# Patient Record
Sex: FEMALE | Race: WHITE | NOT HISPANIC OR LATINO | Employment: FULL TIME | URBAN - METROPOLITAN AREA
[De-identification: names, ages, dates, MRNs, and addresses within clinical notes are randomized per-mention and may not be internally consistent; named-entity substitution may affect disease eponyms.]

---

## 2017-03-17 ENCOUNTER — HOSPITAL ENCOUNTER (OUTPATIENT)
Dept: LAB | Facility: MEDICAL CENTER | Age: 32
End: 2017-03-17
Attending: FAMILY MEDICINE
Payer: COMMERCIAL

## 2017-03-17 LAB
ALBUMIN SERPL BCP-MCNC: 4.1 G/DL (ref 3.2–4.9)
ALBUMIN/GLOB SERPL: 1.6 G/DL
ALP SERPL-CCNC: 32 U/L (ref 30–99)
ALT SERPL-CCNC: 11 U/L (ref 2–50)
ANION GAP SERPL CALC-SCNC: 8 MMOL/L (ref 0–11.9)
AST SERPL-CCNC: 16 U/L (ref 12–45)
BASOPHILS # BLD AUTO: 0.03 K/UL (ref 0–0.12)
BASOPHILS NFR BLD AUTO: 0.4 % (ref 0–1.8)
BILIRUB SERPL-MCNC: 0.4 MG/DL (ref 0.1–1.5)
BUN SERPL-MCNC: 15 MG/DL (ref 8–22)
CALCIUM SERPL-MCNC: 9.3 MG/DL (ref 8.5–10.5)
CHLORIDE SERPL-SCNC: 105 MMOL/L (ref 96–112)
CO2 SERPL-SCNC: 26 MMOL/L (ref 20–33)
CREAT SERPL-MCNC: 0.93 MG/DL (ref 0.5–1.4)
CRP SERPL HS-MCNC: 0.07 MG/DL (ref 0–0.75)
EOSINOPHIL # BLD: 0.11 K/UL (ref 0–0.51)
EOSINOPHIL NFR BLD AUTO: 1.3 % (ref 0–6.9)
ERYTHROCYTE [DISTWIDTH] IN BLOOD BY AUTOMATED COUNT: 43.9 FL (ref 35.9–50)
ERYTHROCYTE [SEDIMENTATION RATE] IN BLOOD BY WESTERGREN METHOD: 6 MM/HOUR (ref 0–20)
GLOBULIN SER CALC-MCNC: 2.6 G/DL (ref 1.9–3.5)
GLUCOSE SERPL-MCNC: 82 MG/DL (ref 65–99)
HCT VFR BLD AUTO: 43.2 % (ref 37–47)
HGB BLD-MCNC: 13.9 G/DL (ref 12–16)
IMM GRANULOCYTES # BLD AUTO: 0.02 K/UL (ref 0–0.11)
IMM GRANULOCYTES NFR BLD AUTO: 0.2 % (ref 0–0.9)
LYMPHOCYTES # BLD: 2.41 K/UL (ref 1–4.8)
LYMPHOCYTES NFR BLD AUTO: 29.2 % (ref 22–41)
MCH RBC QN AUTO: 27.7 PG (ref 27–33)
MCHC RBC AUTO-ENTMCNC: 32.2 G/DL (ref 33.6–35)
MCV RBC AUTO: 86.2 FL (ref 81.4–97.8)
MONOCYTES # BLD: 0.4 K/UL (ref 0–0.85)
MONOCYTES NFR BLD AUTO: 4.9 % (ref 0–13.4)
NEUTROPHILS # BLD: 5.27 K/UL (ref 2–7.15)
NEUTROPHILS NFR BLD AUTO: 64 % (ref 44–72)
NRBC # BLD AUTO: 0 K/UL
NRBC BLD-RTO: 0 /100 WBC
PLATELET # BLD AUTO: 168 K/UL (ref 164–446)
PMV BLD AUTO: 12.3 FL (ref 9–12.9)
POTASSIUM SERPL-SCNC: 4.2 MMOL/L (ref 3.6–5.5)
PROT SERPL-MCNC: 6.7 G/DL (ref 6–8.2)
RBC # BLD AUTO: 5.01 M/UL (ref 4.2–5.4)
SODIUM SERPL-SCNC: 139 MMOL/L (ref 135–145)
TSH SERPL DL<=0.005 MIU/L-ACNC: 2.62 UIU/ML (ref 0.3–3.7)
WBC # BLD AUTO: 8.2 K/UL (ref 4.8–10.8)

## 2017-03-17 PROCEDURE — 86140 C-REACTIVE PROTEIN: CPT

## 2017-03-17 PROCEDURE — 82784 ASSAY IGA/IGD/IGG/IGM EACH: CPT | Mod: 91

## 2017-03-17 PROCEDURE — 80053 COMPREHEN METABOLIC PANEL: CPT

## 2017-03-17 PROCEDURE — 84443 ASSAY THYROID STIM HORMONE: CPT

## 2017-03-17 PROCEDURE — 85652 RBC SED RATE AUTOMATED: CPT

## 2017-03-17 PROCEDURE — 82784 ASSAY IGA/IGD/IGG/IGM EACH: CPT

## 2017-03-17 PROCEDURE — 85025 COMPLETE CBC W/AUTO DIFF WBC: CPT

## 2017-03-17 PROCEDURE — 36415 COLL VENOUS BLD VENIPUNCTURE: CPT

## 2017-03-17 PROCEDURE — 86038 ANTINUCLEAR ANTIBODIES: CPT

## 2017-03-18 LAB
IGA SERPL-MCNC: 28 MG/DL (ref 68–408)
IGG SERPL-MCNC: 956 MG/DL (ref 768–1632)
IGM SERPL-MCNC: 115 MG/DL (ref 35–263)

## 2017-03-20 LAB
NUCLEAR IGG SER QL IA: DETECTED
NUCLEAR IGG TITR SER IF: ABNORMAL {TITER}

## 2017-05-10 ENCOUNTER — HOSPITAL ENCOUNTER (OUTPATIENT)
Dept: LAB | Facility: MEDICAL CENTER | Age: 32
End: 2017-05-10
Attending: INTERNAL MEDICINE
Payer: COMMERCIAL

## 2017-05-10 LAB
25(OH)D3 SERPL-MCNC: 17 NG/ML (ref 30–100)
APPEARANCE UR: ABNORMAL
BACTERIA #/AREA URNS HPF: ABNORMAL /HPF
BILIRUB UR QL STRIP.AUTO: NEGATIVE
C3 SERPL-MCNC: 106 MG/DL (ref 87–200)
C4 SERPL-MCNC: 22 MG/DL (ref 19–52)
CK SERPL-CCNC: 78 U/L (ref 0–154)
COLOR UR: ABNORMAL
CULTURE IF INDICATED INDCX: NO UA CULTURE
EPI CELLS #/AREA URNS HPF: ABNORMAL /HPF
FOLATE SERPL-MCNC: >23.3 NG/ML
GLUCOSE UR STRIP.AUTO-MCNC: NEGATIVE MG/DL
KETONES UR STRIP.AUTO-MCNC: NEGATIVE MG/DL
LEUKOCYTE ESTERASE UR QL STRIP.AUTO: NEGATIVE
MICRO URNS: ABNORMAL
NITRITE UR QL STRIP.AUTO: NEGATIVE
PH UR STRIP.AUTO: 7 [PH]
PROT UR QL STRIP: NEGATIVE MG/DL
RBC # URNS HPF: ABNORMAL /HPF
RBC UR QL AUTO: NEGATIVE
RHEUMATOID FACT SER IA-ACNC: <10 IU/ML (ref 0–14)
SP GR UR STRIP.AUTO: 1.02
THYROPEROXIDASE AB SERPL-ACNC: 340.6 IU/ML (ref 0–9)
VIT B12 SERPL-MCNC: 618 PG/ML (ref 211–911)
WBC #/AREA URNS HPF: ABNORMAL /HPF

## 2017-05-10 PROCEDURE — 86225 DNA ANTIBODY NATIVE: CPT

## 2017-05-10 PROCEDURE — 86431 RHEUMATOID FACTOR QUANT: CPT

## 2017-05-10 PROCEDURE — 82306 VITAMIN D 25 HYDROXY: CPT

## 2017-05-10 PROCEDURE — 86376 MICROSOMAL ANTIBODY EACH: CPT

## 2017-05-10 PROCEDURE — 36415 COLL VENOUS BLD VENIPUNCTURE: CPT

## 2017-05-10 PROCEDURE — 82607 VITAMIN B-12: CPT

## 2017-05-10 PROCEDURE — 86160 COMPLEMENT ANTIGEN: CPT

## 2017-05-10 PROCEDURE — 82550 ASSAY OF CK (CPK): CPT

## 2017-05-10 PROCEDURE — 86235 NUCLEAR ANTIGEN ANTIBODY: CPT | Mod: 91

## 2017-05-10 PROCEDURE — 82085 ASSAY OF ALDOLASE: CPT

## 2017-05-10 PROCEDURE — 83516 IMMUNOASSAY NONANTIBODY: CPT

## 2017-05-10 PROCEDURE — 81001 URINALYSIS AUTO W/SCOPE: CPT

## 2017-05-10 PROCEDURE — 82746 ASSAY OF FOLIC ACID SERUM: CPT

## 2017-05-12 LAB — DSDNA AB TITR SER CLIF: NORMAL {TITER}

## 2017-05-13 LAB
CENTROMERE IGG TITR SER IF: 1 AU/ML (ref 0–40)
ENA SM IGG SER-ACNC: 0 AU/ML (ref 0–40)
ENA SS-B IGG SER IA-ACNC: 0 AU/ML (ref 0–40)
SSA52 R0ENA AB IGG Q0420: 3 AU/ML (ref 0–40)
SSA60 R0ENA AB IGG Q0419: 50 AU/ML (ref 0–40)
U1 SNRNP IGG SER QL: 0 AU/ML (ref 0–40)

## 2017-05-15 LAB — ALDOLASE SERPL-CCNC: 4.1 U/L (ref 1.5–8.1)

## 2017-07-23 ENCOUNTER — HOSPITAL ENCOUNTER (OUTPATIENT)
Dept: RADIOLOGY | Facility: MEDICAL CENTER | Age: 32
End: 2017-07-23
Attending: NURSE PRACTITIONER
Payer: COMMERCIAL

## 2017-07-23 DIAGNOSIS — K42.9 UMBILICAL HERNIA WITHOUT OBSTRUCTION AND WITHOUT GANGRENE: ICD-10-CM

## 2017-07-23 PROCEDURE — 76705 ECHO EXAM OF ABDOMEN: CPT

## 2017-08-02 DIAGNOSIS — Z01.812 PRE-OPERATIVE LABORATORY EXAMINATION: ICD-10-CM

## 2017-08-02 LAB
ANION GAP SERPL CALC-SCNC: 8 MMOL/L (ref 0–11.9)
BUN SERPL-MCNC: 12 MG/DL (ref 8–22)
CALCIUM SERPL-MCNC: 9.4 MG/DL (ref 8.5–10.5)
CHLORIDE SERPL-SCNC: 105 MMOL/L (ref 96–112)
CO2 SERPL-SCNC: 25 MMOL/L (ref 20–33)
CREAT SERPL-MCNC: 0.83 MG/DL (ref 0.5–1.4)
GFR SERPL CREATININE-BSD FRML MDRD: >60 ML/MIN/1.73 M 2
GLUCOSE SERPL-MCNC: 82 MG/DL (ref 65–99)
POTASSIUM SERPL-SCNC: 4 MMOL/L (ref 3.6–5.5)
SODIUM SERPL-SCNC: 138 MMOL/L (ref 135–145)

## 2017-08-02 PROCEDURE — 80048 BASIC METABOLIC PNL TOTAL CA: CPT

## 2017-08-02 PROCEDURE — 36415 COLL VENOUS BLD VENIPUNCTURE: CPT

## 2017-08-02 RX ORDER — SPIRONOLACTONE 50 MG/1
50 TABLET, FILM COATED ORAL EVERY MORNING
Status: ON HOLD | COMMUNITY
End: 2017-08-09

## 2017-08-02 RX ORDER — LEVOTHYROXINE SODIUM 137 UG/1
137 TABLET ORAL
Status: ON HOLD | COMMUNITY
End: 2017-08-09

## 2017-08-02 RX ORDER — LIOTHYRONINE SODIUM 25 UG/1
25 TABLET ORAL EVERY MORNING
Status: ON HOLD | COMMUNITY
End: 2017-08-09

## 2017-08-09 ENCOUNTER — HOSPITAL ENCOUNTER (OUTPATIENT)
Facility: MEDICAL CENTER | Age: 32
End: 2017-08-09
Attending: SURGERY | Admitting: SURGERY
Payer: COMMERCIAL

## 2017-08-09 VITALS
RESPIRATION RATE: 15 BRPM | OXYGEN SATURATION: 96 % | HEIGHT: 67 IN | TEMPERATURE: 98.4 F | WEIGHT: 182.54 LBS | SYSTOLIC BLOOD PRESSURE: 99 MMHG | BODY MASS INDEX: 28.65 KG/M2 | DIASTOLIC BLOOD PRESSURE: 60 MMHG | HEART RATE: 75 BPM

## 2017-08-09 PROBLEM — K42.9 UMBILICAL HERNIA WITHOUT MENTION OF OBSTRUCTION OR GANGRENE: Status: ACTIVE | Noted: 2017-08-09

## 2017-08-09 LAB
HCG UR QL: NEGATIVE
SP GR UR REFRACTOMETRY: 1.02

## 2017-08-09 PROCEDURE — 160025 RECOVERY II MINUTES (STATS): Performed by: SURGERY

## 2017-08-09 PROCEDURE — 501838 HCHG SUTURE GENERAL: Performed by: SURGERY

## 2017-08-09 PROCEDURE — C1781 MESH (IMPLANTABLE): HCPCS | Performed by: SURGERY

## 2017-08-09 PROCEDURE — 700102 HCHG RX REV CODE 250 W/ 637 OVERRIDE(OP)

## 2017-08-09 PROCEDURE — A6402 STERILE GAUZE <= 16 SQ IN: HCPCS | Performed by: SURGERY

## 2017-08-09 PROCEDURE — 160009 HCHG ANES TIME/MIN: Performed by: SURGERY

## 2017-08-09 PROCEDURE — 700111 HCHG RX REV CODE 636 W/ 250 OVERRIDE (IP)

## 2017-08-09 PROCEDURE — 160035 HCHG PACU - 1ST 60 MINS PHASE I: Performed by: SURGERY

## 2017-08-09 PROCEDURE — 160046 HCHG PACU - 1ST 60 MINS PHASE II: Performed by: SURGERY

## 2017-08-09 PROCEDURE — 81025 URINE PREGNANCY TEST: CPT

## 2017-08-09 PROCEDURE — 160002 HCHG RECOVERY MINUTES (STAT): Performed by: SURGERY

## 2017-08-09 PROCEDURE — 700101 HCHG RX REV CODE 250

## 2017-08-09 PROCEDURE — 160048 HCHG OR STATISTICAL LEVEL 1-5: Performed by: SURGERY

## 2017-08-09 PROCEDURE — A9270 NON-COVERED ITEM OR SERVICE: HCPCS

## 2017-08-09 PROCEDURE — 160027 HCHG SURGERY MINUTES - 1ST 30 MINS LEVEL 2: Performed by: SURGERY

## 2017-08-09 PROCEDURE — 160047 HCHG PACU  - EA ADDL 30 MINS PHASE II: Performed by: SURGERY

## 2017-08-09 PROCEDURE — 160038 HCHG SURGERY MINUTES - EA ADDL 1 MIN LEVEL 2: Performed by: SURGERY

## 2017-08-09 DEVICE — MESH VENTRALEX ST W/STRAP - 4.3CM SMALL (1EA/CA): Type: IMPLANTABLE DEVICE | Status: FUNCTIONAL

## 2017-08-09 RX ORDER — ONDANSETRON 4 MG/1
4 TABLET, ORALLY DISINTEGRATING ORAL EVERY 4 HOURS PRN
Qty: 10 TAB | Refills: 1 | Status: SHIPPED | OUTPATIENT
Start: 2017-08-09 | End: 2017-10-27

## 2017-08-09 RX ORDER — CELECOXIB 200 MG/1
CAPSULE ORAL
Status: COMPLETED
Start: 2017-08-09 | End: 2017-08-09

## 2017-08-09 RX ORDER — HYDROCODONE BITARTRATE AND ACETAMINOPHEN 5; 325 MG/1; MG/1
1-2 TABLET ORAL EVERY 6 HOURS PRN
Qty: 30 TAB | Refills: 0 | Status: SHIPPED | OUTPATIENT
Start: 2017-08-09 | End: 2017-10-27

## 2017-08-09 RX ORDER — MAGNESIUM HYDROXIDE 1200 MG/15ML
LIQUID ORAL
Status: DISCONTINUED | OUTPATIENT
Start: 2017-08-09 | End: 2017-08-09 | Stop reason: HOSPADM

## 2017-08-09 RX ORDER — LIDOCAINE AND PRILOCAINE 25; 25 MG/G; MG/G
1 CREAM TOPICAL
Status: DISCONTINUED | OUTPATIENT
Start: 2017-08-09 | End: 2017-08-09 | Stop reason: HOSPADM

## 2017-08-09 RX ORDER — OXYCODONE HYDROCHLORIDE 5 MG/1
5 TABLET ORAL EVERY 4 HOURS PRN
Qty: 30 TAB | Refills: 0 | Status: SHIPPED | OUTPATIENT
Start: 2017-08-09 | End: 2017-10-27

## 2017-08-09 RX ORDER — OXYCODONE HYDROCHLORIDE 5 MG/1
5 TABLET ORAL EVERY 4 HOURS PRN
Status: DISCONTINUED | OUTPATIENT
Start: 2017-08-09 | End: 2017-08-09 | Stop reason: HOSPADM

## 2017-08-09 RX ORDER — BUPIVACAINE HYDROCHLORIDE AND EPINEPHRINE 5; 5 MG/ML; UG/ML
INJECTION, SOLUTION EPIDURAL; INTRACAUDAL; PERINEURAL
Status: DISCONTINUED | OUTPATIENT
Start: 2017-08-09 | End: 2017-08-09 | Stop reason: HOSPADM

## 2017-08-09 RX ORDER — ONDANSETRON 2 MG/ML
4 INJECTION INTRAMUSCULAR; INTRAVENOUS EVERY 4 HOURS PRN
Status: DISCONTINUED | OUTPATIENT
Start: 2017-08-09 | End: 2017-08-09 | Stop reason: HOSPADM

## 2017-08-09 RX ORDER — DIPHENHYDRAMINE HYDROCHLORIDE 50 MG/ML
25 INJECTION INTRAMUSCULAR; INTRAVENOUS EVERY 6 HOURS PRN
Status: DISCONTINUED | OUTPATIENT
Start: 2017-08-09 | End: 2017-08-09 | Stop reason: HOSPADM

## 2017-08-09 RX ORDER — KETOROLAC TROMETHAMINE 30 MG/ML
INJECTION, SOLUTION INTRAMUSCULAR; INTRAVENOUS
Status: DISCONTINUED
Start: 2017-08-09 | End: 2017-08-09 | Stop reason: HOSPADM

## 2017-08-09 RX ORDER — LIDOCAINE HYDROCHLORIDE 10 MG/ML
0.5 INJECTION, SOLUTION INFILTRATION; PERINEURAL
Status: DISCONTINUED | OUTPATIENT
Start: 2017-08-09 | End: 2017-08-09 | Stop reason: HOSPADM

## 2017-08-09 RX ORDER — DEXAMETHASONE SODIUM PHOSPHATE 4 MG/ML
4 INJECTION, SOLUTION INTRA-ARTICULAR; INTRALESIONAL; INTRAMUSCULAR; INTRAVENOUS; SOFT TISSUE
Status: DISCONTINUED | OUTPATIENT
Start: 2017-08-09 | End: 2017-08-09 | Stop reason: HOSPADM

## 2017-08-09 RX ORDER — SCOLOPAMINE TRANSDERMAL SYSTEM 1 MG/1
PATCH, EXTENDED RELEASE TRANSDERMAL
Status: COMPLETED
Start: 2017-08-09 | End: 2017-08-09

## 2017-08-09 RX ORDER — SCOLOPAMINE TRANSDERMAL SYSTEM 1 MG/1
PATCH, EXTENDED RELEASE TRANSDERMAL
Status: DISCONTINUED
Start: 2017-08-09 | End: 2017-08-09 | Stop reason: HOSPADM

## 2017-08-09 RX ORDER — HALOPERIDOL 5 MG/ML
1 INJECTION INTRAMUSCULAR EVERY 6 HOURS PRN
Status: DISCONTINUED | OUTPATIENT
Start: 2017-08-09 | End: 2017-08-09 | Stop reason: HOSPADM

## 2017-08-09 RX ORDER — HYDROCODONE BITARTRATE AND ACETAMINOPHEN 5; 325 MG/1; MG/1
1-2 TABLET ORAL EVERY 6 HOURS PRN
Status: DISCONTINUED | OUTPATIENT
Start: 2017-08-09 | End: 2017-08-09

## 2017-08-09 RX ORDER — SCOLOPAMINE TRANSDERMAL SYSTEM 1 MG/1
1 PATCH, EXTENDED RELEASE TRANSDERMAL
Status: DISCONTINUED | OUTPATIENT
Start: 2017-08-09 | End: 2017-08-09 | Stop reason: HOSPADM

## 2017-08-09 RX ORDER — OXYCODONE HCL 10 MG/1
TABLET, FILM COATED, EXTENDED RELEASE ORAL
Status: COMPLETED
Start: 2017-08-09 | End: 2017-08-09

## 2017-08-09 RX ORDER — ONDANSETRON 4 MG/1
4 TABLET, ORALLY DISINTEGRATING ORAL EVERY 4 HOURS PRN
Status: DISCONTINUED | OUTPATIENT
Start: 2017-08-09 | End: 2017-08-09 | Stop reason: HOSPADM

## 2017-08-09 RX ORDER — OXYCODONE HCL 5 MG/5 ML
SOLUTION, ORAL ORAL
Status: COMPLETED
Start: 2017-08-09 | End: 2017-08-09

## 2017-08-09 RX ORDER — KETOROLAC TROMETHAMINE 30 MG/ML
30 INJECTION, SOLUTION INTRAMUSCULAR; INTRAVENOUS EVERY 6 HOURS
Status: DISCONTINUED | OUTPATIENT
Start: 2017-08-09 | End: 2017-08-09 | Stop reason: HOSPADM

## 2017-08-09 RX ORDER — ACETAMINOPHEN 500 MG
TABLET ORAL
Status: COMPLETED
Start: 2017-08-09 | End: 2017-08-09

## 2017-08-09 RX ORDER — SODIUM CHLORIDE, SODIUM LACTATE, POTASSIUM CHLORIDE, CALCIUM CHLORIDE 600; 310; 30; 20 MG/100ML; MG/100ML; MG/100ML; MG/100ML
INJECTION, SOLUTION INTRAVENOUS CONTINUOUS
Status: DISCONTINUED | OUTPATIENT
Start: 2017-08-09 | End: 2017-08-09 | Stop reason: HOSPADM

## 2017-08-09 RX ADMIN — CELECOXIB 400 MG: 200 CAPSULE ORAL at 08:37

## 2017-08-09 RX ADMIN — OXYCODONE HYDROCHLORIDE 10 MG: 10 TABLET, FILM COATED, EXTENDED RELEASE ORAL at 08:37

## 2017-08-09 RX ADMIN — OXYCODONE HYDROCHLORIDE 5 MG: 5 SOLUTION ORAL at 12:25

## 2017-08-09 RX ADMIN — SCOPALAMINE 1 PATCH: 1 PATCH, EXTENDED RELEASE TRANSDERMAL at 08:35

## 2017-08-09 RX ADMIN — SODIUM CHLORIDE, SODIUM LACTATE, POTASSIUM CHLORIDE, CALCIUM CHLORIDE: 600; 310; 30; 20 INJECTION, SOLUTION INTRAVENOUS at 09:00

## 2017-08-09 RX ADMIN — ACETAMINOPHEN 1000 MG: 500 TABLET, FILM COATED ORAL at 08:37

## 2017-08-09 ASSESSMENT — PAIN SCALES - GENERAL
PAINLEVEL_OUTOF10: 2
PAINLEVEL_OUTOF10: 0
PAINLEVEL_OUTOF10: 2
PAINLEVEL_OUTOF10: 0
PAINLEVEL_OUTOF10: 2

## 2017-08-09 NOTE — OR NURSING
1120: Patient has voided and feel like pain is tolerable at this time, vital signs are stable. Patient meet discharge criteria but is requesting a different pain medication to go home with. Dr. Tavares notified and stated he will come back to unit at some point to give her a new prescription.   1145: Patient was provided discharge education and is comfortable waiting for Dr. Tavares.

## 2017-08-09 NOTE — OP REPORT
DATE OF OPERATION: 8/9/2017    PREOPERATIVE DIAGNOSIS: Umbilical hernia.    POSTOPERATIVE DIAGNOSIS: Umbilical hernia.    PROCEDURE PERFORMED: Umbilical hernia repair with mesh.    SURGEON: Florencio Tavares MD    ASSISTANT: None.    ANESTHESIOLOGIST:Camilo Martin M.D./General     ANESTHESIA: General with LMA, local with 30 mL of 0.5% Marcaine with   epinephrine.    SPECIMENS: None.    BLOOD LOSS: Less than 5 mL    FINDINGS: A 1.5 cm defect. Hernia sac was completely reduced. A Ventralex   ST small mesh was placed in the preperitoneal space, secured with 0 Ethibond   sutures.    COMPLICATIONS: None.    DESCRIPTION OF PROCEDURE: The patient in supine position, general anesthesia   was administered, abdomen shaved, prepped with ChloraPrep and widely draped.   Local anesthesia was infiltrated along the superior edge of the umbilicus,   transverse incision made.  Blunt dissection was used to the fascia. Fascia was   cleaned. The umbilicus was detached from the underlying fascial attachments.   Hernia sac was identified, it was  from the umbilical tissue and was  able to be completely reduced. Superior edge of the fascia was cleaned.   At this point, the fascial defect was approximately 1.8 cm. I was able put a finger in   and out through this without any resistance. The Ventralex ST small mesh was placed   in the preperitoneal space after this had been slightly increased in size just with blunt   dissection with the finger. Once in the preperitoneal space, secured superiorly and inferiorly,   then left and right lateral with horizontal mattress sutures of 0 Ethibond. Once here   in place, these were tied with good lay of the mesh. The tails of the mesh were cut.   Small defect was then repaired using a 0 Vicryl running suture. Subcutaneous   tissues were irrigated. Umbilicus was reattached to the fascia with 3-0   Vicryl sutures x2. Subcutaneous tissues were reapproximated with 3-0 Vicryl   sutures  and the skin was reapproximated with 4-0 Vicryl running subcuticular   suture. Areas were cleaned and dried. Benzoin and Steri-Strips were applied   followed by a gauze and Tegaderm. The patient tolerated the procedure well   and was extubated in the OR, brought to recovery room. Plan to be discharged   to home.    ____________________________________  MD BRIE BLAIR / NTS

## 2017-08-09 NOTE — DISCHARGE INSTRUCTIONS
ACTIVITY: Rest and take it easy for the first 24 hours.  A responsible adult is recommended to remain with you during that time.  It is normal to feel sleepy.  We encourage you to not do anything that requires balance, judgment or coordination.    MILD FLU-LIKE SYMPTOMS ARE NORMAL. YOU MAY EXPERIENCE GENERALIZED MUSCLE ACHES, THROAT IRRITATION, HEADACHE AND/OR SOME NAUSEA.    FOR 24 HOURS DO NOT:  Drive, operate machinery or run household appliances.  Drink beer or alcoholic beverages.   Make important decisions or sign legal documents.    SPECIAL INSTRUCTIONS:   *No lifting >20lbs for 4 weeks.  *Follow up in office in 10 - 14 days  *Ice pack to incisions intermittently to reduce swelling and pain    SURGICAL DRESSING/BATHING:   *Remove dressing in 48 - 72 hrs.  *OK to shower when dressing removed / no bath for 2 weeks.    FOLLOW-UP APPOINTMENT:  A follow-up appointment should be arranged with your doctor in 10-14 days; call to schedule.    You should CALL YOUR PHYSICIAN if you develop:  Fever greater than 101 degrees F.  Pain not relieved by medication, or persistent nausea or vomiting.  Excessive bleeding (blood soaking through dressing) or unexpected drainage from the wound.  Extreme redness or swelling around the incision site, drainage of pus or foul smelling drainage.  Inability to urinate or empty your bladder within 8 hours.  Problems with breathing or chest pain.    You should call 911 if you develop problems with breathing or chest pain.  If you are unable to contact your doctor or surgical center, you should go to the nearest emergency room or urgent care center.  Physician's telephone #: [phone]    If any questions arise, call your doctor.  If your doctor is not available, please feel free to call the Surgical Center at [phone].  The Center is open Monday through Friday from 7AM to 7PM.  You can also call the HEALTH HOTLINE open 24 hours/day, 7 days/week and speak to a nurse at [phone]  [phone], or toll free at [phone].    A registered nurse may call you a few days after your surgery to see how you are doing after your procedure.    MEDICATIONS: Resume taking daily medication.  Take prescribed pain medication with food.  If no medication is prescribed, you may take non-aspirin pain medication if needed.  PAIN MEDICATION CAN BE VERY CONSTIPATING.  Take a stool softener or laxative such as senokot, pericolace, or milk of magnesia if needed.    Last pain medication given at 8:37 am.    If your physician has prescribed pain medication that includes Acetaminophen (Tylenol), do not take additional Acetaminophen (Tylenol) while taking the prescribed medication.    Depression / Suicide Risk    As you are discharged from this [facility], it is important to learn how to keep safe from harming yourself.    Recognize the warning signs:  · Abrupt changes in personality, positive or negative- including increase in energy   · Giving away possessions  · Change in eating patterns- significant weight changes-  positive or negative  · Change in sleeping patterns- unable to sleep or sleeping all the time   · Unwillingness or inability to communicate  · Depression  · Unusual sadness, discouragement and loneliness  · Talk of wanting to die  · Neglect of personal appearance   · Rebelliousness- reckless behavior  · Withdrawal from people/activities they love  · Confusion- inability to concentrate     If you or a loved one observes any of these behaviors or has concerns about self-harm, here's what you can do:  · Talk about it- your feelings and reasons for harming yourself  · Remove any means that you might use to hurt yourself (examples: pills, rope, extension cords, firearm)  · Get professional help from the community (Mental Health, Substance Abuse, psychological counseling)  · Do not be alone:Call your Safe Contact- someone whom you trust who will be there for you.  · Call your local CRISIS HOTLINE  480-3311 or 046-533-4246  · Call your local Children's Mobile Crisis Response Team [place] (001) 965-0607 or www.[employer].Arkadin  · Call the toll free National Suicide Prevention Hotlines   · National Suicide Prevention Lifeline 700-060-HQJF (8306)  · [employer] Line Network 800-SUICIDE (047-2724)

## 2017-08-09 NOTE — IP AVS SNAPSHOT
" Home Care Instructions                                                                                                                Name:Carolina Metcalf  Medical Record Number:[id number]  CSN: [id number]    YOB: 1985   Age: 32 y.o.  Sex: female  HT:1.702 m (5' 7\") WT: 82.8 kg (182 lb 8.7 oz)          Admit Date: 8/9/2017     Discharge Date:   Today's Date: 8/9/2017  Attending Doctor:  Florencio Tavares M.D.                  Allergies:  Shellfish allergy; Cephalosporins; Clindamycin hcl; Iodine; Penicillins; and Vancomycin                Discharge Instructions           ACTIVITY: Rest and take it easy for the first 24 hours.  A responsible adult is recommended to remain with you during that time.  It is normal to feel sleepy.  We encourage you to not do anything that requires balance, judgment or coordination.    MILD FLU-LIKE SYMPTOMS ARE NORMAL. YOU MAY EXPERIENCE GENERALIZED MUSCLE ACHES, THROAT IRRITATION, HEADACHE AND/OR SOME NAUSEA.    FOR 24 HOURS DO NOT:  Drive, operate machinery or run household appliances.  Drink beer or alcoholic beverages.   Make important decisions or sign legal documents.    SPECIAL INSTRUCTIONS:   *No lifting >20lbs for 4 weeks.  *Follow up in office in 10 - 14 days  *Ice pack to incisions intermittently to reduce swelling and pain    SURGICAL DRESSING/BATHING:   *Remove dressing in 48 - 72 hrs.  *OK to shower when dressing removed / no bath for 2 weeks.    FOLLOW-UP APPOINTMENT:  A follow-up appointment should be arranged with your doctor in 10-14 days; call to schedule.    You should CALL YOUR PHYSICIAN if you develop:  Fever greater than 101 degrees F.  Pain not relieved by medication, or persistent nausea or vomiting.  Excessive bleeding (blood soaking through dressing) or unexpected drainage from the wound.  Extreme redness or swelling around the incision site, drainage of pus or foul smelling drainage.  Inability to urinate or empty your bladder within 8 " hours.  Problems with breathing or chest pain.    You should call 911 if you develop problems with breathing or chest pain.  If you are unable to contact your doctor or surgical center, you should go to the nearest emergency room or urgent care center.  Physician's telephone #: [phone]    If any questions arise, call your doctor.  If your doctor is not available, please feel free to call the Surgical Center at [phone].  The Center is open Monday through Friday from 7AM to 7PM.  You can also call the HEALTH HOTLINE open 24 hours/day, 7 days/week and speak to a nurse at [phone], or toll free at [phone].    A registered nurse may call you a few days after your surgery to see how you are doing after your procedure.    MEDICATIONS: Resume taking daily medication.  Take prescribed pain medication with food.  If no medication is prescribed, you may take non-aspirin pain medication if needed.  PAIN MEDICATION CAN BE VERY CONSTIPATING.  Take a stool softener or laxative such as senokot, pericolace, or milk of magnesia if needed.    Last pain medication given at 8:37 am.    If your physician has prescribed pain medication that includes Acetaminophen (Tylenol), do not take additional Acetaminophen (Tylenol) while taking the prescribed medication.    Depression / Suicide Risk    As you are discharged from this [facility] facility, it is important to learn how to keep safe from harming yourself.    Recognize the warning signs:  · Abrupt changes in personality, positive or negative- including increase in energy   · Giving away possessions  · Change in eating patterns- significant weight changes-  positive or negative  · Change in sleeping patterns- unable to sleep or sleeping all the time   · Unwillingness or inability to communicate  · Depression  · Unusual sadness, discouragement and loneliness  · Talk of wanting to die  · Neglect of personal appearance   · Rebelliousness- reckless  behavior  · Withdrawal from people/activities they love  · Confusion- inability to concentrate     If you or a loved one observes any of these behaviors or has concerns about self-harm, here's what you can do:  · Talk about it- your feelings and reasons for harming yourself  · Remove any means that you might use to hurt yourself (examples: pills, rope, extension cords, firearm)  · Get professional help from the community (Mental Health, Substance Abuse, psychological counseling)  · Do not be alone:Call your Safe Contact- someone whom you trust who will be there for you.  · Call your local CRISIS HOTLINE 056-0009 or 359-474-5046  · Call your local Children's Mobile Crisis Response Team [place] (245) 326-5640 or www.[employer]  · Call the toll free National Suicide Prevention Hotlines   · National Suicide Prevention Lifeline 008-969-GYHZ (4208)  · [place] [employer] Line Network 800-SUICIDE (221-0215)       Medication List      START taking these medications        Instructions    Morning Afternoon Evening Bedtime    hydrocodone-acetaminophen 5-325 MG Tabs per tablet   Commonly known as:  NORCO        Take 1-2 Tabs by mouth every 6 hours as needed (as neededfor moderate pain).   Dose:  1-2 Tab                          STOP taking these medications     cyanocobalamin 1000 MCG/ML Soln   Commonly known as:  VITAMIN B-12               CYTOMEL 25 MCG Tabs   Generic drug:  liothyronine               levothyroxine 137 MCG Tabs   Commonly known as:  SYNTHROID               spironolactone 50 MG Tabs   Commonly known as:  ALDACTONE                    Where to Get Your Medications      You can get these medications from any pharmacy     Bring a paper prescription for each of these medications    - hydrocodone-acetaminophen 5-325 MG Tabs per tablet            Medication Information     Above and/or attached are the medications your physician expects you to take upon discharge. Review all of your home medications and newly  ordered medications with your doctor and/or pharmacist. Follow medication instructions as directed by your doctor and/or pharmacist. Please keep your medication list with you and share with your physician. Update the information when medications are discontinued, doses are changed, or new medications (including over-the-counter products) are added; and carry medication information at all times in the event of emergency situations.        Resources     Quit Smoking / Tobacco Use:    I understand the use of any tobacco products increases my chance of suffering from future heart disease or stroke and could cause other illnesses which may shorten my life. Quitting the use of tobacco products is the single most important thing I can do to improve my health. For further information on smoking / tobacco cessation call a Toll Free Quit Line at 1-365.651.9237 (*National [employer] [place]) or 1-219.326.5136 (American Lung Association) or you can access the web based program at www.lungusa.org.    [place] Tobacco Users Help Line:  (296) 969-9247       Toll Free: 1-773.585.1504  Quit Tobacco Program [facility] (769)727-0164    Crisis Hotline:    [place] Crisis Hotline:  8-551-QZTPUJC or 1-752.833.8731    [place] Crisis Hotline:    1-458.178.4318 or 021-219-0566    Discharge Survey:   Thank you for choosing [facility]. We hope we did everything we could to make your hospital stay a pleasant one. You may be receiving a survey and we would appreciate your time and participation in answering the questions. Your input is very valuable to us in our efforts to improve our service to our patients and their families.            Signatures     My signature on this form indicates that:    1. I acknowledge receipt and understanding of these Home Care Instruction.  2. My questions regarding this information have been answered to my satisfaction.  3. I have formulated a plan with my discharge nurse to obtain my  prescribed medications for home.    __________________________________      __________________________________                   Patient Signature                                 Guardian/Responsible Adult Signature      __________________________________                 __________       ________                       Nurse Signature                                               Date                 Time

## 2017-08-09 NOTE — IP AVS SNAPSHOT
8/9/2017    Carolina Metcalf  [address] [facility] [address]    Dear Carolina:    [facility] wants to ensure your discharge home is safe and you or your loved ones have had all of your questions answered regarding your care after you leave the hospital.    Below is a list of resources and contact information should you have any questions regarding your hospital stay, follow-up instructions, or active medical symptoms.    Questions or Concerns Regarding… Contact   Medical Questions Related to Your Discharge  (7 days a week, 8am-5pm) Contact a Nurse Care Coordinator   [phone]   Medical Questions Not Related to Your Discharge  (24 hours a day / 7 days a week)  Contact the Nurse Health Line   [phone]    Medications or Discharge Instructions Refer to your discharge packet   or contact your [facility] Primary Care Provider   [phone]   Follow-up Appointment(s) Schedule your appointment via Qumu   or contact Scheduling [phone]   Billing Review your statement via Qumu  or contact Billing [phone]   Medical Records Review your records via [employer]umu   or contact Medical Records [phone]     You may receive a telephone call within two days of discharge. This call is to make certain you understand your discharge instructions and have the opportunity to have any questions answered. You can also easily access your medical information, test results and upcoming appointments via the Qumu free online health management tool. You can learn more and sign up at [employer]/Qumu. For assistance setting up your Qumu account, please call [phone].    Once again, we want to ensure your discharge home is safe and that you have a clear understanding of any next steps in your care. If you have any questions or concerns, please do not hesitate to contact us, we are here for you. Thank you for choosing [facility] for your healthcare needs.    Sincerely,    Your [facility] Healthcare Team

## 2017-08-09 NOTE — OR SURGEON
Operative Report    PreOp Diagnosis: Umbilical hernia    PostOp Diagnosis: same    Procedure(s):  UMBILICAL HERNIA REPAIR WITH MESH - Wound Class: Clean    Surgeon(s):  Florencio Tavares M.D.    Anesthesiologist/Type of Anesthesia:  Anesthesiologist: Camilo Martin M.D./General    Surgical Staff:  Circulator: Berenice Christianson R.N.  Scrub Person: Silvino Pascual    Specimens:  * No specimens in log *    Estimated Blood Loss: <5 cc    Findings: Defect ~1.8 cm- allowed finger to pass / Ventralux ST small    Complications: none        8/9/2017 10:20 AM Florencio Tavares

## 2017-08-18 ENCOUNTER — OFFICE VISIT (OUTPATIENT)
Dept: URGENT CARE | Facility: PHYSICIAN GROUP | Age: 32
End: 2017-08-18
Payer: COMMERCIAL

## 2017-08-18 ENCOUNTER — HOSPITAL ENCOUNTER (EMERGENCY)
Facility: MEDICAL CENTER | Age: 32
End: 2017-08-19
Attending: EMERGENCY MEDICINE
Payer: COMMERCIAL

## 2017-08-18 VITALS
BODY MASS INDEX: 28.56 KG/M2 | WEIGHT: 182 LBS | DIASTOLIC BLOOD PRESSURE: 62 MMHG | SYSTOLIC BLOOD PRESSURE: 114 MMHG | HEART RATE: 78 BPM | RESPIRATION RATE: 16 BRPM | OXYGEN SATURATION: 99 % | TEMPERATURE: 98.4 F | HEIGHT: 67 IN

## 2017-08-18 DIAGNOSIS — Z87.19 STATUS POST REPAIR OF VENTRAL HERNIA: ICD-10-CM

## 2017-08-18 DIAGNOSIS — Z98.890 STATUS POST REPAIR OF VENTRAL HERNIA: ICD-10-CM

## 2017-08-18 DIAGNOSIS — R10.33 PERIUMBILICAL ABDOMINAL PAIN: ICD-10-CM

## 2017-08-18 DIAGNOSIS — G89.18 POST-OP PAIN: ICD-10-CM

## 2017-08-18 DIAGNOSIS — R11.2 NAUSEA AND VOMITING, INTRACTABILITY OF VOMITING NOT SPECIFIED, UNSPECIFIED VOMITING TYPE: ICD-10-CM

## 2017-08-18 LAB
ALBUMIN SERPL BCP-MCNC: 4.1 G/DL (ref 3.2–4.9)
ALBUMIN/GLOB SERPL: 1.6 G/DL
ALP SERPL-CCNC: 41 U/L (ref 30–99)
ALT SERPL-CCNC: 70 U/L (ref 2–50)
ANION GAP SERPL CALC-SCNC: 8 MMOL/L (ref 0–11.9)
APPEARANCE UR: CLEAR
AST SERPL-CCNC: 42 U/L (ref 12–45)
BASOPHILS # BLD AUTO: 0.5 % (ref 0–1.8)
BASOPHILS # BLD: 0.03 K/UL (ref 0–0.12)
BILIRUB SERPL-MCNC: 0.8 MG/DL (ref 0.1–1.5)
BILIRUB UR QL STRIP.AUTO: NEGATIVE
BUN SERPL-MCNC: 10 MG/DL (ref 8–22)
CALCIUM SERPL-MCNC: 9.1 MG/DL (ref 8.5–10.5)
CHLORIDE SERPL-SCNC: 104 MMOL/L (ref 96–112)
CO2 SERPL-SCNC: 25 MMOL/L (ref 20–33)
COLOR UR: YELLOW
CREAT SERPL-MCNC: 0.85 MG/DL (ref 0.5–1.4)
CULTURE IF INDICATED INDCX: NO UA CULTURE
EOSINOPHIL # BLD AUTO: 0.11 K/UL (ref 0–0.51)
EOSINOPHIL NFR BLD: 1.9 % (ref 0–6.9)
ERYTHROCYTE [DISTWIDTH] IN BLOOD BY AUTOMATED COUNT: 41.2 FL (ref 35.9–50)
GFR SERPL CREATININE-BSD FRML MDRD: >60 ML/MIN/1.73 M 2
GLOBULIN SER CALC-MCNC: 2.6 G/DL (ref 1.9–3.5)
GLUCOSE SERPL-MCNC: 84 MG/DL (ref 65–99)
GLUCOSE UR STRIP.AUTO-MCNC: NEGATIVE MG/DL
HCG UR QL: NEGATIVE
HCT VFR BLD AUTO: 41.8 % (ref 37–47)
HGB BLD-MCNC: 13.6 G/DL (ref 12–16)
IMM GRANULOCYTES # BLD AUTO: 0.01 K/UL (ref 0–0.11)
IMM GRANULOCYTES NFR BLD AUTO: 0.2 % (ref 0–0.9)
KETONES UR STRIP.AUTO-MCNC: 15 MG/DL
LEUKOCYTE ESTERASE UR QL STRIP.AUTO: NEGATIVE
LYMPHOCYTES # BLD AUTO: 1.77 K/UL (ref 1–4.8)
LYMPHOCYTES NFR BLD: 30.6 % (ref 22–41)
MCH RBC QN AUTO: 27.9 PG (ref 27–33)
MCHC RBC AUTO-ENTMCNC: 32.5 G/DL (ref 33.6–35)
MCV RBC AUTO: 85.7 FL (ref 81.4–97.8)
MICRO URNS: ABNORMAL
MONOCYTES # BLD AUTO: 0.47 K/UL (ref 0–0.85)
MONOCYTES NFR BLD AUTO: 8.1 % (ref 0–13.4)
NEUTROPHILS # BLD AUTO: 3.4 K/UL (ref 2–7.15)
NEUTROPHILS NFR BLD: 58.7 % (ref 44–72)
NITRITE UR QL STRIP.AUTO: NEGATIVE
NRBC # BLD AUTO: 0 K/UL
NRBC BLD AUTO-RTO: 0 /100 WBC
PH UR STRIP.AUTO: 5.5 [PH]
PLATELET # BLD AUTO: 134 K/UL (ref 164–446)
PMV BLD AUTO: 11.6 FL (ref 9–12.9)
POTASSIUM SERPL-SCNC: 3.7 MMOL/L (ref 3.6–5.5)
PROT SERPL-MCNC: 6.7 G/DL (ref 6–8.2)
PROT UR QL STRIP: NEGATIVE MG/DL
RBC # BLD AUTO: 4.88 M/UL (ref 4.2–5.4)
RBC UR QL AUTO: NEGATIVE
SODIUM SERPL-SCNC: 137 MMOL/L (ref 135–145)
SP GR UR REFRACTOMETRY: 1.01
SP GR UR STRIP.AUTO: 1.01
UROBILINOGEN UR STRIP.AUTO-MCNC: 0.2 MG/DL
WBC # BLD AUTO: 5.8 K/UL (ref 4.8–10.8)

## 2017-08-18 PROCEDURE — 96361 HYDRATE IV INFUSION ADD-ON: CPT

## 2017-08-18 PROCEDURE — 99284 EMERGENCY DEPT VISIT MOD MDM: CPT

## 2017-08-18 PROCEDURE — 81025 URINE PREGNANCY TEST: CPT

## 2017-08-18 PROCEDURE — 81003 URINALYSIS AUTO W/O SCOPE: CPT

## 2017-08-18 PROCEDURE — 700111 HCHG RX REV CODE 636 W/ 250 OVERRIDE (IP): Performed by: EMERGENCY MEDICINE

## 2017-08-18 PROCEDURE — 85025 COMPLETE CBC W/AUTO DIFF WBC: CPT

## 2017-08-18 PROCEDURE — 80053 COMPREHEN METABOLIC PANEL: CPT

## 2017-08-18 PROCEDURE — 700105 HCHG RX REV CODE 258: Performed by: EMERGENCY MEDICINE

## 2017-08-18 PROCEDURE — 99203 OFFICE O/P NEW LOW 30 MIN: CPT | Performed by: EMERGENCY MEDICINE

## 2017-08-18 PROCEDURE — 36415 COLL VENOUS BLD VENIPUNCTURE: CPT

## 2017-08-18 PROCEDURE — 96375 TX/PRO/DX INJ NEW DRUG ADDON: CPT

## 2017-08-18 PROCEDURE — 96374 THER/PROPH/DIAG INJ IV PUSH: CPT

## 2017-08-18 RX ORDER — MORPHINE SULFATE 4 MG/ML
4 INJECTION, SOLUTION INTRAMUSCULAR; INTRAVENOUS ONCE
Status: COMPLETED | OUTPATIENT
Start: 2017-08-18 | End: 2017-08-18

## 2017-08-18 RX ORDER — ONDANSETRON 2 MG/ML
4 INJECTION INTRAMUSCULAR; INTRAVENOUS ONCE
Status: COMPLETED | OUTPATIENT
Start: 2017-08-18 | End: 2017-08-18

## 2017-08-18 RX ORDER — DIPHENHYDRAMINE HYDROCHLORIDE 50 MG/ML
50 INJECTION INTRAMUSCULAR; INTRAVENOUS ONCE
Status: COMPLETED | OUTPATIENT
Start: 2017-08-18 | End: 2017-08-19

## 2017-08-18 RX ORDER — DIPHENHYDRAMINE HYDROCHLORIDE 50 MG/ML
50 INJECTION INTRAMUSCULAR; INTRAVENOUS ONCE
Status: COMPLETED | OUTPATIENT
Start: 2017-08-18 | End: 2017-08-18

## 2017-08-18 RX ORDER — SODIUM CHLORIDE 9 MG/ML
1000 INJECTION, SOLUTION INTRAVENOUS ONCE
Status: COMPLETED | OUTPATIENT
Start: 2017-08-18 | End: 2017-08-18

## 2017-08-18 RX ADMIN — ONDANSETRON 4 MG: 2 INJECTION INTRAMUSCULAR; INTRAVENOUS at 20:56

## 2017-08-18 RX ADMIN — MORPHINE SULFATE 4 MG: 4 INJECTION INTRAVENOUS at 20:56

## 2017-08-18 RX ADMIN — SODIUM CHLORIDE 1000 ML: 9 INJECTION, SOLUTION INTRAVENOUS at 20:54

## 2017-08-18 RX ADMIN — DIPHENHYDRAMINE HYDROCHLORIDE 50 MG: 50 INJECTION, SOLUTION INTRAMUSCULAR; INTRAVENOUS at 20:56

## 2017-08-18 ASSESSMENT — ENCOUNTER SYMPTOMS
FLANK PAIN: 0
NAUSEA: 1
CHILLS: 1
ANOREXIA: 1
VOMITING: 1
FLATUS: 0
HEMATOCHEZIA: 0
ABDOMINAL PAIN: 1
SORE THROAT: 0
HEADACHES: 1
FEVER: 1
MYALGIAS: 1
COUGH: 0
SHORTNESS OF BREATH: 0
BELCHING: 0
CONSTIPATION: 0
BLOOD IN STOOL: 0
DIARRHEA: 0

## 2017-08-18 ASSESSMENT — LIFESTYLE VARIABLES: DO YOU DRINK ALCOHOL: YES

## 2017-08-18 NOTE — ED NOTES
Carolina Metcalf  32 y.o.  Chief Complaint   Patient presents with   • Post-Op Complications     Pt states that she had an umbilical hernia repaired on the 9/10, yesterday at 11:30 am she began feeling increasing abdominal pain, weakness, nausea vomiring, fever, and headache. She was told to go to  and was then directed here.

## 2017-08-18 NOTE — ED AVS SNAPSHOT
Home Care Instructions                                                                                                                Carolina Metcalf   MRN: [id number]    Department:  [facility], Emergency Dept   Date of Visit:  8/18/2017            [facility], Emergency Dept    [address] [address]    Phone:  [phone]      You were seen by     1. Carlo Prasad M.D.    2. Michelle Prasad M.D.      Your Diagnosis Was     Post-op pain     G89.18       These are the medications you received during your hospitalization from 08/18/2017 1558 to 08/19/2017 0259     Date/Time Order Dose Route Action    08/18/2017 2056 ondansetron (ZOFRAN) syringe/vial injection 4 mg 4 mg Intravenous Given    08/18/2017 2054 NS infusion 1,000 mL 1,000 mL Intravenous New Bag    08/18/2017 2056 morphine (pf) 4 mg/ml injection 4 mg 4 mg Intravenous Given    08/18/2017 2056 diphenhydrAMINE (BENADRYL) injection 50 mg 50 mg Intravenous Given    08/19/2017 0124 diphenhydrAMINE (BENADRYL) injection 50 mg 50 mg Intravenous Given    08/19/2017 0120 ondansetron (ZOFRAN) syringe/vial injection 4 mg 4 mg Intravenous Given    08/19/2017 0300 iohexol (OMNIPAQUE) 350 mg/mL 100 mL Intravenous Given      Medication Information     Review all of your home medications and newly ordered medications with your primary doctor and/or pharmacist as soon as possible. Follow medication instructions as directed by your doctor and/or pharmacist.     Please keep your complete medication list with you and share with your physician. Update the information when medications are discontinued, doses are changed, or new medications (including over-the-counter products) are added; and carry medication information at all times in the event of emergency situations.               Medication List      ASK your doctor about these medications        Instructions    Morning Afternoon Evening Bedtime    hydrocodone-acetaminophen 5-325 MG Tabs per tablet   Commonly known as:  NORCO        Take 1-2 Tabs by mouth every 6 hours as needed (as neededfor moderate pain).   Dose:  1-2 Tab                        ondansetron 4 MG Tbdp   Commonly known as:  ZOFRAN ODT        Take 1 Tab by mouth every four hours as needed for Nausea/Vomiting.   Dose:  4 mg                        oxycodone immediate-release 5 MG Tabs   Commonly known as:  ROXICODONE        Take 1 Tab by mouth every four hours as needed for Severe Pain.   Dose:  5 mg                                Procedures and tests performed during your visit     CBC WITH DIFFERENTIAL    COMP METABOLIC PANEL    CT-ABDOMEN-PELVIS WITH    ESTIMATED GFR    HCG QUALITATIVE UR    INSERT IV    REFRACTOMETER SG    URINALYSIS,CULTURE IF INDICATED        Discharge Instructions       Pain Relief Preoperatively and Postoperatively  If you have questions, problems, or concerns about the pain that you may feel after surgery, let your health care provider know. Patients have the right to assessment and management of pain. Severe pain after surgery--and the fear or anxiety associated with that pain--may cause extreme discomfort that:  · Prevents sleep.  · Decreases the ability to breathe deeply and to cough. This can result in pneumonia or other upper airway infections.  · Causes the heart to beat more quickly and the blood pressure to be higher.  · Increases the risk for constipation and bloating.  · Decreases the ability of wounds to heal.  · May result in depression, increased anxiety, and feelings of helplessness.  Relieving pain before surgery (preoperatively) is also important because it lessens pain that you have after surgery (postoperatively). Patients who receive pain relief both before and after surgery experience greater pain relief than those who receive pain relief only after surgery. Let your health care provider know if you are having uncontrolled pain. This is very  important. Pain after surgery is more difficult to manage if it is severe, so receiving prompt and adequate treatment of acute pain is necessary. If you become constipated after taking pain medicine, drink more liquids if you can. Your health care provider may have you take a mild laxative.  PAIN CONTROL METHODS  Your health care providers follow policies and procedures about the management of your pain. These guidelines should be explained to you before surgery. Plans for pain control after surgery must be decided upon by you and your health care provider and put into use with your full understanding and agreement. Do not be afraid to ask questions about the care that you are receiving.  Your health care providers will attempt to control your pain in various ways, and these methods may be used together (multimodal analgesia). Using this approach has many benefits for you, including being able to eat, move around, and leave the hospital sooner.  As-Needed Pain Control  · You may be given pain medicine through an IV tube or as a pill or liquid that you can swallow. Let your health care provider know when you are having pain, and he or she will give you the pain medicine that is ordered for you.  IV Patient-Controlled Analgesia (PCA) Pump  · You can receive your pain medicine through an IV tube that goes into one of your veins. You can control the amount of pain medicine that you get. The pain medicine is controlled by a pump. When you push the button that is hooked up to this pump, you receive a specific amount of pain medicine. This button should be pushed only by you or by someone who is specifically assigned by you to do so. It is set up to keep you from accidentally giving yourself too much pain medicine. You will be able to start using your pain pump in the recovery room after your surgery. This method can be helpful for most types of surgery.  · Tell your health care provider:  ¨ If you are having too much  pain.  ¨ If you are feeling too sleepy or nauseous.  Continuous Epidural Pain Control  · A thin, soft tube (catheter) is put into your back, outside the outer layer of your spinal cord. Pain medicine flows through the catheter to lessen pain in areas of your body that are below the level of catheter placement. Continuous epidural pain control may work best for you if you are having surgery on your abdomen, hip area, or legs. The epidural catheter is usually put into your back shortly before surgery. It is left in until you can eat, take medicine by mouth, pass urine, and have a bowel movement.  · Giving pain medicine through the epidural catheter may help you to heal more quickly because you can do these things sooner:  ¨ Regain normal bowel and bladder function.  ¨ Return to eating.  ¨ Get up and walk.  Medicine That Numbs the Area (Local Anesthetic)  You may be given pain medicine:  · As an injection near the area of the pain (local infiltration).  · As an injection near the nerve that controls the sensation to a specific part of your body (peripheral nerve block).  · In your spine to block pain (spinal block).  · Through a local anesthetic reservoir pump. If your surgeon or anesthesiologist selects this option as a part of your pain control, one or more thin, soft tubes will be inserted into your incision site(s) at the end of surgery. These tubes will be connected to a device that is filled with a non-narcotic pain medicine. This medicine gradually empties into your incision site over the next several days. Usually, after all of the medicine is used, your health care provider will remove the tubes and throw away the device.  Opioids  · Moderate to moderately severe acute pain after surgery may respond to opioids. Opioids are narcotic pain medicine. Opioids are often combined with non-narcotic medicines to improve pain relief, lower the risk of side effects, and reduce the chance of addiction.  · If you follow  your health care provider's directions about taking opioids and you do not have a history of substance abuse, your risk of becoming addicted is very small. To prevent addiction, opioids are given for short periods of time in careful doses.  Other Methods of Pain Control  · Steroids.  · Physical therapy.  · Heat and cold therapy.  · Compression, such as wrapping an elastic bandage around the area of the pain.  · Massage.     This information is not intended to replace advice given to you by your health care provider. Make sure you discuss any questions you have with your health care provider.     Document Released: 03/09/2004 Document Revised: 01/08/2016 Document Reviewed: 03/13/2012  [employer] Interactive Patient Education ©2016 [employer]            Patient Information     Patient Information    Following emergency treatment: all patient requiring follow-up care must return either to a private physician or a clinic if your condition worsens before you are able to obtain further medical attention, please return to the emergency room.     Billing Information    At [facility], we work to make the billing process streamlined for our patients.  Our Representatives are here to answer any questions you may have regarding your hospital bill.  If you have insurance coverage and have supplied your insurance information to us, we will submit a claim to your insurer on your behalf.  Should you have any questions regarding your bill, we can be reached online or by phone as follows:  Online: You are able pay your bills online or live chat with our representatives about any billing questions you may have. We are here to help Monday - Friday from 8:00am to 7:30pm and 9:00am - 12:00pm on Saturdays.  Please visit [email or web address]  for more information.   Phone:  [phone] or [phone]    Please note that your emergency physician, surgeon, pathologist, radiologist, anesthesiologist,  and other specialists are not employed by [facility] and will therefore bill separately for their services.  Please contact them directly for any questions concerning their bills at the numbers below:     Emergency Physician Services:  [phone]  [facility]:  [phone]  [facility]:  [phone]  [facility]:  [phone]    1. Your final bill may vary from the amount quoted upon discharge if all procedures are not complete at that time, or if your doctor has additional procedures of which we are not aware. You will receive an additional bill if you return to the Emergency Department at [facility] for suture removal regardless of the facility of which the sutures were placed.     2. Please arrange for settlement of this account at the emergency registration.    3. All self-pay accounts are due in full at the time of treatment.  If you are unable to meet this obligation then payment is expected within 4-5 days.     4. If you have had radiology studies (CT, X-ray, Ultrasound, MRI), you have received a preliminary result during your emergency department visit. Please contact the radiology department [phone] to receive a copy of your final result. Please discuss the Final result with your primary physician or with the follow up physician provided.     Crisis Hotline:  [place] Crisis Hotline:  4-502-TYBONLG or 1-154.306.9100  [place] Crisis Hotline:    1-340.772.3839 or 763-931-6475         ED Discharge Follow Up Questions    1. In order to provide you with very good care, we would like to follow up with a phone call in the next few days.  May we have your permission to contact you?     YES /  NO    2. What is the best phone number to call you? (       )_____-__________    3. What is the best time to call you?      Morning  /  Afternoon  /  Evening                   Patient Signature:  ____________________________________________________________    Date:   ____________________________________________________________

## 2017-08-18 NOTE — PROGRESS NOTES
"Subjective:      Carolina Metcalf is a 32 y.o. female who presents with Nausea            Abdominal Pain  This is a new problem. The current episode started yesterday. The onset quality is sudden. The problem occurs constantly. The problem has been unchanged. The pain is located in the periumbilical region. The pain is moderate. The quality of the pain is aching. The abdominal pain does not radiate. Associated symptoms include anorexia, a fever, headaches, myalgias, nausea and vomiting. Pertinent negatives include no belching, constipation, diarrhea, dysuria, flatus, frequency, hematochezia, hematuria or melena. Nothing aggravates the pain. The pain is relieved by nothing. Treatments tried: Zofran. The treatment provided mild relief. Her past medical history is significant for abdominal surgery.    patient notes apparently uncomplicated umbilical hernia repair one week ago, was doing well until yesterday. No known exposure risk, no trauma.    Review of Systems   Constitutional: Positive for fever and chills.   HENT: Negative for congestion and sore throat.    Respiratory: Negative for cough and shortness of breath.    Cardiovascular: Negative for chest pain.   Gastrointestinal: Positive for nausea, vomiting, abdominal pain and anorexia. Negative for diarrhea, constipation, blood in stool, melena, hematochezia and flatus.        No hematemesis. Some decreased appetite.   Genitourinary: Negative for dysuria, urgency, frequency, hematuria and flank pain.   Musculoskeletal: Positive for myalgias.   Skin: Negative for rash.   Neurological: Positive for headaches.          Objective:     /62 mmHg  Pulse 78  Temp(Src) 36.9 °C (98.4 °F)  Resp 16  Ht 1.702 m (5' 7\")  Wt 82.555 kg (182 lb)  BMI 28.50 kg/m2  SpO2 99%  Breastfeeding? No     Physical Exam   Constitutional: She is oriented to person, place, and time. She appears well-developed and well-nourished. She is cooperative.  Non-toxic appearance. " She does not have a sickly appearance. No distress.   Appears well-hydrated.   HENT:   Head: Normocephalic.   Mouth/Throat: Oropharynx is clear and moist.   Eyes: Conjunctivae are normal. No scleral icterus.   Neck: Phonation normal. Neck supple. No thyromegaly present.   Cardiovascular: Normal rate, regular rhythm and normal heart sounds.    No murmur heard.  Pulmonary/Chest: Effort normal and breath sounds normal.   Abdominal: Soft. Bowel sounds are normal. She exhibits no distension and no pulsatile midline mass. There is no hepatosplenomegaly. There is tenderness in the periumbilical area. There is guarding. There is no rigidity, no rebound, no CVA tenderness and no tenderness at McBurney's point.   Lymphadenopathy:     She has no cervical adenopathy.        Right: No inguinal adenopathy present.        Left: No inguinal adenopathy present.   Neurological: She is alert and oriented to person, place, and time.   Skin: Skin is warm and dry.        Psychiatric: She has a normal mood and affect.      Advised patient of need for evaluations due to symptoms associated with recent surgery; likely to need laboratory and imaging evaluation now. Patient and mother prefer to go to the emergency department. [facility] emergency physician by telephone report.          Assessment/Plan:     1. Periumbilical abdominal pain  NPO    2. Nausea and vomiting, intractability of vomiting not specified, unspecified vomiting type    3. Status post repair of ventral hernia

## 2017-08-18 NOTE — ED AVS SNAPSHOT
8/19/2017    Carolina Metcalf  [address] [place] [address]    Dear Carolina:    [facility] wants to ensure your discharge home is safe and you or your loved ones have had all of your questions answered regarding your care after you leave the hospital.    Below is a list of resources and contact information should you have any questions regarding your hospital stay, follow-up instructions, or active medical symptoms.    Questions or Concerns Regarding… Contact   Medical Questions Related to Your Discharge  (7 days a week, 8am-5pm) Contact a Nurse Care Coordinator   [phone]   Medical Questions Not Related to Your Discharge  (24 hours a day / 7 days a week)  Contact the Nurse Health Line   [phone]    Medications or Discharge Instructions Refer to your discharge packet   or contact your [facility] Primary Care Provider   [phone]   Follow-up Appointment(s) Schedule your appointment via [employer]   or contact Scheduling [phone]   Billing Review your statement via [employer]  or contact Billing [phone]   Medical Records Review your records via [employer]   or contact Medical Records [phone]     You may receive a telephone call within two days of discharge. This call is to make certain you understand your discharge instructions and have the opportunity to have any questions answered. You can also easily access your medical information, test results and upcoming appointments via the [employer] free online health management tool. You can learn more and sign up at [email or web address]. For assistance setting up your [employer] account, please call [phone].    Once again, we want to ensure your discharge home is safe and that you have a clear understanding of any next steps in your care. If you have any questions or concerns, please do not hesitate to contact us, we are here for you. Thank you for choosing [facility] for your healthcare needs.    Sincerely,    Your [facility] Healthcare Team

## 2017-08-18 NOTE — MR AVS SNAPSHOT
"        Carolina Metcalf   2017 2:40 PM   Office Visit   MRN: [id number]    Department:  [facility]   Dept Phone:  [phone]    Description:  Female : 1985   Provider:  Pedro Esets M.D.           Reason for Visit     Nausea x 1 day, fever, body aches      Allergies as of 2017     Allergen Noted Reactions    Shellfish Allergy 10/17/2012   Rash    Throat swelling    Cephalosporins 10/17/2012   Hives, Rash    .    Iodine 10/15/2012   Rash    Topical iodine and injectable    Penicillins 10/15/2012   Hives, Rash    .    Vancomycin 10/15/2012   Rash    .      You were diagnosed with     Periumbilical abdominal pain   [066678]       Nausea and vomiting, intractability of vomiting not specified, unspecified vomiting type   [8865423]       Status post repair of ventral hernia   [[id number]]         Vital Signs     Blood Pressure Pulse Temperature Respirations Height Weight    114/62 mmHg 78 36.9 °C (98.4 °F) 16 1.702 m (5' 7\") 82.555 kg (182 lb)    Body Mass Index Oxygen Saturation Breastfeeding? Smoking Status          28.50 kg/m2 99% No Never Smoker         Basic Information     Date Of Birth Sex Race Ethnicity Preferred Language    1985 Female White Non- English      Problem List              ICD-10-CM Priority Class Noted - Resolved    Sinus tachycardia R00.0   10/17/2012 - Present    Dyskinesia    2013 - Present    Healthcare maintenance Z00.00   2013 - Present    Travel advice encounter Z71.89   2016 - Present    Umbilical hernia without mention of obstruction or gangrene K42.9   2017 - Present      Health Maintenance        Date Due Completion Dates    IMM INFLUENZA (1) 2017 10/22/2015, 10/3/2014, 2013, 2012    PAP SMEAR 2018 (Done)    Override on 2015: Done    IMM DTaP/Tdap/Td Vaccine (2 - Td) 2026            Current Immunizations     HPV 9-VALENT VACCINE (GARDASIL 9) 2007    Hep A/HEP B Combined " Vaccine (TwinRix) 1/4/2004, 8/24/2003, 6/16/2003    Influenza TIV (IM) 10/3/2014, 11/13/2013, 12/12/2012    Influenza Vaccine Quad Inj (Preserved) 10/22/2015    MMR Vaccine 6/26/2008    Tdap Vaccine 6/12/2016    Tuberculin Skin Test 5/14/2014,  Incomplete, 6/6/2012  7:00 AM, 5/21/2012  6:30 AM, 12/8/2010 12:35 PM      Below and/or attached are the medications your provider expects you to take. Review all of your home medications and newly ordered medications with your provider and/or pharmacist. Follow medication instructions as directed by your provider and/or pharmacist. Please keep your medication list with you and share with your provider. Update the information when medications are discontinued, doses are changed, or new medications (including over-the-counter products) are added; and carry medication information at all times in the event of emergency situations     Allergies:  SHELLFISH ALLERGY - Rash     CEPHALOSPORINS - Hives,Rash     IODINE - Rash     PENICILLINS - Hives,Rash     VANCOMYCIN - Rash               Medications  Valid as of: August 18, 2017 -  3:10 PM    Generic Name Brand Name Tablet Size Instructions for use    Hydrocodone-Acetaminophen (Tab) NORCO 5-325 MG Take 1-2 Tabs by mouth every 6 hours as needed (as neededfor moderate pain).        Ondansetron (TABLET DISPERSIBLE) ZOFRAN ODT 4 MG Take 1 Tab by mouth every four hours as needed for Nausea/Vomiting.        OxyCODONE HCl (Tab) ROXICODONE 5 MG Take 1 Tab by mouth every four hours as needed for Severe Pain.        .                 Medicines prescribed today were sent to:     [id number] - [place], [place] - [address]    [address] [place] [address]    Phone: [phone] Fax: [phone]    Open 24 Hours?: No      Medication refill instructions:       If your prescription bottle indicates you have medication refills left, it is not necessary to call your provider’s office. Please contact your pharmacy and they will refill  your medication.    If your prescription bottle indicates you do not have any refills left, you may request refills at any time through one of the following ways: The online [employer] system (except Urgent Care), by calling your provider’s office, or by asking your pharmacy to contact your provider’s office with a refill request. Medication refills are processed only during regular business hours and may not be available until the next business day. Your provider may request additional information or to have a follow-up visit with you prior to refilling your medication.   *Please Note: Medication refills are assigned a new Rx number when refilled electronically. Your pharmacy may indicate that no refills were authorized even though a new prescription for the same medication is available at the pharmacy. Please request the medicine by name with the pharmacy before contacting your provider for a refill.           [employer] Access Code: Activation code not generated  Current [employer] Status: Active

## 2017-08-19 ENCOUNTER — APPOINTMENT (OUTPATIENT)
Dept: RADIOLOGY | Facility: MEDICAL CENTER | Age: 32
End: 2017-08-19
Attending: EMERGENCY MEDICINE
Payer: COMMERCIAL

## 2017-08-19 VITALS
HEART RATE: 67 BPM | WEIGHT: 182.54 LBS | OXYGEN SATURATION: 98 % | TEMPERATURE: 97.7 F | DIASTOLIC BLOOD PRESSURE: 65 MMHG | BODY MASS INDEX: 28.58 KG/M2 | SYSTOLIC BLOOD PRESSURE: 106 MMHG | RESPIRATION RATE: 18 BRPM

## 2017-08-19 PROCEDURE — 700111 HCHG RX REV CODE 636 W/ 250 OVERRIDE (IP): Performed by: EMERGENCY MEDICINE

## 2017-08-19 PROCEDURE — 700117 HCHG RX CONTRAST REV CODE 255: Performed by: EMERGENCY MEDICINE

## 2017-08-19 PROCEDURE — 96376 TX/PRO/DX INJ SAME DRUG ADON: CPT

## 2017-08-19 PROCEDURE — 74177 CT ABD & PELVIS W/CONTRAST: CPT

## 2017-08-19 PROCEDURE — 96375 TX/PRO/DX INJ NEW DRUG ADDON: CPT

## 2017-08-19 RX ORDER — ONDANSETRON 2 MG/ML
4 INJECTION INTRAMUSCULAR; INTRAVENOUS ONCE
Status: COMPLETED | OUTPATIENT
Start: 2017-08-19 | End: 2017-08-19

## 2017-08-19 RX ADMIN — ONDANSETRON 4 MG: 2 INJECTION INTRAMUSCULAR; INTRAVENOUS at 01:20

## 2017-08-19 RX ADMIN — DIPHENHYDRAMINE HYDROCHLORIDE 50 MG: 50 INJECTION, SOLUTION INTRAMUSCULAR; INTRAVENOUS at 01:24

## 2017-08-19 RX ADMIN — IOHEXOL 100 ML: 350 INJECTION, SOLUTION INTRAVENOUS at 03:00

## 2017-08-19 NOTE — ED PROVIDER NOTES
"ED Provider Note        Primary care provider: Mary Brooke M.D.  Means of arrival: Walk-in  History obtained from: Patient  History limited by: None    CHIEF COMPLAINT  Chief Complaint   Patient presents with   • Post-Op Complications     Pt states that she had an umbilical hernia repaired on the 9/10, yesterday at 11:30 am she began feeling increasing abdominal pain, weakness, nausea vomiring, fever, and headache. She was told to go to  and was then directed here.        HPI  Carolina Metcalf is a 32 y.o. female who presents to the Emergency Department sent from Urgent Care due to post-op complications. The patient had an umbilical hernia repaired on 8/9 by Dr. Tavares (Surgery). She reports that her abdomen has been sore at night since the repair but she has had worsening abdominal pain since yesterday. She has had associated symptoms of generalized body aches, hot sweats, dizziness, chills, and nausea since yesterday, as well as a fever, with a temperature \"in the low 100's\". Per patient, she also had 1 episode of emesis today. Since vomiting, she has eaten a couple of crackers, which she has been able to retain. However, she still feels nauseas. The patient decided to go to Urgent Care today since her symptoms were not resolving. She does not report any aggravating or alleviating factors. She has had normal bowel movements since her hernia repair. The patient has previously had a cholecystectomy by laparoscopy.     REVIEW OF SYSTEMS  Pertinent positives include abdominal pain, generalized body aches, hot sweats, dizziness, chills, fever, nausea, and vomiting. Patient denies changes in bowel habits. All other systems are reviewed and negative.   C    PAST MEDICAL HISTORY   has a past medical history of Thyroid disease and Pain (08-).    SURGICAL HISTORY   has past surgical history that includes knee arthroscopy; kenroy by laparoscopy (5/1/2013); tonsillectomy and adenoidectomy; and umbilical " hernia repair (8/9/2017).    SOCIAL HISTORY  Social History   Substance Use Topics   • Smoking status: Never Smoker    • Smokeless tobacco: Never Used   • Alcohol Use: 0.0 oz/week     0 Standard drinks or equivalent per week      Comment: 1-2/week      History   Drug Use No       FAMILY HISTORY  Family History   Problem Relation Age of Onset   • Heart Disease Father    • Hypertension Father    • Cancer Paternal Aunt      breast   • Cancer Paternal Uncle      colon   • Diabetes Maternal Grandfather    • Cancer Paternal Grandmother      breast       CURRENT MEDICATIONS  Reviewed.  See Encounter Summary.     ALLERGIES  Allergies   Allergen Reactions   • Shellfish Allergy Rash     Throat swelling   • Cephalosporins Hives and Rash     .   • Iodine Rash     Topical iodine and injectable   • Penicillins Hives and Rash     .   • Vancomycin Rash     .       PHYSICAL EXAM  VITAL SIGNS: /65 mmHg  Pulse 78  Temp(Src) 36.5 °C (97.7 °F) (Oral)  Resp 18  Wt 82.8 kg (182 lb 8.7 oz)  SpO2 98%   Pulse ox interpretation: I interpret this pulse ox as normal.  Constitutional: Alert in no apparent distress.  HENT: Head normocephalic, atraumatic, Bilateral external ears normal, Nose normal.  Eyes: Pupils are equal, extraocular movements intact, Conjunctiva normal, Non-icteric.   Neck: Normal range of motion, Supple, No stridor.   Lymphatic: No lymphadenopathy noted.   Cardiovascular: Regular rate and rhythm   Thorax & Lungs: No acute respiratory distress, No wheezing, No increased work of breathing.   Abdomen: Well-healing laparoscopic incision points across abdomen, without erythema or tenderness directly above infection. Soft, Non-distended, Minimal tenderness to palpation. No rebound, No guarding, No pulsatile masses, No peritoneal signs.  Skin: Well-healing laparoscopic incision points across abdomen, without erythema or tenderness directly above infection. Warm, Dry, No erythema, No rash.   Back: No bony tenderness, No CVA  tenderness.   Extremities: Intact distal pulses, No edema, No tenderness, No cyanosis.    Musculoskeletal: Good range of motion in all major joints. No tenderness to palpation or major deformities noted.   Neurologic: Alert , Normal motor function, Normal sensory function, No focal deficits noted.   Psychiatric: Affect normal, Judgment normal, Mood normal.     DIAGNOSTIC STUDIES / PROCEDURES     LABS  Results for orders placed or performed during the hospital encounter of 08/18/17   CBC WITH DIFFERENTIAL   Result Value Ref Range    WBC 5.8 4.8 - 10.8 K/uL    RBC 4.88 4.20 - 5.40 M/uL    Hemoglobin 13.6 12.0 - 16.0 g/dL    Hematocrit 41.8 37.0 - 47.0 %    MCV 85.7 81.4 - 97.8 fL    MCH 27.9 27.0 - 33.0 pg    MCHC 32.5 (L) 33.6 - 35.0 g/dL    RDW 41.2 35.9 - 50.0 fL    Platelet Count 134 (L) 164 - 446 K/uL    MPV 11.6 9.0 - 12.9 fL    Neutrophils-Polys 58.70 44.00 - 72.00 %    Lymphocytes 30.60 22.00 - 41.00 %    Monocytes 8.10 0.00 - 13.40 %    Eosinophils 1.90 0.00 - 6.90 %    Basophils 0.50 0.00 - 1.80 %    Immature Granulocytes 0.20 0.00 - 0.90 %    Nucleated RBC 0.00 /100 WBC    Neutrophils (Absolute) 3.40 2.00 - 7.15 K/uL    Lymphs (Absolute) 1.77 1.00 - 4.80 K/uL    Monos (Absolute) 0.47 0.00 - 0.85 K/uL    Eos (Absolute) 0.11 0.00 - 0.51 K/uL    Baso (Absolute) 0.03 0.00 - 0.12 K/uL    Immature Granulocytes (abs) 0.01 0.00 - 0.11 K/uL    NRBC (Absolute) 0.00 K/uL   COMP METABOLIC PANEL   Result Value Ref Range    Sodium 137 135 - 145 mmol/L    Potassium 3.7 3.6 - 5.5 mmol/L    Chloride 104 96 - 112 mmol/L    Co2 25 20 - 33 mmol/L    Anion Gap 8.0 0.0 - 11.9    Glucose 84 65 - 99 mg/dL    Bun 10 8 - 22 mg/dL    Creatinine 0.85 0.50 - 1.40 mg/dL    Calcium 9.1 8.5 - 10.5 mg/dL    AST(SGOT) 42 12 - 45 U/L    ALT(SGPT) 70 (H) 2 - 50 U/L    Alkaline Phosphatase 41 30 - 99 U/L    Total Bilirubin 0.8 0.1 - 1.5 mg/dL    Albumin 4.1 3.2 - 4.9 g/dL    Total Protein 6.7 6.0 - 8.2 g/dL    Globulin 2.6 1.9 - 3.5 g/dL     A-G Ratio 1.6 g/dL   HCG QUALITATIVE UR   Result Value Ref Range    Beta-Hcg Urine Negative Negative   URINALYSIS,CULTURE IF INDICATED   Result Value Ref Range    Color Yellow     Character Clear     Specific Gravity 1.007 <1.035    Ph 5.5 5.0-8.0    Glucose Negative Negative mg/dL    Ketones 15 (A) Negative mg/dL    Protein Negative Negative mg/dL    Bilirubin Negative Negative    Urobilinogen, Urine 0.2 Negative    Nitrite Negative Negative    Leukocyte Esterase Negative Negative    Occult Blood Negative Negative    Micro Urine Req see below     Culture Indicated No UA Culture   REFRACTOMETER SG   Result Value Ref Range    Specific Gravity 1.006    ESTIMATED GFR   Result Value Ref Range    GFR If African American >60 >60 mL/min/1.73 m 2    GFR If Non African American >60 >60 mL/min/1.73 m 2     All labs were reviewed by me.    RADIOLOGY  CT-ABDOMEN-PELVIS WITH   Final Result         Periumbilical stranding is likely postsurgical with a tiny focus of soft tissue air as well as a tiny focus of anterior abdominal free air. Findings are likely related to recent hernia repair surgery.      Trace nonspecific free fluid in the pelvis.      Nonvisualization of the appendix, limiting evaluation for appendicitis.      IUD.      Status post cholecystectomy.      Limited evaluation of the descending and rectosigmoid colon which is relatively under distended. It is difficult to exclude mild colonic wall thickening.      Nonobstructing right renal calculus.           The radiologist's interpretation of all radiological studies have been reviewed by me.    COURSE & MEDICAL DECISION MAKING  Pertinent Labs & Imaging studies reviewed. (See chart for details)    8:10 PM - Patient seen and examined at bedside. Patient will be treated with NS infusion 1,000 mL IV for rehydration and benadryl 50 mg IV, morphine 4 mg IV, and zofran 4 mg IV for her symptoms. Ordered refractometer SG, urinalysis, HCG qualitative UR, CMP, CBC with  differential, and CT abdomen/pelvis to evaluate her symptoms.     9:01 PM- Ordered estimated GFR to further evaluate symptoms.     9:32 PM-Patient will be treated with benadryl 50 mg IV for her symptoms.         Decision Making:  This is a 32 y.o. year old female who presents with concern of some postoperative pain. She had an umbilical hernia repair and been having some increasing pain. She said that she was having some weakness, nausea, vomiting fever as well. She presented here afebrile no acute distress. Her labs showed no leukocytosis. She has some abdominal tenderness and was given fentanyl for pain control. CT scan was done to rule out any sort of postoperative infection such as a seroma, intra-abdominal infection, complication related to the repair. CT scan just showed some postoperative changes. At this time there is no signs of any worsening intra-abdominal infection therefore I recommend discharge home with 24-hour return precautions. She continue taking pain medicines at home as needed and can follow-up in the next 24 is if symptoms worsen.    FINAL IMPRESSION  1. Post-op pain

## 2017-08-19 NOTE — ED NOTES
Discharge instructions given to patient; patient verbalized understanding. Patient's VS WNL upon discharge. Patient ambulatory w/ steady gait upon leaving ED w/ mother.

## 2017-08-19 NOTE — DISCHARGE INSTRUCTIONS
Pain Relief Preoperatively and Postoperatively  If you have questions, problems, or concerns about the pain that you may feel after surgery, let your health care provider know. Patients have the right to assessment and management of pain. Severe pain after surgery--and the fear or anxiety associated with that pain--may cause extreme discomfort that:  · Prevents sleep.  · Decreases the ability to breathe deeply and to cough. This can result in pneumonia or other upper airway infections.  · Causes the heart to beat more quickly and the blood pressure to be higher.  · Increases the risk for constipation and bloating.  · Decreases the ability of wounds to heal.  · May result in depression, increased anxiety, and feelings of helplessness.  Relieving pain before surgery (preoperatively) is also important because it lessens pain that you have after surgery (postoperatively). Patients who receive pain relief both before and after surgery experience greater pain relief than those who receive pain relief only after surgery. Let your health care provider know if you are having uncontrolled pain. This is very important. Pain after surgery is more difficult to manage if it is severe, so receiving prompt and adequate treatment of acute pain is necessary. If you become constipated after taking pain medicine, drink more liquids if you can. Your health care provider may have you take a mild laxative.  PAIN CONTROL METHODS  Your health care providers follow policies and procedures about the management of your pain. These guidelines should be explained to you before surgery. Plans for pain control after surgery must be decided upon by you and your health care provider and put into use with your full understanding and agreement. Do not be afraid to ask questions about the care that you are receiving.  Your health care providers will attempt to control your pain in various ways, and these methods may be used together (multimodal  analgesia). Using this approach has many benefits for you, including being able to eat, move around, and leave the hospital sooner.  As-Needed Pain Control  · You may be given pain medicine through an IV tube or as a pill or liquid that you can swallow. Let your health care provider know when you are having pain, and he or she will give you the pain medicine that is ordered for you.  IV Patient-Controlled Analgesia (PCA) Pump  · You can receive your pain medicine through an IV tube that goes into one of your veins. You can control the amount of pain medicine that you get. The pain medicine is controlled by a pump. When you push the button that is hooked up to this pump, you receive a specific amount of pain medicine. This button should be pushed only by you or by someone who is specifically assigned by you to do so. It is set up to keep you from accidentally giving yourself too much pain medicine. You will be able to start using your pain pump in the recovery room after your surgery. This method can be helpful for most types of surgery.  · Tell your health care provider:  ¨ If you are having too much pain.  ¨ If you are feeling too sleepy or nauseous.  Continuous Epidural Pain Control  · A thin, soft tube (catheter) is put into your back, outside the outer layer of your spinal cord. Pain medicine flows through the catheter to lessen pain in areas of your body that are below the level of catheter placement. Continuous epidural pain control may work best for you if you are having surgery on your abdomen, hip area, or legs. The epidural catheter is usually put into your back shortly before surgery. It is left in until you can eat, take medicine by mouth, pass urine, and have a bowel movement.  · Giving pain medicine through the epidural catheter may help you to heal more quickly because you can do these things sooner:  ¨ Regain normal bowel and bladder function.  ¨ Return to eating.  ¨ Get up and walk.  Medicine That  Numbs the Area (Local Anesthetic)  You may be given pain medicine:  · As an injection near the area of the pain (local infiltration).  · As an injection near the nerve that controls the sensation to a specific part of your body (peripheral nerve block).  · In your spine to block pain (spinal block).  · Through a local anesthetic reservoir pump. If your surgeon or anesthesiologist selects this option as a part of your pain control, one or more thin, soft tubes will be inserted into your incision site(s) at the end of surgery. These tubes will be connected to a device that is filled with a non-narcotic pain medicine. This medicine gradually empties into your incision site over the next several days. Usually, after all of the medicine is used, your health care provider will remove the tubes and throw away the device.  Opioids  · Moderate to moderately severe acute pain after surgery may respond to opioids. Opioids are narcotic pain medicine. Opioids are often combined with non-narcotic medicines to improve pain relief, lower the risk of side effects, and reduce the chance of addiction.  · If you follow your health care provider's directions about taking opioids and you do not have a history of substance abuse, your risk of becoming addicted is very small. To prevent addiction, opioids are given for short periods of time in careful doses.  Other Methods of Pain Control  · Steroids.  · Physical therapy.  · Heat and cold therapy.  · Compression, such as wrapping an elastic bandage around the area of the pain.  · Massage.     This information is not intended to replace advice given to you by your health care provider. Make sure you discuss any questions you have with your health care provider.     Document Released: 03/09/2004 Document Revised: 01/08/2016 Document Reviewed: 03/13/2012  [employer] Patient Education ©2016 [employer]

## 2017-09-01 ENCOUNTER — EH NON-PROVIDER (OUTPATIENT)
Dept: OCCUPATIONAL MEDICINE | Facility: CLINIC | Age: 32
End: 2017-09-01

## 2017-09-01 DIAGNOSIS — Z29.89 NEED FOR ISOLATION: ICD-10-CM

## 2017-09-01 PROCEDURE — 94375 RESPIRATORY FLOW VOLUME LOOP: CPT

## 2017-09-19 ENCOUNTER — HOSPITAL ENCOUNTER (OUTPATIENT)
Dept: LAB | Facility: MEDICAL CENTER | Age: 32
End: 2017-09-19
Payer: COMMERCIAL

## 2017-09-19 LAB
BDY FAT % MEASURED: 31 %
BP DIAS: 63 MMHG
BP SYS: 118 MMHG
CHOLEST SERPL-MCNC: 161 MG/DL (ref 100–199)
DIABETES HTDIA: NO
EVENT NAME HTEVT: NORMAL
FASTING HTFAS: YES
GLUCOSE SERPL-MCNC: 88 MG/DL (ref 65–99)
HDLC SERPL-MCNC: 54 MG/DL
HYPERTENSION HTHYP: NO
LDLC SERPL CALC-MCNC: 96 MG/DL
SCREENING LOC CITY HTCIT: NORMAL
SCREENING LOC STATE HTSTA: NORMAL
SCREENING LOCATION HTLOC: NORMAL
SMOKING HTSMO: NO
SUBSCRIBER ID HTSID: NORMAL
TRIGL SERPL-MCNC: 54 MG/DL (ref 0–149)

## 2017-09-19 PROCEDURE — S5190 WELLNESS ASSESSMENT BY NONPH: HCPCS

## 2017-09-19 PROCEDURE — 80061 LIPID PANEL: CPT

## 2017-09-19 PROCEDURE — 36415 COLL VENOUS BLD VENIPUNCTURE: CPT

## 2017-09-19 PROCEDURE — 82947 ASSAY GLUCOSE BLOOD QUANT: CPT

## 2017-11-02 ENCOUNTER — IMMUNIZATION (OUTPATIENT)
Dept: OCCUPATIONAL MEDICINE | Facility: CLINIC | Age: 32
End: 2017-11-02

## 2017-11-02 DIAGNOSIS — Z23 NEED FOR VACCINATION: ICD-10-CM

## 2017-11-02 PROCEDURE — 90686 IIV4 VACC NO PRSV 0.5 ML IM: CPT | Performed by: PREVENTIVE MEDICINE

## 2018-01-10 ENCOUNTER — GYNECOLOGY VISIT (OUTPATIENT)
Dept: OBGYN | Facility: CLINIC | Age: 33
End: 2018-01-10
Payer: COMMERCIAL

## 2018-01-10 ENCOUNTER — HOSPITAL ENCOUNTER (OUTPATIENT)
Facility: MEDICAL CENTER | Age: 33
End: 2018-01-10
Attending: OBSTETRICS & GYNECOLOGY
Payer: COMMERCIAL

## 2018-01-10 VITALS
BODY MASS INDEX: 27.94 KG/M2 | WEIGHT: 178 LBS | HEIGHT: 67 IN | DIASTOLIC BLOOD PRESSURE: 80 MMHG | SYSTOLIC BLOOD PRESSURE: 132 MMHG

## 2018-01-10 DIAGNOSIS — Z11.51 SPECIAL SCREENING EXAMINATION FOR HUMAN PAPILLOMAVIRUS (HPV): ICD-10-CM

## 2018-01-10 DIAGNOSIS — N63.0 BREAST LUMP IN UPPER OUTER QUADRANT: ICD-10-CM

## 2018-01-10 DIAGNOSIS — Z12.4 SCREENING FOR CERVICAL CANCER: ICD-10-CM

## 2018-01-10 DIAGNOSIS — Z01.419 WELL WOMAN EXAM: ICD-10-CM

## 2018-01-10 PROCEDURE — 88175 CYTOPATH C/V AUTO FLUID REDO: CPT

## 2018-01-10 PROCEDURE — 87624 HPV HI-RISK TYP POOLED RSLT: CPT

## 2018-01-10 PROCEDURE — 99395 PREV VISIT EST AGE 18-39: CPT | Performed by: OBSTETRICS & GYNECOLOGY

## 2018-01-10 NOTE — PROGRESS NOTES
"Chief complaint is right breast lump and annual gynecologic exam       Carolina Metcalf32 y.o.  female presents for Annual Well Woman Exam.   Patient is currently without complaints. Patient is   Having no menses with last menstrual period 3 years ago. Consistent with Mirena use  Patient reports occasional spotting, no cramping or pain doing well with Mirena IUD  Patient reports noticing a right breast lump that she describes as a small clean in the right upper outer quadrant. She states it is been there approximately 3 weeks it is nontender and nonpainful she denies any breast discharge.    ROS: Patient is feeling well. No dyspnea or chest pain on exertion. No Abdominal pain, change in bowel habits, black or bloody stools. No urinary sx. GYN ROS:normal menses, no abnormal bleeding, pelvic pain or discharge, no vaginal bleeding, no discharge or pelvic pain. Positive breast symptoms:  tenderness on right upper outer quadrant,  mass on right upper outer quadrant, discharge none, , skin changes none  No neurological complaints.  Past Medical History:   Diagnosis Date   • Pain 2017    abd., 1/10   • Thyroid disease      None  Allergy:   Shellfish allergy; Cephalosporins; Iodine; Penicillins; and Vancomycin    LMP:       No LMP recorded. Patient has had an implant. .     Menstrual History: menses irregular: mirena iud      Pap history, the patient denies abnormal Pap smears and denies   sexually transmitted diseases    Mammo:    Objective : The patient appears well, alert and oriented x 3, in no acute distress.  Blood pressure 132/80, height 1.702 m (5' 7\"), weight 80.7 kg (178 lb), not currently breastfeeding.  HEENT Exam: EOMI, JENISE, no adenopathy or thyromegaly.  Lungs: Clear to Auscultation   Cor: S1 and S2 normal, no murmurs, or rubs   Abdomen: Soft without tenderness, guarding mass or organomegaly.  Extremities: No edema, pulses equal  Neurological: Normal No focal signs  Breast patient has a " small 0.5 cm firm nodule located in the right upper outer quadrant. It is nontender and mobile  Pelvic: External genitalia,urethral meatus, urethra, bladder and vagina normal. Cervix, uterus and adnexa intact and normal.  Anus and perineum normal. Bimanual and rectovaginal without masses or tenderness.  IUD string was noted at cervical os    Lab:No results found for this or any previous visit (from the past 336 hour(s)).    Assessment:  well woman    Plan:pap smear  counseled on breast self exam and family planning choices  return annually or prn  Patient will need a right breast ultrasound  Contraception:IUD

## 2018-01-12 LAB
CYTOLOGY REG CYTOL: ABNORMAL
HPV HR 12 DNA CVX QL NAA+PROBE: POSITIVE
HPV16 DNA SPEC QL NAA+PROBE: NEGATIVE
HPV18 DNA SPEC QL NAA+PROBE: NEGATIVE
SPECIMEN SOURCE: ABNORMAL

## 2018-01-15 ENCOUNTER — HOSPITAL ENCOUNTER (OUTPATIENT)
Dept: RADIOLOGY | Facility: MEDICAL CENTER | Age: 33
End: 2018-01-15
Attending: OBSTETRICS & GYNECOLOGY
Payer: COMMERCIAL

## 2018-01-15 DIAGNOSIS — N63.0 BREAST LUMP IN UPPER OUTER QUADRANT: ICD-10-CM

## 2018-01-15 PROCEDURE — 77066 DX MAMMO INCL CAD BI: CPT

## 2018-01-15 PROCEDURE — 76642 ULTRASOUND BREAST LIMITED: CPT | Mod: RT

## 2018-01-16 ENCOUNTER — TELEPHONE (OUTPATIENT)
Dept: OBGYN | Facility: CLINIC | Age: 33
End: 2018-01-16

## 2018-01-16 DIAGNOSIS — R87.612 LGSIL ON PAP SMEAR OF CERVIX: ICD-10-CM

## 2018-01-16 NOTE — TELEPHONE ENCOUNTER
I called and gave the patient her results and placed the referral for the colposcopy. Will you please sign off on the referral. Thank you

## 2018-01-24 ENCOUNTER — TELEPHONE (OUTPATIENT)
Dept: RADIOLOGY | Facility: MEDICAL CENTER | Age: 33
End: 2018-01-24

## 2018-01-24 NOTE — TELEPHONE ENCOUNTER
Spoke w/ pt to domenica apt @ [facility] on 1/25 @ 8:45 checki n @ 8:15, reviewed prep and location

## 2018-01-25 ENCOUNTER — HOSPITAL ENCOUNTER (OUTPATIENT)
Dept: RADIOLOGY | Facility: MEDICAL CENTER | Age: 33
End: 2018-01-25
Attending: OBSTETRICS & GYNECOLOGY
Payer: COMMERCIAL

## 2018-01-25 DIAGNOSIS — R92.8 ABNORMAL FINDINGS ON DIAGNOSTIC IMAGING OF BREAST: ICD-10-CM

## 2018-01-25 PROCEDURE — 88112 CYTOPATH CELL ENHANCE TECH: CPT

## 2018-01-25 PROCEDURE — 76942 ECHO GUIDE FOR BIOPSY: CPT | Mod: RT

## 2018-01-25 PROCEDURE — 88305 TISSUE EXAM BY PATHOLOGIST: CPT

## 2018-01-26 ENCOUNTER — TELEPHONE (OUTPATIENT)
Dept: RADIOLOGY | Facility: MEDICAL CENTER | Age: 33
End: 2018-01-26

## 2018-02-23 ENCOUNTER — HOSPITAL ENCOUNTER (OUTPATIENT)
Dept: LAB | Facility: MEDICAL CENTER | Age: 33
End: 2018-02-23
Attending: INTERNAL MEDICINE
Payer: COMMERCIAL

## 2018-02-23 LAB
ALBUMIN SERPL BCP-MCNC: 4.4 G/DL (ref 3.2–4.9)
ALBUMIN/GLOB SERPL: 1.8 G/DL
ALP SERPL-CCNC: 34 U/L (ref 30–99)
ALT SERPL-CCNC: 15 U/L (ref 2–50)
ANION GAP SERPL CALC-SCNC: 7 MMOL/L (ref 0–11.9)
AST SERPL-CCNC: 16 U/L (ref 12–45)
BILIRUB SERPL-MCNC: 0.9 MG/DL (ref 0.1–1.5)
BUN SERPL-MCNC: 14 MG/DL (ref 8–22)
CALCIUM SERPL-MCNC: 9.1 MG/DL (ref 8.5–10.5)
CHLORIDE SERPL-SCNC: 105 MMOL/L (ref 96–112)
CO2 SERPL-SCNC: 25 MMOL/L (ref 20–33)
CREAT SERPL-MCNC: 0.83 MG/DL (ref 0.5–1.4)
CREAT UR-MCNC: 52.6 MG/DL
GLOBULIN SER CALC-MCNC: 2.4 G/DL (ref 1.9–3.5)
GLUCOSE SERPL-MCNC: 89 MG/DL (ref 65–99)
PHOSPHATE SERPL-MCNC: 3.1 MG/DL (ref 2.5–4.5)
POTASSIUM SERPL-SCNC: 4.4 MMOL/L (ref 3.6–5.5)
PROT SERPL-MCNC: 6.8 G/DL (ref 6–8.2)
SODIUM SERPL-SCNC: 137 MMOL/L (ref 135–145)
T3FREE SERPL-MCNC: 3.77 PG/ML (ref 2.4–4.2)
T4 FREE SERPL-MCNC: 1.07 NG/DL (ref 0.53–1.43)
TSH SERPL DL<=0.005 MIU/L-ACNC: 0.3 UIU/ML (ref 0.38–5.33)

## 2018-02-23 PROCEDURE — 84481 FREE ASSAY (FT-3): CPT

## 2018-02-23 PROCEDURE — 82340 ASSAY OF CALCIUM IN URINE: CPT

## 2018-02-23 PROCEDURE — 82570 ASSAY OF URINE CREATININE: CPT

## 2018-02-23 PROCEDURE — 80053 COMPREHEN METABOLIC PANEL: CPT

## 2018-02-23 PROCEDURE — 36415 COLL VENOUS BLD VENIPUNCTURE: CPT

## 2018-02-23 PROCEDURE — 84105 ASSAY OF URINE PHOSPHORUS: CPT

## 2018-02-23 PROCEDURE — 84443 ASSAY THYROID STIM HORMONE: CPT

## 2018-02-23 PROCEDURE — 84439 ASSAY OF FREE THYROXINE: CPT

## 2018-02-23 PROCEDURE — 84100 ASSAY OF PHOSPHORUS: CPT

## 2018-02-25 LAB
CALCIUM 24H UR-MCNC: 4.4 MG/DL
CALCIUM 24H UR-MRATE: NORMAL MG/D
CALCIUM/CREAT 24H UR: 98 MG/G (ref 20–300)
COLLECT DURATION TIME SPEC: NORMAL HRS
COLLECT DURATION TIME SPEC: NORMAL HRS
CREAT 24H UR-MCNC: 45 MG/DL
CREAT 24H UR-MRATE: NORMAL MG/D (ref 700–1600)
PHOSPHATE 24H UR-MCNC: 25 MG/DL
PHOSPHATE 24H UR-MRATE: NORMAL MG/D (ref 400–1300)
PHOSPHATE/CREAT 24H UR: 556 MG/G
SPECIMEN VOL ?TM UR: NORMAL ML
TOTAL VOLUME 1105: NORMAL ML

## 2018-03-11 PROBLEM — E03.8 HYPOTHYROIDISM DUE TO HASHIMOTO'S THYROIDITIS: Status: ACTIVE | Noted: 2018-03-11

## 2018-03-11 PROBLEM — E06.3 HYPOTHYROIDISM DUE TO HASHIMOTO'S THYROIDITIS: Status: ACTIVE | Noted: 2018-03-11

## 2018-03-30 ENCOUNTER — HOSPITAL ENCOUNTER (OUTPATIENT)
Facility: MEDICAL CENTER | Age: 33
End: 2018-03-30
Attending: OBSTETRICS & GYNECOLOGY
Payer: COMMERCIAL

## 2018-03-30 ENCOUNTER — GYNECOLOGY VISIT (OUTPATIENT)
Dept: OBGYN | Facility: CLINIC | Age: 33
End: 2018-03-30
Payer: COMMERCIAL

## 2018-03-30 VITALS
HEIGHT: 67 IN | DIASTOLIC BLOOD PRESSURE: 80 MMHG | WEIGHT: 179 LBS | SYSTOLIC BLOOD PRESSURE: 118 MMHG | BODY MASS INDEX: 28.09 KG/M2

## 2018-03-30 DIAGNOSIS — R87.612 LGSIL ON PAP SMEAR OF CERVIX: ICD-10-CM

## 2018-03-30 LAB
INT CON NEG: NEGATIVE
INT CON POS: POSITIVE
POC URINE PREGNANCY TEST: NEGATIVE

## 2018-03-30 PROCEDURE — 81025 URINE PREGNANCY TEST: CPT | Performed by: OBSTETRICS & GYNECOLOGY

## 2018-03-30 PROCEDURE — 87591 N.GONORRHOEAE DNA AMP PROB: CPT

## 2018-03-30 PROCEDURE — 57454 BX/CURETT OF CERVIX W/SCOPE: CPT | Performed by: OBSTETRICS & GYNECOLOGY

## 2018-03-30 PROCEDURE — 87491 CHLMYD TRACH DNA AMP PROBE: CPT

## 2018-03-30 PROCEDURE — 88305 TISSUE EXAM BY PATHOLOGIST: CPT | Mod: 59

## 2018-03-30 NOTE — LETTER
COLPOSCOPY / LEEP DATA SHEET    Carolina Metcalf     1985      32 y.o.      No LMP recorded. Patient has had an implant.     @EDC@       Medical history:   o MVP    o Blood dyscrasia    o Rh     o Other: .    STD history:    o HPV     o HSV     o HIV     o GC     o Chlamydia     o None     o Other: .    HIV:   o Positive     o Negative                            IV Drug User:   o  Yes    o  No .    Age of first coitus:                                                Number of sex partners in lifetime:  .    Reason for exam:.    Prior abnormal PAPS?    o  Yes  o  No        Treatment: .    Prior history of condyloma?    o  Yes  o  No        Treatment:.     Colposcopic view - description:                                 Satisfactory?    o  Yes  o  No                    Squamo-columnar junction seen?  o  Yes  o  No.    Entire lesion seen?     o  Yes  o  No          PAP done?  o  Yes  o  No           Biopsies done?  o  Yes  o  No.    Biopsy sites:.    ECC done?    o  Yes  o  No      If no, reason:.    Impression:.    Recommendations:.             Colposcopist: ______________________________________________ Date: ___________________

## 2018-03-30 NOTE — PROGRESS NOTES
Chief complaint colposcopy    32-year-old G0 here today forcolposcopyPatient is currently without complaints. Using Mirena IUD for birth control    Indications for colposcopy are reviewed with patient  Pap smear shows Mildly abnormal (LGSIL - encompassing HPV,mild dysplasia,PRINCESS I).  Urine pregnancy test is negative  Risks of colposcopy are discussed and informed consent is signed  The patient is placed in dorsal lithotomy position, speculum is inserted into the vagina and the cervix was visualized  Acetic acid is applied to the cervix  The colposcope was then used to evaluate the cervix  The squamocolumnar junction is seen in its entirety  Findings for colposcopy are: There are small area at 12:00 position consistent with PRINCESS-1 biopsy taken  ECC is performed  Colposcopy is considered adequate  Hemostasis is achieved with Monsel solution  Patient tolerated the procedure well  Please see animation located in media, colposcopy/LEEP data sheet    The patient should followup in 1-2 weeks for results review  Pelvic rest x1 week

## 2018-04-01 LAB
C TRACH DNA SPEC QL NAA+PROBE: NEGATIVE
N GONORRHOEA DNA SPEC QL NAA+PROBE: NEGATIVE
SPECIMEN SOURCE: NORMAL

## 2018-09-10 ENCOUNTER — DOCUMENTATION (OUTPATIENT)
Dept: OCCUPATIONAL MEDICINE | Facility: CLINIC | Age: 33
End: 2018-09-10

## 2018-09-12 ENCOUNTER — HOSPITAL ENCOUNTER (OUTPATIENT)
Dept: LAB | Facility: MEDICAL CENTER | Age: 33
End: 2018-09-12
Attending: INTERNAL MEDICINE
Payer: COMMERCIAL

## 2018-09-12 ENCOUNTER — HOSPITAL ENCOUNTER (OUTPATIENT)
Dept: LAB | Facility: MEDICAL CENTER | Age: 33
End: 2018-09-12
Payer: COMMERCIAL

## 2018-09-12 LAB
ALBUMIN SERPL BCP-MCNC: 4.4 G/DL (ref 3.2–4.9)
ALBUMIN/GLOB SERPL: 1.7 G/DL
ALP SERPL-CCNC: 33 U/L (ref 30–99)
ALT SERPL-CCNC: 25 U/L (ref 2–50)
ANION GAP SERPL CALC-SCNC: 5 MMOL/L (ref 0–11.9)
AST SERPL-CCNC: 21 U/L (ref 12–45)
BDY FAT % MEASURED: 26.5 %
BILIRUB SERPL-MCNC: 0.5 MG/DL (ref 0.1–1.5)
BP DIAS: 70 MMHG
BP SYS: 116 MMHG
BUN SERPL-MCNC: 16 MG/DL (ref 8–22)
CALCIUM SERPL-MCNC: 9.1 MG/DL (ref 8.5–10.5)
CHLORIDE SERPL-SCNC: 106 MMOL/L (ref 96–112)
CHOLEST SERPL-MCNC: 181 MG/DL (ref 100–199)
CO2 SERPL-SCNC: 26 MMOL/L (ref 20–33)
CREAT SERPL-MCNC: 0.84 MG/DL (ref 0.5–1.4)
CREAT UR-MCNC: 86.8 MG/DL
DIABETES HTDIA: NO
EVENT NAME HTEVT: NORMAL
FASTING HTFAS: YES
GLOBULIN SER CALC-MCNC: 2.6 G/DL (ref 1.9–3.5)
GLUCOSE SERPL-MCNC: 84 MG/DL (ref 65–99)
GLUCOSE SERPL-MCNC: 84 MG/DL (ref 65–99)
HDLC SERPL-MCNC: 58 MG/DL
HYPERTENSION HTHYP: NO
LDLC SERPL CALC-MCNC: 112 MG/DL
PHOSPHATE SERPL-MCNC: 3.4 MG/DL (ref 2.5–4.5)
POTASSIUM SERPL-SCNC: 4.4 MMOL/L (ref 3.6–5.5)
PROT SERPL-MCNC: 7 G/DL (ref 6–8.2)
SCREENING LOC CITY HTCIT: NORMAL
SCREENING LOC STATE HTSTA: NORMAL
SCREENING LOCATION HTLOC: NORMAL
SMOKING HTSMO: NO
SODIUM SERPL-SCNC: 137 MMOL/L (ref 135–145)
SUBSCRIBER ID HTSID: NORMAL
T3FREE SERPL-MCNC: 3.28 PG/ML (ref 2.4–4.2)
T4 FREE SERPL-MCNC: 0.62 NG/DL (ref 0.53–1.43)
TRIGL SERPL-MCNC: 57 MG/DL (ref 0–149)
TSH SERPL DL<=0.005 MIU/L-ACNC: 0.17 UIU/ML (ref 0.38–5.33)

## 2018-09-12 PROCEDURE — 36415 COLL VENOUS BLD VENIPUNCTURE: CPT

## 2018-09-12 PROCEDURE — 86039 ANTINUCLEAR ANTIBODIES (ANA): CPT

## 2018-09-12 PROCEDURE — 82340 ASSAY OF CALCIUM IN URINE: CPT

## 2018-09-12 PROCEDURE — 80053 COMPREHEN METABOLIC PANEL: CPT

## 2018-09-12 PROCEDURE — 82570 ASSAY OF URINE CREATININE: CPT

## 2018-09-12 PROCEDURE — S5190 WELLNESS ASSESSMENT BY NONPH: HCPCS

## 2018-09-12 PROCEDURE — 84100 ASSAY OF PHOSPHORUS: CPT

## 2018-09-12 PROCEDURE — 86038 ANTINUCLEAR ANTIBODIES: CPT

## 2018-09-12 PROCEDURE — 84439 ASSAY OF FREE THYROXINE: CPT

## 2018-09-12 PROCEDURE — 84105 ASSAY OF URINE PHOSPHORUS: CPT

## 2018-09-12 PROCEDURE — 80061 LIPID PANEL: CPT

## 2018-09-12 PROCEDURE — 82947 ASSAY GLUCOSE BLOOD QUANT: CPT

## 2018-09-12 PROCEDURE — 81256 HFE GENE: CPT

## 2018-09-12 PROCEDURE — 84443 ASSAY THYROID STIM HORMONE: CPT

## 2018-09-12 PROCEDURE — 84481 FREE ASSAY (FT-3): CPT

## 2018-09-14 LAB
CALCIUM 24H UR-MCNC: 5.5 MG/DL
CALCIUM 24H UR-MRATE: NORMAL MG/D
CALCIUM/CREAT 24H UR: 75 MG/G (ref 20–300)
COLLECT DURATION TIME SPEC: NORMAL HRS
COLLECT DURATION TIME SPEC: NORMAL HRS
CREAT 24H UR-MCNC: 73 MG/DL
CREAT 24H UR-MRATE: NORMAL MG/D (ref 700–1600)
NUCLEAR IGG SER QL IA: DETECTED
PHOSPHATE 24H UR-MCNC: 44 MG/DL
PHOSPHATE 24H UR-MRATE: NORMAL MG/D (ref 400–1300)
PHOSPHATE/CREAT 24H UR: 603 MG/G
SPECIMEN VOL ?TM UR: NORMAL ML
TOTAL VOLUME 1105: NORMAL ML

## 2018-09-15 LAB
HFE GENE MUT ANL BLD/T: NORMAL
HFE P.C282Y BLD/T QL: NEGATIVE
HFE P.H63D BLD/T QL: NEGATIVE
HFE P.S65C BLD/T QL: NEGATIVE

## 2018-09-16 LAB — NUCLEAR IGG TITR SER IF: ABNORMAL {TITER}

## 2018-09-24 ENCOUNTER — IMMUNIZATION (OUTPATIENT)
Dept: OCCUPATIONAL MEDICINE | Facility: CLINIC | Age: 33
End: 2018-09-24

## 2018-09-24 DIAGNOSIS — Z23 NEED FOR VACCINATION: ICD-10-CM

## 2018-09-24 PROCEDURE — 90686 IIV4 VACC NO PRSV 0.5 ML IM: CPT | Performed by: PREVENTIVE MEDICINE

## 2018-09-27 ENCOUNTER — EH NON-PROVIDER (OUTPATIENT)
Dept: OCCUPATIONAL MEDICINE | Facility: CLINIC | Age: 33
End: 2018-09-27

## 2018-09-27 DIAGNOSIS — Z02.89 ENCOUNTER FOR OCCUPATIONAL HEALTH EXAMINATION INVOLVING RESPIRATOR: Primary | ICD-10-CM

## 2018-09-27 PROCEDURE — 94375 RESPIRATORY FLOW VOLUME LOOP: CPT | Performed by: PREVENTIVE MEDICINE

## 2019-01-06 ENCOUNTER — HOSPITAL ENCOUNTER (OUTPATIENT)
Facility: MEDICAL CENTER | Age: 34
End: 2019-01-06
Attending: PHYSICIAN ASSISTANT
Payer: COMMERCIAL

## 2019-01-06 ENCOUNTER — OFFICE VISIT (OUTPATIENT)
Dept: URGENT CARE | Facility: PHYSICIAN GROUP | Age: 34
End: 2019-01-06
Payer: COMMERCIAL

## 2019-01-06 VITALS
HEART RATE: 79 BPM | DIASTOLIC BLOOD PRESSURE: 58 MMHG | TEMPERATURE: 98.2 F | OXYGEN SATURATION: 98 % | BODY MASS INDEX: 28.25 KG/M2 | WEIGHT: 180 LBS | RESPIRATION RATE: 16 BRPM | SYSTOLIC BLOOD PRESSURE: 102 MMHG | HEIGHT: 67 IN

## 2019-01-06 DIAGNOSIS — B37.31 VULVOVAGINAL CANDIDIASIS: ICD-10-CM

## 2019-01-06 LAB
APPEARANCE UR: CLEAR
BILIRUB UR STRIP-MCNC: NORMAL MG/DL
COLOR UR AUTO: YELLOW
GLUCOSE UR STRIP.AUTO-MCNC: NORMAL MG/DL
INT CON NEG: NEGATIVE
INT CON POS: POSITIVE
KETONES UR STRIP.AUTO-MCNC: 15 MG/DL
LEUKOCYTE ESTERASE UR QL STRIP.AUTO: NORMAL
NITRITE UR QL STRIP.AUTO: NORMAL
PH UR STRIP.AUTO: 7 [PH] (ref 5–8)
POC URINE PREGNANCY TEST: NORMAL
PROT UR QL STRIP: NORMAL MG/DL
RBC UR QL AUTO: NORMAL
SP GR UR STRIP.AUTO: 1.02
UROBILINOGEN UR STRIP-MCNC: 0.2 MG/DL

## 2019-01-06 PROCEDURE — 99214 OFFICE O/P EST MOD 30 MIN: CPT | Performed by: PHYSICIAN ASSISTANT

## 2019-01-06 PROCEDURE — 81002 URINALYSIS NONAUTO W/O SCOPE: CPT | Performed by: PHYSICIAN ASSISTANT

## 2019-01-06 PROCEDURE — 87491 CHLMYD TRACH DNA AMP PROBE: CPT

## 2019-01-06 PROCEDURE — 87591 N.GONORRHOEAE DNA AMP PROB: CPT

## 2019-01-06 PROCEDURE — 81025 URINE PREGNANCY TEST: CPT | Performed by: PHYSICIAN ASSISTANT

## 2019-01-06 RX ORDER — FLUCONAZOLE 150 MG
TABLET ORAL
Qty: 2 TAB | Refills: 1 | Status: SHIPPED | OUTPATIENT
Start: 2019-01-06 | End: 2021-01-12

## 2019-01-07 ENCOUNTER — TELEPHONE (OUTPATIENT)
Dept: URGENT CARE | Facility: PHYSICIAN GROUP | Age: 34
End: 2019-01-07

## 2019-01-07 NOTE — TELEPHONE ENCOUNTER
[place] the dna panal was mislabeled and they could not read the label that was written on the tube for this patient.  Do you want us to recollect? If so we need a new requisition. thanks

## 2019-03-27 ENCOUNTER — HOSPITAL ENCOUNTER (OUTPATIENT)
Dept: LAB | Facility: MEDICAL CENTER | Age: 34
End: 2019-03-27
Attending: FAMILY MEDICINE
Payer: COMMERCIAL

## 2019-03-27 LAB
ALBUMIN SERPL BCP-MCNC: 4.2 G/DL (ref 3.2–4.9)
ALBUMIN/GLOB SERPL: 1.6 G/DL
ALP SERPL-CCNC: 33 U/L (ref 30–99)
ALT SERPL-CCNC: 18 U/L (ref 2–50)
ANION GAP SERPL CALC-SCNC: 7 MMOL/L (ref 0–11.9)
AST SERPL-CCNC: 18 U/L (ref 12–45)
BASOPHILS # BLD AUTO: 0.5 % (ref 0–1.8)
BASOPHILS # BLD: 0.03 K/UL (ref 0–0.12)
BILIRUB SERPL-MCNC: 0.7 MG/DL (ref 0.1–1.5)
BUN SERPL-MCNC: 16 MG/DL (ref 8–22)
CALCIUM SERPL-MCNC: 9 MG/DL (ref 8.5–10.5)
CHLORIDE SERPL-SCNC: 107 MMOL/L (ref 96–112)
CHOLEST SERPL-MCNC: 173 MG/DL (ref 100–199)
CO2 SERPL-SCNC: 26 MMOL/L (ref 20–33)
CREAT SERPL-MCNC: 0.95 MG/DL (ref 0.5–1.4)
EOSINOPHIL # BLD AUTO: 0.05 K/UL (ref 0–0.51)
EOSINOPHIL NFR BLD: 0.8 % (ref 0–6.9)
ERYTHROCYTE [DISTWIDTH] IN BLOOD BY AUTOMATED COUNT: 43.6 FL (ref 35.9–50)
GLOBULIN SER CALC-MCNC: 2.7 G/DL (ref 1.9–3.5)
GLUCOSE SERPL-MCNC: 88 MG/DL (ref 65–99)
HCT VFR BLD AUTO: 45 % (ref 37–47)
HDLC SERPL-MCNC: 64 MG/DL
HGB BLD-MCNC: 14.6 G/DL (ref 12–16)
HIV 1+2 AB+HIV1 P24 AG SERPL QL IA: NON REACTIVE
IMM GRANULOCYTES # BLD AUTO: 0.01 K/UL (ref 0–0.11)
IMM GRANULOCYTES NFR BLD AUTO: 0.2 % (ref 0–0.9)
LDLC SERPL CALC-MCNC: 100 MG/DL
LYMPHOCYTES # BLD AUTO: 2.05 K/UL (ref 1–4.8)
LYMPHOCYTES NFR BLD: 30.8 % (ref 22–41)
MCH RBC QN AUTO: 28.2 PG (ref 27–33)
MCHC RBC AUTO-ENTMCNC: 32.4 G/DL (ref 33.6–35)
MCV RBC AUTO: 86.9 FL (ref 81.4–97.8)
MONOCYTES # BLD AUTO: 0.35 K/UL (ref 0–0.85)
MONOCYTES NFR BLD AUTO: 5.3 % (ref 0–13.4)
NEUTROPHILS # BLD AUTO: 4.17 K/UL (ref 2–7.15)
NEUTROPHILS NFR BLD: 62.4 % (ref 44–72)
NRBC # BLD AUTO: 0 K/UL
NRBC BLD-RTO: 0 /100 WBC
PLATELET # BLD AUTO: 171 K/UL (ref 164–446)
PMV BLD AUTO: 12.1 FL (ref 9–12.9)
POTASSIUM SERPL-SCNC: 4.4 MMOL/L (ref 3.6–5.5)
PROT SERPL-MCNC: 6.9 G/DL (ref 6–8.2)
RBC # BLD AUTO: 5.18 M/UL (ref 4.2–5.4)
SODIUM SERPL-SCNC: 140 MMOL/L (ref 135–145)
TREPONEMA PALLIDUM IGG+IGM AB [PRESENCE] IN SERUM OR PLASMA BY IMMUNOASSAY: NON REACTIVE
TRIGL SERPL-MCNC: 46 MG/DL (ref 0–149)
WBC # BLD AUTO: 6.7 K/UL (ref 4.8–10.8)

## 2019-03-27 PROCEDURE — 80061 LIPID PANEL: CPT

## 2019-03-27 PROCEDURE — 87389 HIV-1 AG W/HIV-1&-2 AB AG IA: CPT

## 2019-03-27 PROCEDURE — 80053 COMPREHEN METABOLIC PANEL: CPT

## 2019-03-27 PROCEDURE — 85025 COMPLETE CBC W/AUTO DIFF WBC: CPT

## 2019-03-27 PROCEDURE — 36415 COLL VENOUS BLD VENIPUNCTURE: CPT

## 2019-03-27 PROCEDURE — 86780 TREPONEMA PALLIDUM: CPT

## 2019-04-02 ENCOUNTER — HOSPITAL ENCOUNTER (OUTPATIENT)
Dept: RADIOLOGY | Facility: MEDICAL CENTER | Age: 34
End: 2019-04-02
Attending: INTERNAL MEDICINE
Payer: COMMERCIAL

## 2019-04-02 DIAGNOSIS — Z13.820 SCREENING FOR OSTEOPOROSIS: ICD-10-CM

## 2019-04-02 DIAGNOSIS — M81.0 SENILE OSTEOPOROSIS: ICD-10-CM

## 2019-04-02 PROCEDURE — 77080 DXA BONE DENSITY AXIAL: CPT

## 2019-04-04 ENCOUNTER — HOSPITAL ENCOUNTER (OUTPATIENT)
Dept: RADIOLOGY | Facility: MEDICAL CENTER | Age: 34
End: 2019-04-04
Attending: FAMILY MEDICINE
Payer: COMMERCIAL

## 2019-04-04 DIAGNOSIS — K42.9 UMBILICAL HERNIA WITHOUT OBSTRUCTION OR GANGRENE: ICD-10-CM

## 2019-04-04 PROCEDURE — 76705 ECHO EXAM OF ABDOMEN: CPT

## 2019-04-11 ENCOUNTER — TELEPHONE (OUTPATIENT)
Dept: RADIOLOGY | Facility: MEDICAL CENTER | Age: 34
End: 2019-04-11

## 2019-04-11 NOTE — TELEPHONE ENCOUNTER
PER PT: SHE HAS NOT YET SEEN DR ROBERSON @ NEW LOCATION SINCE SHE LEFT [place]; WILL REQUEST ORDER FROM PCP JINNY GOINS - R/S IF POSSIBLE/TML

## 2019-04-12 ENCOUNTER — APPOINTMENT (OUTPATIENT)
Dept: RADIOLOGY | Facility: MEDICAL CENTER | Age: 34
End: 2019-04-12
Attending: OBSTETRICS & GYNECOLOGY
Payer: COMMERCIAL

## 2019-09-25 ENCOUNTER — HOSPITAL ENCOUNTER (OUTPATIENT)
Dept: LAB | Facility: MEDICAL CENTER | Age: 34
End: 2019-09-25
Payer: COMMERCIAL

## 2019-09-25 ENCOUNTER — IMMUNIZATION (OUTPATIENT)
Dept: OCCUPATIONAL MEDICINE | Facility: CLINIC | Age: 34
End: 2019-09-25

## 2019-09-25 DIAGNOSIS — Z23 NEED FOR VACCINATION: ICD-10-CM

## 2019-09-25 LAB
BDY FAT % MEASURED: 29 %
BP DIAS: 73 MMHG
BP SYS: 126 MMHG
CHOLEST SERPL-MCNC: 155 MG/DL (ref 100–199)
DIABETES HTDIA: NO
EVENT NAME HTEVT: NORMAL
FASTING HTFAS: YES
GLUCOSE SERPL-MCNC: 94 MG/DL (ref 65–99)
HDLC SERPL-MCNC: 51 MG/DL
HYPERTENSION HTHYP: NO
LDLC SERPL CALC-MCNC: 92 MG/DL
SCREENING LOC CITY HTCIT: NORMAL
SCREENING LOC STATE HTSTA: NORMAL
SCREENING LOCATION HTLOC: NORMAL
SMOKING HTSMO: NO
SUBSCRIBER ID HTSID: NORMAL
TRIGL SERPL-MCNC: 62 MG/DL (ref 0–149)

## 2019-09-25 PROCEDURE — 90686 IIV4 VACC NO PRSV 0.5 ML IM: CPT | Performed by: PREVENTIVE MEDICINE

## 2019-09-25 PROCEDURE — 36415 COLL VENOUS BLD VENIPUNCTURE: CPT

## 2019-09-25 PROCEDURE — 80061 LIPID PANEL: CPT

## 2019-09-25 PROCEDURE — 82947 ASSAY GLUCOSE BLOOD QUANT: CPT

## 2019-09-25 PROCEDURE — S5190 WELLNESS ASSESSMENT BY NONPH: HCPCS

## 2019-10-07 RX ORDER — CIPROFLOXACIN 500 MG/1
500 TABLET, FILM COATED ORAL 2 TIMES DAILY
Qty: 10 TAB | Refills: 0 | Status: SHIPPED | OUTPATIENT
Start: 2019-10-07 | End: 2021-01-12

## 2019-10-07 RX ORDER — ATOVAQUONE AND PROGUANIL HYDROCHLORIDE 250; 100 MG/1; MG/1
1 TABLET, FILM COATED ORAL DAILY
Qty: 20 TAB | Refills: 0 | Status: SHIPPED | OUTPATIENT
Start: 2019-10-07 | End: 2021-01-12

## 2019-10-13 ENCOUNTER — DOCUMENTATION (OUTPATIENT)
Dept: OCCUPATIONAL MEDICINE | Facility: CLINIC | Age: 34
End: 2019-10-13

## 2019-10-16 ENCOUNTER — EH NON-PROVIDER (OUTPATIENT)
Dept: OCCUPATIONAL MEDICINE | Facility: CLINIC | Age: 34
End: 2019-10-16

## 2019-10-16 DIAGNOSIS — Z02.89 ENCOUNTER FOR OCCUPATIONAL HEALTH EXAMINATION INVOLVING RESPIRATOR: Primary | ICD-10-CM

## 2019-10-16 PROCEDURE — 94375 RESPIRATORY FLOW VOLUME LOOP: CPT | Performed by: NURSE PRACTITIONER

## 2020-04-03 ENCOUNTER — HOSPITAL ENCOUNTER (OUTPATIENT)
Dept: RADIOLOGY | Facility: MEDICAL CENTER | Age: 35
End: 2020-04-03
Attending: ORTHOPAEDIC SURGERY
Payer: COMMERCIAL

## 2020-04-03 DIAGNOSIS — S63.592A OTHER SPECIFIED SPRAIN OF LEFT WRIST, INITIAL ENCOUNTER: ICD-10-CM

## 2020-04-03 PROCEDURE — 73221 MRI JOINT UPR EXTREM W/O DYE: CPT | Mod: LT

## 2020-09-20 ENCOUNTER — TELEMEDICINE (OUTPATIENT)
Dept: TELEHEALTH | Facility: TELEMEDICINE | Age: 35
End: 2020-09-20
Payer: COMMERCIAL

## 2020-09-20 DIAGNOSIS — R05.9 COUGH: ICD-10-CM

## 2020-09-20 PROCEDURE — 99214 OFFICE O/P EST MOD 30 MIN: CPT | Mod: 95,CR | Performed by: PHYSICIAN ASSISTANT

## 2020-09-20 RX ORDER — BENZONATATE 100 MG/1
100 CAPSULE ORAL 3 TIMES DAILY PRN
Qty: 21 CAP | Refills: 0 | Status: SHIPPED | OUTPATIENT
Start: 2020-09-20 | End: 2021-01-12

## 2020-09-20 RX ORDER — ALBUTEROL SULFATE 90 UG/1
2 AEROSOL, METERED RESPIRATORY (INHALATION) EVERY 6 HOURS PRN
Qty: 8.5 G | Refills: 0 | Status: SHIPPED | OUTPATIENT
Start: 2020-09-20 | End: 2021-01-12

## 2020-09-20 NOTE — PROGRESS NOTES
Virtual Visit: Established Patient   This visit was conducted via Zoom using secure and encrypted videoconferencing technology. The patient was in a private location in the state of [place].    The patient's identity was confirmed and verbal consent was obtained for this virtual visit.    Subjective:   CC:   Chief Complaint   Patient presents with   • Cough       Carolina Metcalf is a 35 y.o. female presenting for evaluation and management of: Cough    Patient states she was hiking in the smoke 5 days ago and she reports of breathing hard because she was not drinking any water at the time.  She reports of an irritated throat to the lower throat.  Since then, she has developed a gradual intermittent nonproductive cough.  She denies any significant sore throat.  She has tried over-the-counter cold medicine without relief.  She has tried cough drops and the moisture helps with the cough.  Ibuprofen with some relief.  She has not tried any allergy pills.  She otherwise denies any other symptoms.  Denies any fevers, chills, sore throat, hemoptysis, chest pain, SOB, abdominal pain, n/v/d, body aches, fatigue, nasal congestion or runny nose.      No known exposure to COVID-19.   History of mild asthma when younger.     ROS   Positive for cough.  Denies any recent fevers or chills. No nausea or vomiting. No chest pains or shortness of breath.  No sore throat, hemoptysis, abdominal pain, body aches, fatigue, nasal congestion or runny nose.    Allergies   Allergen Reactions   • Shellfish Allergy Rash     Throat swelling   • Cephalosporins Hives and Rash     .   • Iodine Rash     Topical iodine and injectable   • Penicillins Hives and Rash     .   • Vancomycin Rash     .       Current medicines (including changes today)  Current Outpatient Medications   Medication Sig Dispense Refill   • atovaquone-proguanil (MALARONE) 250-100 MG per tablet Take 1 Tab by mouth every day. Start 1-2 days prior to travel and take until  gone. 20 Tab 0   • ciprofloxacin (CIPRO) 500 MG Tab Take 1 Tab by mouth 2 times a day. As needed for Travel Diarrhea 10 Tab 0   • fluconazole (DIFLUCAN) 150 MG tablet Take 1 tablet once. Repeat 72 hours prn 2 Tab 1   • DIFLUCAN 150 MG tablet 1 tablet by mouth once. Repeat in 72 hours. 2 Tab 1   • Levothyroxine Sodium (SYNTHROID PO) Take  by mouth.     • Liothyronine Sodium (CYTOMEL PO) Take  by mouth.     • Cyanocobalamin (B-12 INJ) by Injection route.       No current facility-administered medications for this visit.        Patient Active Problem List    Diagnosis Date Noted   • Hypothyroidism due to Hashimoto's thyroiditis 03/11/2018   • Umbilical hernia without mention of obstruction or gangrene 08/09/2017   • Travel advice encounter 09/01/2016   • Healthcare maintenance 09/25/2013   • Dyskinesia 05/01/2013   • Sinus tachycardia 10/17/2012       Family History   Problem Relation Age of Onset   • Heart Disease Father    • Hypertension Father    • Cancer Paternal Aunt         breast   • Cancer Paternal Uncle         colon   • Diabetes Maternal Grandfather    • Cancer Paternal Grandmother         breast       She  has a past medical history of Pain (08-) and Thyroid disease.  She  has a past surgical history that includes knee arthroscopy; kenroy by laparoscopy (5/1/2013); tonsillectomy and adenoidectomy; and umbilical hernia repair (8/9/2017).       Objective:   There were no vitals taken for this visit.    Physical Exam:  Constitutional: Alert, no distress, well-groomed.  Skin: No rashes in visible areas.  Eye: Round. Conjunctiva clear, lids normal. No icterus.   ENMT: Lips pink without lesions, good dentition, moist mucous membranes. Phonation normal.  Neck: No masses, no thyromegaly. Moves freely without pain.  Respiratory: Unlabored respiratory effort. Small cough noted. No audible wheezing.   Psych: Alert and oriented x3, normal affect and mood.       Assessment and Plan:   The following treatment plan  was discussed:     1. Cough  - albuterol 108 (90 Base) MCG/ACT Aero Soln inhalation aerosol; Inhale 2 Puffs by mouth every 6 hours as needed for Shortness of Breath.  Dispense: 8.5 g; Refill: 0  - benzonatate (TESSALON) 100 MG Cap; Take 1 Cap by mouth 3 times a day as needed for Cough.  Dispense: 21 Cap; Refill: 0    Discussed with patient her cough most likely related to smoke from chemical exposure versus allergy related versus viral URI.  She states she is overall feeling okay without any other symptoms that would indicate pneumonia.  No other signs of viral etiology.  She states her throat irritation does not feel like a strep throat.     Due to her history as a child of mild asthma we will do trial of albuterol inhaler.   Tessalon Perles for cough.   Recommend nonsedating oral antihistamine such as Zyrtec or Claritin in the morning.   She may take Benadryl at night in combination with Robitussin-DM or NyQuil at night.   Avoid smoke exposure.    Discussed that it is difficult to assess lung examination virtually.  Recommend if symptoms are not improving in the next few days or any worsening or new symptoms, to present to the urgent care for in-person evaluation.     The patient verbalized a good understanding and agreed to the plan of care.  She had no other questions or concerns.      Follow-up: Return if symptoms worsen or fail to improve.

## 2020-09-29 ENCOUNTER — IMMUNIZATION (OUTPATIENT)
Dept: OCCUPATIONAL MEDICINE | Facility: CLINIC | Age: 35
End: 2020-09-29

## 2020-09-29 DIAGNOSIS — Z23 NEED FOR VACCINATION: ICD-10-CM

## 2020-09-29 PROCEDURE — 90686 IIV4 VACC NO PRSV 0.5 ML IM: CPT | Performed by: NURSE PRACTITIONER

## 2020-12-16 DIAGNOSIS — Z23 NEED FOR VACCINATION: ICD-10-CM

## 2021-01-12 ENCOUNTER — TELEMEDICINE (OUTPATIENT)
Dept: MEDICAL GROUP | Facility: PHYSICIAN GROUP | Age: 36
End: 2021-01-12
Payer: COMMERCIAL

## 2021-01-12 VITALS — HEIGHT: 67 IN | WEIGHT: 175 LBS | BODY MASS INDEX: 27.47 KG/M2 | TEMPERATURE: 97.6 F

## 2021-01-12 DIAGNOSIS — E06.3 HYPOTHYROIDISM DUE TO HASHIMOTO'S THYROIDITIS: ICD-10-CM

## 2021-01-12 DIAGNOSIS — E03.8 HYPOTHYROIDISM DUE TO HASHIMOTO'S THYROIDITIS: ICD-10-CM

## 2021-01-12 DIAGNOSIS — D80.2 IGA DEFICIENCY (HCC): ICD-10-CM

## 2021-01-12 DIAGNOSIS — E66.3 OVERWEIGHT (BMI 25.0-29.9): ICD-10-CM

## 2021-01-12 DIAGNOSIS — H35.54 DYSTROPHIES PRIMARILY INVOLVING THE RETINAL PIGMENT EPITHELIUM: ICD-10-CM

## 2021-01-12 PROBLEM — K42.9 UMBILICAL HERNIA: Status: RESOLVED | Noted: 2017-08-09 | Resolved: 2021-01-12

## 2021-01-12 PROCEDURE — 99215 OFFICE O/P EST HI 40 MIN: CPT | Mod: 95,CR | Performed by: FAMILY MEDICINE

## 2021-01-12 RX ORDER — LEVOTHYROXINE SODIUM 137 UG/1
137 TABLET ORAL
COMMUNITY
Start: 2020-11-30 | End: 2021-04-27 | Stop reason: SDUPTHER

## 2021-01-12 RX ORDER — LIOTHYRONINE SODIUM 25 UG/1
50 TABLET ORAL
COMMUNITY
Start: 2020-11-30 | End: 2021-04-28 | Stop reason: SDUPTHER

## 2021-01-12 RX ORDER — CYANOCOBALAMIN 1000 UG/ML
INJECTION, SOLUTION INTRAMUSCULAR; SUBCUTANEOUS
COMMUNITY
Start: 2020-10-20 | End: 2021-01-12

## 2021-01-12 SDOH — HEALTH STABILITY: MENTAL HEALTH: HOW MANY STANDARD DRINKS CONTAINING ALCOHOL DO YOU HAVE ON A TYPICAL DAY?: 1 OR 2

## 2021-01-12 SDOH — HEALTH STABILITY: MENTAL HEALTH: HOW OFTEN DO YOU HAVE A DRINK CONTAINING ALCOHOL?: 2-4 TIMES A MONTH

## 2021-01-12 SDOH — HEALTH STABILITY: MENTAL HEALTH: HOW OFTEN DO YOU HAVE 6 OR MORE DRINKS ON ONE OCCASION?: LESS THAN MONTHLY

## 2021-01-12 ASSESSMENT — FIBROSIS 4 INDEX: FIB4 SCORE: 0.84

## 2021-01-12 ASSESSMENT — PATIENT HEALTH QUESTIONNAIRE - PHQ9: CLINICAL INTERPRETATION OF PHQ2 SCORE: 0

## 2021-01-12 NOTE — LETTER
"[facility]  Kip Miller M.D.  [address] [place] [address]  Fax: [phone]   Authorization for Release/Disclosure of   Protected Health Information   Name: CAROLINA SHERMAN : 1985 SSN: [id number]   Address: [address] [place] [address] Phone:    [phone] (home)    I authorize the entity listed below to release/disclose the PHI below to:   [facility]/Kip Miller M.D. and Kip Miller M.D.   Provider or Entity Name:  [facility] \"Dr. Maldonado\"   Address   City, State, Zip   Phone:      Fax:     Reason for request: continuity of care   Information to be released:    [  ] LAST COLONOSCOPY,  including any PATH REPORT and follow-up  [  ] LAST FIT/COLOGUARD RESULT [  ] LAST DEXA  [  ] LAST MAMMOGRAM  [  ] LAST PAP  [  ] LAST LABS [  ] RETINA EXAM REPORT  [  ] IMMUNIZATION RECORDS  [ xx ] Release all info      [  ] Check here and initial the line next to each item to release ALL health information INCLUDING  _____ Care and treatment for drug and / or alcohol abuse  _____ HIV testing, infection status, or AIDS  _____ Genetic Testing    DATES OF SERVICE OR TIME PERIOD TO BE DISCLOSED: _____________  I understand and acknowledge that:  * This Authorization may be revoked at any time by you in writing, except if your health information has already been used or disclosed.  * Your health information that will be used or disclosed as a result of you signing this authorization could be re-disclosed by the recipient. If this occurs, your re-disclosed health information may no longer be protected by State or Federal laws.  * You may refuse to sign this Authorization. Your refusal will not affect your ability to obtain treatment.  * This Authorization becomes effective upon signing and will  on (date) __________.      If no date is indicated, this Authorization will  one (1) year from the signature date.    Name: Carolina Sherman    Signature: continuity " of care   Date:     1/12/2021       PLEASE FAX REQUESTED RECORDS BACK TO: [phone]

## 2021-01-12 NOTE — LETTER
[facility]  Kip Miller M.D.  [address] [facility] [address]  Fax: [phone]   Authorization for Release/Disclosure of   Protected Health Information   Name: ANTHONY SHERMAN : 1985 SSN: [id number]   Address: [address] [place] [address] Phone:    [phone] (home)    I authorize the entity listed below to release/disclose the PHI below to:   [facility]/Kip Miller M.D. and Kip Miller M.D.   Provider or Entity Name:  Dr. Maldonado    [facility], [facility]  [address] [facility], [facility] [address] Phone:   [phone]    Fax:  [phone]   Reason for request: continuity of care   Information to be released:    [  ] LAST COLONOSCOPY,  including any PATH REPORT and follow-up  [  ] LAST FIT/COLOGUARD RESULT [  ] LAST DEXA  [  ] LAST MAMMOGRAM  [  ] LAST PAP  [  ] LAST LABS [  ] RETINA EXAM REPORT  [  ] IMMUNIZATION RECORDS  [ xx  ] Release all info      [  ] Check here and initial the line next to each item to release ALL health information INCLUDING  _____ Care and treatment for drug and / or alcohol abuse  _____ HIV testing, infection status, or AIDS  _____ Genetic Testing    DATES OF SERVICE OR TIME PERIOD TO BE DISCLOSED: _____________  I understand and acknowledge that:  * This Authorization may be revoked at any time by you in writing, except if your health information has already been used or disclosed.  * Your health information that will be used or disclosed as a result of you signing this authorization could be re-disclosed by the recipient. If this occurs, your re-disclosed health information may no longer be protected by State or Federal laws.  * You may refuse to sign this Authorization. Your refusal will not affect your ability to obtain treatment.  * This Authorization becomes effective upon signing and will  on (date) __________.      If no date is indicated, this Authorization will  one (1) year from the signature date.    Name:  Carolina Metcalf    Signature:   Date:     1/12/2021       PLEASE FAX REQUESTED RECORDS BACK TO: [phone]

## 2021-01-12 NOTE — LETTER
[facility]  Kip Miller M.D.  [address] [place] [address]  Fax: [phone]   Authorization for Release/Disclosure of   Protected Health Information   Name: CAROLINA SHERMAN : 1985 SSN: [id number]   Address: [address] [place] [address] Phone:    [phone] (home)    I authorize the entity listed below to release/disclose the PHI below to:   [facility]/Kip Miller M.D. and Kip Miller M.D.   Provider or Entity Name:  [facility]   Address   City, State, Zip   Phone:      Fax:     Reason for request: continuity of care   Information to be released:    [  ] LAST COLONOSCOPY,  including any PATH REPORT and follow-up  [  ] LAST FIT/COLOGUARD RESULT [  ] LAST DEXA  [  ] LAST MAMMOGRAM  [  ] LAST PAP  [  ] LAST LABS [  ] RETINA EXAM REPORT  [  ] IMMUNIZATION RECORDS  [ xx ] Release all info      [  ] Check here and initial the line next to each item to release ALL health information INCLUDING  _____ Care and treatment for drug and / or alcohol abuse  _____ HIV testing, infection status, or AIDS  _____ Genetic Testing    DATES OF SERVICE OR TIME PERIOD TO BE DISCLOSED: _____________  I understand and acknowledge that:  * This Authorization may be revoked at any time by you in writing, except if your health information has already been used or disclosed.  * Your health information that will be used or disclosed as a result of you signing this authorization could be re-disclosed by the recipient. If this occurs, your re-disclosed health information may no longer be protected by State or Federal laws.  * You may refuse to sign this Authorization. Your refusal will not affect your ability to obtain treatment.  * This Authorization becomes effective upon signing and will  on (date) __________.      If no date is indicated, this Authorization will  one (1) year from the signature date.    Name: Carolina Sherman    Signature:   Date:     2021       PLEASE  FAX REQUESTED RECORDS BACK TO: [phone]

## 2021-01-15 NOTE — PROGRESS NOTES
Virtual Visit: New Patient   This visit was conducted via Zoom using secure and encrypted videoconferencing technology. The patient was in a private location in the state of [place].    The patient's identity was confirmed and verbal consent was obtained for this virtual visit.    Subjective:     CC:   Chief Complaint   Patient presents with   • Establish Care     New to you.       Carolina Metcalf is a 35 y.o. female presenting to establish care and to discuss the evaluation and management of:    This is a new patient to establish with me with her medical complaints.  Patient does have a history of hypothyroidism secondary to Hashimoto's.  She is currently well controlled on her levothyroxine 137 mcg daily and Cytomel 50 mcg daily.  Denies any heat or cold intolerance, weight gain or weight loss.  Denies any changes in hair/skin/nails.    Patient also follows up for retinal abnormalities that were noticed on previous work-ups and evaluations.  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  Patient follows up once annually with ophthalmology.  Denies any vision changes or abnormalities.    Incidental history of IgA deficiency but no history of chronic or serious infections.    Mildly elevated BMI at 27.    ROS  See HPI  Constitutional: Negative for fever, chills and malaise/fatigue.   HENT: Negative for congestion.    Eyes: Negative for pain.   Respiratory: Negative for cough and shortness of breath.    Cardiovascular: Negative for leg swelling.   Gastrointestinal: Negative for nausea, vomiting, abdominal pain and diarrhea.   Genitourinary: Negative for dysuria and hematuria.   Skin: Negative for rash.   Neurological: Negative for dizziness, focal weakness and headaches.   Endo/Heme/Allergies: Does not bruise/bleed easily.   Psychiatric/Behavioral: Negative for depression.  The patient is not nervous/anxious.      Allergies   Allergen Reactions   • Shellfish Allergy Rash     Throat swelling    • Vancomycin Rash and Anaphylaxis     .   • Clindamycin Hcl Rash   • Cephalosporins Hives and Rash     .   • Iodine Rash     Topical iodine and injectable   • Penicillins Hives and Rash     .       Current medicines (including changes today)  Current Outpatient Medications   Medication Sig Dispense Refill   • levothyroxine (SYNTHROID) 137 MCG Tab Take 137 mcg by mouth every day.     • liothyronine (CYTOMEL) 25 MCG Tab Take 50 mcg by mouth.     • Cyanocobalamin (B-12 INJ) by Injection route.       No current facility-administered medications for this visit.        She  has a past medical history of Pain (08-) and Thyroid disease.  She  has a past surgical history that includes knee arthroscopy; kenroy by laparoscopy (5/1/2013); tonsillectomy and adenoidectomy; umbilical hernia repair (8/9/2017); and umbilical hernia repair.      Family History   Problem Relation Age of Onset   • Heart Disease Father    • Hypertension Father    • Alcohol abuse Father    • Cancer Paternal Aunt         breast   • Cancer Paternal Uncle         colon   • Diabetes Maternal Grandfather    • Cancer Paternal Grandmother         breast   • Autoimmune Disease Paternal Grandmother    • Lupus Mother    • No Known Problems Brother    • Diabetes Maternal Grandmother    • Alzheimer's Disease Paternal Grandfather      Family Status   Relation Name Status   • Fa  (Not Specified)   • PAunt  (Not Specified)   • PUnc  (Not Specified)   • MGFa  (Not Specified)   • PGMo  (Not Specified)   • Mo  Alive   • Bro  Alive   • MGMo  (Not Specified)   • PGFa  (Not Specified)       Patient Active Problem List    Diagnosis Date Noted   • 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  Skin: No rashes in visible areas.  Eye: Round. Conjunctiva clear, lids normal. No icterus.   ENMT: Lips pink without lesions, good dentition, moist mucous membranes. Phonation normal.  Neck: No masses, no thyromegaly. Moves freely without pain.  Respiratory: Unlabored respiratory effort, no cough or audible wheeze  Psych: Alert and oriented x3, normal affect and mood.       Assessment and Plan:   The following treatment plan was discussed:     1. Hypothyroidism due to Hashimoto's thyroiditis  - VITAMIN B12; Future  - TSH; Future  - FREE THYROXINE; Future  - TRIIDOTHYRONINE; Future    2. Overweight (BMI 25.0-29.9)  - CBC WITH DIFFERENTIAL; Future  - Comp Metabolic Panel; Future  - Lipid Profile; Future  - VITAMIN B12; Future    3. IgA deficiency (HCC)    4. 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  - liothyronine (CYTOMEL) 25 MCG Tab; Take 50 mcg by mouth.    Labs as above.  No change to thyroid medications at this time.  We will follow-up pending lab work.  [facility] for [facility]ologic records.  3/27/2019 labs reviewed.  1419 bone density reviewed.  4/4/2019 ultrasound of her abdominal hernia reviewed    Follow-up: Return for Pending Labs.       Please note that this dictation was created using voice recognition software. I have worked with consultants from the vendor as well as technical experts from [employer] to optimize the interface. I have made every reasonable attempt to correct obvious errors, but I expect that there are errors of grammar and possibly content that I did not discover before finalizing the note.    "

## 2021-01-20 ENCOUNTER — PHARMACY VISIT (OUTPATIENT)
Dept: PHARMACY | Facility: MEDICAL CENTER | Age: 36
End: 2021-01-20

## 2021-01-20 PROCEDURE — RXOTC WILLOW AMBULATORY OTC CHARGE

## 2021-03-11 ENCOUNTER — EH NON-PROVIDER (OUTPATIENT)
Dept: OCCUPATIONAL MEDICINE | Facility: CLINIC | Age: 36
End: 2021-03-11

## 2021-03-11 DIAGNOSIS — Z02.89 ENCOUNTER FOR OCCUPATIONAL HEALTH EXAMINATION INVOLVING RESPIRATOR: ICD-10-CM

## 2021-03-11 PROCEDURE — 94375 RESPIRATORY FLOW VOLUME LOOP: CPT | Performed by: NURSE PRACTITIONER

## 2021-04-26 ENCOUNTER — HOSPITAL ENCOUNTER (OUTPATIENT)
Dept: LAB | Facility: MEDICAL CENTER | Age: 36
End: 2021-04-26
Attending: FAMILY MEDICINE
Payer: COMMERCIAL

## 2021-04-26 DIAGNOSIS — E66.3 OVERWEIGHT (BMI 25.0-29.9): ICD-10-CM

## 2021-04-26 DIAGNOSIS — E06.3 HYPOTHYROIDISM DUE TO HASHIMOTO'S THYROIDITIS: ICD-10-CM

## 2021-04-26 DIAGNOSIS — E03.8 HYPOTHYROIDISM DUE TO HASHIMOTO'S THYROIDITIS: ICD-10-CM

## 2021-04-26 LAB
BASOPHILS # BLD AUTO: 0.5 % (ref 0–1.8)
BASOPHILS # BLD: 0.04 K/UL (ref 0–0.12)
EOSINOPHIL # BLD AUTO: 0.04 K/UL (ref 0–0.51)
EOSINOPHIL NFR BLD: 0.5 % (ref 0–6.9)
ERYTHROCYTE [DISTWIDTH] IN BLOOD BY AUTOMATED COUNT: 43.6 FL (ref 35.9–50)
HCT VFR BLD AUTO: 45.1 % (ref 37–47)
HGB BLD-MCNC: 14.7 G/DL (ref 12–16)
IMM GRANULOCYTES # BLD AUTO: 0 K/UL (ref 0–0.11)
IMM GRANULOCYTES NFR BLD AUTO: 0 % (ref 0–0.9)
LYMPHOCYTES # BLD AUTO: 1.9 K/UL (ref 1–4.8)
LYMPHOCYTES NFR BLD: 26 % (ref 22–41)
MCH RBC QN AUTO: 27.4 PG (ref 27–33)
MCHC RBC AUTO-ENTMCNC: 32.6 G/DL (ref 33.6–35)
MCV RBC AUTO: 84.1 FL (ref 81.4–97.8)
MONOCYTES # BLD AUTO: 0.35 K/UL (ref 0–0.85)
MONOCYTES NFR BLD AUTO: 4.8 % (ref 0–13.4)
NEUTROPHILS # BLD AUTO: 4.98 K/UL (ref 2–7.15)
NEUTROPHILS NFR BLD: 68.2 % (ref 44–72)
NRBC # BLD AUTO: 0 K/UL
NRBC BLD-RTO: 0 /100 WBC
PLATELET # BLD AUTO: 189 K/UL (ref 164–446)
PMV BLD AUTO: 12 FL (ref 9–12.9)
RBC # BLD AUTO: 5.36 M/UL (ref 4.2–5.4)
WBC # BLD AUTO: 7.3 K/UL (ref 4.8–10.8)

## 2021-04-26 PROCEDURE — 82607 VITAMIN B-12: CPT

## 2021-04-26 PROCEDURE — 85025 COMPLETE CBC W/AUTO DIFF WBC: CPT

## 2021-04-26 PROCEDURE — 80061 LIPID PANEL: CPT

## 2021-04-26 PROCEDURE — 80053 COMPREHEN METABOLIC PANEL: CPT

## 2021-04-26 PROCEDURE — 84439 ASSAY OF FREE THYROXINE: CPT

## 2021-04-26 PROCEDURE — 84443 ASSAY THYROID STIM HORMONE: CPT

## 2021-04-26 PROCEDURE — 84480 ASSAY TRIIODOTHYRONINE (T3): CPT

## 2021-04-26 PROCEDURE — 36415 COLL VENOUS BLD VENIPUNCTURE: CPT

## 2021-04-27 DIAGNOSIS — E06.3 HYPOTHYROIDISM DUE TO HASHIMOTO'S THYROIDITIS: ICD-10-CM

## 2021-04-27 DIAGNOSIS — E03.8 HYPOTHYROIDISM DUE TO HASHIMOTO'S THYROIDITIS: ICD-10-CM

## 2021-04-27 LAB
ALBUMIN SERPL BCP-MCNC: 4.6 G/DL (ref 3.2–4.9)
ALBUMIN/GLOB SERPL: 1.8 G/DL
ALP SERPL-CCNC: 43 U/L (ref 30–99)
ALT SERPL-CCNC: 27 U/L (ref 2–50)
ANION GAP SERPL CALC-SCNC: 9 MMOL/L (ref 7–16)
AST SERPL-CCNC: 27 U/L (ref 12–45)
BILIRUB SERPL-MCNC: 0.5 MG/DL (ref 0.1–1.5)
BUN SERPL-MCNC: 13 MG/DL (ref 8–22)
CALCIUM SERPL-MCNC: 9.3 MG/DL (ref 8.5–10.5)
CHLORIDE SERPL-SCNC: 101 MMOL/L (ref 96–112)
CHOLEST SERPL-MCNC: 203 MG/DL (ref 100–199)
CO2 SERPL-SCNC: 29 MMOL/L (ref 20–33)
CREAT SERPL-MCNC: 0.74 MG/DL (ref 0.5–1.4)
FASTING STATUS PATIENT QL REPORTED: NORMAL
GLOBULIN SER CALC-MCNC: 2.5 G/DL (ref 1.9–3.5)
GLUCOSE SERPL-MCNC: 59 MG/DL (ref 65–99)
HDLC SERPL-MCNC: 67 MG/DL
LDLC SERPL CALC-MCNC: 124 MG/DL
POTASSIUM SERPL-SCNC: 4.3 MMOL/L (ref 3.6–5.5)
PROT SERPL-MCNC: 7.1 G/DL (ref 6–8.2)
SODIUM SERPL-SCNC: 139 MMOL/L (ref 135–145)
T3 SERPL-MCNC: 307 NG/DL (ref 60–181)
T4 FREE SERPL-MCNC: 0.68 NG/DL (ref 0.93–1.7)
TRIGL SERPL-MCNC: 62 MG/DL (ref 0–149)
TSH SERPL DL<=0.005 MIU/L-ACNC: 1.26 UIU/ML (ref 0.38–5.33)
VIT B12 SERPL-MCNC: 1027 PG/ML (ref 211–911)

## 2021-04-27 RX ORDER — LEVOTHYROXINE SODIUM 137 UG/1
137 TABLET ORAL
Qty: 90 TABLET | Refills: 4 | Status: SHIPPED | OUTPATIENT
Start: 2021-04-27 | End: 2021-04-28 | Stop reason: SDUPTHER

## 2021-04-28 ENCOUNTER — PHARMACY VISIT (OUTPATIENT)
Dept: PHARMACY | Facility: MEDICAL CENTER | Age: 36
End: 2021-04-28
Payer: COMMERCIAL

## 2021-04-28 DIAGNOSIS — E06.3 HYPOTHYROIDISM DUE TO HASHIMOTO'S THYROIDITIS: ICD-10-CM

## 2021-04-28 DIAGNOSIS — E03.8 HYPOTHYROIDISM DUE TO HASHIMOTO'S THYROIDITIS: ICD-10-CM

## 2021-04-28 PROCEDURE — RXMED WILLOW AMBULATORY MEDICATION CHARGE: Performed by: FAMILY MEDICINE

## 2021-04-28 RX ORDER — LIOTHYRONINE SODIUM 50 UG/1
50 TABLET ORAL DAILY
Qty: 90 TABLET | Refills: 4 | Status: SHIPPED | OUTPATIENT
Start: 2021-04-28 | End: 2022-05-20 | Stop reason: SDUPTHER

## 2021-04-28 RX ORDER — LEVOTHYROXINE SODIUM 137 UG/1
137 TABLET ORAL
Qty: 90 TABLET | Refills: 4 | Status: SHIPPED | OUTPATIENT
Start: 2021-04-28 | End: 2022-05-20 | Stop reason: SDUPTHER

## 2021-05-07 ENCOUNTER — PHARMACY VISIT (OUTPATIENT)
Dept: PHARMACY | Facility: MEDICAL CENTER | Age: 36
End: 2021-05-07
Payer: COMMERCIAL

## 2021-09-13 ENCOUNTER — PHARMACY VISIT (OUTPATIENT)
Dept: PHARMACY | Facility: MEDICAL CENTER | Age: 36
End: 2021-09-13
Payer: COMMERCIAL

## 2021-09-13 PROCEDURE — RXMED WILLOW AMBULATORY MEDICATION CHARGE: Performed by: FAMILY MEDICINE

## 2021-09-24 ENCOUNTER — IMMUNIZATION (OUTPATIENT)
Dept: OCCUPATIONAL MEDICINE | Facility: CLINIC | Age: 36
End: 2021-09-24
Payer: COMMERCIAL

## 2021-09-24 DIAGNOSIS — Z23 NEED FOR VACCINATION: Primary | ICD-10-CM

## 2021-09-24 PROCEDURE — 90686 IIV4 VACC NO PRSV 0.5 ML IM: CPT | Performed by: PREVENTIVE MEDICINE

## 2021-10-12 PROCEDURE — RXMED WILLOW AMBULATORY MEDICATION CHARGE: Performed by: FAMILY MEDICINE

## 2021-10-18 ENCOUNTER — PHARMACY VISIT (OUTPATIENT)
Dept: PHARMACY | Facility: MEDICAL CENTER | Age: 36
End: 2021-10-18
Payer: COMMERCIAL

## 2021-11-26 ENCOUNTER — PHARMACY VISIT (OUTPATIENT)
Dept: PHARMACY | Facility: MEDICAL CENTER | Age: 36
End: 2021-11-26
Payer: COMMERCIAL

## 2021-11-26 PROCEDURE — RXMED WILLOW AMBULATORY MEDICATION CHARGE: Performed by: INTERNAL MEDICINE

## 2021-11-26 RX ORDER — RNA INGREDIENT BNT-162B2 0.23 G/1.8ML
0.3 INJECTION, SUSPENSION INTRAMUSCULAR
Qty: 0.3 ML | Refills: 0 | Status: SHIPPED | OUTPATIENT
Start: 2021-11-26 | End: 2022-11-09

## 2021-12-13 ENCOUNTER — HOSPITAL ENCOUNTER (OUTPATIENT)
Dept: RADIOLOGY | Facility: MEDICAL CENTER | Age: 36
End: 2021-12-13
Attending: OBSTETRICS & GYNECOLOGY
Payer: COMMERCIAL

## 2021-12-13 DIAGNOSIS — N63.0 BREAST LUMP IN FEMALE: ICD-10-CM

## 2021-12-13 PROCEDURE — 76642 ULTRASOUND BREAST LIMITED: CPT | Mod: RT

## 2021-12-13 PROCEDURE — G0279 TOMOSYNTHESIS, MAMMO: HCPCS

## 2021-12-16 ENCOUNTER — HOSPITAL ENCOUNTER (OUTPATIENT)
Dept: RADIOLOGY | Facility: MEDICAL CENTER | Age: 36
End: 2021-12-16
Attending: OBSTETRICS & GYNECOLOGY
Payer: COMMERCIAL

## 2021-12-16 DIAGNOSIS — R92.8 ABNORMAL FINDING ON BREAST IMAGING: ICD-10-CM

## 2021-12-16 LAB — PATHOLOGY CONSULT NOTE: NORMAL

## 2021-12-16 PROCEDURE — 19083 BX BREAST 1ST LESION US IMAG: CPT

## 2021-12-16 PROCEDURE — 88305 TISSUE EXAM BY PATHOLOGIST: CPT

## 2021-12-16 PROCEDURE — 76942 ECHO GUIDE FOR BIOPSY: CPT

## 2021-12-17 ENCOUNTER — TELEPHONE (OUTPATIENT)
Dept: RADIOLOGY | Facility: MEDICAL CENTER | Age: 36
End: 2021-12-17

## 2022-01-11 PROCEDURE — RXMED WILLOW AMBULATORY MEDICATION CHARGE: Performed by: FAMILY MEDICINE

## 2022-01-13 ENCOUNTER — PHARMACY VISIT (OUTPATIENT)
Dept: PHARMACY | Facility: MEDICAL CENTER | Age: 37
End: 2022-01-13
Payer: COMMERCIAL

## 2022-03-07 ENCOUNTER — PHARMACY VISIT (OUTPATIENT)
Dept: PHARMACY | Facility: MEDICAL CENTER | Age: 37
End: 2022-03-07
Payer: COMMERCIAL

## 2022-03-07 PROCEDURE — RXMED WILLOW AMBULATORY MEDICATION CHARGE

## 2022-03-07 RX ORDER — HYDROXYZINE HYDROCHLORIDE 25 MG/1
TABLET, FILM COATED ORAL
Qty: 30 TABLET | Refills: 2 | Status: SHIPPED | OUTPATIENT
Start: 2022-03-07 | End: 2022-08-10 | Stop reason: SDUPTHER

## 2022-05-18 DIAGNOSIS — E06.3 HYPOTHYROIDISM DUE TO HASHIMOTO'S THYROIDITIS: ICD-10-CM

## 2022-05-18 DIAGNOSIS — E03.8 HYPOTHYROIDISM DUE TO HASHIMOTO'S THYROIDITIS: ICD-10-CM

## 2022-05-18 PROCEDURE — RXMED WILLOW AMBULATORY MEDICATION CHARGE

## 2022-05-18 RX ORDER — LEVOTHYROXINE SODIUM 137 UG/1
137 TABLET ORAL
Qty: 90 TABLET | Refills: 4 | OUTPATIENT
Start: 2022-05-18

## 2022-05-18 RX ORDER — LIOTHYRONINE SODIUM 50 UG/1
50 TABLET ORAL DAILY
Qty: 90 TABLET | Refills: 4 | OUTPATIENT
Start: 2022-05-18

## 2022-05-18 NOTE — TELEPHONE ENCOUNTER
Lab Results   Component Value Date/Time    CHOLSTRLTOT 203 (H) 04/26/2021 12:09 PM     (H) 04/26/2021 12:09 PM    HDL 67 04/26/2021 12:09 PM    TRIGLYCERIDE 62 04/26/2021 12:09 PM       Lab Results   Component Value Date/Time    SODIUM 139 04/26/2021 12:09 PM    POTASSIUM 4.3 04/26/2021 12:09 PM    CHLORIDE 101 04/26/2021 12:09 PM    CO2 29 04/26/2021 12:09 PM    GLUCOSE 59 (L) 04/26/2021 12:09 PM    BUN 13 04/26/2021 12:09 PM    CREATININE 0.74 04/26/2021 12:09 PM     Lab Results   Component Value Date/Time    ALKPHOSPHAT 43 04/26/2021 12:09 PM    ASTSGOT 27 04/26/2021 12:09 PM    ALTSGPT 27 04/26/2021 12:09 PM    TBILIRUBIN 0.5 04/26/2021 12:09 PM

## 2022-05-19 ENCOUNTER — PHARMACY VISIT (OUTPATIENT)
Dept: PHARMACY | Facility: MEDICAL CENTER | Age: 37
End: 2022-05-19
Payer: COMMERCIAL

## 2022-05-20 DIAGNOSIS — E06.3 HYPOTHYROIDISM DUE TO HASHIMOTO'S THYROIDITIS: Primary | ICD-10-CM

## 2022-05-20 DIAGNOSIS — E03.8 HYPOTHYROIDISM DUE TO HASHIMOTO'S THYROIDITIS: Primary | ICD-10-CM

## 2022-05-20 RX ORDER — LIOTHYRONINE SODIUM 50 UG/1
50 TABLET ORAL DAILY
Qty: 90 TABLET | Refills: 4 | OUTPATIENT
Start: 2022-05-20

## 2022-05-20 RX ORDER — LEVOTHYROXINE SODIUM 137 UG/1
137 TABLET ORAL
Qty: 90 TABLET | Refills: 4 | OUTPATIENT
Start: 2022-05-20

## 2022-05-23 ENCOUNTER — PHARMACY VISIT (OUTPATIENT)
Dept: PHARMACY | Facility: MEDICAL CENTER | Age: 37
End: 2022-05-23
Payer: COMMERCIAL

## 2022-05-23 PROCEDURE — RXMED WILLOW AMBULATORY MEDICATION CHARGE: Performed by: INTERNAL MEDICINE

## 2022-05-23 RX ORDER — LIOTHYRONINE SODIUM 50 UG/1
50 TABLET ORAL DAILY
Qty: 90 TABLET | Refills: 0 | Status: SHIPPED | OUTPATIENT
Start: 2022-05-23 | End: 2022-09-05 | Stop reason: SDUPTHER

## 2022-05-23 RX ORDER — LEVOTHYROXINE SODIUM 137 UG/1
137 TABLET ORAL
Qty: 90 TABLET | Refills: 0 | Status: SHIPPED | OUTPATIENT
Start: 2022-05-23 | End: 2022-09-05 | Stop reason: SDUPTHER

## 2022-07-06 PROCEDURE — RXMED WILLOW AMBULATORY MEDICATION CHARGE

## 2022-07-06 PROCEDURE — RXMED WILLOW AMBULATORY MEDICATION CHARGE: Performed by: INTERNAL MEDICINE

## 2022-07-07 ENCOUNTER — PHARMACY VISIT (OUTPATIENT)
Dept: PHARMACY | Facility: MEDICAL CENTER | Age: 37
End: 2022-07-07
Payer: COMMERCIAL

## 2022-07-07 PROCEDURE — RXOTC WILLOW AMBULATORY OTC CHARGE: Performed by: PHARMACIST

## 2022-08-03 NOTE — ED AVS SNAPSHOT
[employer] Access Code: Activation code not generated  Current [employer] Status: Active    [employer]  A secure, online tool to manage your health information     [employer]’s [employer]® is a secure, online tool that connects you to your personalized health information from the privacy of your home -- day or night - making it very easy for you to manage your healthcare. Once the activation process is completed, you can even access your medical information using the [employer] claudio, which is available for free in the Apple Claudio store or Google Play store.     [employer] provides the following levels of access (as shown below):   My Chart Features   [facility] Primary Care Doctor [facility]  Specialists [facility]  Urgent  Care Non-[facility]  Primary Care  Doctor   Email your healthcare team securely and privately 24/7 X X X X   Manage appointments: schedule your next appointment; view details of past/upcoming appointments X      Request prescription refills. X      View recent personal medical records, including lab and immunizations X X X X   View health record, including health history, allergies, medications X X X X   Read reports about your outpatient visits, procedures, consult and ER notes X X X X   See your discharge summary, which is a recap of your hospital and/or ER visit that includes your diagnosis, lab results, and care plan. X X       How to register for [employer]  1. Go to  [email or web address].  2. Click on the Sign Up Now box, which takes you to the New Member Sign Up page. You will need to provide the following information:  a. Enter your [employer] Access Code exactly as it appears at the top of this page. (You will not need to use this code after you’ve completed the sign-up process. If you do not sign up before the expiration date, you must request a new code.)   b. Enter your date of birth.   c. Enter your home email address.   d. Click Submit, and follow the next screen’s instructions.  3. Create a [employer] ID. This will  be your [employer] login ID and cannot be changed, so think of one that is secure and easy to remember.  4. Create a [employer] password. You can change your password at any time.  5. Enter your Password Reset Question and Answer. This can be used at a later time if you forget your password.   6. Enter your e-mail address. This allows you to receive e-mail notifications when new information is available in [employer].  7. Click Sign Up. You can now view your health information.    For assistance activating your [employer] account, call [phone]         Vaccine status unknown

## 2022-08-10 PROCEDURE — RXMED WILLOW AMBULATORY MEDICATION CHARGE

## 2022-08-14 ENCOUNTER — PHARMACY VISIT (OUTPATIENT)
Dept: PHARMACY | Facility: MEDICAL CENTER | Age: 37
End: 2022-08-14
Payer: COMMERCIAL

## 2022-08-14 PROCEDURE — RXOTC WILLOW AMBULATORY OTC CHARGE: Performed by: PHARMACIST

## 2022-09-05 DIAGNOSIS — E06.3 HYPOTHYROIDISM DUE TO HASHIMOTO'S THYROIDITIS: ICD-10-CM

## 2022-09-05 DIAGNOSIS — E03.8 HYPOTHYROIDISM DUE TO HASHIMOTO'S THYROIDITIS: ICD-10-CM

## 2022-09-05 PROCEDURE — RXMED WILLOW AMBULATORY MEDICATION CHARGE: Performed by: STUDENT IN AN ORGANIZED HEALTH CARE EDUCATION/TRAINING PROGRAM

## 2022-09-05 RX ORDER — LEVOTHYROXINE SODIUM 137 UG/1
137 TABLET ORAL
Qty: 90 TABLET | Refills: 0 | Status: SHIPPED | OUTPATIENT
Start: 2022-09-05 | End: 2022-12-18 | Stop reason: SDUPTHER

## 2022-09-05 RX ORDER — LIOTHYRONINE SODIUM 50 UG/1
50 TABLET ORAL DAILY
Qty: 90 TABLET | Refills: 0 | Status: ON HOLD | OUTPATIENT
Start: 2022-09-05 | End: 2023-05-21

## 2022-09-05 NOTE — PROGRESS NOTES
Long-term levothyroxine and liothyronine use for Hashimoto's.  Symptomatically euthyroid.  Refill 90-day supply until upcoming appointment.

## 2022-09-07 PROCEDURE — RXMED WILLOW AMBULATORY MEDICATION CHARGE

## 2022-09-08 ENCOUNTER — PHARMACY VISIT (OUTPATIENT)
Dept: PHARMACY | Facility: MEDICAL CENTER | Age: 37
End: 2022-09-08
Payer: COMMERCIAL

## 2022-09-12 ENCOUNTER — APPOINTMENT (OUTPATIENT)
Dept: RADIOLOGY | Facility: IMAGING CENTER | Age: 37
End: 2022-09-12
Attending: NURSE PRACTITIONER
Payer: COMMERCIAL

## 2022-09-12 ENCOUNTER — OFFICE VISIT (OUTPATIENT)
Dept: URGENT CARE | Facility: CLINIC | Age: 37
End: 2022-09-12
Payer: COMMERCIAL

## 2022-09-12 VITALS
HEIGHT: 67 IN | DIASTOLIC BLOOD PRESSURE: 82 MMHG | SYSTOLIC BLOOD PRESSURE: 118 MMHG | TEMPERATURE: 98 F | BODY MASS INDEX: 29.82 KG/M2 | WEIGHT: 190 LBS | OXYGEN SATURATION: 100 % | HEART RATE: 101 BPM | RESPIRATION RATE: 18 BRPM

## 2022-09-12 DIAGNOSIS — S97.81XA CRUSHING INJURY OF RIGHT FOOT, INITIAL ENCOUNTER: ICD-10-CM

## 2022-09-12 PROCEDURE — 73630 X-RAY EXAM OF FOOT: CPT | Mod: TC,FY,RT | Performed by: RADIOLOGY

## 2022-09-12 PROCEDURE — 99213 OFFICE O/P EST LOW 20 MIN: CPT | Performed by: NURSE PRACTITIONER

## 2022-09-12 ASSESSMENT — FIBROSIS 4 INDEX: FIB4 SCORE: .989743318610787025

## 2022-09-12 NOTE — PROGRESS NOTES
Patient has consented to treatment and for use of patient information for treatment and billing purposes.    Date: 09/12/22     Arrival Mode: Private Vehicle    Chief Complaint:    Chief Complaint   Patient presents with    Foot Injury     X1 week, right foot pain, swollen, and last night toes started to feel numb         History of Present Illness: 37 y.o.  female presents to clinic with presents to clinic with right foot pain swelling and tingling into the toes last night.  Patient states approximately 1 week ago which she had a horse stepped on her foot and she was wearing sandals at the time.  Patient does admit to some pain with ambulation describes pain mostly midfoot.      ROS:    As stated in HPI     Pertinent Medical History:  Past Medical History:   Diagnosis Date    Pain 08-    abd., 1/10    Thyroid disease         Pertinent Surgical History:  Past Surgical History:   Procedure Laterality Date    UMBILICAL HERNIA REPAIR  8/9/2017    Procedure: UMBILICAL HERNIA REPAIR WITH MESH;  Surgeon: Florencio Tavares M.D.;  Location: SURGERY [facility];  Service:     LIZ BY LAPAROSCOPY  5/1/2013    Performed by Florencio Tavares M.D. at SURGERY [facility]    KNEE ARTHROSCOPY      right knee ( times 4)    TONSILLECTOMY AND ADENOIDECTOMY      as a child    UMBILICAL HERNIA REPAIR          Pertinent Medications:    Current Outpatient Medications on File Prior to Visit   Medication Sig Dispense Refill    levothyroxine (SYNTHROID) 137 MCG Tab Take 1 tablet by mouth every morning on an empty stomach. 90 Tablet 0    liothyronine (CYTOMEL) 50 MCG tablet Take 1 tablet by mouth every day. 90 Tablet 0    hydrOXYzine HCl (ATARAX) 25 MG Tab Take 1 tablet orally 3 times a day, as needed for anxiety and insomnia 30 Tablet 2    Cyanocobalamin (B-12 INJ) by Injection route.      COVID-19 mRNA Vaccine, Pfizer, (PFIZER-BIONTECH COVID-19 VACC) 30 MCG/0.3ML Suspension injection Inject 0.3 mL into the shoulder,  thigh, or buttocks. 2nd shot admin 11/26/2021 (Patient not taking: Reported on 9/12/2022) 0.3 mL 0     No current facility-administered medications on file prior to visit.        Allergies:    Shellfish allergy, Vancomycin, Clindamycin hcl, Cephalosporins, Iodine, and Penicillins     Social History:  Social History     Tobacco Use    Smoking status: Never    Smokeless tobacco: Never   Vaping Use    Vaping Use: Never used   Substance Use Topics    Alcohol use: Yes     Alcohol/week: 0.0 oz     Comment: 1-2/week    Drug use: No        No LMP recorded. Patient has had an implant.           Physical Exam:    Vitals:    09/12/22 0817   BP: 118/82   Pulse: (!) 101   Resp: 18   Temp: 36.7 °C (98 °F)   SpO2: 100%             Physical Exam  Constitutional:       Appearance: Normal appearance. She is not ill-appearing, toxic-appearing or diaphoretic.   Cardiovascular:      Pulses:           Dorsalis pedis pulses are 2+ on the right side and 2+ on the left side.   Musculoskeletal:      Right ankle: Normal.      Left ankle: Normal.      Right foot: Normal range of motion and normal capillary refill. Swelling and bony tenderness present. No crepitus. Normal pulse.        Feet:    Feet:      Right foot:      Skin integrity: Skin integrity normal.      Left foot:      Skin integrity: Skin integrity normal.   Skin:     General: Skin is warm.      Capillary Refill: Capillary refill takes less than 2 seconds.      Coloration: Skin is not cyanotic or pale.   Neurological:      Mental Status: She is alert and oriented to person, place, and time.      Sensory: Sensation is intact. No sensory deficit.      Gait: Gait is intact.   Psychiatric:         Behavior: Behavior normal. Behavior is cooperative.          Diagnostics:    DX-FOOT-COMPLETE 3+ RIGHT    Result Date: 9/12/2022 9/12/2022 8:43 AM HISTORY/REASON FOR EXAM:  Pain/Deformity Following Trauma; Crush injury 1 week pauly right foot. Horse stepped on foot. TTp lisfran joint, 3-5  metatarsals TECHNIQUE/EXAM DESCRIPTION AND NUMBER OF VIEWS: 3 of the right foot COMPARISON: None. FINDINGS: There is normal bony mineralization.  There is no evidence of fracture, dislocation, or osseous lesion.  There is no evidence of soft tissue injury.     1.  No radiographic evidence of acute injury.      Medical Decision making and clinic course :  I personally reviewed prior external notes and test results pertinent to today's visit.   Shared decision-making was utilized with patient for treatment plan.  Did obtain a foot x-ray which showed no acute fractures did review x-rays myself and agree with radiology read.  Did discuss that she does have a significant sprain at this time and the tingling may be due to nerve irritation.  Fortunately patient had sensation on exam.  Encouraged rest ice elevation.  If symptoms or not improving in the next week return to clinic for us.     The patient remained stable during the urgent care visit.    Plan:    Medication discussed included indication for use and the potential benefits and side effects.       1. Crushing injury of right foot, initial encounter    - DX-FOOT-COMPLETE 3+ RIGHT          Printed education was provided regarding the aforementioned assessments.  All of the patient's questions were answered to their satisfaction at the time of discharge.    Follow up:      Patient was encouraged to monitor symptoms closely. Those signs and symptoms which would warrant concern and mandate seeking a higher level of service through the emergency department discussed at length and included in discharge papers.  Patient stated agreement and understanding of this plan of care.    Disposition:  Home in stable condition       Voice Recognition Disclaimer:  Portions of this document were created using voice recognition software. The software does have a chance of producing errors of grammar and possibly content. I have made every reasonable attempt to correct obvious errors,  but there may be errors of grammar and possibly content that I did not discover before finalizing the documentation.    PHOEBE [email or web address].[email or web address].

## 2022-10-07 ENCOUNTER — PHARMACY VISIT (OUTPATIENT)
Dept: PHARMACY | Facility: MEDICAL CENTER | Age: 37
End: 2022-10-07
Payer: COMMERCIAL

## 2022-10-07 ENCOUNTER — TELEPHONE (OUTPATIENT)
Dept: OBGYN | Facility: MEDICAL CENTER | Age: 37
End: 2022-10-07
Payer: COMMERCIAL

## 2022-10-07 DIAGNOSIS — J02.9 PHARYNGITIS, UNSPECIFIED ETIOLOGY: ICD-10-CM

## 2022-10-07 PROCEDURE — RXMED WILLOW AMBULATORY MEDICATION CHARGE: Performed by: OBSTETRICS & GYNECOLOGY

## 2022-10-07 RX ORDER — AZITHROMYCIN 250 MG/1
TABLET, FILM COATED ORAL
Qty: 6 TABLET | Refills: 0 | Status: SHIPPED | OUTPATIENT
Start: 2022-10-07 | End: 2022-11-09

## 2022-10-07 RX ORDER — BENZONATATE 100 MG/1
100 CAPSULE ORAL 3 TIMES DAILY PRN
Qty: 60 CAPSULE | Refills: 0 | Status: SHIPPED | OUTPATIENT
Start: 2022-10-07 | End: 2022-11-09

## 2022-10-07 NOTE — TELEPHONE ENCOUNTER
Patient is a patient of Dr. Weathers who is in our group. Patient states she has had a cough for 2 weeks and has failed steroid therapy. Will rx Z-pack.

## 2022-10-11 ENCOUNTER — IMMUNIZATION (OUTPATIENT)
Dept: OCCUPATIONAL MEDICINE | Facility: CLINIC | Age: 37
End: 2022-10-11

## 2022-10-11 DIAGNOSIS — Z23 NEED FOR VACCINATION: Primary | ICD-10-CM

## 2022-10-11 PROCEDURE — 90686 IIV4 VACC NO PRSV 0.5 ML IM: CPT | Performed by: NURSE PRACTITIONER

## 2022-11-03 ENCOUNTER — PHARMACY VISIT (OUTPATIENT)
Dept: PHARMACY | Facility: MEDICAL CENTER | Age: 37
End: 2022-11-03
Payer: COMMERCIAL

## 2022-11-03 PROCEDURE — RXMED WILLOW AMBULATORY MEDICATION CHARGE: Performed by: STUDENT IN AN ORGANIZED HEALTH CARE EDUCATION/TRAINING PROGRAM

## 2022-11-09 ENCOUNTER — OFFICE VISIT (OUTPATIENT)
Dept: MEDICAL GROUP | Facility: CLINIC | Age: 37
End: 2022-11-09
Payer: COMMERCIAL

## 2022-11-09 VITALS — HEIGHT: 67 IN | WEIGHT: 204 LBS | BODY MASS INDEX: 32.02 KG/M2

## 2022-11-09 DIAGNOSIS — Z3A.01 LESS THAN 8 WEEKS GESTATION OF PREGNANCY: ICD-10-CM

## 2022-11-09 DIAGNOSIS — D80.2 IGA DEFICIENCY (HCC): ICD-10-CM

## 2022-11-09 DIAGNOSIS — E06.3 HYPOTHYROIDISM DUE TO HASHIMOTO'S THYROIDITIS: ICD-10-CM

## 2022-11-09 DIAGNOSIS — Z76.89 ENCOUNTER TO ESTABLISH CARE: ICD-10-CM

## 2022-11-09 DIAGNOSIS — E03.8 HYPOTHYROIDISM DUE TO HASHIMOTO'S THYROIDITIS: ICD-10-CM

## 2022-11-09 PROCEDURE — 99213 OFFICE O/P EST LOW 20 MIN: CPT | Performed by: STUDENT IN AN ORGANIZED HEALTH CARE EDUCATION/TRAINING PROGRAM

## 2022-11-09 ASSESSMENT — FIBROSIS 4 INDEX: FIB4 SCORE: .989743318610787025

## 2022-11-10 ENCOUNTER — PATIENT MESSAGE (OUTPATIENT)
Dept: MEDICAL GROUP | Facility: CLINIC | Age: 37
End: 2022-11-10
Payer: COMMERCIAL

## 2022-11-10 PROBLEM — Z34.90 PREGNANCY: Status: ACTIVE | Noted: 2022-11-10

## 2022-11-10 ASSESSMENT — ENCOUNTER SYMPTOMS
SHORTNESS OF BREATH: 0
CHILLS: 0
FEVER: 0
SORE THROAT: 0
SENSORY CHANGE: 0
PALPITATIONS: 0
COUGH: 0
DEPRESSION: 0
EYE REDNESS: 0
NAUSEA: 1
VOMITING: 1
BLURRED VISION: 0
ABDOMINAL PAIN: 0
FOCAL WEAKNESS: 0
SINUS PAIN: 0

## 2022-11-10 ASSESSMENT — PATIENT HEALTH QUESTIONNAIRE - PHQ9: CLINICAL INTERPRETATION OF PHQ2 SCORE: 0

## 2022-11-11 NOTE — ASSESSMENT & PLAN NOTE
- Reviewed safe/recommended dosing of pyridoxine/doxylamine  - OB appt as scheduled  - May wait to have thyroid studies drawn with first trimester OB labs

## 2022-11-11 NOTE — PROGRESS NOTES
Please notify patient, call or letter    Your results were all normal or expected.  Please continue your current plan of care.     Electronically signed by Lissette Grant CNP.   Subjective:     CC:  Establish care    Diagnoses of Encounter to establish care, Hypothyroidism due to Hashimoto's thyroiditis, Less than 8 weeks gestation of pregnancy, and IgA deficiency (HCC) were pertinent to this visit.    HISTORY OF THE PRESENT ILLNESS: Patient is a 37 y.o. female. This pleasant patient is here today to establish care and discuss hypothyroidism. Her prior PCP was Dr. Kip Miller.    Problem   Pregnancy    7 weeks pregnant  Has appt to establish with OB soon  +N/V, taking pyridoxine and doxylamine for sx       Hypothyroidism Due to Hashimoto's Thyroiditis    Diagnosed at age 17  Previously managed by Endocrinology, but has been managed by prior PCP due to long duration of stable medication doses.  Takes levothyroxine 137 mcg and liothyronine 50 mcg daily.  Symptomatically euthyroid  >1 year since last lab evaluation     Iga Deficiency (Hcc)    Historical dx  Patient denies frequent or more severe illness than peers       PMH:  Hypothyroidism due to Hashimoto's  IgA deficiency    PSH:  Laparoscopic cholecystectomy, 2013  Umbilical hernia repair, 2017  Knee arthroscopy  T&A    FH:  Father: HTN  Mother: autoimmune disease  Siblings: two brothers, one  years ago d/t MVA. Living brother without known health problems.      OB/GYN History:  Menses Q28-30 days, regular prior to current pregnancy which is patient's first pregnancy  H/o LSIL with HPV in past; most recent pap in 2019 NILM without high risk HPV.    Social History:  Tobacco: Denies  ETOH: Denies  Drug use: Denies  Lives with  and father in law, reports great support system. Recently had to euthanize one of her horses which has been difficult.   Works as L&D RN     ROS:   Review of Systems   Constitutional:  Negative for chills and fever.   HENT:  Negative for congestion, sinus pain and sore throat.    Eyes:  Negative for blurred vision and redness.   Respiratory:  Negative for cough and shortness of breath.   "  Cardiovascular:  Negative for chest pain, palpitations and leg swelling.   Gastrointestinal:  Positive for nausea and vomiting. Negative for abdominal pain.   Genitourinary:  Negative for dysuria.   Skin:  Negative for rash.   Neurological:  Negative for sensory change and focal weakness.   Psychiatric/Behavioral:  Negative for depression.        Objective:     Exam: Ht 1.702 m (5' 7\")   Wt 92.5 kg (204 lb)  Body mass index is 31.95 kg/m².    Physical Exam  Constitutional:       General: She is not in acute distress.     Appearance: Normal appearance. She is not ill-appearing.   HENT:      Head: Normocephalic and atraumatic.      Right Ear: External ear normal.      Left Ear: External ear normal.   Eyes:      Conjunctiva/sclera: Conjunctivae normal.   Cardiovascular:      Rate and Rhythm: Normal rate and regular rhythm.      Pulses: Normal pulses.      Heart sounds: Normal heart sounds. No murmur heard.  Pulmonary:      Effort: Pulmonary effort is normal. No respiratory distress.      Breath sounds: Normal breath sounds. No wheezing or rales.   Musculoskeletal:         General: No swelling or deformity.   Skin:     Findings: No lesion or rash.   Neurological:      General: No focal deficit present.      Mental Status: She is alert.      Gait: Gait normal.   Psychiatric:         Mood and Affect: Mood normal.         Behavior: Behavior normal.         Thought Content: Thought content normal.         Judgment: Judgment normal.           Assessment & Plan:   37 y.o. female with the following -    Problem List Items Addressed This Visit       Hypothyroidism due to Hashimoto's thyroiditis     - TSH, T4, and T3 ordered  - Continue levothyroxine and liothyronine at this time, though doses may need adjustment due to new dx of pregnancy         Relevant Orders    TSH    TRIIDOTHYRONINE    FREE THYROXINE    IgA deficiency (HCC)    Pregnancy     - Reviewed safe/recommended dosing of pyridoxine/doxylamine  - OB appt as " scheduled  - May wait to have thyroid studies drawn with first trimester OB labs            Other Visit Diagnoses       Encounter to establish care                    No follow-ups on file.

## 2022-11-11 NOTE — ASSESSMENT & PLAN NOTE
- TSH, T4, and T3 ordered  - Continue levothyroxine and liothyronine at this time, though doses may need adjustment due to new dx of pregnancy

## 2022-11-17 ENCOUNTER — HOSPITAL ENCOUNTER (OUTPATIENT)
Dept: LAB | Facility: MEDICAL CENTER | Age: 37
End: 2022-11-17
Attending: OBSTETRICS & GYNECOLOGY

## 2022-11-17 ENCOUNTER — HOSPITAL ENCOUNTER (OUTPATIENT)
Facility: MEDICAL CENTER | Age: 37
End: 2022-11-17
Attending: OBSTETRICS & GYNECOLOGY
Payer: COMMERCIAL

## 2022-11-17 ENCOUNTER — PHARMACY VISIT (OUTPATIENT)
Dept: PHARMACY | Facility: MEDICAL CENTER | Age: 37
End: 2022-11-17
Payer: COMMERCIAL

## 2022-11-17 PROCEDURE — 84443 ASSAY THYROID STIM HORMONE: CPT

## 2022-11-17 PROCEDURE — 87077 CULTURE AEROBIC IDENTIFY: CPT

## 2022-11-17 PROCEDURE — 86803 HEPATITIS C AB TEST: CPT

## 2022-11-17 PROCEDURE — 85025 COMPLETE CBC W/AUTO DIFF WBC: CPT

## 2022-11-17 PROCEDURE — 86762 RUBELLA ANTIBODY: CPT

## 2022-11-17 PROCEDURE — 86901 BLOOD TYPING SEROLOGIC RH(D): CPT

## 2022-11-17 PROCEDURE — 81329 SMN1 GENE DOS/DELETION ALYS: CPT

## 2022-11-17 PROCEDURE — 87389 HIV-1 AG W/HIV-1&-2 AB AG IA: CPT

## 2022-11-17 PROCEDURE — 81220 CFTR GENE COM VARIANTS: CPT

## 2022-11-17 PROCEDURE — 86780 TREPONEMA PALLIDUM: CPT

## 2022-11-17 PROCEDURE — 87086 URINE CULTURE/COLONY COUNT: CPT

## 2022-11-17 PROCEDURE — 86850 RBC ANTIBODY SCREEN: CPT

## 2022-11-17 PROCEDURE — 86787 VARICELLA-ZOSTER ANTIBODY: CPT | Mod: 91

## 2022-11-17 PROCEDURE — 87340 HEPATITIS B SURFACE AG IA: CPT

## 2022-11-17 PROCEDURE — 86900 BLOOD TYPING SEROLOGIC ABO: CPT

## 2022-11-17 PROCEDURE — 81243 FMR1 GEN ALY DETC ABNL ALLEL: CPT

## 2022-11-17 PROCEDURE — RXMED WILLOW AMBULATORY MEDICATION CHARGE: Performed by: OBSTETRICS & GYNECOLOGY

## 2022-11-17 RX ORDER — ONDANSETRON HYDROCHLORIDE 8 MG/1
TABLET, FILM COATED ORAL
Qty: 30 TABLET | Refills: 0 | Status: SHIPPED | OUTPATIENT
Start: 2022-11-17 | End: 2023-02-23 | Stop reason: SDUPTHER

## 2022-11-18 DIAGNOSIS — E03.8 HYPOTHYROIDISM DUE TO HASHIMOTO'S THYROIDITIS: ICD-10-CM

## 2022-11-18 DIAGNOSIS — E06.3 HYPOTHYROIDISM DUE TO HASHIMOTO'S THYROIDITIS: ICD-10-CM

## 2022-11-18 LAB
ABO GROUP BLD: NORMAL
BASOPHILS # BLD AUTO: 0.4 % (ref 0–1.8)
BASOPHILS # BLD: 0.04 K/UL (ref 0–0.12)
BLD GP AB SCN SERPL QL: NORMAL
EOSINOPHIL # BLD AUTO: 0.04 K/UL (ref 0–0.51)
EOSINOPHIL NFR BLD: 0.4 % (ref 0–6.9)
ERYTHROCYTE [DISTWIDTH] IN BLOOD BY AUTOMATED COUNT: 44.5 FL (ref 35.9–50)
HBV SURFACE AG SER QL: ABNORMAL
HCT VFR BLD AUTO: 45.2 % (ref 37–47)
HCV AB SER QL: NORMAL
HGB BLD-MCNC: 14.7 G/DL (ref 12–16)
HIV 1+2 AB+HIV1 P24 AG SERPL QL IA: NORMAL
IMM GRANULOCYTES # BLD AUTO: 0.03 K/UL (ref 0–0.11)
IMM GRANULOCYTES NFR BLD AUTO: 0.3 % (ref 0–0.9)
LYMPHOCYTES # BLD AUTO: 1.95 K/UL (ref 1–4.8)
LYMPHOCYTES NFR BLD: 18.7 % (ref 22–41)
MCH RBC QN AUTO: 27.4 PG (ref 27–33)
MCHC RBC AUTO-ENTMCNC: 32.5 G/DL (ref 33.6–35)
MCV RBC AUTO: 84.2 FL (ref 81.4–97.8)
MONOCYTES # BLD AUTO: 0.45 K/UL (ref 0–0.85)
MONOCYTES NFR BLD AUTO: 4.3 % (ref 0–13.4)
NEUTROPHILS # BLD AUTO: 7.94 K/UL (ref 2–7.15)
NEUTROPHILS NFR BLD: 75.9 % (ref 44–72)
NRBC # BLD AUTO: 0 K/UL
NRBC BLD-RTO: 0 /100 WBC
PLATELET # BLD AUTO: 181 K/UL (ref 164–446)
PMV BLD AUTO: 12.4 FL (ref 9–12.9)
RBC # BLD AUTO: 5.37 M/UL (ref 4.2–5.4)
RH BLD: NORMAL
RUBV AB SER QL: 170 IU/ML
T PALLIDUM AB SER QL IA: ABNORMAL
TSH SERPL DL<=0.005 MIU/L-ACNC: 0.05 UIU/ML (ref 0.38–5.33)
WBC # BLD AUTO: 10.5 K/UL (ref 4.8–10.8)

## 2022-11-18 NOTE — PROGRESS NOTES
H/o hypothyroidism due to Hashimoto's.  Previously controlled on levothryoxine 137 mcg daily and liothyronine 50 mcg daily.  Recently pregnant, discontinued liothyronine about 1 week ago.  TSH low at 0.05.   Patient denies sx of hyperthyroidism.  Give half life of liothyronine, will repeat TSH in about 1 week. If still low for first trimester of pregnancy, may need to decrease levothyroxine dose.  Discussed plan with patient who verbalizes understanding and agrees with plan of care. Repeat TSH study ordered.

## 2022-11-20 LAB
BACTERIA UR CULT: ABNORMAL
BACTERIA UR CULT: ABNORMAL
SIGNIFICANT IND 70042: ABNORMAL
SITE SITE: ABNORMAL
SOURCE SOURCE: ABNORMAL
VZV IGG SER IA-ACNC: 916.8 IV
VZV IGM SER IA-ACNC: 0.14 ISR

## 2022-12-05 LAB
CF EXPANDED VARIANT PANEL INTERP Q4864: ABNORMAL
CFTR ALLELE 1 BLD/T QL: NEGATIVE
CFTR ALLELE 1 BLD/T QL: NEGATIVE
CFTR MUT ANL BLD/T: ABNORMAL
FMR1 ALLELE 2 CGG RPT ENTNUM BLD/T: 30 CGG REPEATS
FMR1 ALLELE1 CGG RPT ENTNUM BLD/T: 31 CGG REPEATS
FMR1 GENE MUT ANL BLD/T: ABNORMAL
FMR1 GENE MUT ANL BLD/T: ABNORMAL
GEN STRUCT VAR COPY NUM: ABNORMAL
SMN1 GENE MUT ANL BLD/T: ABNORMAL
SMN1+SMN2 GENE MUT ANL BLD/T: ABNORMAL
SMN2 GENE MUT ANL BLD/T: ABNORMAL
SPECIMEN SOURCE: ABNORMAL
SYMPTOM: ABNORMAL

## 2022-12-18 DIAGNOSIS — E06.3 HYPOTHYROIDISM DUE TO HASHIMOTO'S THYROIDITIS: ICD-10-CM

## 2022-12-18 DIAGNOSIS — E03.8 HYPOTHYROIDISM DUE TO HASHIMOTO'S THYROIDITIS: ICD-10-CM

## 2022-12-19 ENCOUNTER — HOSPITAL ENCOUNTER (OUTPATIENT)
Dept: LAB | Facility: MEDICAL CENTER | Age: 37
End: 2022-12-19
Attending: STUDENT IN AN ORGANIZED HEALTH CARE EDUCATION/TRAINING PROGRAM
Payer: COMMERCIAL

## 2022-12-19 DIAGNOSIS — E06.3 HYPOTHYROIDISM DUE TO HASHIMOTO'S THYROIDITIS: ICD-10-CM

## 2022-12-19 DIAGNOSIS — E03.8 HYPOTHYROIDISM DUE TO HASHIMOTO'S THYROIDITIS: ICD-10-CM

## 2022-12-19 LAB
T3 SERPL-MCNC: 113 NG/DL (ref 60–181)
T4 FREE SERPL-MCNC: 0.94 NG/DL (ref 0.93–1.7)
TSH SERPL DL<=0.005 MIU/L-ACNC: 3.75 UIU/ML (ref 0.38–5.33)

## 2022-12-19 PROCEDURE — 36415 COLL VENOUS BLD VENIPUNCTURE: CPT

## 2022-12-19 PROCEDURE — 84480 ASSAY TRIIODOTHYRONINE (T3): CPT

## 2022-12-19 PROCEDURE — 84443 ASSAY THYROID STIM HORMONE: CPT

## 2022-12-19 PROCEDURE — 84439 ASSAY OF FREE THYROXINE: CPT

## 2022-12-19 RX ORDER — LEVOTHYROXINE SODIUM 137 UG/1
137 TABLET ORAL
Qty: 90 TABLET | Refills: 0 | Status: SHIPPED | OUTPATIENT
Start: 2022-12-19 | End: 2022-12-20

## 2022-12-20 ENCOUNTER — PHARMACY VISIT (OUTPATIENT)
Dept: PHARMACY | Facility: MEDICAL CENTER | Age: 37
End: 2022-12-20
Payer: COMMERCIAL

## 2022-12-20 DIAGNOSIS — E06.3 HYPOTHYROIDISM DUE TO HASHIMOTO'S THYROIDITIS: ICD-10-CM

## 2022-12-20 DIAGNOSIS — E03.8 HYPOTHYROIDISM DUE TO HASHIMOTO'S THYROIDITIS: ICD-10-CM

## 2022-12-20 PROCEDURE — RXMED WILLOW AMBULATORY MEDICATION CHARGE: Performed by: STUDENT IN AN ORGANIZED HEALTH CARE EDUCATION/TRAINING PROGRAM

## 2022-12-20 RX ORDER — LEVOTHYROXINE SODIUM 0.15 MG/1
150 TABLET ORAL
Qty: 30 TABLET | Refills: 1 | Status: SHIPPED | OUTPATIENT
Start: 2022-12-20 | End: 2023-02-20 | Stop reason: SDUPTHER

## 2023-01-17 ENCOUNTER — PHARMACY VISIT (OUTPATIENT)
Dept: PHARMACY | Facility: MEDICAL CENTER | Age: 38
End: 2023-01-17
Payer: COMMERCIAL

## 2023-01-17 PROCEDURE — RXMED WILLOW AMBULATORY MEDICATION CHARGE: Performed by: STUDENT IN AN ORGANIZED HEALTH CARE EDUCATION/TRAINING PROGRAM

## 2023-01-17 PROCEDURE — RXOTC WILLOW AMBULATORY OTC CHARGE

## 2023-01-27 ENCOUNTER — HOSPITAL ENCOUNTER (OUTPATIENT)
Dept: LAB | Facility: MEDICAL CENTER | Age: 38
End: 2023-01-27
Attending: STUDENT IN AN ORGANIZED HEALTH CARE EDUCATION/TRAINING PROGRAM
Payer: COMMERCIAL

## 2023-01-27 DIAGNOSIS — E03.8 HYPOTHYROIDISM DUE TO HASHIMOTO'S THYROIDITIS: ICD-10-CM

## 2023-01-27 DIAGNOSIS — E06.3 HYPOTHYROIDISM DUE TO HASHIMOTO'S THYROIDITIS: ICD-10-CM

## 2023-01-27 LAB — TSH SERPL DL<=0.005 MIU/L-ACNC: 1.22 UIU/ML (ref 0.38–5.33)

## 2023-01-27 PROCEDURE — 84443 ASSAY THYROID STIM HORMONE: CPT

## 2023-01-27 PROCEDURE — 36415 COLL VENOUS BLD VENIPUNCTURE: CPT

## 2023-02-09 DIAGNOSIS — E06.3 HYPOTHYROIDISM DUE TO HASHIMOTO'S THYROIDITIS: ICD-10-CM

## 2023-02-09 DIAGNOSIS — E03.8 HYPOTHYROIDISM DUE TO HASHIMOTO'S THYROIDITIS: ICD-10-CM

## 2023-02-20 DIAGNOSIS — E03.8 HYPOTHYROIDISM DUE TO HASHIMOTO'S THYROIDITIS: ICD-10-CM

## 2023-02-20 DIAGNOSIS — E06.3 HYPOTHYROIDISM DUE TO HASHIMOTO'S THYROIDITIS: ICD-10-CM

## 2023-02-22 PROCEDURE — RXMED WILLOW AMBULATORY MEDICATION CHARGE: Performed by: STUDENT IN AN ORGANIZED HEALTH CARE EDUCATION/TRAINING PROGRAM

## 2023-02-22 RX ORDER — LEVOTHYROXINE SODIUM 0.15 MG/1
150 TABLET ORAL
Qty: 30 TABLET | Refills: 1 | Status: SHIPPED | OUTPATIENT
Start: 2023-02-22 | End: 2023-08-03 | Stop reason: SDUPTHER

## 2023-02-23 ENCOUNTER — PHARMACY VISIT (OUTPATIENT)
Dept: PHARMACY | Facility: MEDICAL CENTER | Age: 38
End: 2023-02-23
Payer: COMMERCIAL

## 2023-02-23 PROCEDURE — RXMED WILLOW AMBULATORY MEDICATION CHARGE: Performed by: OBSTETRICS & GYNECOLOGY

## 2023-02-23 RX ORDER — ONDANSETRON HYDROCHLORIDE 8 MG/1
TABLET, FILM COATED ORAL
Qty: 30 TABLET | Refills: 0 | Status: ON HOLD | OUTPATIENT
Start: 2023-02-23 | End: 2023-06-09

## 2023-02-23 RX ORDER — ONDANSETRON HYDROCHLORIDE 8 MG/1
TABLET, FILM COATED ORAL
Qty: 30 TABLET | Refills: 0 | OUTPATIENT
Start: 2023-02-23

## 2023-03-05 ENCOUNTER — HOSPITAL ENCOUNTER (EMERGENCY)
Facility: MEDICAL CENTER | Age: 38
End: 2023-03-05
Attending: OBSTETRICS & GYNECOLOGY | Admitting: OBSTETRICS & GYNECOLOGY
Payer: COMMERCIAL

## 2023-03-05 ENCOUNTER — APPOINTMENT (OUTPATIENT)
Dept: RADIOLOGY | Facility: MEDICAL CENTER | Age: 38
End: 2023-03-05
Attending: OBSTETRICS & GYNECOLOGY
Payer: COMMERCIAL

## 2023-03-05 VITALS
BODY MASS INDEX: 32.96 KG/M2 | SYSTOLIC BLOOD PRESSURE: 127 MMHG | HEIGHT: 67 IN | WEIGHT: 210 LBS | TEMPERATURE: 97.3 F | DIASTOLIC BLOOD PRESSURE: 64 MMHG | RESPIRATION RATE: 18 BRPM | HEART RATE: 78 BPM

## 2023-03-05 LAB
ACTION RH IMMUNE GLOB 8505RHG: NORMAL
NUMBER OF RH DOSES IND 8505RD: 1
RH BLD: NORMAL

## 2023-03-05 PROCEDURE — 86901 BLOOD TYPING SEROLOGIC RH(D): CPT

## 2023-03-05 PROCEDURE — 96374 THER/PROPH/DIAG INJ IV PUSH: CPT

## 2023-03-05 PROCEDURE — 36415 COLL VENOUS BLD VENIPUNCTURE: CPT

## 2023-03-05 PROCEDURE — 99282 EMERGENCY DEPT VISIT SF MDM: CPT

## 2023-03-05 PROCEDURE — 76815 OB US LIMITED FETUS(S): CPT

## 2023-03-05 ASSESSMENT — PAIN SCALES - GENERAL: PAINLEVEL: 0 - NO PAIN

## 2023-03-05 ASSESSMENT — FIBROSIS 4 INDEX: FIB4 SCORE: 1.03

## 2023-03-06 NOTE — PROGRESS NOTES
190) Report received from Molly ÁLVAREZ RN.  POC discussed with Pt  ) Order received for Ultrasound, pt updated   ) Rhogam given   ) Ultrasound at bedside  ) Dr Gonzalez at bedside, order received for discharge.  Pt given  labor discharge instructions.  Stable, discharged to home with FOB

## 2023-03-06 NOTE — PROGRESS NOTES
MIRZA     2023     EGA     23w6d    1729: TOCO/US applied, pt educated. Pt comes in with c/o falling down from a retaining wall in her back yard. Pt states she fell around 1400 today landing on her knees and catching herself with her hands. Pt declines LOF, VB, UC's and states +FM.     : Dr. Paez updated with patient's status, states will come to bedside to assess patient.     : Report given to Loren ÁLVAREZ RN.

## 2023-03-06 NOTE — ED PROVIDER NOTES
OB ED Assessment:    CC: fall    HPI:  Ms. Carolina Medrano is a 37 y.o.  @ 23w6d by lmp c/w 9wk US with fall around 2PM to her hands her knees.  No abdominal trauma.  Denies ctx, LOF, VB.  Reports +FM.        PNC with Dr Kang, denies complications.  She has a hx of hypothyroidism, well controlled.  <ost recent TSH 1.22 .      PNL:  Rh neg, antibody screen neg, RI, HIV neg, RPR NR, HCV neg, HBsAg NR  GBS + by urine      ROS:  Const: denies fevers, general concerns  CV/resp: reports no concerns  GI: denies abd pain, GI concerns  : see HPI  Neuro: reports no HA/vision changes    OB History    Para Term  AB Living   1 0 0 0 0 0   SAB IAB Ectopic Molar Multiple Live Births   0 0 0   0        # Outcome Date GA Lbr Abhay/2nd Weight Sex Delivery Anes PTL Lv   1 Current                PMHx: hypothyroidism        Past Surgical History:   Procedure Laterality Date    UMBILICAL HERNIA REPAIR  2017    Procedure: UMBILICAL HERNIA REPAIR WITH MESH;  Surgeon: Florencio Tavares M.D.;  Location: SURGERY [facility];  Service:     LIZ BY LAPAROSCOPY  2013    Performed by Florencio Tavares M.D. at SURGERY [facility]    KNEE ARTHROSCOPY      right knee ( times 4)    TONSILLECTOMY AND ADENOIDECTOMY      as a child    UMBILICAL HERNIA REPAIR         No current facility-administered medications on file prior to encounter.     Current Outpatient Medications on File Prior to Encounter   Medication Sig Dispense Refill    ondansetron (ZOFRAN) 8 MG Tab Take 1 tablet by mouth once a day as needed 30 Tablet 0    levothyroxine (SYNTHROID) 150 MCG Tab Take 1 Tablet by mouth every morning on an empty stomach. 30 Tablet 1    liothyronine (CYTOMEL) 50 MCG tablet Take 1 tablet by mouth every day. 90 Tablet 0       Family History   Problem Relation Age of Onset    Heart Disease Father     Hypertension Father     Alcohol abuse Father     Cancer Paternal Aunt         breast    Cancer Paternal  "Uncle         colon    Diabetes Maternal Grandfather     Cancer Paternal Grandmother         breast    Autoimmune Disease Paternal Grandmother     Lupus Mother     No Known Problems Brother     Diabetes Maternal Grandmother     Alzheimer's Disease Paternal Grandfather        Social History     Socioeconomic History    Marital status: Single   Tobacco Use    Smoking status: Never    Smokeless tobacco: Never   Vaping Use    Vaping Use: Never used   Substance and Sexual Activity    Alcohol use: Yes     Alcohol/week: 0.0 oz     Comment: 1-2/week    Drug use: No    Sexual activity: Yes     Partners: Female     Birth control/protection: Other-See Comments       PE:  Vitals:    23 1731   BP: 127/64   Pulse: 78   Resp: 18   Temp: 36.3 °C (97.3 °F)   TempSrc: Temporal   Weight: 95.3 kg (210 lb)   Height: 1.702 m (5' 7\")     gen: AAO, NAD  abd: soft, gravid, NT      FHT: 140/moderate variability/+ accels/ mild variable decels  steph: no ctx    A/P: 37 y.o.  @ 23w6d by lmp c/w 9wk US with fall, no abdominal trauma  - FHTs reassuring, EGA appropriate    - Rh neg: rhogam  - US wnl, no signs of abruption.        Nora Paez MD  [facility]        "

## 2023-03-28 ENCOUNTER — HOSPITAL ENCOUNTER (OUTPATIENT)
Dept: LAB | Facility: MEDICAL CENTER | Age: 38
End: 2023-03-28
Attending: STUDENT IN AN ORGANIZED HEALTH CARE EDUCATION/TRAINING PROGRAM
Payer: COMMERCIAL

## 2023-03-28 ENCOUNTER — HOSPITAL ENCOUNTER (OUTPATIENT)
Dept: LAB | Facility: MEDICAL CENTER | Age: 38
End: 2023-03-28
Attending: OBSTETRICS & GYNECOLOGY
Payer: COMMERCIAL

## 2023-03-28 DIAGNOSIS — E03.8 HYPOTHYROIDISM DUE TO HASHIMOTO'S THYROIDITIS: ICD-10-CM

## 2023-03-28 DIAGNOSIS — E06.3 HYPOTHYROIDISM DUE TO HASHIMOTO'S THYROIDITIS: ICD-10-CM

## 2023-03-28 LAB
BASOPHILS # BLD AUTO: 0.2 % (ref 0–1.8)
BASOPHILS # BLD: 0.03 K/UL (ref 0–0.12)
BLD GP AB INVEST PLASRBC-IMP: ABNORMAL
BLD GP AB SCN SERPL QL: ABNORMAL
EOSINOPHIL # BLD AUTO: 0.05 K/UL (ref 0–0.51)
EOSINOPHIL NFR BLD: 0.4 % (ref 0–6.9)
ERYTHROCYTE [DISTWIDTH] IN BLOOD BY AUTOMATED COUNT: 44.7 FL (ref 35.9–50)
GLUCOSE 1H P 50 G GLC PO SERPL-MCNC: 133 MG/DL (ref 70–139)
HCT VFR BLD AUTO: 37.5 % (ref 37–47)
HGB BLD-MCNC: 12.5 G/DL (ref 12–16)
IMM GRANULOCYTES # BLD AUTO: 0.12 K/UL (ref 0–0.11)
IMM GRANULOCYTES NFR BLD AUTO: 1 % (ref 0–0.9)
LYMPHOCYTES # BLD AUTO: 1.81 K/UL (ref 1–4.8)
LYMPHOCYTES NFR BLD: 14.7 % (ref 22–41)
MCH RBC QN AUTO: 29.1 PG (ref 27–33)
MCHC RBC AUTO-ENTMCNC: 33.3 G/DL (ref 33.6–35)
MCV RBC AUTO: 87.4 FL (ref 81.4–97.8)
MONOCYTES # BLD AUTO: 0.46 K/UL (ref 0–0.85)
MONOCYTES NFR BLD AUTO: 3.7 % (ref 0–13.4)
NEUTROPHILS # BLD AUTO: 9.87 K/UL (ref 2–7.15)
NEUTROPHILS NFR BLD: 80 % (ref 44–72)
NRBC # BLD AUTO: 0 K/UL
NRBC BLD-RTO: 0 /100 WBC
PLATELET # BLD AUTO: 128 K/UL (ref 164–446)
PMV BLD AUTO: 12.1 FL (ref 9–12.9)
RBC # BLD AUTO: 4.29 M/UL (ref 4.2–5.4)
T PALLIDUM AB SER QL IA: NORMAL
TSH SERPL DL<=0.005 MIU/L-ACNC: 1.28 UIU/ML (ref 0.38–5.33)
WBC # BLD AUTO: 12.3 K/UL (ref 4.8–10.8)

## 2023-03-28 PROCEDURE — 84443 ASSAY THYROID STIM HORMONE: CPT

## 2023-03-28 PROCEDURE — 86850 RBC ANTIBODY SCREEN: CPT

## 2023-03-28 PROCEDURE — 86870 RBC ANTIBODY IDENTIFICATION: CPT

## 2023-03-28 PROCEDURE — 85025 COMPLETE CBC W/AUTO DIFF WBC: CPT

## 2023-03-28 PROCEDURE — 86780 TREPONEMA PALLIDUM: CPT

## 2023-03-28 PROCEDURE — 36415 COLL VENOUS BLD VENIPUNCTURE: CPT

## 2023-03-28 PROCEDURE — 82950 GLUCOSE TEST: CPT

## 2023-03-30 PROCEDURE — RXMED WILLOW AMBULATORY MEDICATION CHARGE: Performed by: STUDENT IN AN ORGANIZED HEALTH CARE EDUCATION/TRAINING PROGRAM

## 2023-03-30 PROCEDURE — RXMED WILLOW AMBULATORY MEDICATION CHARGE: Performed by: OBSTETRICS & GYNECOLOGY

## 2023-03-31 ENCOUNTER — PHARMACY VISIT (OUTPATIENT)
Dept: PHARMACY | Facility: MEDICAL CENTER | Age: 38
End: 2023-03-31
Payer: COMMERCIAL

## 2023-04-12 ENCOUNTER — OFFICE VISIT (OUTPATIENT)
Dept: URGENT CARE | Facility: CLINIC | Age: 38
End: 2023-04-12
Payer: COMMERCIAL

## 2023-04-12 VITALS
RESPIRATION RATE: 18 BRPM | WEIGHT: 227 LBS | SYSTOLIC BLOOD PRESSURE: 120 MMHG | DIASTOLIC BLOOD PRESSURE: 70 MMHG | HEIGHT: 67 IN | OXYGEN SATURATION: 98 % | BODY MASS INDEX: 35.63 KG/M2 | HEART RATE: 88 BPM | TEMPERATURE: 98 F

## 2023-04-12 DIAGNOSIS — J22 LRTI (LOWER RESPIRATORY TRACT INFECTION): ICD-10-CM

## 2023-04-12 DIAGNOSIS — Z3A.29 29 WEEKS GESTATION OF PREGNANCY: ICD-10-CM

## 2023-04-12 PROCEDURE — 99213 OFFICE O/P EST LOW 20 MIN: CPT | Performed by: NURSE PRACTITIONER

## 2023-04-12 RX ORDER — AZITHROMYCIN 250 MG/1
TABLET, FILM COATED ORAL
Qty: 6 TABLET | Refills: 0 | Status: SHIPPED | OUTPATIENT
Start: 2023-04-12 | End: 2023-04-17

## 2023-04-12 RX ORDER — AMOXICILLIN AND CLAVULANATE POTASSIUM 875; 125 MG/1; MG/1
1 TABLET, FILM COATED ORAL 2 TIMES DAILY
Qty: 14 TABLET | Refills: 0 | Status: SHIPPED | OUTPATIENT
Start: 2023-04-12 | End: 2023-04-12

## 2023-04-12 ASSESSMENT — ENCOUNTER SYMPTOMS
SHORTNESS OF BREATH: 0
SPUTUM PRODUCTION: 1
CONSTITUTIONAL NEGATIVE: 1
CHILLS: 0
COUGH: 1
FEVER: 0

## 2023-04-12 ASSESSMENT — FIBROSIS 4 INDEX: FIB4 SCORE: 1.46

## 2023-04-12 ASSESSMENT — VISUAL ACUITY: OU: 1

## 2023-04-12 NOTE — PROGRESS NOTES
Subjective:     Carolina Medrano is a 37 y.o. female who presents for Cough (X 10 days, cough with green mucus, 29 weeks pregnant.)       Cough  This is a new problem. The problem has been gradually worsening. The cough is Productive of sputum. Pertinent negatives include no chills, fever or shortness of breath.     10 to 14 days ago, patient started develop cough.  Reports productive with green, thick sputum.  Reports metallic taste to it similar to past bacterial/fungal colitis infection she has had in the past.  She is 29 weeks pregnant.  Denies other symptoms at this time.  Has been using Robitussin.  Home COVID test negative.    Review of Systems   Constitutional: Negative.  Negative for chills, fever and malaise/fatigue.   Respiratory:  Positive for cough and sputum production. Negative for shortness of breath.    All other systems reviewed and are negative.    Refer to HPI for additional details.    During this visit, appropriate PPE was worn, hand hygiene was performed, and the patient and any visitors were masked.    PMH:  has a past medical history of Pain (08-) and Thyroid disease.    MEDS:   Current Outpatient Medications:     azithromycin (ZITHROMAX) 250 MG Tab, Take 2 tabs by mouth once today, then one tab by mouth once daily days 2-5., Disp: 6 Tablet, Rfl: 0    omeprazole (PRILOSEC) 10 MG CAPSULE DELAYED RELEASE, Take 1 capsule by mouth 2 times a day, Disp: 60 Capsule, Rfl: 30    ondansetron (ZOFRAN) 8 MG Tab, Take 1 tablet by mouth once a day as needed, Disp: 30 Tablet, Rfl: 0    levothyroxine (SYNTHROID) 150 MCG Tab, Take 1 Tablet by mouth every morning on an empty stomach., Disp: 30 Tablet, Rfl: 1    liothyronine (CYTOMEL) 50 MCG tablet, Take 1 tablet by mouth every day., Disp: 90 Tablet, Rfl: 0    ALLERGIES:   Allergies   Allergen Reactions    Shellfish Allergy Rash     Throat swelling    Vancomycin Rash and Anaphylaxis     .    Clindamycin Hcl Rash    Cephalosporins Hives and Rash  "    .    Iodine Rash     Topical iodine and injectable    Penicillins Hives and Rash     .     SURGHX:   Past Surgical History:   Procedure Laterality Date    UMBILICAL HERNIA REPAIR  8/9/2017    Procedure: UMBILICAL HERNIA REPAIR WITH MESH;  Surgeon: Florencio Tavares M.D.;  Location: SURGERY [facility];  Service:     LIZ BY LAPAROSCOPY  5/1/2013    Performed by Florencio Tavares M.D. at SURGERY [facility]    KNEE ARTHROSCOPY      right knee ( times 4)    TONSILLECTOMY AND ADENOIDECTOMY      as a child    UMBILICAL HERNIA REPAIR       SOCHX:  reports that she has never smoked. She has never used smokeless tobacco. She reports current alcohol use. She reports that she does not use drugs.    FH: Per HPI as applicable/pertinent.      Objective:     /70   Pulse 88   Temp 36.7 °C (98 °F) (Temporal)   Resp 18   Ht 1.702 m (5' 7\")   Wt 103 kg (227 lb)   LMP 09/19/2022   SpO2 98%   BMI 35.55 kg/m²     Physical Exam  Nursing note reviewed.   Constitutional:       General: She is not in acute distress.     Appearance: She is well-developed. She is not ill-appearing or toxic-appearing.   HENT:      Head: Normocephalic.   Eyes:      General: Vision grossly intact.      Extraocular Movements: Extraocular movements intact.   Cardiovascular:      Rate and Rhythm: Normal rate and regular rhythm.      Heart sounds: Normal heart sounds.   Pulmonary:      Effort: Pulmonary effort is normal. No respiratory distress.      Breath sounds: Rhonchi (Small, LLL) present. No decreased breath sounds.   Musculoskeletal:         General: No deformity. Normal range of motion.      Cervical back: Normal range of motion.   Skin:     General: Skin is warm and dry.      Coloration: Skin is not pale.   Neurological:      Mental Status: She is alert and oriented to person, place, and time.      Motor: No weakness.   Psychiatric:         Behavior: Behavior normal. Behavior is cooperative.       Assessment/Plan:     1. " LRTI (lower respiratory tract infection)  - azithromycin (ZITHROMAX) 250 MG Tab; Take 2 tabs by mouth once today, then one tab by mouth once daily days 2-5.  Dispense: 6 Tablet; Refill: 0    2. 29 weeks gestation of pregnancy    Rx as above sent electronically.  Allergic to multiple antibiotics.  Has tolerated Z-Ahsan in the past.    Differential diagnosis, natural history, supportive care, over-the-counter symptom management per 's instructions, OTC, close monitoring, and indications for immediate follow-up discussed.     Vital signs stable, afebrile, no acute distress at this time. Warning signs reviewed. Return precautions discussed.     All questions answered. Patient agrees with the plan of care.    Discharge summary provided via [employer].

## 2023-04-17 ENCOUNTER — PHARMACY VISIT (OUTPATIENT)
Dept: PHARMACY | Facility: MEDICAL CENTER | Age: 38
End: 2023-04-17
Payer: COMMERCIAL

## 2023-04-17 DIAGNOSIS — E06.3 HYPOTHYROIDISM DUE TO HASHIMOTO'S THYROIDITIS: ICD-10-CM

## 2023-04-17 DIAGNOSIS — E03.8 HYPOTHYROIDISM DUE TO HASHIMOTO'S THYROIDITIS: ICD-10-CM

## 2023-04-17 PROCEDURE — RXMED WILLOW AMBULATORY MEDICATION CHARGE: Performed by: STUDENT IN AN ORGANIZED HEALTH CARE EDUCATION/TRAINING PROGRAM

## 2023-04-17 PROCEDURE — RXOTC WILLOW AMBULATORY OTC CHARGE

## 2023-04-17 RX ORDER — METHYLPREDNISOLONE 4 MG/1
20 TABLET ORAL EVERY MORNING
Qty: 25 TABLET | Refills: 0 | Status: ON HOLD | OUTPATIENT
Start: 2023-04-17 | End: 2023-05-21

## 2023-04-17 RX ORDER — ALBUTEROL SULFATE 90 UG/1
2 AEROSOL, METERED RESPIRATORY (INHALATION) EVERY 6 HOURS PRN
Qty: 8.5 G | Refills: 1 | Status: SHIPPED | OUTPATIENT
Start: 2023-04-17

## 2023-04-19 ENCOUNTER — HOSPITAL ENCOUNTER (EMERGENCY)
Facility: MEDICAL CENTER | Age: 38
End: 2023-04-19
Attending: OBSTETRICS & GYNECOLOGY | Admitting: OBSTETRICS & GYNECOLOGY
Payer: COMMERCIAL

## 2023-04-19 VITALS
RESPIRATION RATE: 18 BRPM | TEMPERATURE: 97.6 F | BODY MASS INDEX: 35.31 KG/M2 | SYSTOLIC BLOOD PRESSURE: 126 MMHG | HEART RATE: 86 BPM | HEIGHT: 67 IN | WEIGHT: 225 LBS | OXYGEN SATURATION: 98 % | DIASTOLIC BLOOD PRESSURE: 59 MMHG

## 2023-04-19 DIAGNOSIS — J45.40 MODERATE PERSISTENT REACTIVE AIRWAY DISEASE WITHOUT COMPLICATION: ICD-10-CM

## 2023-04-19 LAB
APPEARANCE UR: CLEAR
COLOR UR AUTO: YELLOW
FLUAV RNA SPEC QL NAA+PROBE: NEGATIVE
FLUBV RNA SPEC QL NAA+PROBE: NEGATIVE
GLUCOSE UR QL STRIP.AUTO: NEGATIVE MG/DL
KETONES UR QL STRIP.AUTO: NEGATIVE MG/DL
LEUKOCYTE ESTERASE UR QL STRIP.AUTO: NEGATIVE
NITRITE UR QL STRIP.AUTO: NEGATIVE
PH UR STRIP.AUTO: 6.5 [PH] (ref 5–8)
PROT UR QL STRIP: ABNORMAL MG/DL
RBC UR QL AUTO: NEGATIVE
RSV RNA SPEC QL NAA+PROBE: NEGATIVE
SARS-COV-2 RNA RESP QL NAA+PROBE: NOTDETECTED
SP GR UR STRIP.AUTO: >=1.03 (ref 1–1.03)
SPECIMEN SOURCE: NORMAL

## 2023-04-19 PROCEDURE — 59025 FETAL NON-STRESS TEST: CPT

## 2023-04-19 PROCEDURE — 700101 HCHG RX REV CODE 250: Performed by: OBSTETRICS & GYNECOLOGY

## 2023-04-19 PROCEDURE — C9803 HOPD COVID-19 SPEC COLLECT: HCPCS | Performed by: OBSTETRICS & GYNECOLOGY

## 2023-04-19 PROCEDURE — 94640 AIRWAY INHALATION TREATMENT: CPT

## 2023-04-19 PROCEDURE — 99283 EMERGENCY DEPT VISIT LOW MDM: CPT

## 2023-04-19 PROCEDURE — 94760 N-INVAS EAR/PLS OXIMETRY 1: CPT

## 2023-04-19 PROCEDURE — 81002 URINALYSIS NONAUTO W/O SCOPE: CPT

## 2023-04-19 PROCEDURE — 0241U HCHG SARS-COV-2 COVID-19 NFCT DS RESP RNA 4 TRGT MIC: CPT

## 2023-04-19 RX ORDER — FLUTICASONE PROPIONATE AND SALMETEROL 250; 50 UG/1; UG/1
1 POWDER RESPIRATORY (INHALATION) EVERY 12 HOURS
Qty: 1 EACH | Refills: 1 | Status: ON HOLD | OUTPATIENT
Start: 2023-04-19 | End: 2023-06-09

## 2023-04-19 RX ORDER — IPRATROPIUM BROMIDE AND ALBUTEROL SULFATE 2.5; .5 MG/3ML; MG/3ML
3 SOLUTION RESPIRATORY (INHALATION) ONCE
Status: COMPLETED | OUTPATIENT
Start: 2023-04-19 | End: 2023-04-19

## 2023-04-19 RX ADMIN — IPRATROPIUM BROMIDE AND ALBUTEROL SULFATE 3 ML: .5; 2.5 SOLUTION RESPIRATORY (INHALATION) at 16:51

## 2023-04-19 ASSESSMENT — FIBROSIS 4 INDEX: FIB4 SCORE: 1.46

## 2023-04-19 ASSESSMENT — PAIN SCALES - GENERAL: PAINLEVEL: 0 - NO PAIN

## 2023-04-19 NOTE — PROGRESS NOTES
1551 - A  with EDC 23 making her 30.2 presents with c/o persistent cough x19 days. She has finished antibiotics as ordered and is on steroids and an inhaler starting 2 days ago with no improvement. Pt has +FM, denies LOF or VB, denies UCs.   - RT to the bedside for breathing treatment as ordered. Pt reports improvement after breathing treatment.    - Dr Gonzalez to the bedside, orders for medications at home discussed.    - Pt discharged per MD order, discharge education discussed including when to return, PTL precautions, and if symptoms worsen. Pt verbalizes understanding. Pt ambulated from unit in stable condition with FOB and all belongings.

## 2023-04-20 ENCOUNTER — OFFICE VISIT (OUTPATIENT)
Dept: MEDICAL GROUP | Facility: CLINIC | Age: 38
End: 2023-04-20
Payer: COMMERCIAL

## 2023-04-20 ENCOUNTER — PHARMACY VISIT (OUTPATIENT)
Dept: PHARMACY | Facility: MEDICAL CENTER | Age: 38
End: 2023-04-20
Payer: COMMERCIAL

## 2023-04-20 VITALS — HEIGHT: 67 IN | WEIGHT: 228 LBS | BODY MASS INDEX: 35.79 KG/M2

## 2023-04-20 DIAGNOSIS — J45.909 REACTIVE AIRWAY DISEASE WITHOUT COMPLICATION, UNSPECIFIED ASTHMA SEVERITY, UNSPECIFIED WHETHER PERSISTENT: ICD-10-CM

## 2023-04-20 DIAGNOSIS — R05.9 COUGH IN ADULT PATIENT: ICD-10-CM

## 2023-04-20 PROCEDURE — 99213 OFFICE O/P EST LOW 20 MIN: CPT | Performed by: STUDENT IN AN ORGANIZED HEALTH CARE EDUCATION/TRAINING PROGRAM

## 2023-04-20 PROCEDURE — RXMED WILLOW AMBULATORY MEDICATION CHARGE: Performed by: STUDENT IN AN ORGANIZED HEALTH CARE EDUCATION/TRAINING PROGRAM

## 2023-04-20 PROCEDURE — RXMED WILLOW AMBULATORY MEDICATION CHARGE: Performed by: OBSTETRICS & GYNECOLOGY

## 2023-04-20 RX ORDER — ACETAMINOPHEN AND CODEINE PHOSPHATE 120; 12 MG/5ML; MG/5ML
5 SOLUTION ORAL EVERY 6 HOURS PRN
Qty: 120 ML | Refills: 0 | Status: SHIPPED | OUTPATIENT
Start: 2023-04-20 | End: 2023-04-28

## 2023-04-20 RX ORDER — FLUTICASONE PROPIONATE AND SALMETEROL 100; 50 UG/1; UG/1
1 POWDER RESPIRATORY (INHALATION) EVERY 12 HOURS
Qty: 60 EACH | Refills: 2 | Status: ON HOLD | OUTPATIENT
Start: 2023-04-20 | End: 2023-06-09

## 2023-04-20 RX ORDER — IPRATROPIUM BROMIDE AND ALBUTEROL SULFATE 2.5; .5 MG/3ML; MG/3ML
SOLUTION RESPIRATORY (INHALATION)
Qty: 360 ML | Refills: 2 | Status: ON HOLD | OUTPATIENT
Start: 2023-04-20 | End: 2023-06-09

## 2023-04-20 RX ORDER — ALBUTEROL SULFATE 2.5 MG/3ML
SOLUTION RESPIRATORY (INHALATION)
Qty: 75 ML | Refills: 0 | Status: ON HOLD | OUTPATIENT
Start: 2023-04-20 | End: 2023-06-09

## 2023-04-20 ASSESSMENT — ENCOUNTER SYMPTOMS
VOMITING: 0
COUGH: 1
SHORTNESS OF BREATH: 1
SINUS PAIN: 0
ABDOMINAL PAIN: 0
MYALGIAS: 0
FEVER: 0
HEADACHES: 0
DIARRHEA: 0
PALPITATIONS: 0
EYE REDNESS: 0
NAUSEA: 0
SPUTUM PRODUCTION: 0
EYE DISCHARGE: 0
CHILLS: 0

## 2023-04-20 ASSESSMENT — FIBROSIS 4 INDEX: FIB4 SCORE: 1.46

## 2023-04-20 NOTE — PROGRESS NOTES
"Subjective:     CC: Cough, dyspnea    HPI:   Carolina presents today with    Problem   Reactive Airway Disease Without Complication    36 y/o with likely underlying asthma with increased symptoms recently. Patient is currently in the third trimester of pregnancy.  She had initial symptoms c/w allergic rhinosinusitis versus viral URI a couple weeks ago. She has had persistent dry cough associated with dyspnea keeping her up at night. She has tried albuterol inhaler, systemic steroids, and was treated with Duo-Nebs in antepartum setting. Of these treatments, the Duo-Nebs provided the most relief but only for a short period.   Recent testing for flu, COVID, and RSV was negative.  Patient denies fevers, rhinorrhea, chest pain, GI symptoms, or dysuria.   She reports that she has always had a difficult time recovering from URIs with prolonged post-viral symptoms. 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     Cough in Adult Patient   See RAD    ROS:  Review of Systems   Constitutional:  Negative for chills and fever.   HENT:  Negative for congestion and sinus pain.    Eyes:  Negative for discharge and redness.   Respiratory:  Positive for cough and shortness of breath. Negative for sputum production.    Cardiovascular:  Negative for chest pain and palpitations.   Gastrointestinal:  Negative for abdominal pain, diarrhea, nausea and vomiting.   Genitourinary:  Negative for dysuria and urgency.   Musculoskeletal:  Negative for myalgias.   Skin:  Negative for rash.   Neurological:  Negative for headaches.     Objective:     Exam:  BP (P) 132/74 (BP Location: Right arm, Patient Position: Sitting, BP Cuff Size: Adult)   Pulse (P) 95   Temp (P) 36.8 °C (98.2 °F) (Temporal)   Ht 1.702 m (5' 7\")   Wt 103 kg (228 lb)   LMP 09/19/2022   SpO2 (P) 97%   BMI 35.71 kg/m²  Body mass index is 35.71 kg/m².    Physical Exam  Constitutional:       General: She is not in acute distress.     Appearance: Normal " appearance.   HENT:      Head: Normocephalic and atraumatic.      Right Ear: External ear normal.      Left Ear: External ear normal.      Nose: Nose normal.      Mouth/Throat:      Mouth: Mucous membranes are moist.   Eyes:      Conjunctiva/sclera: Conjunctivae normal.   Cardiovascular:      Rate and Rhythm: Normal rate and regular rhythm.      Pulses: Normal pulses.      Heart sounds: Normal heart sounds. No murmur heard.  Pulmonary:      Effort: Pulmonary effort is normal. No respiratory distress.      Breath sounds: No wheezing or rales.   Abdominal:      Comments: Gravid     Skin:     General: Skin is warm and dry.   Neurological:      General: No focal deficit present.      Mental Status: She is alert.   Psychiatric:         Mood and Affect: Mood normal.         Behavior: Behavior normal.         Thought Content: Thought content normal.         Judgment: Judgment normal.         Assessment & Plan:     37 y.o. female with the following -     Problem List Items Addressed This Visit       Reactive airway disease without complication     - Advair inhaler sent to pharmacy  - Duo-nebs ordered but out of stock. Requested Rx for individual albuterol and ipratropium ampules to use with nebulizer  - DME for nebulizer with supplies ordered; paperwork completed and faxed to supply company on day of visit.  - Ref to pulm for PFTs. Consider if PFTs can be appropriately interpreted during expected physiologic changes in pregnancy.  - Based on severity and duration of symptoms and patient's inability to sleep, discussed risks and benefits of short-term use of antitussive agent to use PRN at night to allow for sleep. Reviewed few options and used shared decision making to decide on trial of codeine-acetaminophen syrup.  reviewed and reassuring. Controlled substance agreement signed.          Relevant Orders    DME Nebulizer    Referral to Pulmonary and Sleep Medicine    Cough in adult patient    Relevant Medications     acetaminophen-codeine (TYLENOL-CODEINE) 120-12 MG/5ML Solution    Other Relevant Orders    Controlled Substance Treatment Agreement             No follow-ups on file.

## 2023-04-20 NOTE — ED PROVIDER NOTES
OB ED Eval:    CC: shortness of breath    HPI:  Ms. Carolina Medrano is a 37 y.o.  @ 30w2d with hx of reactive airway disease who reports increased shortness of breath and cough this week.  On Monday was started on prednisone 20mg daily x5 days and also was recently treated with for productive cough azithromycin.  Mostly concerned with her shortness of breath which is worse.  Has been using her albuterol inhaler every 6 hours but does not feel like it is really helping.  Does not have an inhaled corticosteroid has never been on maintenance inhaler in the past.  Denies fevers, reports good fetal movement.  No contractions, loss of fluid, vaginal bleeding.      ROS  Const: denies fevers, general concerns  CV: No chest pain  Resp: Cough, shortness of breath  GI: denies abd pain, GI concerns  : see HPI  Neuro: denies HA/vision changes    OB History    Para Term  AB Living   1 0 0 0 0 0   SAB IAB Ectopic Molar Multiple Live Births   0 0 0   0        # Outcome Date GA Lbr Abhay/2nd Weight Sex Delivery Anes PTL Lv   1 Current              Past medical history: Reactive airway disease, hypothyroidism      Past Surgical History:   Procedure Laterality Date    UMBILICAL HERNIA REPAIR  2017    Procedure: UMBILICAL HERNIA REPAIR WITH MESH;  Surgeon: Florencio Tavares M.D.;  Location: [facility];  Service:     LIZ BY LAPAROSCOPY  2013    Performed by Florencio Tavares M.D. at SURGERY [facility]    KNEE ARTHROSCOPY      right knee ( times 4)    TONSILLECTOMY AND ADENOIDECTOMY      as a child    UMBILICAL HERNIA REPAIR         No current facility-administered medications on file prior to encounter.     Current Outpatient Medications on File Prior to Encounter   Medication Sig Dispense Refill    ondansetron (ZOFRAN) 8 MG Tab Take 1 tablet by mouth once a day as needed 30 Tablet 0    levothyroxine (SYNTHROID) 150 MCG Tab Take 1 Tablet by mouth every morning on an empty  "stomach. 30 Tablet 1    liothyronine (CYTOMEL) 50 MCG tablet Take 1 tablet by mouth every day. 90 Tablet 0   Albuterol as needed  Prednisone 20 mg daily    Family History   Problem Relation Age of Onset    Heart Disease Father     Hypertension Father     Alcohol abuse Father     Cancer Paternal Aunt         breast    Cancer Paternal Uncle         colon    Diabetes Maternal Grandfather     Cancer Paternal Grandmother         breast    Autoimmune Disease Paternal Grandmother     Lupus Mother     No Known Problems Brother     Diabetes Maternal Grandmother     Alzheimer's Disease Paternal Grandfather        Sochx: no drugs/EtOH/tob    PE:  Patient Vitals for the past 6 hrs:   BP Temp Temp src Pulse Resp SpO2 Height Weight   23 1655 -- -- -- 86 18 98 % -- --   23 1601 126/59 36.4 °C (97.6 °F) Temporal 87 18 96 % 1.702 m (5' 7\") 102 kg (225 lb)   23 1600 -- -- -- 91 -- 96 % -- --   Pulse ox with persistent mid to upper 90s    gen: AAO, NAD  Resp: ctab  CV: RRR  abd: soft, gravid, NT  Ext: No edema      FHT: 140/moderate variability/+ accels/ no decels  steph:no ctx    A/P: 37 y.o.  @ 30w2d with worsening reactive airway disease  Patient feeling substantially better after breathing treatment. O2 saturations wnl, not tachycardic.  Discussed if she is continuing to feel bad, or not improving would recommend we do chest x-ray, PFTs, additional work-up.    NST reactive and reassuring    To cont prednisone as rx by PCP; rx sent for advair given her reactive airway disease is persistent, exacerbated by suspected viral URI (covid neg)  Return with worsening sxs.    Keep OB appt in office tomorrow.    Nora Paez MD  [facility]        "

## 2023-04-20 NOTE — ASSESSMENT & PLAN NOTE
- Advair inhaler sent to pharmacy  - Duo-nebs ordered but out of stock. Requested Rx for individual albuterol and ipratropium ampules to use with nebulizer  - DME for nebulizer with supplies ordered; paperwork completed and faxed to supply company on day of visit.  - Ref to pulm for PFTs. Consider if PFTs can be appropriately interpreted during expected physiologic changes in pregnancy.  - Based on severity and duration of symptoms and patient's inability to sleep, discussed risks and benefits of short-term use of antitussive agent to use PRN at night to allow for sleep. Reviewed few options and used shared decision making to decide on trial of codeine-acetaminophen syrup.  reviewed and reassuring. Controlled substance agreement signed.

## 2023-04-20 NOTE — DISCHARGE INSTRUCTIONS
General Instructions:  If you think you are in labor, time contractions (lying on your left side) from the beginning of one contraction to the beginning of the next contraction for at least one hour.  Increase fluid intake: you should consume 10-12 8 oz glasses of non-caffeinated fluid per day.  Report any pressure or burning on urination to your physician.  Monitor fetal movement: If you notice an absence or decrease in fetal movement, drink a large glass of water and rest on your side.  If there is no increase in movement, call your physician or go to the hospital for further evaluation.  Report any sudden, sharp abdominal pain.  Report any bleeding.  Spotting or pinkish discharge is normal after vaginal exam.  You may also spot after sexual intercourse.    Pre-term Labor (<37 weeks):  Call your physician or return to the hospital if:  You have painless regular contractions more than 4 in one hour.  Your water breaks (remember time and color).  You have menstrual-like cramps, a low dull backache or pressure in your pelvis or back.  Your baby does not move enough to complete the daily kick count (10 movements in 2 hours).  Your baby moves much less often than on the days before or you have not felt your baby move all day.  Please review the MEDICATION LIST section of your AFTER VISIT SUMMARY document.  Take your medication as prescribed    Other Instructions:  Please carefully review your entire AFTER VISIT SUMMARY document for all discharge instructions.    How to Use a Nebulizer, Adult    A nebulizer is a device that turns liquid medicine into a mist (vapor) that you can breathe in (inhale). You may need to use a nebulizer if you have a breathing illness, such as asthma or pneumonia.  There are different kinds of nebulizers. With some, you breathe in through a mouthpiece. With others, a mask fits over your nose and mouth.  Risks and complications  Using a nebulizer that does not fit right or is not cleaned right  can lead to the following complications:  Infection.  Eye irritation.  Delivery of too much medicine or not enough medicine.  Mouth irritation.  How to prepare before using a nebulizer  Take these steps before using your nebulizer:  Check your medicine. Make sure it has not  and is not damaged in any way.  Wash your hands with soap and water.  Put all of the parts of your nebulizer on a sturdy, flat surface. Make sure all of the tubing is connected.  Measure the liquid medicine according to instructions from your health care provider. Pour the liquid into the part of the nebulizer that holds the medicine (reservoir).  Attach the mouthpiece or mask.  Test the nebulizer by turning it on to make sure that a spray comes out. Then, turn it off.  How to use a nebulizer         Sit down and relax.  If your nebulizer has a mask, put it over your nose and mouth. It should fit somewhat snugly, with no gaps around the nose or cheeks where medicine could escape. If you use a mouthpiece, put it in your mouth. Press your lips firmly around the mouthpiece.  Turn on the nebulizer.  Breathe out (exhale).  Some nebulizers have a finger valve. If yours does, cover up the air hole so the air gets to the nebulizer.  Once the medicine begins to mist out, take slow, deep breaths. If there is a finger valve, release it at the end of your breath.  Continue taking slow, deep breaths until the medicine in the nebulizer is gone and no vapor appears.  Be sure to stop the machine at any time if you start coughing or if the medicine foams or bubbles.  How to clean a nebulizer  The nebulizer and all of its parts must be kept very clean. If the nebulizer and its parts are not cleaned properly, bacteria can grow inside of them. If you inhale the bacteria, you can get sick. Follow the 's instructions for cleaning your nebulizer. For most nebulizers, you should follow these guidelines:  Clean the mouthpiece or mask and the medicine  cup by:  Rinsing them after each use. Use sterile or distilled water.  Washing them 1-2 times a week using soap and warm water.  Do not wash the tubing.  After you rinse or wash them, place the parts on a clean towel and let them dry completely. After they dry, reconnect the pieces and turn the nebulizer on without any medicine in it. Doing this will blow air through the equipment to help dry it out.  Store the nebulizer in a clean and dust-free place.  Check the filter at least one time every week. Replace it if it looks dirty.  Contact a health care provider if:  You continue to have trouble breathing.  You have trouble using the nebulizer.  Your breathing gets worse during a nebulizer treatment.  Your nebulizer stops working, foams, or does not create a mist after you add medicine and turn it on.  Summary  A nebulizer is a device that turns liquid medicine into a mist (vapor) that you can breathe in (inhale).  Measure the liquid medicine according to instructions from your health care provider. Pour the liquid into the part of the nebulizer that holds the medicine (reservoir).  Once the medicine begins to mist out, take slow, deep breaths.  Rinse or wash the mouthpiece and the medicine cup after each use, and allow them to dry completely.  This information is not intended to replace advice given to you by your health care provider. Make sure you discuss any questions you have with your health care provider.  Document Released: 12/06/2010 Document Revised: 07/02/2019 Document Reviewed: 06/24/2017  [email or web address] Patient Education © 2020 [employer]

## 2023-04-22 ENCOUNTER — PHARMACY VISIT (OUTPATIENT)
Dept: PHARMACY | Facility: MEDICAL CENTER | Age: 38
End: 2023-04-22

## 2023-04-22 PROCEDURE — RXOTC WILLOW AMBULATORY OTC CHARGE

## 2023-04-27 ENCOUNTER — EH NON-PROVIDER (OUTPATIENT)
Dept: OCCUPATIONAL MEDICINE | Facility: CLINIC | Age: 38
End: 2023-04-27

## 2023-04-27 DIAGNOSIS — Z02.89 ENCOUNTER FOR OCCUPATIONAL HEALTH EXAMINATION INVOLVING RESPIRATOR: ICD-10-CM

## 2023-04-27 PROCEDURE — 94375 RESPIRATORY FLOW VOLUME LOOP: CPT | Performed by: PREVENTIVE MEDICINE

## 2023-04-27 PROCEDURE — 94010 BREATHING CAPACITY TEST: CPT | Performed by: PREVENTIVE MEDICINE

## 2023-05-13 PROCEDURE — RXMED WILLOW AMBULATORY MEDICATION CHARGE: Performed by: STUDENT IN AN ORGANIZED HEALTH CARE EDUCATION/TRAINING PROGRAM

## 2023-05-19 ENCOUNTER — APPOINTMENT (OUTPATIENT)
Dept: RADIOLOGY | Facility: MEDICAL CENTER | Age: 38
End: 2023-05-19
Attending: OBSTETRICS & GYNECOLOGY
Payer: COMMERCIAL

## 2023-05-19 ENCOUNTER — PHARMACY VISIT (OUTPATIENT)
Dept: PHARMACY | Facility: MEDICAL CENTER | Age: 38
End: 2023-05-19
Payer: COMMERCIAL

## 2023-05-19 ENCOUNTER — HOSPITAL ENCOUNTER (EMERGENCY)
Facility: MEDICAL CENTER | Age: 38
End: 2023-05-19
Attending: OBSTETRICS & GYNECOLOGY | Admitting: OBSTETRICS & GYNECOLOGY
Payer: COMMERCIAL

## 2023-05-19 VITALS
SYSTOLIC BLOOD PRESSURE: 126 MMHG | HEART RATE: 104 BPM | TEMPERATURE: 98 F | OXYGEN SATURATION: 97 % | DIASTOLIC BLOOD PRESSURE: 73 MMHG

## 2023-05-19 LAB
BASOPHILS # BLD AUTO: 0.2 % (ref 0–1.8)
BASOPHILS # BLD: 0.02 K/UL (ref 0–0.12)
EOSINOPHIL # BLD AUTO: 0.01 K/UL (ref 0–0.51)
EOSINOPHIL NFR BLD: 0.1 % (ref 0–6.9)
ERYTHROCYTE [DISTWIDTH] IN BLOOD BY AUTOMATED COUNT: 43.4 FL (ref 35.9–50)
HCT VFR BLD AUTO: 33.5 % (ref 37–47)
HGB BLD-MCNC: 10.9 G/DL (ref 12–16)
IMM GRANULOCYTES # BLD AUTO: 0.09 K/UL (ref 0–0.11)
IMM GRANULOCYTES NFR BLD AUTO: 0.8 % (ref 0–0.9)
LYMPHOCYTES # BLD AUTO: 1.12 K/UL (ref 1–4.8)
LYMPHOCYTES NFR BLD: 10.2 % (ref 22–41)
MCH RBC QN AUTO: 27.5 PG (ref 27–33)
MCHC RBC AUTO-ENTMCNC: 32.5 G/DL (ref 33.6–35)
MCV RBC AUTO: 84.6 FL (ref 81.4–97.8)
MONOCYTES # BLD AUTO: 0.55 K/UL (ref 0–0.85)
MONOCYTES NFR BLD AUTO: 5 % (ref 0–13.4)
NEUTROPHILS # BLD AUTO: 9.17 K/UL (ref 2–7.15)
NEUTROPHILS NFR BLD: 83.7 % (ref 44–72)
NRBC # BLD AUTO: 0 K/UL
NRBC BLD-RTO: 0 /100 WBC
PLATELET # BLD AUTO: 134 K/UL (ref 164–446)
PMV BLD AUTO: 11.7 FL (ref 9–12.9)
RBC # BLD AUTO: 3.96 M/UL (ref 4.2–5.4)
WBC # BLD AUTO: 11 K/UL (ref 4.8–10.8)

## 2023-05-19 PROCEDURE — 76819 FETAL BIOPHYS PROFIL W/O NST: CPT

## 2023-05-19 PROCEDURE — 85025 COMPLETE CBC W/AUTO DIFF WBC: CPT

## 2023-05-19 PROCEDURE — RXMED WILLOW AMBULATORY MEDICATION CHARGE: Performed by: OBSTETRICS & GYNECOLOGY

## 2023-05-19 PROCEDURE — 59025 FETAL NON-STRESS TEST: CPT

## 2023-05-19 PROCEDURE — 99283 EMERGENCY DEPT VISIT LOW MDM: CPT

## 2023-05-19 PROCEDURE — 36415 COLL VENOUS BLD VENIPUNCTURE: CPT

## 2023-05-19 PROCEDURE — RXOTC WILLOW AMBULATORY OTC CHARGE: Performed by: PHARMACIST

## 2023-05-19 RX ORDER — METHYLPREDNISOLONE 4 MG/1
TABLET ORAL
Qty: 21 TABLET | Refills: 0 | Status: ON HOLD | OUTPATIENT
Start: 2023-05-19 | End: 2023-06-09

## 2023-05-19 ASSESSMENT — PAIN SCALES - GENERAL: PAINLEVEL: 0 - NO PAIN

## 2023-05-19 NOTE — PROGRESS NOTES
EDC - 34     Pt sent from office for evaluation due to non-reactive NST. Pt reports +FM, denies vaginal bleeding/leaking of fluid. Pt reports some irregular contractions. EFM/TOCO applied. Dr Fields notified of pt's arrival, orders received.    1225- ultrasound in room for BPP.    1315- Dr Fields updated regarding results. MD in room to see pt; order received for discharge. Pt to follow up in office next week for monitoring. Discharge teaching done, pt verbalized understanding.

## 2023-05-19 NOTE — ED PROVIDER NOTES
DATE OF ADMISSION:  2023     OB/GYN OBSERVATION HISTORY AND PHYSICAL     IDENTIFICATION:  This is a 38-year-old  1, para 0 with an EDC of   2023 and EGA of 34 and 2/7th weeks who presents with a chief complaint   of fetal tachycardia.     HISTORY OF PRESENT ILLNESS:  This is a patient of Dr. Kang's who has gotten   good prenatal care.  Her prenatal care has been uncomplicated.  She was in   the office today for routine antepartum testing.  She does have a history of   reactive airway disease and has advanced maternal age.  She had a nonreactive   NST with fetal tachycardia for which she was subsequently sent here to labor   and delivery for further evaluation.  Here on labor and delivery, fetal heart   tracings reactive, category 1 in the 130s to 140s.  She is not carmelo.    She has an 8/8 biophysical profile.  She has no other complaints.  Denies   nausea, vomiting, fever, chills, change in bowel or bladder habits.  Admits   good fetal movement.  Denies symptoms of PIH.  She does complain of shortness   of breath and tachycardia with exertion.  She has reactive airway disease   history with pregnancy and is currently on inhaled steroids and albuterol.    She has followup with Dr. Kang and Dr. Galarza next week.     OBSTETRICAL HISTORY:  This is her first pregnancy.     GYNECOLOGIC HISTORY:  No STDs or abnormal Paps.  Most recent Pap is normal.     MEDICAL HISTORY:  ALLERGIES:  ALLERGIES TO CLINDAMYCIN, PENICILLIN, IODINE.     CURRENT MEDICATIONS:  Inhaled steroids and albuterol.     MEDICAL PROBLEMS:  Reactive airway disease, Hashimoto's thyroiditis, currently   taking Synthroid.     SURGICAL HISTORY:  Laparoscopic cholecystectomy.     SOCIAL HISTORY:  Denies alcohol, tobacco or drug abuse.     FAMILY HISTORY:  Noncontributory.     REVIEW OF SYSTEMS:  Times 12 is negative per AMA standards available in chart.     LABORATORY DATA:  Her white count is 11, hemoglobin is 10.9, hematocrit 33.5,    platelets 134.     PHYSICAL EXAMINATION:    GENERAL:  She is awake, alert, in no apparent distress.  NECK:  Supple.  HEART:  Regular.  CHEST:  Clear.  BREASTS:  Symmetrical.  ABDOMEN:  Soft, gravid, size appropriate for dates.  EXTREMITIES:  Negative.  GYNECOLOGIC:  Deferred.     ASSESSMENT:  At this time:  1.  Pregnancy at 34 and 2/7th weeks.  2.  Reactive airway disease, on inhaled steroids with fetal tachycardia,   resolved.  3.  Fetal status reassuring with  biophysical profile.  4.  Anemia.     PLAN:  At this time:   1.  Discharge home.  2.  Follow up on Tuesday for NST.  3.  Kick counts,  labor precautions, will continue with her current   medication regime, come back to labor and delivery with change in her status   or fetal status.        ______________________________  MD FRANCO Hyatt/EVANS    DD:  2023 13:19  DT:  2023 14:33    Job#:  [id number]

## 2023-05-21 ENCOUNTER — APPOINTMENT (OUTPATIENT)
Dept: RADIOLOGY | Facility: MEDICAL CENTER | Age: 38
End: 2023-05-21
Attending: OBSTETRICS & GYNECOLOGY
Payer: COMMERCIAL

## 2023-05-21 ENCOUNTER — HOSPITAL ENCOUNTER (INPATIENT)
Facility: MEDICAL CENTER | Age: 38
LOS: 17 days | End: 2023-06-09
Attending: OBSTETRICS & GYNECOLOGY | Admitting: OBSTETRICS & GYNECOLOGY
Payer: COMMERCIAL

## 2023-05-21 ENCOUNTER — APPOINTMENT (OUTPATIENT)
Dept: CARDIOLOGY | Facility: MEDICAL CENTER | Age: 38
End: 2023-05-21
Attending: OBSTETRICS & GYNECOLOGY
Payer: COMMERCIAL

## 2023-05-21 DIAGNOSIS — J45.909 REACTIVE AIRWAY DISEASE WITHOUT COMPLICATION, UNSPECIFIED ASTHMA SEVERITY, UNSPECIFIED WHETHER PERSISTENT: ICD-10-CM

## 2023-05-21 DIAGNOSIS — G89.18 ACUTE POST-OPERATIVE PAIN: ICD-10-CM

## 2023-05-21 DIAGNOSIS — Z91.89 AT RISK FOR BREASTFEEDING DIFFICULTY: ICD-10-CM

## 2023-05-21 DIAGNOSIS — J45.51 SEVERE PERSISTENT ASTHMA WITH ACUTE EXACERBATION: ICD-10-CM

## 2023-05-21 DIAGNOSIS — Z98.891 S/P CESAREAN SECTION: ICD-10-CM

## 2023-05-21 LAB
ALBUMIN SERPL BCP-MCNC: 3.5 G/DL (ref 3.2–4.9)
ALBUMIN/GLOB SERPL: 1.2 G/DL
ALP SERPL-CCNC: 153 U/L (ref 30–99)
ALT SERPL-CCNC: 35 U/L (ref 2–50)
ANION GAP SERPL CALC-SCNC: 12 MMOL/L (ref 7–16)
APPEARANCE UR: CLEAR
ARTERIAL PATENCY WRIST A: ABNORMAL
ARTERIAL PATENCY WRIST A: ABNORMAL
AST SERPL-CCNC: 34 U/L (ref 12–45)
BASE EXCESS BLDA CALC-SCNC: -2 MMOL/L (ref -4–3)
BASE EXCESS BLDA CALC-SCNC: -3 MMOL/L (ref -4–3)
BASE EXCESS BLDA CALC-SCNC: -3 MMOL/L (ref -4–3)
BASOPHILS # BLD AUTO: 0.2 % (ref 0–1.8)
BASOPHILS # BLD: 0.02 K/UL (ref 0–0.12)
BILIRUB SERPL-MCNC: 0.3 MG/DL (ref 0.1–1.5)
BODY TEMPERATURE: 36.3 CENTIGRADE
BODY TEMPERATURE: ABNORMAL DEGREES
BODY TEMPERATURE: ABNORMAL DEGREES
BUN SERPL-MCNC: 5 MG/DL (ref 8–22)
CALCIUM ALBUM COR SERPL-MCNC: 9.2 MG/DL (ref 8.5–10.5)
CALCIUM SERPL-MCNC: 8.8 MG/DL (ref 8.5–10.5)
CHLORIDE SERPL-SCNC: 108 MMOL/L (ref 96–112)
CO2 BLDA-SCNC: 21 MMOL/L (ref 20–33)
CO2 BLDA-SCNC: 22 MMOL/L (ref 20–33)
CO2 SERPL-SCNC: 19 MMOL/L (ref 20–33)
COLOR UR AUTO: ABNORMAL
CREAT SERPL-MCNC: 0.54 MG/DL (ref 0.5–1.4)
EOSINOPHIL # BLD AUTO: 0.03 K/UL (ref 0–0.51)
EOSINOPHIL NFR BLD: 0.3 % (ref 0–6.9)
ERYTHROCYTE [DISTWIDTH] IN BLOOD BY AUTOMATED COUNT: 44.7 FL (ref 35.9–50)
FLUAV RNA SPEC QL NAA+PROBE: NEGATIVE
FLUBV RNA SPEC QL NAA+PROBE: NEGATIVE
GFR SERPLBLD CREATININE-BSD FMLA CKD-EPI: 122 ML/MIN/1.73 M 2
GLOBULIN SER CALC-MCNC: 2.9 G/DL (ref 1.9–3.5)
GLUCOSE SERPL-MCNC: 87 MG/DL (ref 65–99)
GLUCOSE UR QL STRIP.AUTO: NEGATIVE MG/DL
HCO3 BLDA-SCNC: 20 MMOL/L (ref 17–25)
HCO3 BLDA-SCNC: 20.5 MMOL/L (ref 17–25)
HCO3 BLDA-SCNC: 20.8 MMOL/L (ref 17–25)
HCT VFR BLD AUTO: 32.3 % (ref 37–47)
HGB BLD-MCNC: 10.6 G/DL (ref 12–16)
IMM GRANULOCYTES # BLD AUTO: 0.11 K/UL (ref 0–0.11)
IMM GRANULOCYTES NFR BLD AUTO: 1.1 % (ref 0–0.9)
INHALED O2 FLOW RATE: ABNORMAL L/MIN
KETONES UR QL STRIP.AUTO: NEGATIVE MG/DL
LEUKOCYTE ESTERASE UR QL STRIP.AUTO: NEGATIVE
LV EJECT FRACT MOD 2C 99903: 69.96
LV EJECT FRACT MOD 4C 99902: 61.25
LV EJECT FRACT MOD BP 99901: 65.74
LYMPHOCYTES # BLD AUTO: 1 K/UL (ref 1–4.8)
LYMPHOCYTES NFR BLD: 10.1 % (ref 22–41)
MCH RBC QN AUTO: 28.3 PG (ref 27–33)
MCHC RBC AUTO-ENTMCNC: 32.8 G/DL (ref 33.6–35)
MCV RBC AUTO: 86.4 FL (ref 81.4–97.8)
MONOCYTES # BLD AUTO: 0.51 K/UL (ref 0–0.85)
MONOCYTES NFR BLD AUTO: 5.2 % (ref 0–13.4)
NEUTROPHILS # BLD AUTO: 8.23 K/UL (ref 2–7.15)
NEUTROPHILS NFR BLD: 83.1 % (ref 44–72)
NITRITE UR QL STRIP.AUTO: NEGATIVE
NRBC # BLD AUTO: 0 K/UL
NRBC BLD-RTO: 0 /100 WBC
NT-PROBNP SERPL IA-MCNC: 68 PG/ML (ref 0–125)
PCO2 BLDA: 27.2 MMHG (ref 26–37)
PCO2 BLDA: 28.3 MMHG (ref 26–37)
PCO2 BLDA: 30.6 MMHG (ref 26–37)
PCO2 TEMP ADJ BLDA: 26.4 MMHG (ref 26–37)
PH BLDA: 7.44 [PH] (ref 7.4–7.5)
PH BLDA: 7.47 [PH] (ref 7.4–7.5)
PH BLDA: 7.48 [PH] (ref 7.4–7.5)
PH TEMP ADJ BLDA: 7.49 [PH] (ref 7.4–7.5)
PH UR STRIP.AUTO: 7.5 [PH] (ref 5–8)
PLATELET # BLD AUTO: 126 K/UL (ref 164–446)
PMV BLD AUTO: 11.9 FL (ref 9–12.9)
PO2 BLDA: 106 MMHG (ref 64–87)
PO2 BLDA: 36 MMHG (ref 64–87)
PO2 BLDA: 49 MMHG (ref 64–87)
PO2 TEMP ADJ BLDA: 101.8 MMHG (ref 64–87)
POTASSIUM SERPL-SCNC: 4.1 MMOL/L (ref 3.6–5.5)
PROT SERPL-MCNC: 6.4 G/DL (ref 6–8.2)
PROT UR QL STRIP: ABNORMAL MG/DL
RBC # BLD AUTO: 3.74 M/UL (ref 4.2–5.4)
RBC UR QL AUTO: NEGATIVE
RSV RNA SPEC QL NAA+PROBE: NEGATIVE
SAO2 % BLDA: 72 % (ref 93–99)
SAO2 % BLDA: 87 % (ref 93–99)
SAO2 % BLDA: 97.2 % (ref 93–99)
SARS-COV-2 RNA RESP QL NAA+PROBE: NOTDETECTED
SODIUM SERPL-SCNC: 139 MMOL/L (ref 135–145)
SP GR UR STRIP.AUTO: 1.02 (ref 1–1.03)
SPECIMEN DRAWN FROM PATIENT: ABNORMAL
SPECIMEN DRAWN FROM PATIENT: ABNORMAL
SPECIMEN SOURCE: NORMAL
TSH SERPL DL<=0.005 MIU/L-ACNC: 1.56 UIU/ML (ref 0.38–5.33)
WBC # BLD AUTO: 9.9 K/UL (ref 4.8–10.8)

## 2023-05-21 PROCEDURE — 81002 URINALYSIS NONAUTO W/O SCOPE: CPT

## 2023-05-21 PROCEDURE — 83880 ASSAY OF NATRIURETIC PEPTIDE: CPT

## 2023-05-21 PROCEDURE — 36415 COLL VENOUS BLD VENIPUNCTURE: CPT

## 2023-05-21 PROCEDURE — 59025 FETAL NON-STRESS TEST: CPT

## 2023-05-21 PROCEDURE — 94640 AIRWAY INHALATION TREATMENT: CPT

## 2023-05-21 PROCEDURE — 93306 TTE W/DOPPLER COMPLETE: CPT | Mod: 26 | Performed by: STUDENT IN AN ORGANIZED HEALTH CARE EDUCATION/TRAINING PROGRAM

## 2023-05-21 PROCEDURE — 82803 BLOOD GASES ANY COMBINATION: CPT | Mod: 91

## 2023-05-21 PROCEDURE — 82103 ALPHA-1-ANTITRYPSIN TOTAL: CPT

## 2023-05-21 PROCEDURE — 93306 TTE W/DOPPLER COMPLETE: CPT

## 2023-05-21 PROCEDURE — 700101 HCHG RX REV CODE 250: Performed by: OBSTETRICS & GYNECOLOGY

## 2023-05-21 PROCEDURE — 700111 HCHG RX REV CODE 636 W/ 250 OVERRIDE (IP): Performed by: OBSTETRICS & GYNECOLOGY

## 2023-05-21 PROCEDURE — 0241U HCHG SARS-COV-2 COVID-19 NFCT DS RESP RNA 4 TRGT MIC: CPT

## 2023-05-21 PROCEDURE — 94760 N-INVAS EAR/PLS OXIMETRY 1: CPT

## 2023-05-21 PROCEDURE — 80053 COMPREHEN METABOLIC PANEL: CPT

## 2023-05-21 PROCEDURE — C9803 HOPD COVID-19 SPEC COLLECT: HCPCS | Performed by: OBSTETRICS & GYNECOLOGY

## 2023-05-21 PROCEDURE — 99283 EMERGENCY DEPT VISIT LOW MDM: CPT

## 2023-05-21 PROCEDURE — 700102 HCHG RX REV CODE 250 W/ 637 OVERRIDE(OP): Performed by: OBSTETRICS & GYNECOLOGY

## 2023-05-21 PROCEDURE — G0378 HOSPITAL OBSERVATION PER HR: HCPCS

## 2023-05-21 PROCEDURE — 85025 COMPLETE CBC W/AUTO DIFF WBC: CPT

## 2023-05-21 PROCEDURE — A9270 NON-COVERED ITEM OR SERVICE: HCPCS | Performed by: OBSTETRICS & GYNECOLOGY

## 2023-05-21 PROCEDURE — 84443 ASSAY THYROID STIM HORMONE: CPT

## 2023-05-21 PROCEDURE — 302790 HCHG STAT ANTEPARTUM CARE, DAILY

## 2023-05-21 PROCEDURE — 36600 WITHDRAWAL OF ARTERIAL BLOOD: CPT

## 2023-05-21 PROCEDURE — 71045 X-RAY EXAM CHEST 1 VIEW: CPT

## 2023-05-21 RX ORDER — LEVOTHYROXINE SODIUM 0.15 MG/1
150 TABLET ORAL
Status: DISCONTINUED | OUTPATIENT
Start: 2023-05-22 | End: 2023-05-21

## 2023-05-21 RX ORDER — IPRATROPIUM BROMIDE AND ALBUTEROL SULFATE 2.5; .5 MG/3ML; MG/3ML
3 SOLUTION RESPIRATORY (INHALATION)
Status: COMPLETED | OUTPATIENT
Start: 2023-05-21 | End: 2023-05-21

## 2023-05-21 RX ORDER — METHYLPREDNISOLONE SODIUM SUCCINATE 40 MG/ML
40 INJECTION, POWDER, LYOPHILIZED, FOR SOLUTION INTRAMUSCULAR; INTRAVENOUS
Status: COMPLETED | OUTPATIENT
Start: 2023-05-21 | End: 2023-05-22

## 2023-05-21 RX ORDER — OMEPRAZOLE 20 MG/1
20 CAPSULE, DELAYED RELEASE ORAL DAILY
Status: DISCONTINUED | OUTPATIENT
Start: 2023-05-22 | End: 2023-05-21

## 2023-05-21 RX ORDER — ACETAMINOPHEN 325 MG/1
650 TABLET ORAL EVERY 4 HOURS PRN
Status: DISCONTINUED | OUTPATIENT
Start: 2023-05-21 | End: 2023-06-05

## 2023-05-21 RX ORDER — ASPIRIN 81 MG/1
81 TABLET, CHEWABLE ORAL DAILY
Status: DISCONTINUED | OUTPATIENT
Start: 2023-05-22 | End: 2023-06-05

## 2023-05-21 RX ORDER — ACETAMINOPHEN 500 MG
1000 TABLET ORAL EVERY 6 HOURS PRN
Status: DISCONTINUED | OUTPATIENT
Start: 2023-05-21 | End: 2023-06-05

## 2023-05-21 RX ORDER — LEVOTHYROXINE SODIUM 0.15 MG/1
150 TABLET ORAL
Status: DISCONTINUED | OUTPATIENT
Start: 2023-05-22 | End: 2023-06-09 | Stop reason: HOSPADM

## 2023-05-21 RX ORDER — ASPIRIN 81 MG/1
81 TABLET, CHEWABLE ORAL DAILY
Status: ON HOLD | COMMUNITY
End: 2023-06-09

## 2023-05-21 RX ORDER — IPRATROPIUM BROMIDE AND ALBUTEROL SULFATE 2.5; .5 MG/3ML; MG/3ML
3 SOLUTION RESPIRATORY (INHALATION)
Status: DISCONTINUED | OUTPATIENT
Start: 2023-05-21 | End: 2023-05-23

## 2023-05-21 RX ORDER — DIPHENHYDRAMINE HYDROCHLORIDE 50 MG/ML
50 INJECTION INTRAMUSCULAR; INTRAVENOUS ONCE
Status: COMPLETED | OUTPATIENT
Start: 2023-05-21 | End: 2023-05-22

## 2023-05-21 RX ORDER — OMEPRAZOLE 20 MG/1
20 CAPSULE, DELAYED RELEASE ORAL DAILY
Status: DISCONTINUED | OUTPATIENT
Start: 2023-05-22 | End: 2023-06-09 | Stop reason: HOSPADM

## 2023-05-21 RX ADMIN — ACETAMINOPHEN 1000 MG: 500 TABLET, FILM COATED ORAL at 20:28

## 2023-05-21 RX ADMIN — METHYLPREDNISOLONE SODIUM SUCCINATE 40 MG: 40 INJECTION, POWDER, FOR SOLUTION INTRAMUSCULAR; INTRAVENOUS at 21:23

## 2023-05-21 RX ADMIN — IPRATROPIUM BROMIDE AND ALBUTEROL SULFATE 3 ML: .5; 2.5 SOLUTION RESPIRATORY (INHALATION) at 23:30

## 2023-05-21 RX ADMIN — IPRATROPIUM BROMIDE AND ALBUTEROL SULFATE 3 ML: .5; 2.5 SOLUTION RESPIRATORY (INHALATION) at 16:48

## 2023-05-21 ASSESSMENT — PAIN SCALES - GENERAL: PAINLEVEL: 0 - NO PAIN

## 2023-05-21 ASSESSMENT — PATIENT HEALTH QUESTIONNAIRE - PHQ9
SUM OF ALL RESPONSES TO PHQ9 QUESTIONS 1 AND 2: 0
1. LITTLE INTEREST OR PLEASURE IN DOING THINGS: NOT AT ALL
2. FEELING DOWN, DEPRESSED, IRRITABLE, OR HOPELESS: NOT AT ALL

## 2023-05-21 NOTE — ED PROVIDER NOTES
OB ED Eval    CC: shortness of breath    HPI:  Ms. Carolina Medrano is a 38 y.o.  @ 34w6d by lmp c/w 9wk US with history of reactive airway disease reporting increasing shortness of breath and cough.  Was previously treated with short course of prednisone in April and started on advair for similar symptoms however reports the last few days her symptoms are worse than ever.  Is using advair 2x daily as well as duonebs 4x daily.  Was started on medrol dose pack last Friday.  Reports that the symptoms are worse lying flat, really feels like she can't breath when lying flat although denies worsening edema.  No CP; doesn't really notice wheezing, just feels tight and has a non-productive cough.   reports her symptoms are worse than they have previously been.      Chau sees Dr. Kang for OB care and follows with Dr. Galarza as well.  Pt reports +SSA antibodies with Dr. Galarza.  On synthroid and due for repeat TSH.  Reports last growth w/ Dr. Galarza was normal bout 2wks ago, baby measuring LGA.      ROS:  Const: denies fevers, general concerns  CV: denies edema, CP  resp: + cough and shortness of breath  GI: denies abd pain, GI concerns  : no ctx/LOF/VB.  Reports +FM  Neuro: reports no HA/vision changes    OB History    Para Term  AB Living   1 0 0 0 0 0   SAB IAB Ectopic Molar Multiple Live Births   0 0 0   0        # Outcome Date GA Lbr Abhay/2nd Weight Sex Delivery Anes PTL Lv   1 Current                GYN: no STIs    PMHx: reactive airway disease, hypothyroidism    Past Surgical History:   Procedure Laterality Date    UMBILICAL HERNIA REPAIR  2017    Procedure: UMBILICAL HERNIA REPAIR WITH MESH;  Surgeon: Florencio Tavares M.D.;  Location: SURGERY [facility];  Service:     LIZ BY LAPAROSCOPY  2013    Performed by Florencio Tavares M.D. at SURGERY [facility]    KNEE ARTHROSCOPY      right knee ( times 4)    TONSILLECTOMY AND ADENOIDECTOMY      as a child     "UMBILICAL HERNIA REPAIR         Meds:  Medrol dose dunia (started )  Advair 2x daily  Duoneb 4x daily  Synthroid 150mcg daily; liothyronine 50mcg daily  PNV      Family History   Problem Relation Age of Onset    Heart Disease Father     Hypertension Father     Alcohol abuse Father     Cancer Paternal Aunt         breast    Cancer Paternal Uncle         colon    Diabetes Maternal Grandfather     Cancer Paternal Grandmother         breast    Autoimmune Disease Paternal Grandmother     Lupus Mother     No Known Problems Brother     Diabetes Maternal Grandmother     Alzheimer's Disease Paternal Grandfather        Social History     Socioeconomic History    Marital status: Single   Tobacco Use    Smoking status: Never    Smokeless tobacco: Never   Vaping Use    Vaping Use: Never used   Substance and Sexual Activity    Alcohol use: Yes     Alcohol/week: 0.0 oz     Comment: 1-2/week    Drug use: No    Sexual activity: Yes     Partners: Female     Birth control/protection: Other-See Comments       PE:  Patient Vitals for the past 24 hrs:   BP Temp Temp src Pulse Resp SpO2 Height Weight   23 1640 -- -- -- (!) 104 (!) 22 95 % -- --   23 1610 -- -- -- (!) 103 (!) 24 97 % -- --   23 1525 133/75 36.5 °C (97.7 °F) Temporal 97 18 95 % 1.702 m (5' 7\") 103 kg (226 lb)       gen: AAO, NAD  CV: regular, no m/r/g, pulse   Resp: ctab, not moving a lot of air in the lower lung bases, no wheezing appreciated  abd: soft, gravid, NT  Ext: NT, trace edema bilaterally    NST:  Indication: shortness of breath  FHT: 140/moderate variability/+ accels/ no decels  steph: no ctx     Latest Reference Range & Units 23 16:08   Ph 7.400 - 7.500  7.467   Pco2 26.0 - 37.0 mmHg 28.3   Po2 64 - 87 mmHg 49 (LL)   Hco3 17.0 - 25.0 mmol/L 20.5   BE -4 - 3 mmol/L -2   Tco2 20 - 33 mmol/L 21   S02 93 - 99 % 87 (L)   (LL): Data is critically low  (L): Data is abnormally low    A/P: 38 y.o.  @ 34w6d by lmp c/w 9wk US with " shortness of breath  Cbc/CMP/CXR.  Will discuss with MFM and pending results consider CTA/ECHO    NST reactive and reassuring.      Nora Paez MD  [facility]

## 2023-05-21 NOTE — PROGRESS NOTES
1522 - A  with EDC 23 making her 34.6 presents with c/o persistent cough after starting on steroids for reactive airway on Friday. This cough has worsened over the past 6-7 weeks despite frequent intervention. The pt has been using an inhaler, multiple rounds of steroids, and using a duoneb nebulizer Q6H. Pt reports +FM, denies LOF, VB, and UCs. Monitors on x2. POC discussed and questions addressed. VSS. Will monitor.  1640 - Dr Gonzalez to the bedside to discuss POC. See order history.   1645 - Per MD wants to repeat ABG. Rt to bedside, see RT note.  1730 - Ambulating O2 sats assessed. Initially 88-91% then to 97-98% ORA while ambulating. MD aware.  1900 - Report to KRIS Lara.

## 2023-05-22 ENCOUNTER — HOSPITAL ENCOUNTER (OUTPATIENT)
Dept: RADIOLOGY | Facility: MEDICAL CENTER | Age: 38
End: 2023-05-22
Attending: OBSTETRICS & GYNECOLOGY

## 2023-05-22 LAB
FEV1 % PREDICTED: 103
FEV1/FVC % PREDICTED: 100
FEV1/FVC: 84.35 %
FEV1: 3.27 L
FVC % PREDICTED: 101
FVC (L): 3.87

## 2023-05-22 PROCEDURE — 700102 HCHG RX REV CODE 250 W/ 637 OVERRIDE(OP): Performed by: OBSTETRICS & GYNECOLOGY

## 2023-05-22 PROCEDURE — A9270 NON-COVERED ITEM OR SERVICE: HCPCS | Performed by: OBSTETRICS & GYNECOLOGY

## 2023-05-22 PROCEDURE — G0378 HOSPITAL OBSERVATION PER HR: HCPCS

## 2023-05-22 PROCEDURE — 94640 AIRWAY INHALATION TREATMENT: CPT

## 2023-05-22 PROCEDURE — 94010 BREATHING CAPACITY TEST: CPT

## 2023-05-22 PROCEDURE — A9270 NON-COVERED ITEM OR SERVICE: HCPCS | Performed by: INTERNAL MEDICINE

## 2023-05-22 PROCEDURE — 700117 HCHG RX CONTRAST REV CODE 255: Performed by: OBSTETRICS & GYNECOLOGY

## 2023-05-22 PROCEDURE — 99254 IP/OBS CNSLTJ NEW/EST MOD 60: CPT | Performed by: INTERNAL MEDICINE

## 2023-05-22 PROCEDURE — 700111 HCHG RX REV CODE 636 W/ 250 OVERRIDE (IP): Performed by: OBSTETRICS & GYNECOLOGY

## 2023-05-22 PROCEDURE — 700101 HCHG RX REV CODE 250: Performed by: OBSTETRICS & GYNECOLOGY

## 2023-05-22 PROCEDURE — 700111 HCHG RX REV CODE 636 W/ 250 OVERRIDE (IP): Performed by: INTERNAL MEDICINE

## 2023-05-22 PROCEDURE — 71275 CT ANGIOGRAPHY CHEST: CPT

## 2023-05-22 PROCEDURE — 700102 HCHG RX REV CODE 250 W/ 637 OVERRIDE(OP): Performed by: INTERNAL MEDICINE

## 2023-05-22 RX ORDER — BUDESONIDE 0.5 MG/2ML
0.5 INHALANT ORAL
Status: DISCONTINUED | OUTPATIENT
Start: 2023-05-22 | End: 2023-06-06

## 2023-05-22 RX ORDER — MAGNESIUM SULFATE HEPTAHYDRATE 40 MG/ML
2 INJECTION, SOLUTION INTRAVENOUS ONCE
Status: COMPLETED | OUTPATIENT
Start: 2023-05-22 | End: 2023-05-22

## 2023-05-22 RX ORDER — IPRATROPIUM BROMIDE AND ALBUTEROL SULFATE 2.5; .5 MG/3ML; MG/3ML
3 SOLUTION RESPIRATORY (INHALATION)
Status: DISCONTINUED | OUTPATIENT
Start: 2023-05-22 | End: 2023-05-27

## 2023-05-22 RX ORDER — PREDNISONE 20 MG/1
50 TABLET ORAL DAILY
Status: COMPLETED | OUTPATIENT
Start: 2023-05-22 | End: 2023-05-31

## 2023-05-22 RX ORDER — DIPHENHYDRAMINE HCL 25 MG
50 TABLET ORAL EVERY 6 HOURS PRN
Status: DISCONTINUED | OUTPATIENT
Start: 2023-05-22 | End: 2023-06-05

## 2023-05-22 RX ORDER — BENZONATATE 100 MG/1
100 CAPSULE ORAL 3 TIMES DAILY PRN
Status: DISCONTINUED | OUTPATIENT
Start: 2023-05-22 | End: 2023-06-09 | Stop reason: HOSPADM

## 2023-05-22 RX ADMIN — BENZONATATE 100 MG: 100 CAPSULE ORAL at 23:08

## 2023-05-22 RX ADMIN — IOHEXOL 50 ML: 350 INJECTION, SOLUTION INTRAVENOUS at 02:03

## 2023-05-22 RX ADMIN — LEVOTHYROXINE SODIUM 150 MCG: 0.15 TABLET ORAL at 05:53

## 2023-05-22 RX ADMIN — MOMETASONE FUROATE AND FORMOTEROL FUMARATE DIHYDRATE 2 PUFF: 200; 5 AEROSOL RESPIRATORY (INHALATION) at 20:35

## 2023-05-22 RX ADMIN — BUDESONIDE INHALATION 0.5 MG: 0.5 SUSPENSION RESPIRATORY (INHALATION) at 14:15

## 2023-05-22 RX ADMIN — IPRATROPIUM BROMIDE AND ALBUTEROL SULFATE 3 ML: .5; 2.5 SOLUTION RESPIRATORY (INHALATION) at 20:34

## 2023-05-22 RX ADMIN — MAGNESIUM SULFATE HEPTAHYDRATE 2 G: 2 INJECTION, SOLUTION INTRAVENOUS at 13:44

## 2023-05-22 RX ADMIN — OMEPRAZOLE 20 MG: 20 CAPSULE, DELAYED RELEASE ORAL at 05:53

## 2023-05-22 RX ADMIN — ACETAMINOPHEN 650 MG: 325 TABLET, FILM COATED ORAL at 23:07

## 2023-05-22 RX ADMIN — PREDNISONE 50 MG: 20 TABLET ORAL at 15:51

## 2023-05-22 RX ADMIN — ASPIRIN 81 MG 81 MG: 81 TABLET ORAL at 05:53

## 2023-05-22 RX ADMIN — BUDESONIDE INHALATION 0.5 MG: 0.5 SUSPENSION RESPIRATORY (INHALATION) at 20:36

## 2023-05-22 RX ADMIN — DIPHENHYDRAMINE HYDROCHLORIDE 50 MG: 50 INJECTION INTRAMUSCULAR; INTRAVENOUS at 00:17

## 2023-05-22 RX ADMIN — BENZONATATE 100 MG: 100 CAPSULE ORAL at 05:53

## 2023-05-22 RX ADMIN — IPRATROPIUM BROMIDE AND ALBUTEROL SULFATE 3 ML: .5; 2.5 SOLUTION RESPIRATORY (INHALATION) at 06:35

## 2023-05-22 RX ADMIN — METHYLPREDNISOLONE SODIUM SUCCINATE 40 MG: 40 INJECTION, POWDER, FOR SOLUTION INTRAMUSCULAR; INTRAVENOUS at 00:10

## 2023-05-22 RX ADMIN — BENZONATATE 100 MG: 100 CAPSULE ORAL at 15:52

## 2023-05-22 RX ADMIN — IPRATROPIUM BROMIDE AND ALBUTEROL SULFATE 3 ML: .5; 2.5 SOLUTION RESPIRATORY (INHALATION) at 13:47

## 2023-05-22 RX ADMIN — ACETAMINOPHEN 1000 MG: 500 TABLET, FILM COATED ORAL at 16:37

## 2023-05-22 RX ADMIN — MOMETASONE FUROATE AND FORMOTEROL FUMARATE DIHYDRATE 2 PUFF: 200; 5 AEROSOL RESPIRATORY (INHALATION) at 14:24

## 2023-05-22 ASSESSMENT — ENCOUNTER SYMPTOMS
SHORTNESS OF BREATH: 1
VOMITING: 0
COUGH: 1
NAUSEA: 0
FEVER: 0
WHEEZING: 0
ORTHOPNEA: 0
SPUTUM PRODUCTION: 0
CHILLS: 0

## 2023-05-22 ASSESSMENT — PATIENT HEALTH QUESTIONNAIRE - PHQ9
2. FEELING DOWN, DEPRESSED, IRRITABLE, OR HOPELESS: NOT AT ALL
1. LITTLE INTEREST OR PLEASURE IN DOING THINGS: NOT AT ALL
SUM OF ALL RESPONSES TO PHQ9 QUESTIONS 1 AND 2: 0

## 2023-05-22 NOTE — PROGRESS NOTES
0700- Report received from KRIS Lara. POC discussed and assumed.   0745- Assessment completed. O2 off at this time to see how pt does. Will monitor closely and re-apply O2 if needed. Bilateral Lung sounds clear. Pt with persistent cough that has made it difficult to rest. EFM and TOCO in place. Dr Gonzalez would like pt to have consult with pulmonology today. Will follow up with Dr Laws.   0802- Pt O2 did drop in to 92-93% on RA. 2L O2 re-applied.     1330- RN called to bedside for coughing fit. Pt reports in-ability to catch her breath. O2 via NC re-applied at 5L. Pt calmed and pulmonologist, Dr Fleming called and made aware of pt situation. Pt O2 sat at 98% on 5L via NC. Orders to give one time 2g Mag bolus and she will update other mediations for RT to give (see MAR).   1334- RT notified of pt situation and she will be up to administer breathing treatment shortly.   1344- Mag bolus started. Order for 25 ml/hr. Dr Bee called to verify rate. Orders to bolus in dose at 300 ml/hr.   1347- RT at bedside to administer medication (see MAR).   1349- PIV in left AC infiltrated. Mag bolus paused  1400- 18 G PIV started in left hand. Mag bolus restarted.   1430- Pt feeling slightly better. Intermittent cough still present  1517- Dr Fleming updated on pt status. Informed her that pt is doing better but still has intermittent cough.   1735- Pt requesting a steam shower to help with cough. Dr Calloway is ok with this at this time. Pt given supplies for shower and PIV covered.   1800- Dr Fleming updated RN on her plan for pt to see ENT tomorrow.   1815- PIV dressing changed due to getting wet in shower.   1845- Dr Laws updated on pt status and plan of care from Dr Bee. No new orders received. Dr Galarza would like pt to remain on continuous monitoring due to pt work of breathing.   1900- Report given to KRIS Lim.

## 2023-05-22 NOTE — CONSULTS
DATE OF SERVICE:  2023     REQUESTING PHYSICIAN:  Nora Paez MD     REASON FOR CONSULTATION:  The patient with increasing respiratory symptoms.     HISTORY OF PRESENT ILLNESS:  This is a 38-year-old  1, para 0,   currently with a working EDC 2023, now 34 weeks and 6 days.  The patient   reports about since 7 weeks ago, she was having shortness of breath and   increasing respiratory symptoms suggestive of reactive airway disease as a   presumptive diagnosis.  The patient was receiving Advair and DuoNebs 4 times a   day but reports increasing symptoms and as of 2 days ago began to have   difficulty maintaining a supine position.  Symptoms progressively got worse   and she was referred for further evaluation.     PAST MEDICAL HISTORY:  Significant for known history of Hashimoto's disease,   currently on Synthroid and previously was treated on Cytomel.  She also has   known history of SSA antibodies.  She denies prior history of asthma,   seizures, hypertension, cardiovascular, GI or  diseases.     PREVIOUS OPERATIONS:  Cholecystectomy, umbilical hernia repair, T and A and   knee surgery.     TRANSFUSIONS:  None.     ALLERGIES:  BETADINE CAUSES RASH.  CONTRAST MEDIA CAUSES RASH.     HABITS:  None.     CURRENT MEDICATIONS:  Synthroid and above medications.  Also, aspirin 81 mg   per day.     VENEREAL DISEASE HISTORY:  Significant for HPV.     PHYSICAL EXAMINATION:  LUNGS:  Revealed she has bronchial breath sounds bilaterally but they are   clear.  HEART:  She is tachycardic, regular rhythm and grade I-II/VI systolic ejection   murmur heard best at left sternal border, nonradiating.  There were no   gallops or S3 or S4.     DIAGNOSTIC DATA:  Initial chest x-ray was normal.     LABORATORY DATA:  Hemoglobin 10.6, hematocrit 32.3, platelet count slightly   decreased 126,000.  She does have a left shift.  Chemistry panel was   unremarkable.  Urinalysis showed trace protein.  Arterial blood gas  was   initially done with a pH of 7.467, pCO2 of 28.3, pO2 of 49.  Discussed this   with Dr. Paez and she reported that the pulse ox was 93-94%.  In light   of this, I discussed with anesthesia and they requested an ultrasound-guided   arterial blood gas and the results were significantly improved with a pH 7.48,   pCO2 27.2, pO2 106.0, bicarbonate 20, base excess -3.     Notify Dr. Gonzalez that she is hyperventilating and actually mildly   hyperoxygenated.     Recommended at this time that she has since had a normal echo and would   recommend exclusion of pulmonary embolism and recommended CTA.     If the studies are negative, we need to exclude extraneous causes and findings may   suggest that she may have either bronchial asthma or other some form of   respiratory disorder.  Viral studies have been requested.  They were negative   for COVID, RSV and influenza.     ASSESSMENT:  1.  Intrauterine pregnancy at 34 weeks and 6 days.  2.  Respiratory hyperventilation, rule out upper airway disease versus   pulmonary embolism.  3.  Respiratory distress     PLAN:  A CTA is pending and if negative, recommend pulmonary consultation.    Inpatient comprehensive consultation with high complexity decision making.        ______________________________  MD MAYELIN NAVARRO/RHONA    DD:  05/21/2023 23:45  DT:  05/22/2023 00:59    Job#:  [id number]

## 2023-05-22 NOTE — CONSULTS
Pulmonary Consultation    Date of consult: 2023    Referring Physician  Sarah Kang M.D.    Reason for Consultation  Acute hypoxemic respiratory failure     History of Presenting Illness  38 y.o. female who presented 2023 with cough and shortness of breath.  She is currently pregnant, , 35 weeks and 6 days. she reports that she has no formal diagnosis of asthma, but has had a reactive airways disease syndrome, especially when she is sick and usually requires a course of prednisone and an albuterol inhaler.  This has always helped her in the past.  Starting in late March, early April patient started having worsening cough.  She has been on short courses of steroids and a low-dose Advair inhaler on and off since that time.  She reports that when she is taken full dose steroids she has had some relief and improvement, however, there has been concern about giving her higher dose steroids due to pregnancy and she has not had a good response to them.  She has been having severe coughing fits and is unable to catch her breath at these times.  She has had response to nebulizers in the past.  She has been checking her saturations at home and they have been around 94%, but when she exerts herself by walking up the stairs, she notes that her heart rate increases into the 150s.  In the ER, they did stand her up and her oxygen saturations dropped to 88% and she was placed on oxygen at that time.  Patient endorses a feeling of tightness in her throat, she has no history of tracheal stenosis.  She does not feel that she has any nasal congestion at this time, but reports that she maybe has been snoring.  She does endorse a history of allergies [place]    Review of Systems   Constitutional:  Negative for chills and fever.   HENT:  Negative for congestion.         Throat tightness   Respiratory:  Positive for cough and shortness of breath.  Negative for sputum production and wheezing.    Cardiovascular:  Negative for chest pain and orthopnea.   Gastrointestinal:  Negative for nausea and vomiting.       Past Medical History   has a past medical history of Pain (08-) and Thyroid disease.    Surgical History   has a past surgical history that includes knee arthroscopy; kenroy by laparoscopy (5/1/2013); tonsillectomy and adenoidectomy; umbilical hernia repair (8/9/2017); and umbilical hernia repair.    Family History  family history includes Alcohol abuse in her father; Alzheimer's Disease in her paternal grandfather; Autoimmune Disease in her paternal grandmother; Cancer in her paternal aunt, paternal grandmother, and paternal uncle; Diabetes in her maternal grandfather and maternal grandmother; Heart Disease in her father; Hypertension in her father; Lupus in her mother; No Known Problems in her brother.    Social History   reports that she has never smoked. She has never used smokeless tobacco. She reports current alcohol use. She reports that she does not use drugs.    Medications  Home Medications       Reviewed by Jane Larsen R.N. (Registered Nurse) on 05/21/23 at 2151  Med List Status: Complete     Medication Last Dose Status   albuterol (PROVENTIL) 2.5mg/3ml Nebu Soln solution for nebulization 5/21/2023 Active   albuterol 108 (90 Base) MCG/ACT Aero Soln inhalation aerosol 5/20/2023 Active   aspirin (ASA) 81 MG Chew Tab chewable tablet 5/21/2023 Active   fluticasone-salmeterol (ADVAIR) 100-50 MCG/ACT AEROSOL POWDER, BREATH ACTIVATED  Active   fluticasone-salmeterol (ADVAIR) 250-50 MCG/ACT AEROSOL POWDER, BREATH ACTIVATED  Active   ipratropium (ATROVENT) 0.02 % Solution  Active   ipratropium-albuterol (DUONEB) 0.5-2.5 (3) MG/3ML nebulizer solution 5/21/2023 Active   levothyroxine (SYNTHROID) 150 MCG Tab 5/21/2023 Active   methylPREDNISolone (MEDROL DOSEPAK) 4 MG Tablet Therapy Pack 5/21/2023 Active   omeprazole (PRILOSEC) 10 MG CAPSULE  DELAYED RELEASE 5/21/2023 Active   ondansetron (ZOFRAN) 8 MG Tab 3/6/2023 Active                  Current Facility-Administered Medications   Medication Dose Route Frequency Provider Last Rate Last Admin    benzonatate (TESSALON) capsule 100 mg  100 mg Oral TID PRN Nora Paez M.D.   100 mg at 05/22/23 0553    diphenhydrAMINE (BENADRYL) tablet/capsule 50 mg  50 mg Oral Q6HRS PRN Nora Paez M.D.        ipratropium-albuterol (DUONEB) nebulizer solution  3 mL Nebulization Q4H PRN (RT) Adriana Fleming D.O.        magnesium sulfate IVPB premix 2 g  2 g Intravenous Once Adriana Fleming D.O. 300 mL/hr at 05/22/23 1344 2 g at 05/22/23 1344    mometasone-formoterol (Dulera) 200-5 mcg/Act inhaler 2 Puff  2 Puff Inhalation BID (RT) Adriana Fleming D.O.        budesonide (PULMICORT) neb susp 0.5 mg  0.5 mg Nebulization BID (RT) Adriana Fleming D.O.        predniSONE (DELTASONE) tablet 50 mg  50 mg Oral DAILY Adriana Fleming D.O.        ipratropium-albuterol (DUONEB) nebulizer solution  3 mL Nebulization Q6HRS (RT) Nora Paez M.D.   3 mL at 05/22/23 0635    aspirin (ASA) chewable tab 81 mg  81 mg Oral DAILY Nora Paez M.D.   81 mg at 05/22/23 0553    acetaminophen (Tylenol) tablet 650 mg  650 mg Oral Q4HRS PRN Nora Paez M.D.        levothyroxine (SYNTHROID) tablet 150 mcg  150 mcg Oral AM ES Nora Paez M.D.   150 mcg at 05/22/23 0553    omeprazole (PRILOSEC) capsule 20 mg  20 mg Oral DAILY Nora Paez M.D.   20 mg at 05/22/23 0553    acetaminophen (TYLENOL) tablet 1,000 mg  1,000 mg Oral Q6HRS PRN Nora Paez M.D.   1,000 mg at 05/21/23 2028       Allergies  Allergies   Allergen Reactions    Shellfish Allergy Rash     Throat swelling    Vancomycin Rash and Anaphylaxis     .    Clindamycin Hcl Rash    Cephalosporins Hives and Rash     .    Iodine Rash     Topical iodine and injectable    Penicillins Hives and Rash     .        Vital Signs last 24 hours  Temp:  [35.9 °C (96.7 °F)-36.5 °C (97.7 °F)] 36.1 °C (97 °F)  Pulse:  [] 94  Resp:  [16-24] 17  BP: (106-134)/(57-75) 134/69  SpO2:  [92 %-98 %] 96 %    Physical Exam  Constitutional:       Appearance: Normal appearance.   HENT:      Head: Normocephalic.   Eyes:      Conjunctiva/sclera: Conjunctivae normal.   Cardiovascular:      Rate and Rhythm: Normal rate and regular rhythm.   Pulmonary:      Effort: Pulmonary effort is normal.      Breath sounds: No stridor.      Comments: Decreased breath sounds in right > left  Skin:     General: Skin is warm and dry.   Neurological:      General: No focal deficit present.      Mental Status: She is alert and oriented to person, place, and time.       Fluids  No intake or output data in the 24 hours ending 05/22/23 1347    Laboratory  Recent Results (from the past 48 hour(s))   POCT urinalysis device results    Collection Time: 05/21/23  3:48 PM   Result Value Ref Range    POC Color Dark yellow     POC Appearance Clear     POC Glucose Negative Negative mg/dL    POC Ketones Negative Negative mg/dL    POC Specific Gravity 1.020 1.005 - 1.030    POC Blood Negative Negative    POC Urine PH 7.5 5.0 - 8.0    POC Protein Trace (A) Negative mg/dL    POC Nitrites Negative Negative    POC Leukocyte Esterase Negative Negative   POCT arterial blood gas device results    Collection Time: 05/21/23  4:08 PM   Result Value Ref Range    Ph 7.467 7.400 - 7.500    Pco2 28.3 26.0 - 37.0 mmHg    Po2 49 (LL) 64 - 87 mmHg    Tco2 21 20 - 33 mmol/L    S02 87 (L) 93 - 99 %    Hco3 20.5 17.0 - 25.0 mmol/L    BE -2 -4 - 3 mmol/L    Body Temp see below degrees    Specimen Arterial     Stephan Test Pass    CBC WITH DIFFERENTIAL    Collection Time: 05/21/23  4:20 PM   Result Value Ref Range    WBC 9.9 4.8 - 10.8 K/uL    RBC 3.74 (L) 4.20 - 5.40 M/uL    Hemoglobin 10.6 (L) 12.0 - 16.0 g/dL    Hematocrit 32.3 (L) 37.0 - 47.0 %    MCV 86.4 81.4 - 97.8 fL    MCH 28.3 27.0 -  33.0 pg    MCHC 32.8 (L) 33.6 - 35.0 g/dL    RDW 44.7 35.9 - 50.0 fL    Platelet Count 126 (L) 164 - 446 K/uL    MPV 11.9 9.0 - 12.9 fL    Neutrophils-Polys 83.10 (H) 44.00 - 72.00 %    Lymphocytes 10.10 (L) 22.00 - 41.00 %    Monocytes 5.20 0.00 - 13.40 %    Eosinophils 0.30 0.00 - 6.90 %    Basophils 0.20 0.00 - 1.80 %    Immature Granulocytes 1.10 (H) 0.00 - 0.90 %    Nucleated RBC 0.00 /100 WBC    Neutrophils (Absolute) 8.23 (H) 2.00 - 7.15 K/uL    Lymphs (Absolute) 1.00 1.00 - 4.80 K/uL    Monos (Absolute) 0.51 0.00 - 0.85 K/uL    Eos (Absolute) 0.03 0.00 - 0.51 K/uL    Baso (Absolute) 0.02 0.00 - 0.12 K/uL    Immature Granulocytes (abs) 0.11 0.00 - 0.11 K/uL    NRBC (Absolute) 0.00 K/uL   Comp Metabolic Panel    Collection Time: 05/21/23  4:20 PM   Result Value Ref Range    Sodium 139 135 - 145 mmol/L    Potassium 4.1 3.6 - 5.5 mmol/L    Chloride 108 96 - 112 mmol/L    Co2 19 (L) 20 - 33 mmol/L    Anion Gap 12.0 7.0 - 16.0    Glucose 87 65 - 99 mg/dL    Bun 5 (L) 8 - 22 mg/dL    Creatinine 0.54 0.50 - 1.40 mg/dL    Calcium 8.8 8.5 - 10.5 mg/dL    AST(SGOT) 34 12 - 45 U/L    ALT(SGPT) 35 2 - 50 U/L    Alkaline Phosphatase 153 (H) 30 - 99 U/L    Total Bilirubin 0.3 0.1 - 1.5 mg/dL    Albumin 3.5 3.2 - 4.9 g/dL    Total Protein 6.4 6.0 - 8.2 g/dL    Globulin 2.9 1.9 - 3.5 g/dL    A-G Ratio 1.2 g/dL   TSH    Collection Time: 05/21/23  4:20 PM   Result Value Ref Range    TSH 1.560 0.380 - 5.330 uIU/mL   CORRECTED CALCIUM    Collection Time: 05/21/23  4:20 PM   Result Value Ref Range    Correct Calcium 9.2 8.5 - 10.5 mg/dL   ESTIMATED GFR    Collection Time: 05/21/23  4:20 PM   Result Value Ref Range    GFR (CKD-EPI) 122 >60 mL/min/1.73 m 2   proBrain Natriuretic Peptide, NT    Collection Time: 05/21/23  4:20 PM   Result Value Ref Range    NT-proBNP 68 0 - 125 pg/mL   POCT arterial blood gas device results    Collection Time: 05/21/23  5:10 PM   Result Value Ref Range    Ph 7.440 7.400 - 7.500    Pco2 30.6 26.0 -  37.0 mmHg    Po2 36 (LL) 64 - 87 mmHg    Tco2 22 20 - 33 mmol/L    S02 72 (L) 93 - 99 %    Hco3 20.8 17.0 - 25.0 mmol/L    BE -3 -4 - 3 mmol/L    Body Temp see below degrees    Specimen Arterial     Stephan Test Pass    EC-ECHOCARDIOGRAM COMPLETE W/O CONT    Collection Time: 05/21/23  6:47 PM   Result Value Ref Range    Eject.Frac. MOD BP 65.74     Eject.Frac. MOD 4C 61.25     Eject.Frac. MOD 2C 69.96    ABG - LAB    Collection Time: 05/21/23  8:48 PM   Result Value Ref Range    Ph 7.48 7.40 - 7.50    Pco2 27.2 26.0 - 37.0 mmHg    Po2 106.0 (H) 64.0 - 87.0 mmHg    O2 Saturation 97.2 93.0 - 99.0 %    Hco3 20 17 - 25 mmol/L    Base Excess -3 -4 - 3 mmol/L    Body Temp 36.3 Centigrade    O2 Therapy n/a     Ph -TC 7.49 7.40 - 7.50    Pco2 -TC 26.4 26.0 - 37.0 mmHg    Po2 -.8 (H) 64.0 - 87.0 mmHg   CoV-2, Flu A/B, And RSV by PCR ([facility])    Collection Time: 05/21/23  8:53 PM    Specimen: Nasal; Respirate   Result Value Ref Range    Influenza virus A RNA Negative Negative    Influenza virus B, PCR Negative Negative    RSV, PCR Negative Negative    SARS-CoV-2 by PCR NotDetected     SARS-CoV-2 Source NP Swab        Imaging  CT-CTA CHEST PULMONARY ARTERY W/ RECONS   Final Result         1.  No large central pulmonary embolus is appreciated, evaluation of the subsegmental branches is essentially nondiagnostic due to motion artifacts. Additional imaging would be required for definitive exclusion of small distal pulmonary emboli.   2.  Left breast mass, recommend referral for mammography and additional workup.      These findings were discussed with the patient's clinician, Nora Paez, on 5/22/2023 2:33 AM.      EC-ECHOCARDIOGRAM COMPLETE W/O CONT   Final Result      DX-CHEST-PORTABLE (1 VIEW)   Final Result      No acute cardiac or pulmonary abnormalities are identified.          Assessment/Plan  #Acute hypoxemic respiratory failure, suspect that this is secondary to asthma exacerbation    Titrate O2 to maintain  sats >95% in the setting of active pregnancy  Start Dulera high dose with spacer  Start nebulized budesonide, double ICS to ensure that her deep airways are getting access to the medication  We will give mag sulfate 2 g once now  Continue with around-the-clock nebulizers  Start prednisone 50 mg daily, for at least 10 days, we may need a longer taper of this  Start Zyrtec daily for control of allergies  Recommend nasal rinses, patient is going to ask her  to bring in a NeilMed nasal rinse bottle to be used with distilled water to try to remove any allergens from her nasal cavity  Will request bedside spirometry to look at flow volume loops to look for airway obstruction, although there is no stridor on auscultation  Continue with omeprazole     Adriana Fleming, DO   Pulmonary and Critical Care

## 2023-05-22 NOTE — CARE PLAN
Problem: Psychosocial - L&D  Goal: Patient's level of anxiety will decrease  5/22/2023 1043 by Nadine Winn, R.N.  Outcome: Progressing  5/22/2023 1043 by Nadine Winn, R.N.  Outcome: Progressing     Problem: Pain  Goal: Patient's pain will be alleviated or reduced to the patient’s comfort goal  5/22/2023 1043 by Nadine Winn, R.N.  Outcome: Progressing  Note: Pt denies pain at this time but her cough is very persistent and not allowing her to rest.   5/22/2023 1043 by Nadine Winn, R.N.  Note: Pt denies pain at this time but her cough is very persistent and not allowing her to rest.    The patient is Watcher - Medium risk of patient condition declining or worsening

## 2023-05-22 NOTE — FLOWSHEET NOTE
Peak Flow (Pre/Post Bronchodilator)    Peak Flow--Pre (ml / sec) : 400     Peak Flow-Post (ml/sec): 375     Peak Flow Predicted Values: 468    Peak Flow % of Predicted Value: (Pre) 85 % (Post) 80 %

## 2023-05-22 NOTE — RESPIRATORY CARE
PULMONARY FUNCTION TEST by COPD CLINICAL EDUCATOR  5/22/2023 at 3:58 PM by Juanis Persaud RRT     PFT performed by COPD educator. Bedside PFT will reside in results tab on EMR and sent to inpatient pulmonary to interpret.     PFT Results    Lab Results   Component Value Date    FEV1 (L) 3.27 05/22/2023    FEV1/FVC % 84.35 05/22/2023    FVC % Predicted 101 05/22/2023    FEV1 % Predicted 103 05/22/2023    FVC 3.87 05/22/2023    FEV1/FVC % Predicted 100 05/22/2023

## 2023-05-22 NOTE — PROGRESS NOTES
PROGRESS NOTE    DATE: 23  Hospital day: 2  Gestational Age: 35w0d, Estimated Date of Delivery: 23  Primary OB/GYN: Jorge Luis MARTINEZ Consultant: Dr. Ganesh KELLOGG  Carolina Medrano is a 38 y.o.  female with Estimated Date of Delivery: 23 at 35w0d  gestation who initially presented with worsening shortness of breath.  Patient is being admitted for reactive airway disease with unclear etiology.     Patient endorses good fetal movement. Denies contractions, vaginal bleeding, or loss of fluid. Denies headaches, changed in vision and abdominal pain. Reports she is ambulating without difficulty and voiding spontaneously.     Interval Changes: cough seems to be worse today, Dr. Fleming came by and has given some recs for med changes, is getting nebulizer treatment right now.     OBJECTIVE:  Vitals:    23 1424   BP:    Pulse: (!) 113   Resp:    Temp:    SpO2: 98%        Review of systems:     PHYSICAL EXAM:  General:   Alert, conversational, pleasant, coughs a lot when talking  Lungs:   Breathing somewhat labored,on oxygen   Heart:      Abdomen:  Soft, gravid, non-tender, non-distended    OB Ultrasound:  LGA on Dr. Galarza u/s 2w ago     FHT: baseline 140's with moderate variability and accels, no decels seen, no contractions on toco    Medications:   Current Facility-Administered Medications   Medication Dose Route Frequency Provider Last Rate Last Admin    benzonatate (TESSALON) capsule 100 mg  100 mg Oral TID PRN Nora Paez M.D.   100 mg at 23 0553    diphenhydrAMINE (BENADRYL) tablet/capsule 50 mg  50 mg Oral Q6HRS PRN Nora Paez M.D.        ipratropium-albuterol (DUONEB) nebulizer solution  3 mL Nebulization Q4H PRN (RT) Adriana Fleming D.O.        magnesium sulfate IVPB premix 2 g  2 g Intravenous Once Adriana Fleming D.O.   Stopped at 23 1403    mometasone-formoterol (Dulera) 200-5 mcg/Act inhaler 2 Puff  2 Puff Inhalation BID  (RT) Adriana Fleming D.O.   2 Puff at 05/22/23 1424    budesonide (PULMICORT) neb susp 0.5 mg  0.5 mg Nebulization BID (RT) Adriana Fleming D.O.   0.5 mg at 05/22/23 1415    predniSONE (DELTASONE) tablet 50 mg  50 mg Oral DAILY Adriana Fleming D.O.        ipratropium-albuterol (DUONEB) nebulizer solution  3 mL Nebulization Q6HRS (RT) Nora Paez M.D.   3 mL at 05/22/23 1347    aspirin (ASA) chewable tab 81 mg  81 mg Oral DAILY Nora Paez M.D.   81 mg at 05/22/23 0553    acetaminophen (Tylenol) tablet 650 mg  650 mg Oral Q4HRS PRN Nora Paez M.D.        levothyroxine (SYNTHROID) tablet 150 mcg  150 mcg Oral AM ES Nora Paez M.D.   150 mcg at 05/22/23 0553    omeprazole (PRILOSEC) capsule 20 mg  20 mg Oral DAILY Nora Paez M.D.   20 mg at 05/22/23 0553    acetaminophen (TYLENOL) tablet 1,000 mg  1,000 mg Oral Q6HRS PRN Nora Paez M.D.   1,000 mg at 05/21/23 2028        Labs:   Recent Results (from the past 24 hour(s))   POCT urinalysis device results    Collection Time: 05/21/23  3:48 PM   Result Value Ref Range    POC Color Dark yellow     POC Appearance Clear     POC Glucose Negative Negative mg/dL    POC Ketones Negative Negative mg/dL    POC Specific Gravity 1.020 1.005 - 1.030    POC Blood Negative Negative    POC Urine PH 7.5 5.0 - 8.0    POC Protein Trace (A) Negative mg/dL    POC Nitrites Negative Negative    POC Leukocyte Esterase Negative Negative   POCT arterial blood gas device results    Collection Time: 05/21/23  4:08 PM   Result Value Ref Range    Ph 7.467 7.400 - 7.500    Pco2 28.3 26.0 - 37.0 mmHg    Po2 49 (LL) 64 - 87 mmHg    Tco2 21 20 - 33 mmol/L    S02 87 (L) 93 - 99 %    Hco3 20.5 17.0 - 25.0 mmol/L    BE -2 -4 - 3 mmol/L    Body Temp see below degrees    Specimen Arterial     Stephan Test Pass    CBC WITH DIFFERENTIAL    Collection Time: 05/21/23  4:20 PM   Result Value Ref Range    WBC 9.9 4.8 - 10.8 K/uL    RBC 3.74  (L) 4.20 - 5.40 M/uL    Hemoglobin 10.6 (L) 12.0 - 16.0 g/dL    Hematocrit 32.3 (L) 37.0 - 47.0 %    MCV 86.4 81.4 - 97.8 fL    MCH 28.3 27.0 - 33.0 pg    MCHC 32.8 (L) 33.6 - 35.0 g/dL    RDW 44.7 35.9 - 50.0 fL    Platelet Count 126 (L) 164 - 446 K/uL    MPV 11.9 9.0 - 12.9 fL    Neutrophils-Polys 83.10 (H) 44.00 - 72.00 %    Lymphocytes 10.10 (L) 22.00 - 41.00 %    Monocytes 5.20 0.00 - 13.40 %    Eosinophils 0.30 0.00 - 6.90 %    Basophils 0.20 0.00 - 1.80 %    Immature Granulocytes 1.10 (H) 0.00 - 0.90 %    Nucleated RBC 0.00 /100 WBC    Neutrophils (Absolute) 8.23 (H) 2.00 - 7.15 K/uL    Lymphs (Absolute) 1.00 1.00 - 4.80 K/uL    Monos (Absolute) 0.51 0.00 - 0.85 K/uL    Eos (Absolute) 0.03 0.00 - 0.51 K/uL    Baso (Absolute) 0.02 0.00 - 0.12 K/uL    Immature Granulocytes (abs) 0.11 0.00 - 0.11 K/uL    NRBC (Absolute) 0.00 K/uL   Comp Metabolic Panel    Collection Time: 05/21/23  4:20 PM   Result Value Ref Range    Sodium 139 135 - 145 mmol/L    Potassium 4.1 3.6 - 5.5 mmol/L    Chloride 108 96 - 112 mmol/L    Co2 19 (L) 20 - 33 mmol/L    Anion Gap 12.0 7.0 - 16.0    Glucose 87 65 - 99 mg/dL    Bun 5 (L) 8 - 22 mg/dL    Creatinine 0.54 0.50 - 1.40 mg/dL    Calcium 8.8 8.5 - 10.5 mg/dL    AST(SGOT) 34 12 - 45 U/L    ALT(SGPT) 35 2 - 50 U/L    Alkaline Phosphatase 153 (H) 30 - 99 U/L    Total Bilirubin 0.3 0.1 - 1.5 mg/dL    Albumin 3.5 3.2 - 4.9 g/dL    Total Protein 6.4 6.0 - 8.2 g/dL    Globulin 2.9 1.9 - 3.5 g/dL    A-G Ratio 1.2 g/dL   TSH    Collection Time: 05/21/23  4:20 PM   Result Value Ref Range    TSH 1.560 0.380 - 5.330 uIU/mL   CORRECTED CALCIUM    Collection Time: 05/21/23  4:20 PM   Result Value Ref Range    Correct Calcium 9.2 8.5 - 10.5 mg/dL   ESTIMATED GFR    Collection Time: 05/21/23  4:20 PM   Result Value Ref Range    GFR (CKD-EPI) 122 >60 mL/min/1.73 m 2   proBrain Natriuretic Peptide, NT    Collection Time: 05/21/23  4:20 PM   Result Value Ref Range    NT-proBNP 68 0 - 125 pg/mL   POCT  arterial blood gas device results    Collection Time: 05/21/23  5:10 PM   Result Value Ref Range    Ph 7.440 7.400 - 7.500    Pco2 30.6 26.0 - 37.0 mmHg    Po2 36 (LL) 64 - 87 mmHg    Tco2 22 20 - 33 mmol/L    S02 72 (L) 93 - 99 %    Hco3 20.8 17.0 - 25.0 mmol/L    BE -3 -4 - 3 mmol/L    Body Temp see below degrees    Specimen Arterial     Stephan Test Pass    EC-ECHOCARDIOGRAM COMPLETE W/O CONT    Collection Time: 05/21/23  6:47 PM   Result Value Ref Range    Eject.Frac. MOD BP 65.74     Eject.Frac. MOD 4C 61.25     Eject.Frac. MOD 2C 69.96    ABG - LAB    Collection Time: 05/21/23  8:48 PM   Result Value Ref Range    Ph 7.48 7.40 - 7.50    Pco2 27.2 26.0 - 37.0 mmHg    Po2 106.0 (H) 64.0 - 87.0 mmHg    O2 Saturation 97.2 93.0 - 99.0 %    Hco3 20 17 - 25 mmol/L    Base Excess -3 -4 - 3 mmol/L    Body Temp 36.3 Centigrade    O2 Therapy n/a     Ph -TC 7.49 7.40 - 7.50    Pco2 -TC 26.4 26.0 - 37.0 mmHg    Po2 -.8 (H) 64.0 - 87.0 mmHg   CoV-2, Flu A/B, And RSV by PCR ([employer])    Collection Time: 05/21/23  8:53 PM    Specimen: Nasal; Respirate   Result Value Ref Range    Influenza virus A RNA Negative Negative    Influenza virus B, PCR Negative Negative    RSV, PCR Negative Negative    SARS-CoV-2 by PCR NotDetected     SARS-CoV-2 Source NP Swab        Imaging: CT-CTA CHEST PULMONARY ARTERY W/ RECONS    Result Date: 5/22/2023 5/22/2023 1:50 AM HISTORY/REASON FOR EXAM:  Persistent cough, on steroids TECHNIQUE/EXAM DESCRIPTION:  CT angiogram of the chest with contrast. Transaxial MDCT scan of chest post bolus 50 cc Omnipaque 350 IV administration. MIP reconstructed images were created and reviewed. Low dose optimization technique was utilized for this CT exam including automated exposure control and adjustment of the mA and/or kV according to patient size. COMPARISON: None FINDINGS: The visualized portion of the thyroid appear within normal limits.  The trachea and main stem airways are normal in caliber. There are  no pathologically enlarged mediastinal lymph nodes. The heart and pericardium appear within normal limits.   The aorta and its main branch vessels are normal in caliber and configuration.  The pulmonary arteries are well opacified, no large central pulmonary arterial filling defect is appreciated, respiratory motion artifacts limit evaluation of the subsegmental pulmonary arterial branches. The pulmonary parenchyma demonstrates no acute abnormality. 1.9 x 2.9 cm left breast mass is seen. Limited views of the abdomen demonstrate no acute abnormality. The bony structures are age appropriate.     1.  No large central pulmonary embolus is appreciated, evaluation of the subsegmental branches is essentially nondiagnostic due to motion artifacts. Additional imaging would be required for definitive exclusion of small distal pulmonary emboli. 2.  Left breast mass, recommend referral for mammography and additional workup. These findings were discussed with the patient's clinician, Shreya Paez, on 2023 2:33 AM.    EC-ECHOCARDIOGRAM COMPLETE W/O CONT    Result Date: 2023  Transthoracic Echo Report Echocardiography Laboratory CONCLUSIONS Normal left ventricular systolic function. The left ventricular ejection fraction is visually estimated to be 65% The right ventricle is normal in size and systolic function. No significant valvular abnormalities. No prior study is available for comparison. ANTHONY JOHNSON Exam Date:         2023                    18:18 Exam Location:     Inpatient Priority:          Routine Ordering Physician:        SHREYA PAEZ Referring Physician:       [id number] Sonographer:               Jade Reyes RDCS Age:    38     Gender:    F MRN:    [id number] :    [id number] BSA:    2.13   Ht (in):    67     Wt (lb):    226 Exam Type:     Complete Indications:     Shortness of breath ICD Codes:       R06.02 CPT Codes:       96158 BP:    133    /   75     HR:   105 Technical Quality:       Fair MEASUREMENTS  (Male / Female) Normal Values 2D ECHO LV Diastolic Diameter PLAX        4.4 cm                4.2 - 5.9 / 3.9 - 5.3 cm LV Systolic Diameter PLAX         3.2 cm                2.1 - 4.0 cm IVS Diastolic Thickness           0.8 cm                LVPW Diastolic Thickness          0.94 cm               LVOT Diameter                     1.9 cm                LV Ejection Fraction MOD BP       65.7 %                >= 55  % LV Ejection Fraction MOD 4C       61.2 %                LV Ejection Fraction MOD 2C       70 %                  IVC Diameter                      1.5 cm                DOPPLER AV Peak Velocity                  1.8 m/s               AV Peak Gradient                  13.1 mmHg             AV Mean Gradient                  6.9 mmHg              LVOT Peak Velocity                1.2 m/s               AV Area Cont Eq vti               2.1 cm2               Mitral E Point Velocity           0.92 m/s              Mitral E to A Ratio               0.86                  MV Pressure Half Time             28.5 ms               MV Area PHT                       7.7 cm2               MV Deceleration Time              98.1 ms               TV Peak E Velocity                0.42 m/s              * Indicates values subject to auto-interpretation LV EF:        % FINDINGS Left Ventricle Normal left ventricular chamber size. Normal left ventricular wall thickness. Normal left ventricular systolic function. The left ventricular ejection fraction is visually estimated to be 65%. Normal regional wall motion. Diastolic function is difficult to assess with tachycardia. Right Ventricle The right ventricle is normal in size and systolic function. Right Atrium The right atrium is normal in size. Normal inferior vena cava size and inspiratory collapse. Left Atrium The left atrium is normal in size. Left atrial volume index is 14 mL/sq m. Mitral Valve  Structurally normal mitral valve without significant stenosis or regurgitation. Aortic Valve Structurally normal aortic valve without significant stenosis or regurgitation. Tricuspid Valve Structurally normal tricuspid valve without significant stenosis or regurgitation. Unable to estimate right ventricular systolic pressure due to an inadequate tricuspid regurgitant jet. Pulmonic Valve Structurally normal pulmonic valve without significant stenosis or regurgitation. Pericardium No pericardial effusion. Aorta Normal aortic root for body surface area. The ascending aorta diameter is 2.9 cm. Helio Bernal MD (Electronically Signed) Final Date:     21 May 2023 20:14    DX-CHEST-PORTABLE (1 VIEW)    Result Date: 2023 4:12 PM HISTORY/REASON FOR EXAM:  Cough. TECHNIQUE/EXAM DESCRIPTION AND NUMBER OF VIEWS: Single portable view of the chest. COMPARISON: May 16, 2014 FINDINGS: Heart size is within normal limits. No pulmonary infiltrates or consolidations are noted. No pleural abnormalities are noted.     No acute cardiac or pulmonary abnormalities are identified.    US-BIOPHYSICAL PROFILE    Result Date: 2023 12:20 PM 2023 12:20 PM HISTORY/REASON FOR EXAM:  Abnormal fetal heart rate or rhythm TECHNIQUE/EXAM DESCRIPTION: Ultrasound for biophysical profile. COMPARISON:  None FINDINGS: Single intrauterine gestation is identified which has a heart rate of 146 bpm. Amniotic fluid volume is 13.05 cm. Biophysical profile score is 8  out of 8 (movement 2, tone 2, breathing 2, fluid 2).     1. Normal biophysical profile ultrasound.           ASSESSMENT & PLAN  Carolina Medrano is a 38 y.o.  at 35w0d Estimated Date of Delivery: 23    ASSESSMENT  - IUP at 35w  - Hypoxemia  - Presumed Asthma due to allergen exposure  DISCUSSION: thank you to pulmonary team, Dr. Fleming for your help    PLAN:  See Dr. Fleming's recommendations for med changes and neb treatments  CEFM until  stable      Plan of care discussed with the patient and she is in agreement with this plan. Plan of care discussed with RN taking carer of patient. All questions and concerns were answered.     Carlotta Laws M.D.

## 2023-05-22 NOTE — H&P
OB ED Eval    CC: shortness of breath    HPI:  Ms. Carolina Medrano is a 38 y.o.  @ 34w6d by lmp c/w 9wk US with history of reactive airway disease reporting increasing shortness of breath and cough.  Was previously treated with short course of prednisone in April and started on advair for similar symptoms however reports the last few days her symptoms are worse than ever.  Is using advair 2x daily as well as duonebs 4x daily.  Was started on medrol dose pack last Friday.  Reports that the symptoms are worse lying flat, really feels like she can't breath when lying flat although denies worsening edema.  No CP; doesn't really notice wheezing, just feels tight and has a non-productive cough.   reports her symptoms are worse than they have previously been.      Chau sees Dr. Kang for OB care and follows with Dr. Galarza as well.  Pt reports +SSA antibodies with Dr. Galarza.  On synthroid and due for repeat TSH.  Reports last growth w/ Dr. Galarza was normal bout 2wks ago, baby measuring LGA.      ROS:  Const: denies fevers, general concerns  CV: denies edema, CP  resp: + cough and shortness of breath  GI: denies abd pain, GI concerns  : no ctx/LOF/VB.  Reports +FM  Neuro: reports no HA/vision changes    OB History    Para Term  AB Living   1 0 0 0 0 0   SAB IAB Ectopic Molar Multiple Live Births   0 0 0   0        # Outcome Date GA Lbr Abhay/2nd Weight Sex Delivery Anes PTL Lv   1 Current                GYN: no STIs    PMHx: reactive airway disease, hypothyroidism    Past Surgical History:   Procedure Laterality Date    UMBILICAL HERNIA REPAIR  2017    Procedure: UMBILICAL HERNIA REPAIR WITH MESH;  Surgeon: Florencio Tavares M.D.;  Location: [facility];  Service:     LIZ BY LAPAROSCOPY  2013    Performed by Florencio Tavares M.D. at [facility]    KNEE ARTHROSCOPY      right knee ( times 4)    TONSILLECTOMY AND ADENOIDECTOMY      as a child     "UMBILICAL HERNIA REPAIR         Meds:  Medrol dose dunia (started 5/19)  Advair 2x daily  Duoneb 4x daily  Synthroid 150mcg daily; liothyronine 50mcg daily  PNV      Family History   Problem Relation Age of Onset    Heart Disease Father     Hypertension Father     Alcohol abuse Father     Cancer Paternal Aunt         breast    Cancer Paternal Uncle         colon    Diabetes Maternal Grandfather     Cancer Paternal Grandmother         breast    Autoimmune Disease Paternal Grandmother     Lupus Mother     No Known Problems Brother     Diabetes Maternal Grandmother     Alzheimer's Disease Paternal Grandfather        Social History     Socioeconomic History    Marital status: Single   Tobacco Use    Smoking status: Never    Smokeless tobacco: Never   Vaping Use    Vaping Use: Never used   Substance and Sexual Activity    Alcohol use: Yes     Alcohol/week: 0.0 oz     Comment: 1-2/week    Drug use: No    Sexual activity: Yes     Partners: Female     Birth control/protection: Other-See Comments       PE:  Patient Vitals for the past 24 hrs:   BP Temp Temp src Pulse Resp SpO2 Height Weight   05/21/23 1640 -- -- -- (!) 104 (!) 22 95 % -- --   05/21/23 1610 -- -- -- (!) 103 (!) 24 97 % -- --   05/21/23 1525 133/75 36.5 °C (97.7 °F) Temporal 97 18 95 % 1.702 m (5' 7\") 103 kg (226 lb)       gen: AAO, NAD  CV: regular, no m/r/g, pulse   Resp: ctab, not moving a lot of air in the lower lung bases, no wheezing appreciated  abd: soft, gravid, NT  Ext: NT, trace edema bilaterally    NST:  Indication: shortness of breath  FHT: 140/moderate variability/+ accels/ no decels  steph: no ctx     Latest Reference Range & Units 05/21/23 16:20   WBC 4.8 - 10.8 K/uL 9.9   RBC 4.20 - 5.40 M/uL 3.74 (L)   Hemoglobin 12.0 - 16.0 g/dL 10.6 (L)   Hematocrit 37.0 - 47.0 % 32.3 (L)   MCV 81.4 - 97.8 fL 86.4   MCH 27.0 - 33.0 pg 28.3   MCHC 33.6 - 35.0 g/dL 32.8 (L)   RDW 35.9 - 50.0 fL 44.7   Platelet Count 164 - 446 K/uL 126 (L)   MPV 9.0 - 12.9 " fL 11.9   Neutrophils-Polys 44.00 - 72.00 % 83.10 (H)   Neutrophils (Absolute) 2.00 - 7.15 K/uL 8.23 (H)   Lymphocytes 22.00 - 41.00 % 10.10 (L)   Lymphs (Absolute) 1.00 - 4.80 K/uL 1.00   Monocytes 0.00 - 13.40 % 5.20   Monos (Absolute) 0.00 - 0.85 K/uL 0.51   Eosinophils 0.00 - 6.90 % 0.30   Eos (Absolute) 0.00 - 0.51 K/uL 0.03   Basophils 0.00 - 1.80 % 0.20   Baso (Absolute) 0.00 - 0.12 K/uL 0.02   Immature Granulocytes 0.00 - 0.90 % 1.10 (H)   Immature Granulocytes (abs) 0.00 - 0.11 K/uL 0.11   Nucleated RBC /100 WBC 0.00   NRBC (Absolute) K/uL 0.00   Sodium 135 - 145 mmol/L 139   Potassium 3.6 - 5.5 mmol/L 4.1   Chloride 96 - 112 mmol/L 108   Co2 20 - 33 mmol/L 19 (L)   Anion Gap 7.0 - 16.0  12.0   Glucose 65 - 99 mg/dL 87   Bun 8 - 22 mg/dL 5 (L)   Creatinine 0.50 - 1.40 mg/dL 0.54   GFR (CKD-EPI) >60 mL/min/1.73 m 2 122   Calcium 8.5 - 10.5 mg/dL 8.8   Correct Calcium 8.5 - 10.5 mg/dL 9.2   AST(SGOT) 12 - 45 U/L 34   ALT(SGPT) 2 - 50 U/L 35   Alkaline Phosphatase 30 - 99 U/L 153 (H)   Total Bilirubin 0.1 - 1.5 mg/dL 0.3   Albumin 3.2 - 4.9 g/dL 3.5   Total Protein 6.0 - 8.2 g/dL 6.4   Globulin 1.9 - 3.5 g/dL 2.9   A-G Ratio g/dL 1.2   NT-proBNP 0 - 125 pg/mL 68   (L): Data is abnormally low  (H): Data is abnormally high   Latest Reference Range & Units 23 16:08 23 17:10   Stephan Test  Pass Pass   Ph 7.400 - 7.500  7.467 7.440   Pco2 26.0 - 37.0 mmHg 28.3 30.6   Po2 64 - 87 mmHg 49 (LL) 36 (LL)   Hco3 17.0 - 25.0 mmol/L 20.5 20.8   BE -4 - 3 mmol/L -2 -3   Tco2 20 - 33 mmol/L 21 22   S02 93 - 99 % 87 (L) 72 (L)   (LL): Data is critically low  (L): Data is abnormally low   Latest Reference Range & Units 23 16:20   TSH 0.380 - 5.330 uIU/mL 1.560     CXR:  FINDINGS:  Heart size is within normal limits.  No pulmonary infiltrates or consolidations are noted.  No pleural abnormalities are noted.     IMPRESSION:     No acute cardiac or pulmonary abnormalities are identified.      A/P: 38 y.o.   @ 34w6d by lmp c/w 9wk US with shortness of breath  Pt with desaturation to high 80s with ambulation combined with tachycardia to 130s.  Oxygen saturation at rest wnl although ABG significant for low pO2.  Spoke with Dr. Galarza, appreciate MFM input.  Will evaluate with CTA and ECHO to evaluate for additional cardiopulmonary etiology.  Will need steroids/benadryl prior to CTA for hx of iodine allergy (tolerated CT contrast previously with this)    NST reactive and reassuring.    Cont home duonebs/advair      Nora Paez MD  [facility]

## 2023-05-22 NOTE — PROGRESS NOTES
Came to bedside to discuss CT results; no acute cardiopulmonary process, neg for PE.  Incidental L breast mass found, 2.9cm.  pt reports prior L breast mass biopsied 2yrs ago showing fibroadenoma (was <2cm at that time); will need mammo/US outpatient in f/u.  O2 sat 92-94% on RA, pt unable to rest with SOB/cough.  2L NC started with improvement in sat to 97%.  FHT reactive and reassuring  Repeat ABG wnl  Anticipate medicine vs pulmonary consult in AM      Nora Paez MD  [facility]

## 2023-05-22 NOTE — PROGRESS NOTES
1900: report received from Zoraida Brunson, assumed care of patient.  Md orders reviewed.  Rn at bs, assessment done. Vss.  Pt coughing, sitting up.  Rn sitting and talking with patient.      2100: dr. Galarza at bs, dr. Gonzalez at bs.  Poc discussed.  Iv started.  Meds given.      0015: rn at bs, meds given.  Pt denies needs.    0200: to CT, md aware    0300: dr. Gonzalez at bs, nasal cannula ordered at 2 liters/min.  CT results reviewed by dr. Gonzalez.      0600: rn at bs, meds given.  Pt denies needs.     0700: report to acosta brunson

## 2023-05-23 ENCOUNTER — APPOINTMENT (OUTPATIENT)
Dept: RADIOLOGY | Facility: MEDICAL CENTER | Age: 38
End: 2023-05-23
Attending: INTERNAL MEDICINE
Payer: COMMERCIAL

## 2023-05-23 LAB — A1AT SERPL-MCNC: 289 MG/DL (ref 90–200)

## 2023-05-23 PROCEDURE — 770002 HCHG ROOM/CARE - OB PRIVATE (112)

## 2023-05-23 PROCEDURE — 94640 AIRWAY INHALATION TREATMENT: CPT

## 2023-05-23 PROCEDURE — 302790 HCHG STAT ANTEPARTUM CARE, DAILY

## 2023-05-23 PROCEDURE — 700102 HCHG RX REV CODE 250 W/ 637 OVERRIDE(OP): Performed by: OBSTETRICS & GYNECOLOGY

## 2023-05-23 PROCEDURE — A9270 NON-COVERED ITEM OR SERVICE: HCPCS | Performed by: OBSTETRICS & GYNECOLOGY

## 2023-05-23 PROCEDURE — 700111 HCHG RX REV CODE 636 W/ 250 OVERRIDE (IP): Performed by: INTERNAL MEDICINE

## 2023-05-23 PROCEDURE — 76536 US EXAM OF HEAD AND NECK: CPT

## 2023-05-23 PROCEDURE — 94760 N-INVAS EAR/PLS OXIMETRY 1: CPT

## 2023-05-23 PROCEDURE — 700101 HCHG RX REV CODE 250: Performed by: OBSTETRICS & GYNECOLOGY

## 2023-05-23 PROCEDURE — 700101 HCHG RX REV CODE 250: Performed by: INTERNAL MEDICINE

## 2023-05-23 PROCEDURE — 99232 SBSQ HOSP IP/OBS MODERATE 35: CPT | Performed by: INTERNAL MEDICINE

## 2023-05-23 RX ORDER — VITAMIN A ACETATE, BETA CAROTENE, ASCORBIC ACID, CHOLECALCIFEROL, .ALPHA.-TOCOPHEROL ACETATE, DL-, THIAMINE MONONITRATE, RIBOFLAVIN, NIACINAMIDE, PYRIDOXINE HYDROCHLORIDE, FOLIC ACID, CYANOCOBALAMIN, CALCIUM CARBONATE, FERROUS FUMARATE, ZINC OXIDE, CUPRIC OXIDE 3080; 12; 120; 400; 1; 1.84; 3; 20; 22; 920; 25; 200; 27; 10; 2 [IU]/1; UG/1; MG/1; [IU]/1; MG/1; MG/1; MG/1; MG/1; MG/1; [IU]/1; MG/1; MG/1; MG/1; MG/1; MG/1
1 TABLET, FILM COATED ORAL
Status: DISCONTINUED | OUTPATIENT
Start: 2023-05-23 | End: 2023-06-05

## 2023-05-23 RX ORDER — CETIRIZINE HYDROCHLORIDE 10 MG/1
10 TABLET ORAL DAILY
Status: DISCONTINUED | OUTPATIENT
Start: 2023-05-23 | End: 2023-06-06

## 2023-05-23 RX ORDER — IPRATROPIUM BROMIDE AND ALBUTEROL SULFATE 2.5; .5 MG/3ML; MG/3ML
3 SOLUTION RESPIRATORY (INHALATION)
Status: DISCONTINUED | OUTPATIENT
Start: 2023-05-23 | End: 2023-05-25

## 2023-05-23 RX ADMIN — IPRATROPIUM BROMIDE AND ALBUTEROL SULFATE 3 ML: .5; 2.5 SOLUTION RESPIRATORY (INHALATION) at 02:16

## 2023-05-23 RX ADMIN — IPRATROPIUM BROMIDE AND ALBUTEROL SULFATE 3 ML: 2.5; .5 SOLUTION RESPIRATORY (INHALATION) at 20:28

## 2023-05-23 RX ADMIN — BENZONATATE 100 MG: 100 CAPSULE ORAL at 23:27

## 2023-05-23 RX ADMIN — IPRATROPIUM BROMIDE AND ALBUTEROL SULFATE 3 ML: 2.5; .5 SOLUTION RESPIRATORY (INHALATION) at 23:47

## 2023-05-23 RX ADMIN — ACETAMINOPHEN 650 MG: 325 TABLET, FILM COATED ORAL at 22:55

## 2023-05-23 RX ADMIN — LEVOTHYROXINE SODIUM 150 MCG: 0.15 TABLET ORAL at 05:43

## 2023-05-23 RX ADMIN — IPRATROPIUM BROMIDE AND ALBUTEROL SULFATE 3 ML: .5; 2.5 SOLUTION RESPIRATORY (INHALATION) at 08:38

## 2023-05-23 RX ADMIN — PRENATAL WITH FERROUS FUM AND FOLIC ACID 1 TABLET: 3080; 920; 120; 400; 22; 1.84; 3; 20; 10; 1; 12; 200; 27; 25; 2 TABLET ORAL at 10:39

## 2023-05-23 RX ADMIN — BUDESONIDE INHALATION 0.5 MG: 0.5 SUSPENSION RESPIRATORY (INHALATION) at 08:38

## 2023-05-23 RX ADMIN — MOMETASONE FUROATE AND FORMOTEROL FUMARATE DIHYDRATE 2 PUFF: 200; 5 AEROSOL RESPIRATORY (INHALATION) at 08:39

## 2023-05-23 RX ADMIN — PREDNISONE 50 MG: 20 TABLET ORAL at 05:43

## 2023-05-23 RX ADMIN — ASPIRIN 81 MG 81 MG: 81 TABLET ORAL at 05:43

## 2023-05-23 RX ADMIN — BUDESONIDE INHALATION 0.5 MG: 0.5 SUSPENSION RESPIRATORY (INHALATION) at 20:28

## 2023-05-23 RX ADMIN — IPRATROPIUM BROMIDE AND ALBUTEROL SULFATE 3 ML: .5; 2.5 SOLUTION RESPIRATORY (INHALATION) at 17:01

## 2023-05-23 RX ADMIN — MOMETASONE FUROATE AND FORMOTEROL FUMARATE DIHYDRATE 2 PUFF: 200; 5 AEROSOL RESPIRATORY (INHALATION) at 20:29

## 2023-05-23 RX ADMIN — CETIRIZINE HYDROCHLORIDE 10 MG: 10 TABLET, FILM COATED ORAL at 10:39

## 2023-05-23 RX ADMIN — OMEPRAZOLE 20 MG: 20 CAPSULE, DELAYED RELEASE ORAL at 05:43

## 2023-05-23 RX ADMIN — IPRATROPIUM BROMIDE AND ALBUTEROL SULFATE 3 ML: .5; 2.5 SOLUTION RESPIRATORY (INHALATION) at 13:16

## 2023-05-23 ASSESSMENT — ENCOUNTER SYMPTOMS
VOMITING: 0
COUGH: 1
CHILLS: 0
SHORTNESS OF BREATH: 1
FEVER: 0
NAUSEA: 0

## 2023-05-23 ASSESSMENT — PAIN DESCRIPTION - PAIN TYPE: TYPE: ACUTE PAIN

## 2023-05-23 NOTE — CARE PLAN
Problem: Knowledge Deficit - L&D  Goal: Patient and family/caregivers will demonstrate understanding of plan of care, disease process/condition, diagnostic tests and medications  Outcome: Progressing     Problem: Risk for Excess Fluid Volume  Goal: Patient will demonstrate pulse, blood pressure and neurologic signs within expected ranges and without any respiratory complications  Outcome: Progressing     Problem: Psychosocial - L&D  Goal: Patient's level of anxiety will decrease  Outcome: Progressing  Goal: Patient will be able to discuss coping skills during hospitalization  Outcome: Progressing     Problem: Pain  Goal: Patient's pain will be alleviated or reduced to the patient’s comfort goal  Outcome: Progressing   The patient is Watcher - Medium risk of patient condition declining or worsening    Shift Goals  Clinical Goals: decrease work of breathing  Patient Goals: decrease cough so she can rest  Family Goals: support

## 2023-05-23 NOTE — PROGRESS NOTES
" PROGRESS NOTE    DATE: 23  Hospital day: 3  Gestational Age: 35w1d, Estimated Date of Delivery: 23  Primary OB/GYN: Jorge Luis MARTINEZ Consultant: Dr. Michell KELLOGG  Carolina Medrano is a 38 y.o.  female with Estimated Date of Delivery: 23 at 35w1d  gestation who initially presented with worsening shortness of breath in the setting of prior reactive airway disease and was admitted with acute hypoxemic respiratory failure likely secondary to asthma exacerbation.     The patient states that she does feel better today.  She has not been \"gasping\" for air today but still feels short of breath, currently on 3 L nasal cannula.  She states her main discomfort is when she gets her coughing fits and has sore upper abdominal muscles from this.  She does not specifically have significant numbers of contractions that she feels these intermittently only.  She denies leaking of fluid or vaginal bleeding.  Patient endorses good fetal movement. Reports she is ambulating without difficulty and voiding spontaneously.     Interval Changes: s/p magnesium bolus (2g), Started dulera, started budesonide neb, started 50mg prednisone, bedside spirometry    OBJECTIVE:  Vitals:    23 0829   BP: (!) 141/66   Pulse: 93   Resp: 18   Temp: 36.1 °C (96.9 °F)   SpO2:         Review of systems:     PHYSICAL EXAM:  General:   Alert, conversational, pleasant, coughs a lot when talking  Lungs:   Breathing was not labored on initial discussion, but showed increased work of breathing after a coughing.  Nasal cannula in place  Heart:      Abdomen:  Soft, gravid, non-tender, non-distended    OB Ultrasound:  LGA on Dr. Galarza u/s 2w ago     NST Procedure Note  FHT: baseline 145, moderate variability, +accels, decels absent  Interpretation: reactive for GA  [place]: no distinct contractions seen    Medications:   Current Facility-Administered Medications   Medication Dose Route Frequency Provider Last Rate Last Admin    " benzonatate (TESSALON) capsule 100 mg  100 mg Oral TID PRN Nora Paez M.D.   100 mg at 23 2308    diphenhydrAMINE (BENADRYL) tablet/capsule 50 mg  50 mg Oral Q6HRS PRN Nora Paez M.D.        ipratropium-albuterol (DUONEB) nebulizer solution  3 mL Nebulization Q4H PRN (RT) Adriana Fleming D.O.        mometasone-formoterol (Dulera) 200-5 mcg/Act inhaler 2 Puff  2 Puff Inhalation BID (RT) Adriana Fleming D.O.   2 Puff at 23 0839    budesonide (PULMICORT) neb susp 0.5 mg  0.5 mg Nebulization BID (RT) REBEL SchneiderOGabe   0.5 mg at 23 0838    predniSONE (DELTASONE) tablet 50 mg  50 mg Oral DAILY REBEL SchneiderOGabe   50 mg at 23 0543    ipratropium-albuterol (DUONEB) nebulizer solution  3 mL Nebulization Q6HRS (RT) Nora Paez M.D.   3 mL at 23 0838    aspirin (ASA) chewable tab 81 mg  81 mg Oral DAILY Nora Paez M.D.   81 mg at 23 0543    acetaminophen (Tylenol) tablet 650 mg  650 mg Oral Q4HRS PRN Nora Paez M.D.   650 mg at 23 2307    levothyroxine (SYNTHROID) tablet 150 mcg  150 mcg Oral AM ES Nora Paez M.D.   150 mcg at 23 0543    omeprazole (PRILOSEC) capsule 20 mg  20 mg Oral DAILY Nora Paez M.D.   20 mg at 23 0543    acetaminophen (TYLENOL) tablet 1,000 mg  1,000 mg Oral Q6HRS PRN Nora Paez M.D.   1,000 mg at 23 1637        Labs (last 24h, reviewed by me):   Recent Results (from the past 24 hour(s))   PFT Bedside    Collection Time: 23  3:54 PM   Result Value Ref Range    FVC 3.87     FVC % Predicted 101     FEV1 (L) 3.27     FEV1 % Predicted 103     FEV1/FVC % 84.35     FEV1/FVC % Predicted 100      ASSESSMENT & PLAN  Carolina Liseth Medrano is a 38 y.o.  at 35w1d Estimated Date of Delivery: 23.      ASSESSMENT  - IUP at 35w1d  - Acute hypoxemic respiratory failure  - Presumed Asthma due to allergen exposure  -  Hypothyroid  - AMA    PLAN:  Current pulmonary recommendations: Dulera, Duoneb, Zyrtec, Budesonide neb, prednisone 50 daily, omeprazole 20mg daily.  Appreciate Dr. Fleming's assistance in the care of this patient.   CEFM   Levothyroxine 150mg qam  ASA 81mg daily  PNV  APAP PRN    Consultant: Pulmonary (Lonnie), MFM (Michell).      Carlo Toney MD, MS, FACOG, 5/23/2023, 9:37 AM

## 2023-05-23 NOTE — CARE PLAN
The patient is Watcher - Medium risk of patient condition declining or worsening    Shift Goals  Clinical Goals: comfort with breathing, rest  Patient Goals: comfort and rest  Family Goals: Support Carolina

## 2023-05-23 NOTE — PROGRESS NOTES
S: Still feeling dyspneic.  Seen by pulmonary but in general breathing is easier than last night.  O: Patient Vitals for the past 24 hrs:   BP Temp Temp src Pulse Resp SpO2   05/22/23 1424 -- -- -- (!) 113 -- 98 %   05/22/23 1416 -- -- -- (!) 117 19 96 %   05/22/23 1347 -- -- -- (!) 106 20 97 %   05/22/23 1258 134/69 36.1 °C (97 °F) Temporal 94 17 96 %   05/22/23 1000 -- -- -- (!) 105 -- 97 %   05/22/23 0900 128/72 -- -- 100 -- 97 %   05/22/23 0858 128/72 35.9 °C (96.7 °F) Temporal 99 16 97 %   05/22/23 0800 -- -- -- (!) 105 -- 93 %   05/22/23 0635 -- -- -- 100 -- 96 %   05/22/23 0600 -- -- -- 95 -- 98 %   05/22/23 0500 -- -- -- 96 -- 92 %   05/22/23 0400 -- -- -- 99 -- 96 %   05/22/23 0300 -- -- -- 95 -- 95 %   05/22/23 0254 -- -- -- (!) 101 -- 95 %   05/22/23 0200 106/57 36.4 °C (97.5 °F) Temporal 100 -- 93 %   05/22/23 0100 -- -- -- 99 -- 92 %   05/22/23 0000 -- -- -- (!) 106 -- 95 %   05/21/23 2331 -- -- -- 99 -- 97 %   05/21/23 2300 -- -- -- 98 -- 95 %   05/21/23 2200 -- -- -- 98 -- 96 %   05/21/23 2100 -- -- -- (!) 110 -- 96 %   05/21/23 2000 130/64 36.3 °C (97.3 °F) Temporal (!) 108 20 95 %   05/21/23 1915 -- -- -- (!) 105 -- --   05/21/23 1914 -- -- -- (!) 102 -- 93 %   05/21/23 1911 -- -- -- (!) 110 -- --   05/21/23 1909 -- -- -- (!) 102 -- 96 %   05/21/23 1907 -- -- -- (!) 109 -- --   05/21/23 1904 -- -- -- (!) 109 -- 94 %   05/21/23 1903 -- -- -- (!) 111 -- --   05/21/23 1859 -- -- -- (!) 107 -- 93 %   05/21/23 1855 -- -- -- (!) 115 -- --   05/21/23 1854 -- -- -- (!) 111 -- 95 %   05/21/23 1851 -- -- -- (!) 114 -- --   05/21/23 1849 -- -- -- (!) 116 -- 95 %   05/21/23 1847 -- -- -- (!) 119 -- --     EFM: Moderate variability, baseline tachycardia.  Accelerations present.  No declerations.  A: IUP 35 weeks      Exacerbation of asthma   P: Follow pulmonary O2 sat       Follow EFM.       As her O2 sats improve may go to intermittent monitoring.    Time: 15 minutes.

## 2023-05-23 NOTE — PROGRESS NOTES
0700- Report received from KRIS Lim. POC discussed and assumed.  0730- Assessment completed. Pt with 3L O2 via NC to maintain sat above 95%. Bilateral lung sounds clear. Pt states she was able to lean back slightly and rest more comfortably. Pt reports +FM and denies any LOF, VB or feeling contractions at this time. VSS.     0830- Dr Toney at bedside to talk with pt.     0838- RT at bedside for scheduled treatment.    1016- Pt up to sit in steamy shower to help with cough.     1600- Dr Fleming at bedside to discuss POC.     1900- Report given to KRIS Lim

## 2023-05-23 NOTE — PROGRESS NOTES
1900: Report received from Nadine PONCE, POC discussed.     1915: Initial assessment completed. POC for the night discussed and all needs met at this time. RN will contact RT about nebulizer treatments.     1928: Ramon RT notified, questions answered and RT will be at bedside shortly.     0710: Report given to Nadine PONCE, pt stable at time of handoff. POC discussed and agreed upon per RN's.

## 2023-05-24 PROCEDURE — 99232 SBSQ HOSP IP/OBS MODERATE 35: CPT | Performed by: INTERNAL MEDICINE

## 2023-05-24 PROCEDURE — 770002 HCHG ROOM/CARE - OB PRIVATE (112)

## 2023-05-24 PROCEDURE — 59025 FETAL NON-STRESS TEST: CPT

## 2023-05-24 PROCEDURE — A9270 NON-COVERED ITEM OR SERVICE: HCPCS | Performed by: OBSTETRICS & GYNECOLOGY

## 2023-05-24 PROCEDURE — 94640 AIRWAY INHALATION TREATMENT: CPT

## 2023-05-24 PROCEDURE — 700111 HCHG RX REV CODE 636 W/ 250 OVERRIDE (IP): Performed by: INTERNAL MEDICINE

## 2023-05-24 PROCEDURE — 700101 HCHG RX REV CODE 250: Performed by: INTERNAL MEDICINE

## 2023-05-24 PROCEDURE — 700102 HCHG RX REV CODE 250 W/ 637 OVERRIDE(OP): Performed by: OBSTETRICS & GYNECOLOGY

## 2023-05-24 PROCEDURE — 94760 N-INVAS EAR/PLS OXIMETRY 1: CPT

## 2023-05-24 PROCEDURE — 302790 HCHG STAT ANTEPARTUM CARE, DAILY

## 2023-05-24 PROCEDURE — 0CJS8ZZ INSPECTION OF LARYNX, VIA NATURAL OR ARTIFICIAL OPENING ENDOSCOPIC: ICD-10-PCS | Performed by: OTOLARYNGOLOGY

## 2023-05-24 RX ORDER — AZITHROMYCIN 250 MG/1
250 TABLET, FILM COATED ORAL DAILY
Status: COMPLETED | OUTPATIENT
Start: 2023-05-25 | End: 2023-05-28

## 2023-05-24 RX ORDER — FAMOTIDINE 20 MG/1
20 TABLET, FILM COATED ORAL 2 TIMES DAILY
Status: DISCONTINUED | OUTPATIENT
Start: 2023-05-24 | End: 2023-05-24

## 2023-05-24 RX ORDER — FAMOTIDINE 20 MG/1
20 TABLET, FILM COATED ORAL 2 TIMES DAILY
Status: DISCONTINUED | OUTPATIENT
Start: 2023-05-24 | End: 2023-06-09 | Stop reason: HOSPADM

## 2023-05-24 RX ORDER — AZITHROMYCIN 250 MG/1
500 TABLET, FILM COATED ORAL DAILY
Status: COMPLETED | OUTPATIENT
Start: 2023-05-24 | End: 2023-05-24

## 2023-05-24 RX ORDER — BENZOCAINE/MENTHOL 6 MG-10 MG
LOZENGE MUCOUS MEMBRANE 2 TIMES DAILY
Status: DISCONTINUED | OUTPATIENT
Start: 2023-05-24 | End: 2023-05-30

## 2023-05-24 RX ORDER — ALUMINA, MAGNESIA, AND SIMETHICONE 2400; 2400; 240 MG/30ML; MG/30ML; MG/30ML
10 SUSPENSION ORAL 4 TIMES DAILY PRN
Status: DISCONTINUED | OUTPATIENT
Start: 2023-05-24 | End: 2023-05-30

## 2023-05-24 RX ADMIN — PRENATAL WITH FERROUS FUM AND FOLIC ACID 1 TABLET: 3080; 920; 120; 400; 22; 1.84; 3; 20; 10; 1; 12; 200; 27; 25; 2 TABLET ORAL at 08:52

## 2023-05-24 RX ADMIN — AZITHROMYCIN MONOHYDRATE 500 MG: 250 TABLET ORAL at 15:05

## 2023-05-24 RX ADMIN — CETIRIZINE HYDROCHLORIDE 10 MG: 10 TABLET, FILM COATED ORAL at 06:22

## 2023-05-24 RX ADMIN — IPRATROPIUM BROMIDE AND ALBUTEROL SULFATE 3 ML: 2.5; .5 SOLUTION RESPIRATORY (INHALATION) at 03:48

## 2023-05-24 RX ADMIN — FAMOTIDINE 20 MG: 20 TABLET, FILM COATED ORAL at 18:45

## 2023-05-24 RX ADMIN — ASPIRIN 81 MG 81 MG: 81 TABLET ORAL at 06:18

## 2023-05-24 RX ADMIN — BUDESONIDE INHALATION 0.5 MG: 0.5 SUSPENSION RESPIRATORY (INHALATION) at 19:53

## 2023-05-24 RX ADMIN — LEVOTHYROXINE SODIUM 150 MCG: 0.15 TABLET ORAL at 06:18

## 2023-05-24 RX ADMIN — IPRATROPIUM BROMIDE AND ALBUTEROL SULFATE 3 ML: .5; 2.5 SOLUTION RESPIRATORY (INHALATION) at 09:00

## 2023-05-24 RX ADMIN — PREDNISONE 50 MG: 20 TABLET ORAL at 06:18

## 2023-05-24 RX ADMIN — IPRATROPIUM BROMIDE AND ALBUTEROL SULFATE 3 ML: 2.5; .5 SOLUTION RESPIRATORY (INHALATION) at 19:44

## 2023-05-24 RX ADMIN — ACETAMINOPHEN 1000 MG: 500 TABLET, FILM COATED ORAL at 06:22

## 2023-05-24 RX ADMIN — IPRATROPIUM BROMIDE AND ALBUTEROL SULFATE 3 ML: 2.5; .5 SOLUTION RESPIRATORY (INHALATION) at 11:06

## 2023-05-24 RX ADMIN — BUDESONIDE INHALATION 0.5 MG: 0.5 SUSPENSION RESPIRATORY (INHALATION) at 06:39

## 2023-05-24 RX ADMIN — HYDROCORTISONE: 10 CREAM TOPICAL at 15:09

## 2023-05-24 RX ADMIN — OMEPRAZOLE 20 MG: 20 CAPSULE, DELAYED RELEASE ORAL at 06:18

## 2023-05-24 RX ADMIN — IPRATROPIUM BROMIDE AND ALBUTEROL SULFATE 3 ML: 2.5; .5 SOLUTION RESPIRATORY (INHALATION) at 06:39

## 2023-05-24 RX ADMIN — ALUMINUM HYDROXIDE, MAGNESIUM HYDROXIDE, AND DIMETHICONE 10 ML: 400; 400; 40 SUSPENSION ORAL at 20:02

## 2023-05-24 RX ADMIN — MOMETASONE FUROATE AND FORMOTEROL FUMARATE DIHYDRATE 2 PUFF: 200; 5 AEROSOL RESPIRATORY (INHALATION) at 19:45

## 2023-05-24 RX ADMIN — IPRATROPIUM BROMIDE AND ALBUTEROL SULFATE 3 ML: 2.5; .5 SOLUTION RESPIRATORY (INHALATION) at 22:07

## 2023-05-24 RX ADMIN — MOMETASONE FUROATE AND FORMOTEROL FUMARATE DIHYDRATE 2 PUFF: 200; 5 AEROSOL RESPIRATORY (INHALATION) at 06:39

## 2023-05-24 RX ADMIN — ACETAMINOPHEN 1000 MG: 500 TABLET, FILM COATED ORAL at 23:12

## 2023-05-24 RX ADMIN — IPRATROPIUM BROMIDE AND ALBUTEROL SULFATE 3 ML: 2.5; .5 SOLUTION RESPIRATORY (INHALATION) at 15:08

## 2023-05-24 ASSESSMENT — PAIN DESCRIPTION - PAIN TYPE: TYPE: ACUTE PAIN

## 2023-05-24 ASSESSMENT — ENCOUNTER SYMPTOMS
CHILLS: 0
SHORTNESS OF BREATH: 1
FEVER: 0
COUGH: 1
VOMITING: 0
NAUSEA: 0

## 2023-05-24 NOTE — PROGRESS NOTES
S: No complaints but still requires oxygen support.  O: Patient Vitals for the past 24 hrs:   BP Temp Temp src Pulse Resp SpO2   05/23/23 1950 125/65 36.4 °C (97.6 °F) Temporal (!) 107 18 96 %   05/23/23 1701 -- -- -- (!) 112 (!) 24 96 %   05/23/23 1601 138/64 36.4 °C (97.6 °F) Temporal (!) 112 -- --   05/23/23 1316 -- -- -- (!) 112 (!) 24 97 %   05/23/23 1209 129/58 36.8 °C (98.3 °F) Temporal (!) 106 18 --   05/23/23 0838 -- -- -- 97 18 92 %   05/23/23 0829 (!) 141/66 36.1 °C (96.9 °F) Temporal 93 18 --   05/23/23 0410 107/55 36.1 °C (97 °F) Temporal 98 17 94 %   05/23/23 0218 -- -- -- 96 17 99 %   05/23/23 0000 122/63 36.1 °C (96.9 °F) Temporal (!) 107 17 95 %     EFM: Moderate variability and accelerations present. No decelerations.  A: IUP 35 1/7 weeks      Probable asthma exacerbation.  P: Maintain O2 sat >94%      Decrease fetal monitoring to 1 hour qshift.      Defer discharge until pulse normalizes but also does not need O2 support.    Time: 15 minutes.

## 2023-05-24 NOTE — PROGRESS NOTES
1900: Report received from Nadine PONCE, POC discussed.     1910: Initial assessment completed. Pt sitting comfortably in bed and remains on 3L O2 via NC. Pt states she was able to walk the unit some today and did become tachycardic and increased SOB but was able to keep her O2 sats above 95%. Pt reports + FM, denies any LOF, VB or ctx at this time. POC for the night discussed and pt is agreeable with plan.     0655: Report given to Iona PONCE, pt stable at time of hand off. POC discussed and agreed upon.

## 2023-05-24 NOTE — THERAPY
Speech Language Therapy Contact Note    Patient Name: Carolina Medrano  Age:  38 y.o., Sex:  female  Medical Record #: [id number]  Today's Date: 5/24/2023      Orders received and acknowledged. Patient to be evaluated by ENT later this date. RN to notify SLP ([id number]) if SLP services still indicated following ENT consult. No overt concerns for swallowing difficulty noted per IDT. SLP will await clarification on orders prior to completing.     Thank you.   Kavya Proctor MS. CCC-SLP  [place]: [phone]

## 2023-05-24 NOTE — RESPIRATORY CARE
Nocturnal CPAP    NOC CPAP ordered - no available in-hospital CPAP units to be used at this time. Patient does not wear CPAP at home and does not have a CPAP unit to supply for herself. RT will bring CPAP unit when one becomes available and will trial it on patient as appropriate/tolerated.

## 2023-05-24 NOTE — PROGRESS NOTES
"0700: Report received from Carina LUQUE RN. POC discussed.     0852: Pt c/o \"tight\" feeling in throat/airway and requesting breathing treatment. Adalberto RT notified and asked to come to bedside to assess.     1027: External monitors applied. Reactive NST obtained.     1030: Assessment done. Pt denies LOF or VB. Reports occasional UCs and + FM. POC discussed with pt. Encouraged pt to call with needs.     1132: External monitors removed.     1400: Dr. Fleming at bedside. POC discussed with pt.     1900: Report given to Makenzie PONCE. POC discussed.       "

## 2023-05-24 NOTE — CARE PLAN
The patient is Watcher - Medium risk of patient condition declining or worsening    Shift Goals  Clinical Goals: decrease work of breathing, rest  Patient Goals: rest and decrease cough  Family Goals: Support Carolina    Progress made toward(s) clinical / shift goals:    Problem: Knowledge Deficit - L&D  Goal: Patient and family/caregivers will demonstrate understanding of plan of care, disease process/condition, diagnostic tests and medications  Outcome: Progressing   -POC discussed with pt. Pt asking appropriate questions.     Problem: Risk for Fluid Imbalance  Goal: Patient's fluid volume balance will be maintained or improve  Outcome: Progressing   -Pt taking PO fluids well.     Patient is not progressing towards the following goals:

## 2023-05-24 NOTE — PROGRESS NOTES
Pulmonary Progress Note    Date of admission  2023    Reason for Consultation  Acute hypoxemic respiratory failure      History of Presenting Illness  38 y.o. female who presented 2023 with cough and shortness of breath.  She is currently pregnant, , 35 weeks and 6 days. she reports that she has no formal diagnosis of asthma, but has had a reactive airways disease syndrome, especially when she is sick and usually requires a course of prednisone and an albuterol inhaler.  This has always helped her in the past.  Starting in late March, early April patient started having worsening cough.  She has been on short courses of steroids and a low-dose Advair inhaler on and off since that time.  She reports that when she is taken full dose steroids she has had some relief and improvement, however, there has been concern about giving her higher dose steroids due to pregnancy and she has not had a good response to them.  She has been having severe coughing fits and is unable to catch her breath at these times.  She has had response to nebulizers in the past.  She has been checking her saturations at home and they have been around 94%, but when she exerts herself by walking up the stairs, she notes that her heart rate increases into the 150s.  In the ER, they did stand her up and her oxygen saturations dropped to 88% and she was placed on oxygen at that time.  Patient endorses a feeling of tightness in her throat, she has no history of tracheal stenosis.  She does not feel that she has any nasal congestion at this time, but reports that she maybe has been snoring.  She does endorse a history of allergies [place] city.    Hospital Course  2023 - patient reports that she is feeling slightly better today.  She is not having quite as much coughing when she is talking.  She feels like maybe her throat is slightly less tight.  Reviewed her spirometry and  she does have flattening of the inspiratory limb    Review of Systems   Constitutional:  Negative for chills and fever.   Respiratory:  Positive for cough and shortness of breath.    Cardiovascular:  Negative for chest pain.   Gastrointestinal:  Negative for nausea and vomiting.        Vital Signs for last 24 hours   Temp:  [36.1 °C (96.9 °F)-36.8 °C (98.3 °F)] 36.4 °C (97.6 °F)  Pulse:  [] 107  Resp:  [17-24] 18  BP: (107-141)/(55-66) 125/65  SpO2:  [92 %-99 %] 96 %    Physical Exam  Constitutional:       Appearance: Normal appearance.   HENT:      Head: Normocephalic.   Eyes:      Conjunctiva/sclera: Conjunctivae normal.   Cardiovascular:      Rate and Rhythm: Normal rate and regular rhythm.   Pulmonary:      Effort: Pulmonary effort is normal. No respiratory distress.      Breath sounds: No wheezing.   Abdominal:      Palpations: Abdomen is soft.   Skin:     General: Skin is warm and dry.   Neurological:      General: No focal deficit present.      Mental Status: She is alert and oriented to person, place, and time.       Medications  Current Facility-Administered Medications   Medication Dose Route Frequency Provider Last Rate Last Admin    cetirizine (ZYRTEC) tablet 10 mg  10 mg Oral DAILY Carlo Toney M.D.   10 mg at 05/23/23 1039    prenatal plus vitamin (STUARTNATAL 1+1) 27-1 MG tablet 1 Tablet  1 Tablet Oral Daily-0800 Carlo Toney M.D.   1 Tablet at 05/23/23 1039    ipratropium-albuterol (DUONEB) nebulizer solution  3 mL Nebulization Q4HRS (RT) Adriana Fleming D.O.   3 mL at 05/23/23 2028    benzonatate (TESSALON) capsule 100 mg  100 mg Oral TID PRN Nora Paez M.D.   100 mg at 05/22/23 2308    diphenhydrAMINE (BENADRYL) tablet/capsule 50 mg  50 mg Oral Q6HRS PRN Nora Paez M.D.        ipratropium-albuterol (DUONEB) nebulizer solution  3 mL Nebulization Q4H PRN (RT) Adriana Fleming D.O.   3 mL at 05/23/23 1701    mometasone-formoterol (Dulera) 200-5 mcg/Act  inhaler 2 Puff  2 Puff Inhalation BID (RT) Adriana Fleming D.O.   2 Puff at 05/23/23 2029    budesonide (PULMICORT) neb susp 0.5 mg  0.5 mg Nebulization BID (RT) REBEL SchneiderOGabe   0.5 mg at 05/23/23 2028    predniSONE (DELTASONE) tablet 50 mg  50 mg Oral DAILY Adriana Fleming D.O.   50 mg at 05/23/23 0543    aspirin (ASA) chewable tab 81 mg  81 mg Oral DAILY Nora Paez M.D.   81 mg at 05/23/23 0543    acetaminophen (Tylenol) tablet 650 mg  650 mg Oral Q4HRS PRN Nora Paez M.D.   650 mg at 05/22/23 2307    levothyroxine (SYNTHROID) tablet 150 mcg  150 mcg Oral AM ES Nora Paez M.D.   150 mcg at 05/23/23 0543    omeprazole (PRILOSEC) capsule 20 mg  20 mg Oral DAILY Nora Paez M.D.   20 mg at 05/23/23 0543    acetaminophen (TYLENOL) tablet 1,000 mg  1,000 mg Oral Q6HRS PRN Nora Paez M.D.   1,000 mg at 05/22/23 1637       Fluids  No intake or output data in the 24 hours ending 05/23/23 2055    Laboratory  Recent Labs     05/21/23  1608 05/21/23  1710 05/21/23 2048   NWOHP18B  --   --  7.48   [id number]  --   --  27.2   [id number]  --   --  106.0*   RHEU4XAL  --   --  97.2   ARTHCO3  --   --  20   [id number]  --   --  n/a   ARTBE  --   --  -3   ISTATAPH 7.467 7.440  --    ISTATAPCO2 28.3 30.6  --    ISTATAPO2 49* 36*  --    ISTATATCO2 21 22  --    [id number] 87* 72*  --    ISTATARTHCO3 20.5 20.8  --    ISTATARTBE -2 -3  --    ISTATTEMP see below see below  --    ISTATSPEC Arterial Arterial  --          Recent Labs     05/21/23  1620   SODIUM 139   POTASSIUM 4.1   CHLORIDE 108   CO2 19*   BUN 5*   CREATININE 0.54   CALCIUM 8.8     Recent Labs     05/21/23  1620   ALTSGPT 35   ASTSGOT 34   ALKPHOSPHAT 153*   TBILIRUBIN 0.3   GLUCOSE 87     Recent Labs     05/21/23  1620   WBC 9.9   NEUTSPOLYS 83.10*   LYMPHOCYTES 10.10*   MONOCYTES 5.20   EOSINOPHILS 0.30   BASOPHILS 0.20   ASTSGOT 34   ALTSGPT 35   ALKPHOSPHAT 153*   TBILIRUBIN 0.3     Recent Labs      05/21/23  1620   RBC 3.74*   HEMOGLOBIN 10.6*   HEMATOCRIT 32.3*   PLATELETCT 126*       Imaging  No new imaging     Assessment/Plan  #Acute hypoxemic respiratory failure, suspect that this is secondary to asthma exacerbation     Titrate O2 to maintain sats >95% in the setting of pregnancy  Continue Dulera high dose with spacer  Continue nebulized budesonide, double ICS to ensure that her deep airways are getting access to the medication  Continue with around-the-clock nebulizers, change to q4  Start prednisone 50 mg daily, for at least 10 days, we may need a longer taper of this  Continue Zyrtec daily for control of allergies  Continue nasal rinses  Continue with omeprazole   Will consult ENT regarding throat tightness  US of neck  CPAP available if needed to help lay flat      Adriana Fleming, DO   Pulmonary and Critical Care

## 2023-05-24 NOTE — CARE PLAN
Problem: Bronchoconstriction  Goal: Improve in air movement and diminished wheezing  Description: Target End Date:  2 to 3 days    1.  Implement inhaled treatments  2.  Evaluate and manage medication effects  Outcome: Progressing   Duoneb inc to Q4 from Q6 this shift.  BID Pulmicort.  BID Dulera

## 2023-05-24 NOTE — PROGRESS NOTES
OB Progress Note:  35w2d  Date of Admission: 5/21/2023      Subjective:   Pt reports feeling OK, not as good as yesterday but not as bad as on arrival.  Still some upper tightness and cough.  On 2.5L by nasal cannula currently.    No ctx/lof/vb.  Reports +FM    Objective:   24hr VS:  Temp:  [36.1 °C (96.9 °F)-36.7 °C (98 °F)] 36.4 °C (97.5 °F)  Pulse:  [] 120  Resp:  [17-24] 18  BP: (112-138)/(53-70) 136/70  SpO2:  [95 %-98 %] 97 %    Gen: AAO, NAD  CV: tachycardic, approx 110, regular  Resp: lower lungs CTAB, +inspiratory wheeze posteriorly bilaterally  Abdomen: gravid, soft, NT  Ext: NT, no edema    NST:  Indication: asthma exacerbation, hypoxemia  155/moderate variability/+ accelerations/no decelerations  [place]: few ctx      Meds:     Current Facility-Administered Medications:     cetirizine (ZYRTEC) tablet 10 mg, 10 mg, Oral, DAILY, Carlo Toney M.D., 10 mg at 05/24/23 0622    prenatal plus vitamin (STUARTNATAL 1+1) 27-1 MG tablet 1 Tablet, 1 Tablet, Oral, Daily-0800, Carlo Toney M.D., 1 Tablet at 05/24/23 0852    ipratropium-albuterol (DUONEB) nebulizer solution, 3 mL, Nebulization, Q4HRS (RT), Adraina Fleming D.O., 3 mL at 05/24/23 1106    benzonatate (TESSALON) capsule 100 mg, 100 mg, Oral, TID PRN, Nora Paez M.D., 100 mg at 05/23/23 2327    diphenhydrAMINE (BENADRYL) tablet/capsule 50 mg, 50 mg, Oral, Q6HRS PRN, Nora Paez M.D.    ipratropium-albuterol (DUONEB) nebulizer solution, 3 mL, Nebulization, Q4H PRN (RT), Adriana Fleming D.O., 3 mL at 05/24/23 0900    mometasone-formoterol (Dulera) 200-5 mcg/Act inhaler 2 Puff, 2 Puff, Inhalation, BID (RT), Adriana Fleming D.O., 2 Puff at 05/24/23 0639    budesonide (PULMICORT) neb susp 0.5 mg, 0.5 mg, Nebulization, BID (RT), Adriana Fleming D.O., 0.5 mg at 05/24/23 0639    predniSONE (DELTASONE) tablet 50 mg, 50 mg, Oral, DAILY, Adriana Fleming D.O., 50 mg at 05/24/23 0618    aspirin (ASA) chewable tab  81 mg, 81 mg, Oral, DAILY, Nora Paez M.D., 81 mg at 23 0618    acetaminophen (Tylenol) tablet 650 mg, 650 mg, Oral, Q4HRS PRN, Nora Paez M.D., 650 mg at 23 2255    levothyroxine (SYNTHROID) tablet 150 mcg, 150 mcg, Oral, AM ES, Nora Paez M.D., 150 mcg at 23 0618    omeprazole (PRILOSEC) capsule 20 mg, 20 mg, Oral, DAILY, Nora Paez M.D., 20 mg at 23 0618    acetaminophen (TYLENOL) tablet 1,000 mg, 1,000 mg, Oral, Q6HRS PRN, Nora Paez M.D., 1,000 mg at 23 0622    Labs:  None new    A/P:  38 y.o.  @ 35w2d admitted with asthma exacerbation   - appreciate pulmonology involvement.  Overall improved on prednisone 50mg daily, increased nebulizer treatments, ICS, dulera, zyrtec.  Plan for ENT consult.  Still requiring O2 to maintain oxygen sats above 95%; continuous pulse ox in place    - SIUP @ 35w2d, NST reactive and reassuring.  NST qshift currently, would change to continuous monitoring if oxygenation worsens.  No indication for delivery currently.  Discussed consideration for late  BMZ however with limited respiratory  benefit, increased risk of hypoglycemia, and lack of long term data on outcomes combined with history of prior local wasting/reaction to IM steroids will hold off for now.  LGA on last growth US approx 2.5wks ago with Dr. Galarza in office.    - will get GBS  - VTE ppx: SCDs when in bed    dispo: cont antepartum monitoring    Nora Paez MD, FACOG  [facility]      Addendum:  GBS + by urine culture    Nora Paez MD  [facility]

## 2023-05-25 LAB
B PARAP IS1001 DNA NPH QL NAA+NON-PROBE: NOT DETECTED
B PERT.PT PRMT NPH QL NAA+NON-PROBE: NOT DETECTED
C PNEUM DNA NPH QL NAA+NON-PROBE: NOT DETECTED
FLUAV RNA NPH QL NAA+NON-PROBE: NOT DETECTED
FLUBV RNA NPH QL NAA+NON-PROBE: NOT DETECTED
GLUCOSE BLD STRIP.AUTO-MCNC: 123 MG/DL (ref 65–99)
GLUCOSE BLD STRIP.AUTO-MCNC: 124 MG/DL (ref 65–99)
GLUCOSE BLD STRIP.AUTO-MCNC: 139 MG/DL (ref 65–99)
GLUCOSE BLD STRIP.AUTO-MCNC: 88 MG/DL (ref 65–99)
HADV DNA NPH QL NAA+NON-PROBE: NOT DETECTED
HCOV 229E RNA NPH QL NAA+NON-PROBE: NOT DETECTED
HCOV HKU1 RNA NPH QL NAA+NON-PROBE: NOT DETECTED
HCOV NL63 RNA NPH QL NAA+NON-PROBE: NOT DETECTED
HCOV OC43 RNA NPH QL NAA+NON-PROBE: NOT DETECTED
HMPV RNA NPH QL NAA+NON-PROBE: NOT DETECTED
HPIV1 RNA NPH QL NAA+NON-PROBE: NOT DETECTED
HPIV2 RNA NPH QL NAA+NON-PROBE: NOT DETECTED
HPIV3 RNA NPH QL NAA+NON-PROBE: NOT DETECTED
HPIV4 RNA NPH QL NAA+NON-PROBE: NOT DETECTED
M PNEUMO DNA NPH QL NAA+NON-PROBE: NOT DETECTED
RSV RNA NPH QL NAA+NON-PROBE: NOT DETECTED
RV+EV RNA NPH QL NAA+NON-PROBE: NOT DETECTED
SARS-COV-2 RNA NPH QL NAA+NON-PROBE: NOTDETECTED

## 2023-05-25 PROCEDURE — 59025 FETAL NON-STRESS TEST: CPT

## 2023-05-25 PROCEDURE — 87798 DETECT AGENT NOS DNA AMP: CPT

## 2023-05-25 PROCEDURE — 700102 HCHG RX REV CODE 250 W/ 637 OVERRIDE(OP): Performed by: OBSTETRICS & GYNECOLOGY

## 2023-05-25 PROCEDURE — 94640 AIRWAY INHALATION TREATMENT: CPT

## 2023-05-25 PROCEDURE — 302790 HCHG STAT ANTEPARTUM CARE, DAILY

## 2023-05-25 PROCEDURE — 770002 HCHG ROOM/CARE - OB PRIVATE (112)

## 2023-05-25 PROCEDURE — A9270 NON-COVERED ITEM OR SERVICE: HCPCS | Performed by: OBSTETRICS & GYNECOLOGY

## 2023-05-25 PROCEDURE — 87486 CHLMYD PNEUM DNA AMP PROBE: CPT

## 2023-05-25 PROCEDURE — 700111 HCHG RX REV CODE 636 W/ 250 OVERRIDE (IP): Performed by: INTERNAL MEDICINE

## 2023-05-25 PROCEDURE — 82962 GLUCOSE BLOOD TEST: CPT | Mod: 91

## 2023-05-25 PROCEDURE — 99232 SBSQ HOSP IP/OBS MODERATE 35: CPT | Mod: GC | Performed by: INTERNAL MEDICINE

## 2023-05-25 PROCEDURE — 87633 RESP VIRUS 12-25 TARGETS: CPT

## 2023-05-25 PROCEDURE — 700101 HCHG RX REV CODE 250: Performed by: INTERNAL MEDICINE

## 2023-05-25 PROCEDURE — 87581 M.PNEUMON DNA AMP PROBE: CPT

## 2023-05-25 RX ORDER — FERROUS SULFATE 325(65) MG
325 TABLET ORAL
Status: DISCONTINUED | OUTPATIENT
Start: 2023-05-26 | End: 2023-06-09 | Stop reason: HOSPADM

## 2023-05-25 RX ORDER — IPRATROPIUM BROMIDE AND ALBUTEROL SULFATE 2.5; .5 MG/3ML; MG/3ML
3 SOLUTION RESPIRATORY (INHALATION)
Status: DISCONTINUED | OUTPATIENT
Start: 2023-05-25 | End: 2023-05-26

## 2023-05-25 RX ORDER — IPRATROPIUM BROMIDE AND ALBUTEROL SULFATE 2.5; .5 MG/3ML; MG/3ML
3 SOLUTION RESPIRATORY (INHALATION)
Status: DISCONTINUED | OUTPATIENT
Start: 2023-05-25 | End: 2023-05-25

## 2023-05-25 RX ADMIN — ACETAMINOPHEN 1000 MG: 500 TABLET, FILM COATED ORAL at 11:54

## 2023-05-25 RX ADMIN — BUDESONIDE INHALATION 0.5 MG: 0.5 SUSPENSION RESPIRATORY (INHALATION) at 19:37

## 2023-05-25 RX ADMIN — MOMETASONE FUROATE AND FORMOTEROL FUMARATE DIHYDRATE 2 PUFF: 200; 5 AEROSOL RESPIRATORY (INHALATION) at 19:48

## 2023-05-25 RX ADMIN — IPRATROPIUM BROMIDE AND ALBUTEROL SULFATE 3 ML: 2.5; .5 SOLUTION RESPIRATORY (INHALATION) at 14:09

## 2023-05-25 RX ADMIN — BUDESONIDE INHALATION 0.5 MG: 0.5 SUSPENSION RESPIRATORY (INHALATION) at 06:19

## 2023-05-25 RX ADMIN — FAMOTIDINE 20 MG: 20 TABLET, FILM COATED ORAL at 06:14

## 2023-05-25 RX ADMIN — IPRATROPIUM BROMIDE AND ALBUTEROL SULFATE 3 ML: 2.5; .5 SOLUTION RESPIRATORY (INHALATION) at 10:02

## 2023-05-25 RX ADMIN — PREDNISONE 50 MG: 20 TABLET ORAL at 06:15

## 2023-05-25 RX ADMIN — ACETAMINOPHEN 1000 MG: 500 TABLET, FILM COATED ORAL at 21:31

## 2023-05-25 RX ADMIN — CETIRIZINE HYDROCHLORIDE 10 MG: 10 TABLET, FILM COATED ORAL at 06:46

## 2023-05-25 RX ADMIN — IPRATROPIUM BROMIDE AND ALBUTEROL SULFATE 3 ML: 2.5; .5 SOLUTION RESPIRATORY (INHALATION) at 06:19

## 2023-05-25 RX ADMIN — HYDROCORTISONE: 10 CREAM TOPICAL at 18:15

## 2023-05-25 RX ADMIN — HYDROCORTISONE: 10 CREAM TOPICAL at 06:47

## 2023-05-25 RX ADMIN — ASPIRIN 81 MG 81 MG: 81 TABLET ORAL at 06:13

## 2023-05-25 RX ADMIN — FAMOTIDINE 20 MG: 20 TABLET, FILM COATED ORAL at 18:15

## 2023-05-25 RX ADMIN — ALUMINUM HYDROXIDE, MAGNESIUM HYDROXIDE, AND DIMETHICONE 10 ML: 400; 400; 40 SUSPENSION ORAL at 21:31

## 2023-05-25 RX ADMIN — BENZONATATE 100 MG: 100 CAPSULE ORAL at 22:25

## 2023-05-25 RX ADMIN — IPRATROPIUM BROMIDE AND ALBUTEROL SULFATE 3 ML: .5; 2.5 SOLUTION RESPIRATORY (INHALATION) at 23:15

## 2023-05-25 RX ADMIN — ALUMINUM HYDROXIDE, MAGNESIUM HYDROXIDE, AND DIMETHICONE 10 ML: 400; 400; 40 SUSPENSION ORAL at 11:53

## 2023-05-25 RX ADMIN — MOMETASONE FUROATE AND FORMOTEROL FUMARATE DIHYDRATE 2 PUFF: 200; 5 AEROSOL RESPIRATORY (INHALATION) at 06:37

## 2023-05-25 RX ADMIN — LEVOTHYROXINE SODIUM 150 MCG: 0.15 TABLET ORAL at 06:13

## 2023-05-25 RX ADMIN — IPRATROPIUM BROMIDE AND ALBUTEROL SULFATE 3 ML: 2.5; .5 SOLUTION RESPIRATORY (INHALATION) at 19:37

## 2023-05-25 RX ADMIN — PRENATAL WITH FERROUS FUM AND FOLIC ACID 1 TABLET: 3080; 920; 120; 400; 22; 1.84; 3; 20; 10; 1; 12; 200; 27; 25; 2 TABLET ORAL at 08:32

## 2023-05-25 RX ADMIN — BENZONATATE 100 MG: 100 CAPSULE ORAL at 11:55

## 2023-05-25 RX ADMIN — OMEPRAZOLE 20 MG: 20 CAPSULE, DELAYED RELEASE ORAL at 06:13

## 2023-05-25 RX ADMIN — IPRATROPIUM BROMIDE AND ALBUTEROL SULFATE 3 ML: 2.5; .5 SOLUTION RESPIRATORY (INHALATION) at 03:11

## 2023-05-25 RX ADMIN — AZITHROMYCIN MONOHYDRATE 250 MG: 250 TABLET ORAL at 06:14

## 2023-05-25 ASSESSMENT — ENCOUNTER SYMPTOMS
SHORTNESS OF BREATH: 1
NAUSEA: 0
FEVER: 0
COUGH: 1
VOMITING: 0
CHILLS: 0

## 2023-05-25 ASSESSMENT — COPD QUESTIONNAIRES
DURING THE PAST 4 WEEKS HOW MUCH DID YOU FEEL SHORT OF BREATH: NONE/LITTLE OF THE TIME
HAVE YOU SMOKED AT LEAST 100 CIGARETTES IN YOUR ENTIRE LIFE: NO/DON'T KNOW
DO YOU EVER COUGH UP ANY MUCUS OR PHLEGM?: NO/ONLY WITH OCCASIONAL COLDS OR INFECTIONS
COPD SCREENING SCORE: 1

## 2023-05-25 NOTE — CARE PLAN
The patient is Stable - Low risk of patient condition declining or worsening    Shift Goals  Clinical Goals: Improve respiratory efforts  Patient Goals: Healthy mom, healthy baby, go home  Family Goals: Support    Progress made toward(s) clinical / shift goals:    Problem: Knowledge Deficit - L&D  Goal: Patient and family/caregivers will demonstrate understanding of plan of care, disease process/condition, diagnostic tests and medications  Description: Target End Date:  1-3 days or as soon as patient condition allows    1.  Oriented to unit, equipment, visitation policy and means for communicating concern  2.  Complete/review Learning Assessment  3.  Assess patient's preparation, knowledge level and expectations  4.  Provide accurate information in basic terms, clarify misconceptions  5.  Explain disease process/condition, treatment plan, diagnostic tests and medications  6.  Encourage patient and support person to ask questions and verbalize their understanding  7.  Instruct patient/support person in basic interpretation of fetal monitor  8.  Obtain informed consent when appropriate  9.  Demonstrate breathing/relaxation techniques  Outcome: Progressing     Problem: Risk for Excess Fluid Volume  Goal: Patient will demonstrate pulse, blood pressure and neurologic signs within expected ranges and without any respiratory complications  Description: 1.  Monitor for signs of hypertension and tachycardia; observe for signs of dyspnea; auscultate for signs of stridor, rhonchi or moist crackles  2.  Monitor for the intake/output.  Check the infusion rate of the fluids manually or preferably through the use of infusion pumps  3.  Monitor hemoglobin/hematocrit and platelets   Outcome: Progressing     Problem: Psychosocial - L&D  Goal: Patient's level of anxiety will decrease  Description: Target End Date:  1-3 days or as soon as patient condition allows    1.  Collaborate with patient and family/caregiver to identify triggers  and develop strategies to cope with anxiety  2.  Implement stimuli reduction, calming techniques  3.  Pharmacologic management per provider order  4.  Encourage patient/family/care giver participation  5.  Collaborate with interdisciplinary team including Psychologist or Behavioral Health Team as needed  Outcome: Progressing  Goal: Patient will be able to discuss coping skills during hospitalization  Description: Target End Date:  1 to 3 days    1.  Assess patient and support person's emotional response  2.  Review patient and support person's participation and role in birth experience.  Identify positive behaviors during the process  3.  Encourage presence/participation of support person  Outcome: Progressing  Goal: Patient's ability to re-evaluate and adapt role responsibilities will improve  Description: Target End Date:  Prior to discharge or change in level of care    1.  Assess family support  2.  Encourage support system participation in care  3.  Encouraged verbalization of feelings regarding caregiver responsibilities  4.  Discuss changes in role and responsibilities caused by patient's condition  Outcome: Progressing  Goal: Spiritual and cultural needs incorporated into hospitalization  Description: Target End Date:  End of day 1    1.  Encourage verbalization of feelings, concerns, expectations and needs  2.  Collaborate with Case Management/  3.  Collaborate with Pastoral Care to meet spiritual needs  Outcome: Progressing     Problem: Cardiac Output  Goal: Patient will remain normotensive throughout hospitalization  Description: Target End Date:  Prior to discharge or change in level of care    1.  Assess and document BP, pulse and oxygen saturation  2.  Administer antihypertensive drugs as ordered by provider  Outcome: Progressing     Problem: Pain  Goal: Patient's pain will be alleviated or reduced to the patient’s comfort goal  Description: Target End Date:  Prior to discharge or change  in level of care    1.  Document pain using the appropriate pain scale per order or unit policy  2.  Administer pain medications per provider order and/or assist with epidural/spinal placement as needed  3.  Pain management medications as ordered  4.  Educate and implement non-pharmacologic comfort measures (i.e. relaxation, distraction, massage, cold/heat therapy, etc.)  5.  Assess for nonverbal signs of ineffective coping with pain and offer pain medication and/or epidural anesthesia  Outcome: Progressing     Problem: Risk for Fluid Imbalance  Goal: Patient's fluid volume balance will be maintained or improve  Description: Target End Date:  1 to 3 days    1.  Assess for signs and symptoms of postpartum and postoperative bleeding as appropriate  2.  Monitor drainage for color, consistency, quantity  3.  Report inadequate intake or output to provider  4.  Promote oral intake as appropriate  5.  Administer IV therapy as ordered  6.  Discontinue IV fluids when tolerating PO or per provider order  Outcome: Progressing     Problem: Risk for Infection and Impaired Wound Healing  Goal: Patient will remain free from infection  Description: Target End Date:  1 to 3 days    1.  Utilize Standard Precautions at all times to reduce the risk of transmission of microorganisms from both recognized and unrecognized sources of infection  2.  Infection prevention handouts provided (general/device/diagnosis specific) and documented in Patient Education  3.  Limit vaginal exams as necessary  4.  Use aseptic technique during vaginal exams  5.  Administer antibiotic therapy per provider order  6.  Assess for removal of lines and drains  7.  Line/drain care per policy and/or provider orders  Outcome: Progressing  Goal: Patient's wound/surgical incision will decrease in size and heals properly  Description: Target End Date:  Prior to discharge or change in level of care    Document on LDA    1.  Assess and document surgical  incision/wound  2.  Provide incision/wound care per policy and/or provider orders  3.  Manage surgical drains per policy if applicable  4.  Encourage adequate nutrition to promote wound healing  5.  Collaborate with Clinical Dietician  Outcome: Progressing     Problem: Risk for Injury  Goal: Patient and fetus will be free of preventable injury/complications  Description: Target End Date:  Prior to discharge or change in level of care    1.  Monitor uterine activity and if applicable progression of labor  2.  Monitor fetal well being  3.  Assure resuscitative equipment is available and within reach  Outcome: Progressing     Problem: Risk for Venous Thromboembolism (VTE)  Goal: VTE prevention measures will be implemented and patient will remain free from VTE  Description: Target End Date:  Prior to discharge or change in level of care    1.  Intermittent sequential compression device in place 23 hours per day or per provider order  2.  Pharmacologic prophylaxis per provider order  3.  Monitor for anticoagulation complications  4.  Educate patient and family/caregiver about the importance of intermittent sequential and early ambulation when able  5.  Educate/demonstrate to patient and family/caregiver about ankle flex, foot rotation and knee flex exercises in addition to other prophylactic measures every hour while awake  Outcome: Progressing     Problem: Discharge Barriers/Planning  Goal: Patient's continuum of care needs are met  Description: Target End Date:  Prior to discharge or change in level of care    1.  Identify potential discharge barriers on admission and throughout hospitalization  2.  Collaborate with Case Management, , Clinical Educators, Navigators and others on the transitional care team to meet discharge needs  3.  Involve patient/family/caregivers in setting and prioritizing goals for hospitalization and discharge  4.  Ensure Flu vaccinations are addressed  5.  Inquire if patient is  interested in the Meds to Bed program  6.  Ensure patient and family/caregiver are able to demonstrate use of equipment as prescribed  7.  Ensure patient and family/caregiver can verbalize understanding of patient education  8.  Explain discharge instructions and medication reconciliation to patient and family/caregiver  Outcome: Progressing   Patient denies LOF, bleeding, contractions. Patient reports positive fetal movement.     Patient is not progressing towards the following goals:

## 2023-05-25 NOTE — PROGRESS NOTES
1600 - Report received from KRIS Cedillo. POC discussed.     2305 - Patient called out stating she was feeling short of breath, requested RT for another treatment. RT called to bedside.     0720 - Report given to KRIS Monson. POC discussed.

## 2023-05-25 NOTE — PROGRESS NOTES
1930: In to see pt; she's appears to be in good spirits, no complaints at this time. Will be receiving breathing treatment soon.  Occasional cough noted.  2002: pt requesting Maalox for heartburn.  2030: Dr Galarza wants pt to check her glucose. Order received for fasting and 1 hour PP for tomorrow only. Pt on steroids.  2200: pt called, she is ready for her evening NST.  2305: pt requesting Tylenol for h/a and Tessalon pearls for cough.  0300: pt has been resting quietly this evening.  0600: Fasting FS = 88  0700: report to Nguyen DAIGLE RN

## 2023-05-25 NOTE — PROGRESS NOTES
Late entry    0700 Report received from KRIS Coppola. POC discussed.    0830 RN at bedside. Assessment performed. POC discussed. Patient will call if assistance is needed.     0915 Dr Benjamin at bedside to discuss POC with patient.     0930 TOCO and EFM applied. Reactive NST obtained. Verified by KRIS Conway.     1450 Dr. Galarza notified of patients blood glucose results. Orders received.     1600 Report given to KRIS Martinez. Transfer of care at this time.

## 2023-05-25 NOTE — PROGRESS NOTES
S: Felt worse this am but better this afternoon.  O:Patient Vitals for the past 24 hrs:   BP Temp Temp src Pulse Resp SpO2   05/24/23 1954 -- -- -- (!) 109 -- 98 %   05/24/23 1930 121/62 36.3 °C (97.4 °F) Temporal (!) 103 18 96 %   05/24/23 1705 128/70 36.2 °C (97.2 °F) Temporal (!) 113 17 93 %   05/24/23 1509 -- -- -- (!) 110 -- 96 %   05/24/23 1200 136/70 36.4 °C (97.5 °F) Temporal (!) 120 18 97 %   05/24/23 1106 -- -- -- (!) 116 -- 98 %   05/24/23 0903 -- -- -- (!) 106 18 98 %   05/24/23 0844 128/61 36.1 °C (96.9 °F) Temporal (!) 109 17 96 %   05/24/23 0649 -- -- -- 92 -- 97 %   05/24/23 0640 -- -- -- 99 20 97 %   05/24/23 0459 -- -- -- 91 -- 98 %   05/24/23 0455 -- -- -- 90 -- --   05/24/23 0454 -- -- -- 93 -- 97 %   05/24/23 0451 -- -- -- 92 -- --   05/24/23 0449 -- -- -- 91 -- 97 %   05/24/23 0447 -- -- -- 91 -- --   05/24/23 0444 -- -- -- 90 -- 98 %   05/24/23 0443 -- -- -- 91 -- --   05/24/23 0439 -- -- -- 89 -- 96 %   05/24/23 0435 -- -- -- 92 -- --   05/24/23 0434 -- -- -- 93 -- 97 %   05/24/23 0431 -- -- -- 94 -- --   05/24/23 0429 -- -- -- 96 -- 96 %   05/24/23 0427 -- -- -- 98 -- --   05/24/23 0424 -- -- -- 96 -- 96 %   05/24/23 0423 -- -- -- 92 -- --   05/24/23 0419 -- -- -- (!) 108 -- 95 %   05/24/23 0415 -- -- -- 95 -- --   05/24/23 0414 -- -- -- 93 -- 98 %   05/24/23 0411 -- -- -- 97 -- --   05/24/23 0409 -- -- -- 98 -- 96 %   05/24/23 0407 -- -- -- 95 -- --   05/24/23 0404 -- -- -- 92 -- 95 %   05/24/23 0403 -- -- -- 93 -- --   05/24/23 0400 114/53 36.1 °C (97 °F) Temporal 95 18 98 %   05/24/23 0359 -- -- -- (!) 101 -- --   05/24/23 0355 -- -- -- 91 -- --   05/24/23 0353 -- -- -- (!) 107 -- 96 %   05/24/23 0351 -- -- -- 98 -- --   05/24/23 0348 -- -- -- 95 -- 96 %   05/24/23 0347 -- -- -- 92 -- --   05/24/23 0343 -- -- -- 88 -- 96 %   05/24/23 0339 -- -- -- 94 -- --   05/24/23 0338 -- -- -- 99 -- 96 %   05/24/23 0335 -- -- -- 93 -- --   05/24/23 0333 -- -- -- 86 -- 98 %   05/24/23 0331 -- -- -- 86  -- --   05/24/23 0328 -- -- -- 86 -- 98 %   05/24/23 0327 -- -- -- 86 -- --   05/24/23 0323 -- -- -- 92 -- 98 %   05/24/23 0319 -- -- -- 86 -- --   05/24/23 0318 -- -- -- 89 -- 98 %   05/24/23 0315 -- -- -- 89 -- --   05/24/23 0313 -- -- -- 92 -- 98 %   05/24/23 0311 -- -- -- 93 -- --   05/24/23 0308 -- -- -- 94 -- 97 %   05/24/23 0307 -- -- -- 90 -- --   05/24/23 0303 -- -- -- 94 -- 96 %   05/24/23 0259 -- -- -- 89 -- --   05/24/23 0258 -- -- -- 90 -- 97 %   05/24/23 0255 -- -- -- 90 -- --   05/24/23 0253 -- -- -- 90 -- 97 %   05/24/23 0251 -- -- -- 86 -- --   05/24/23 0248 -- -- -- 88 -- 97 %   05/24/23 0247 -- -- -- 90 -- --   05/24/23 0243 -- -- -- 91 -- 97 %   05/24/23 0239 -- -- -- 96 -- --   05/24/23 0238 -- -- -- 86 -- 97 %   05/24/23 0235 -- -- -- 85 -- --   05/24/23 0233 -- -- -- 84 -- 97 %   05/24/23 0231 -- -- -- 94 -- --   05/24/23 0228 -- -- -- (!) 107 -- 98 %   05/24/23 0227 -- -- -- 94 -- --   05/24/23 0223 -- -- -- 94 -- 98 %   05/24/23 0219 -- -- -- 93 -- --   05/24/23 0218 -- -- -- 94 -- 98 %   05/24/23 0215 -- -- -- 95 -- --   05/24/23 0213 -- -- -- 94 -- 98 %   05/24/23 0211 -- -- -- 90 -- --   05/24/23 0208 -- -- -- 94 -- 98 %   05/24/23 0207 -- -- -- 92 -- --   05/24/23 0203 -- -- -- 91 -- 98 %   05/24/23 0100 -- -- -- 90 -- 98 %   05/24/23 0000 117/63 -- -- 97 -- 96 %   05/23/23 2300 112/53 36.7 °C (98 °F) Temporal (!) 108 19 97 %   05/23/23 2200 -- -- -- (!) 104 -- 97 %   05/23/23 2100 -- -- -- (!) 125 -- 96 %     EFM: Moderate variability and accelerations present.    A: IUP 35 2/7 weeks       Probable asthma exacerbation.  P:  Keep as inpatient until significant improvement.        Will monitor her glucose tomorrow (fasting and one hour post meals).    Time: 15 minutes.

## 2023-05-25 NOTE — PROGRESS NOTES
Patient seen in room  She has had chronic coughing and sob on back for about 8 weeks  She is 35 weeks pregnant  Treated with asthma medicaitons with some improvement    Scope at bedside showed thick secretion on larynx and hypopharynx.  Normal vocal cord movement and anatomy  Tongue base and posterior glottic changes consistent with reflux    Re: reflux precautions and medications  Stay on side when supine to reduce SOB

## 2023-05-25 NOTE — PROGRESS NOTES
HD#4  Iup at 35 3/7wks  S) Pt feels about the same. C/o nasal discharge and cough. No vb/lof, ctx. +fm.   O2 down to 2.0L by nasal cannula  O) 127/68, 96.7, 98, pulse ox 95-97  CV: rrr  Lungs: clear  Abd: gravid  Ext: no edema  NST: 150s, moderate variability, no uc's  Meds: see current list    A/P IUP at 35 3/7wks with acute asthma exacerbation   - see pulmonary consultation - continue all meds, increase O2 if necessary to keep sat above 95%   - IUP at 35 3/7wks - continue nst 2x/day as last as maternal status reassuring - LGA on last growth u/s 2.5wks ago with Dr. Galarza   - start scd's today   - reflux - see ent consultation   - see Dr. Galarza consultation   - GBS+ urine - inquire with Dr. Galarza prophylaxis when laboring given multiple allergies    - continue antepartum monitoring

## 2023-05-25 NOTE — PROGRESS NOTES
Pulmonary Progress Note    Date of admission  2023    Reason for Consultation  Acute hypoxemic respiratory failure      History of Presenting Illness  38 y.o. female who presented 2023 with cough and shortness of breath.  She is currently pregnant, , 35 weeks and 6 days. she reports that she has no formal diagnosis of asthma, but has had a reactive airways disease syndrome, especially when she is sick and usually requires a course of prednisone and an albuterol inhaler.  This has always helped her in the past.  Starting in late March, early April patient started having worsening cough.  She has been on short courses of steroids and a low-dose Advair inhaler on and off since that time.  She reports that when she is taken full dose steroids she has had some relief and improvement, however, there has been concern about giving her higher dose steroids due to pregnancy and she has not had a good response to them.  She has been having severe coughing fits and is unable to catch her breath at these times.  She has had response to nebulizers in the past.  She has been checking her saturations at home and they have been around 94%, but when she exerts herself by walking up the stairs, she notes that her heart rate increases into the 150s.  In the ER, they did stand her up and her oxygen saturations dropped to 88% and she was placed on oxygen at that time.  Patient endorses a feeling of tightness in her throat, she has no history of tracheal stenosis.  She does not feel that she has any nasal congestion at this time, but reports that she maybe has been snoring.  She does endorse a history of allergies [place] the city.    Hospital Course  2023 - patient reports that she is feeling slightly better today.  She is not having quite as much coughing when she is talking.  She feels like maybe her throat is slightly less tight.  Reviewed her spirometry and  she does have flattening of the inspiratory limb    5/24/2023 -patient reports that she was able to sleep last night, but is not feeling significantly better today.  She continues to cough.  She feels like the every 4 hours nebulizers are helping    5/25/2023- patient continues to feel better. Less shortness of breath than yesterday, but still has lingering cough. She reports short of breath when talking or walking. At rest, her oxygen saturation is 95%, but her heart rate will increase to 90s and low 100s without oxygen. Denies palpitation or chest pain. Remains afebrile. , ENT,  did laryngoscopy yesterday which showed thick secretion on larynx and hypopharynx; also showed tongue base and posterior glottic changes consistent with reflux. Patient states that sore throat is gone now.    Review of Systems   Constitutional:  Negative for chills and fever.   Respiratory:  Positive for cough and shortness of breath.    Cardiovascular:  Negative for chest pain.   Gastrointestinal:  Negative for nausea and vomiting.        Vital Signs for last 24 hours   Temp:  [35.9 °C (96.7 °F)-36.4 °C (97.5 °F)] 35.9 °C (96.7 °F)  Pulse:  [] 98  Resp:  [16-18] 16  BP: (106-136)/(51-70) 127/68  SpO2:  [93 %-98 %] 95 %    Physical Exam  Constitutional:       General: She is not in acute distress.     Appearance: Normal appearance.   HENT:      Head: Normocephalic.      Right Ear: External ear normal.      Left Ear: External ear normal.   Eyes:      Extraocular Movements: Extraocular movements intact.      Conjunctiva/sclera: Conjunctivae normal.   Cardiovascular:      Rate and Rhythm: Normal rate and regular rhythm.      Heart sounds: Normal heart sounds. No murmur heard.  Pulmonary:      Effort: Pulmonary effort is normal. No respiratory distress.      Breath sounds: No stridor. No wheezing, rhonchi or rales.      Comments: Air is moving well  Abdominal:      Palpations: Abdomen is soft.      Comments: Pregnant abdomen.    Musculoskeletal:      Cervical back: Normal range of motion and neck supple.   Skin:     General: Skin is warm and dry.   Neurological:      General: No focal deficit present.      Mental Status: She is alert and oriented to person, place, and time.       Medications  Current Facility-Administered Medications   Medication Dose Route Frequency Provider Last Rate Last Admin    ipratropium-albuterol (DUONEB) nebulizer solution  3 mL Nebulization 4X/DAY (RT) Adriana Fleming D.O.   3 mL at 05/25/23 1002    azithromycin (ZITHROMAX) tablet 250 mg  250 mg Oral DAILY Nora Paez M.D.   250 mg at 05/25/23 0614    hydrocortisone 1 % cream   Topical BID Nora Paez M.D.   Given at 05/25/23 0647    mag hydrox-al hydrox-simeth (MAALOX PLUS ES or MYLANTA DS) suspension 10 mL  10 mL Oral 4X/DAY PRN Nora Paez M.D.   10 mL at 05/25/23 1153    famotidine (PEPCID) tablet 20 mg  20 mg Oral BID Nora Paez M.D.   20 mg at 05/25/23 0614    cetirizine (ZYRTEC) tablet 10 mg  10 mg Oral DAILY Carlo Toney M.D.   10 mg at 05/25/23 0646    prenatal plus vitamin (STUARTNATAL 1+1) 27-1 MG tablet 1 Tablet  1 Tablet Oral Daily-0800 Carlo Toney M.D.   1 Tablet at 05/25/23 0832    benzonatate (TESSALON) capsule 100 mg  100 mg Oral TID PRN Nora Paez M.D.   100 mg at 05/25/23 1155    diphenhydrAMINE (BENADRYL) tablet/capsule 50 mg  50 mg Oral Q6HRS PRN Nora Paez M.D.        ipratropium-albuterol (DUONEB) nebulizer solution  3 mL Nebulization Q4H PRN (RT) Adriana Fleming D.O.   3 mL at 05/24/23 0900    mometasone-formoterol (Dulera) 200-5 mcg/Act inhaler 2 Puff  2 Puff Inhalation BID (RT) Adriana Fleming D.O.   2 Puff at 05/25/23 0637    budesonide (PULMICORT) neb susp 0.5 mg  0.5 mg Nebulization BID (RT) Adriana Fleming D.O.   0.5 mg at 05/25/23 0619    predniSONE (DELTASONE) tablet 50 mg  50 mg Oral DAILY Adriana Fleming D.O.   50 mg at 05/25/23  0615    aspirin (ASA) chewable tab 81 mg  81 mg Oral DAILY Nora Paez M.D.   81 mg at 05/25/23 0613    acetaminophen (Tylenol) tablet 650 mg  650 mg Oral Q4HRS PRN Nroa Paez M.D.   650 mg at 05/23/23 2255    levothyroxine (SYNTHROID) tablet 150 mcg  150 mcg Oral AM ES Nora Paez M.D.   150 mcg at 05/25/23 0613    omeprazole (PRILOSEC) capsule 20 mg  20 mg Oral DAILY Nora Paez M.D.   20 mg at 05/25/23 0613    acetaminophen (TYLENOL) tablet 1,000 mg  1,000 mg Oral Q6HRS PRN Nora Paez M.D.   1,000 mg at 05/25/23 1154           Assessment/Plan  In summary, This is 38 y.o. pregnant female who is presenting with 2 month history of cough and shortness of breath concerning for asthma exacerbation. She does not have formal diagnosis of asthma but was using inhalers intermittently outpatient especially if she gets sick. [place] is not a smoker [place]. She received magnesium 4 g on 5/22 and has been receiving breathing treatments throughout admission. CT PE and chest x ray negative.    #Acute hypoxemic respiratory failure, suspect that this is secondary to asthma exacerbation    -Added complete respiratory viral panel; will follow up on it  -Titrate O2 to maintain sats >95% in the setting of pregnancy  -Continue Dulera high dose with spacer  -Continue nebulized budesonide, double ICS to ensure that her deep airways are getting access to the medication  -Continue with around-the-clock nebulizers q4  -Continue prednisone 50 mg daily, for at least 10 days, we may need a longer taper of this  -continue Azithromycin for total 5 days   -Continue Zyrtec daily for control of allergies  -Continue nasal rinses  -Continue with omeprazole and famotidine for heartburn

## 2023-05-25 NOTE — PROGRESS NOTES
Pulmonary Progress Note    Date of admission  2023    Reason for Consultation  Acute hypoxemic respiratory failure      History of Presenting Illness  38 y.o. female who presented 2023 with cough and shortness of breath.  She is currently pregnant, , 35 weeks and 6 days. she reports that she has no formal diagnosis of asthma, but has had a reactive airways disease syndrome, especially when she is sick and usually requires a course of prednisone and an albuterol inhaler.  This has always helped her in the past.  Starting in late March, early April patient started having worsening cough.  She has been on short courses of steroids and a low-dose Advair inhaler on and off since that time.  She reports that when she is taken full dose steroids she has had some relief and improvement, however, there has been concern about giving her higher dose steroids due to pregnancy and she has not had a good response to them.  She has been having severe coughing fits and is unable to catch her breath at these times.  She has had response to nebulizers in the past.  She has been checking her saturations at home and they have been around 94%, but when she exerts herself by walking up the stairs, she notes that her heart rate increases into the 150s.  In the ER, they did stand her up and her oxygen saturations dropped to 88% and she was placed on oxygen at that time.  Patient endorses a feeling of tightness in her throat, she has no history of tracheal stenosis.  She does not feel that she has any nasal congestion at this time, but reports that she maybe has been snoring.  She does endorse a history of allergies [place]    Hospital Course  2023 - patient reports that she is feeling slightly better today.  She is not having quite as much coughing when she is talking.  She feels like maybe her throat is slightly less tight.  Reviewed her spirometry and  she does have flattening of the inspiratory limb    5/24/2023 -patient reports that she was able to sleep last night, but is not feeling significantly better today.  She continues to cough.  She feels like the every 4 hours nebulizers are helping    Review of Systems   Constitutional:  Negative for chills and fever.   Respiratory:  Positive for cough and shortness of breath.    Cardiovascular:  Negative for chest pain.   Gastrointestinal:  Negative for nausea and vomiting.        Vital Signs for last 24 hours   Temp:  [36.1 °C (96.9 °F)-36.7 °C (98 °F)] 36.2 °C (97.2 °F)  Pulse:  [] 113  Resp:  [17-20] 17  BP: (112-136)/(53-70) 128/70  SpO2:  [93 %-98 %] 93 %    Physical Exam  Constitutional:       Appearance: Normal appearance.   HENT:      Head: Normocephalic.   Eyes:      Conjunctiva/sclera: Conjunctivae normal.   Cardiovascular:      Rate and Rhythm: Normal rate and regular rhythm.   Pulmonary:      Effort: Pulmonary effort is normal. No respiratory distress.      Breath sounds: No wheezing.   Abdominal:      Palpations: Abdomen is soft.   Skin:     General: Skin is warm and dry.   Neurological:      General: No focal deficit present.      Mental Status: She is alert and oriented to person, place, and time.       Medications  Current Facility-Administered Medications   Medication Dose Route Frequency Provider Last Rate Last Admin    [START ON 5/25/2023] azithromycin (ZITHROMAX) tablet 250 mg  250 mg Oral DAILY Nora Paez M.D.        hydrocortisone 1 % cream   Topical BID Nora Paez M.D.   Given at 05/24/23 1509    cetirizine (ZYRTEC) tablet 10 mg  10 mg Oral DAILY Carlo Toney M.D.   10 mg at 05/24/23 0622    prenatal plus vitamin (STUARTNATAL 1+1) 27-1 MG tablet 1 Tablet  1 Tablet Oral Daily-0800 Carlo Toney M.D.   1 Tablet at 05/24/23 0852    ipratropium-albuterol (DUONEB) nebulizer solution  3 mL Nebulization Q4HRS (RT) Adriana Fleming D.O.   3 mL at 05/24/23 1503     benzonatate (TESSALON) capsule 100 mg  100 mg Oral TID PRN Nora Paez M.D.   100 mg at 05/23/23 2327    diphenhydrAMINE (BENADRYL) tablet/capsule 50 mg  50 mg Oral Q6HRS PRN Nora Paez M.D.        ipratropium-albuterol (DUONEB) nebulizer solution  3 mL Nebulization Q4H PRN (RT) REBEL SchneiderOGabe   3 mL at 05/24/23 0900    mometasone-formoterol (Dulera) 200-5 mcg/Act inhaler 2 Puff  2 Puff Inhalation BID (RT) Adriana Fleming D.O.   2 Puff at 05/24/23 0639    budesonide (PULMICORT) neb susp 0.5 mg  0.5 mg Nebulization BID (RT) REBEL SchneiderOGabe   0.5 mg at 05/24/23 0639    predniSONE (DELTASONE) tablet 50 mg  50 mg Oral DAILY REBEL SchneiderOGabe   50 mg at 05/24/23 0618    aspirin (ASA) chewable tab 81 mg  81 mg Oral DAILY Nora Paez M.D.   81 mg at 05/24/23 0618    acetaminophen (Tylenol) tablet 650 mg  650 mg Oral Q4HRS PRN Nora Paez M.D.   650 mg at 05/23/23 2255    levothyroxine (SYNTHROID) tablet 150 mcg  150 mcg Oral AM ES Nora Paez M.D.   150 mcg at 05/24/23 0618    omeprazole (PRILOSEC) capsule 20 mg  20 mg Oral DAILY Nora Paez M.D.   20 mg at 05/24/23 0618    acetaminophen (TYLENOL) tablet 1,000 mg  1,000 mg Oral Q6HRS PRN Nora Paez M.D.   1,000 mg at 05/24/23 0622       Fluids  No intake or output data in the 24 hours ending 05/24/23 1731    Laboratory  Recent Labs     05/21/23  2048   [id number] 7.48   [id number] 27.2   [id number] 106.0*   [id number] 97.2   ARTHCO3 20   [id number] n/a   ARTBE -3           No results for input(s): SODIUM, POTASSIUM, CHLORIDE, CO2, BUN, CREATININE, MAGNESIUM, PHOSPHORUS, CALCIUM in the last 72 hours.    No results for input(s): ALTSGPT, ASTSGOT, ALKPHOSPHAT, TBILIRUBIN, DBILIRUBIN, GAMMAGT, AMYLASE, LIPASE, ALB, PREALBUMIN, GLUCOSE in the last 72 hours.          No results for input(s): RBC, HEMOGLOBIN, HEMATOCRIT, PLATELETCT, PROTHROMBTM, APTT, INR, IRON, FERRITIN,  [facility] in the last 72 hours.      Imaging  Ultrasound:  Reviewed    Assessment/Plan  #Acute hypoxemic respiratory failure, suspect that this is secondary to asthma exacerbation     Titrate O2 to maintain sats >95% in the setting of pregnancy  Continue Dulera high dose with spacer  Continue nebulized budesonide, double ICS to ensure that her deep airways are getting access to the medication  Continue with around-the-clock nebulizers q4  Continue prednisone 50 mg daily, for at least 10 days, we may need a longer taper of this  Continue Zyrtec daily for control of allergies  Continue nasal rinses  Continue with omeprazole   Consulted ENT, Dr. Roblero today who will see patient at bedside.  Start Azithromycin for 5 days      Adriana Fleming, DO   Pulmonary and Critical Care

## 2023-05-26 LAB
GLUCOSE BLD STRIP.AUTO-MCNC: 119 MG/DL (ref 65–99)
GLUCOSE BLD STRIP.AUTO-MCNC: 130 MG/DL (ref 65–99)
GLUCOSE BLD STRIP.AUTO-MCNC: 137 MG/DL (ref 65–99)
GLUCOSE BLD STRIP.AUTO-MCNC: 75 MG/DL (ref 65–99)

## 2023-05-26 PROCEDURE — 700102 HCHG RX REV CODE 250 W/ 637 OVERRIDE(OP): Performed by: OBSTETRICS & GYNECOLOGY

## 2023-05-26 PROCEDURE — 94640 AIRWAY INHALATION TREATMENT: CPT

## 2023-05-26 PROCEDURE — 770002 HCHG ROOM/CARE - OB PRIVATE (112)

## 2023-05-26 PROCEDURE — A9270 NON-COVERED ITEM OR SERVICE: HCPCS | Performed by: OBSTETRICS & GYNECOLOGY

## 2023-05-26 PROCEDURE — 700101 HCHG RX REV CODE 250: Performed by: INTERNAL MEDICINE

## 2023-05-26 PROCEDURE — 700111 HCHG RX REV CODE 636 W/ 250 OVERRIDE (IP): Performed by: INTERNAL MEDICINE

## 2023-05-26 PROCEDURE — 59025 FETAL NON-STRESS TEST: CPT

## 2023-05-26 PROCEDURE — 302790 HCHG STAT ANTEPARTUM CARE, DAILY

## 2023-05-26 PROCEDURE — 82962 GLUCOSE BLOOD TEST: CPT | Mod: 91

## 2023-05-26 RX ORDER — ONDANSETRON 4 MG/1
4 TABLET, ORALLY DISINTEGRATING ORAL EVERY 6 HOURS PRN
Status: DISCONTINUED | OUTPATIENT
Start: 2023-05-26 | End: 2023-06-05

## 2023-05-26 RX ORDER — DOCUSATE SODIUM 100 MG/1
100 CAPSULE, LIQUID FILLED ORAL 2 TIMES DAILY PRN
Status: DISCONTINUED | OUTPATIENT
Start: 2023-05-26 | End: 2023-06-05

## 2023-05-26 RX ADMIN — AZITHROMYCIN MONOHYDRATE 250 MG: 250 TABLET ORAL at 06:12

## 2023-05-26 RX ADMIN — PREDNISONE 50 MG: 20 TABLET ORAL at 06:13

## 2023-05-26 RX ADMIN — ALUMINUM HYDROXIDE, MAGNESIUM HYDROXIDE, AND DIMETHICONE 10 ML: 400; 400; 40 SUSPENSION ORAL at 20:31

## 2023-05-26 RX ADMIN — IPRATROPIUM BROMIDE AND ALBUTEROL SULFATE 3 ML: .5; 2.5 SOLUTION RESPIRATORY (INHALATION) at 22:52

## 2023-05-26 RX ADMIN — HYDROCORTISONE: 10 CREAM TOPICAL at 06:13

## 2023-05-26 RX ADMIN — OMEPRAZOLE 20 MG: 20 CAPSULE, DELAYED RELEASE ORAL at 06:12

## 2023-05-26 RX ADMIN — MOMETASONE FUROATE AND FORMOTEROL FUMARATE DIHYDRATE 2 PUFF: 200; 5 AEROSOL RESPIRATORY (INHALATION) at 06:57

## 2023-05-26 RX ADMIN — ASPIRIN 81 MG 81 MG: 81 TABLET ORAL at 06:12

## 2023-05-26 RX ADMIN — ACETAMINOPHEN 1000 MG: 500 TABLET, FILM COATED ORAL at 20:30

## 2023-05-26 RX ADMIN — HYDROCORTISONE: 10 CREAM TOPICAL at 13:42

## 2023-05-26 RX ADMIN — BUDESONIDE INHALATION 0.5 MG: 0.5 SUSPENSION RESPIRATORY (INHALATION) at 19:05

## 2023-05-26 RX ADMIN — IPRATROPIUM BROMIDE AND ALBUTEROL SULFATE 3 ML: .5; 2.5 SOLUTION RESPIRATORY (INHALATION) at 10:02

## 2023-05-26 RX ADMIN — LEVOTHYROXINE SODIUM 150 MCG: 0.15 TABLET ORAL at 06:12

## 2023-05-26 RX ADMIN — FAMOTIDINE 20 MG: 20 TABLET, FILM COATED ORAL at 06:12

## 2023-05-26 RX ADMIN — BENZONATATE 100 MG: 100 CAPSULE ORAL at 07:56

## 2023-05-26 RX ADMIN — CETIRIZINE HYDROCHLORIDE 10 MG: 10 TABLET, FILM COATED ORAL at 06:46

## 2023-05-26 RX ADMIN — BUDESONIDE INHALATION 0.5 MG: 0.5 SUSPENSION RESPIRATORY (INHALATION) at 06:51

## 2023-05-26 RX ADMIN — IPRATROPIUM BROMIDE AND ALBUTEROL SULFATE 3 ML: .5; 2.5 SOLUTION RESPIRATORY (INHALATION) at 13:48

## 2023-05-26 RX ADMIN — ALUMINUM HYDROXIDE, MAGNESIUM HYDROXIDE, AND DIMETHICONE 10 ML: 400; 400; 40 SUSPENSION ORAL at 13:42

## 2023-05-26 RX ADMIN — FERROUS SULFATE TAB 325 MG (65 MG ELEMENTAL FE) 325 MG: 325 (65 FE) TAB at 06:46

## 2023-05-26 RX ADMIN — BENZONATATE 100 MG: 100 CAPSULE ORAL at 20:30

## 2023-05-26 RX ADMIN — FAMOTIDINE 20 MG: 20 TABLET, FILM COATED ORAL at 18:22

## 2023-05-26 RX ADMIN — IPRATROPIUM BROMIDE AND ALBUTEROL SULFATE 3 ML: 2.5; .5 SOLUTION RESPIRATORY (INHALATION) at 06:51

## 2023-05-26 RX ADMIN — PRENATAL WITH FERROUS FUM AND FOLIC ACID 1 TABLET: 3080; 920; 120; 400; 22; 1.84; 3; 20; 10; 1; 12; 200; 27; 25; 2 TABLET ORAL at 07:53

## 2023-05-26 RX ADMIN — IPRATROPIUM BROMIDE AND ALBUTEROL SULFATE 3 ML: .5; 2.5 SOLUTION RESPIRATORY (INHALATION) at 19:05

## 2023-05-26 RX ADMIN — MOMETASONE FUROATE AND FORMOTEROL FUMARATE DIHYDRATE 2 PUFF: 200; 5 AEROSOL RESPIRATORY (INHALATION) at 19:21

## 2023-05-26 ASSESSMENT — PATIENT HEALTH QUESTIONNAIRE - PHQ9
SUM OF ALL RESPONSES TO PHQ9 QUESTIONS 1 AND 2: 0
1. LITTLE INTEREST OR PLEASURE IN DOING THINGS: NOT AT ALL

## 2023-05-26 NOTE — PROGRESS NOTES
0700: Report received from Michelle MCCORMICK RN. POC discussed.     0750: Assessment done. Pt denies LOF or VB. Reports occasional UCs and + FM. POC discussed with pt. Encouraged pt to call with needs.     0755: Dr. Umana at bedside. POC discussed with pt. All questions answered.     0817: External monitors applied. Reactive NST obtained.     1900: Report given to Shila KRISHNAMURTHY RN. POC discussed.

## 2023-05-26 NOTE — CARE PLAN
The patient is Watcher - Medium risk of patient condition declining or worsening    Shift Goals  Clinical Goals: Decreased SOB  Patient Goals: healthy mom, healthy baby  Family Goals: support    Progress made toward(s) clinical / shift goals:    Problem: Knowledge Deficit - L&D  Goal: Patient and family/caregivers will demonstrate understanding of plan of care, disease process/condition, diagnostic tests and medications  Outcome: Progressing   -POC discussed with pt. All questions answered.    Problem: Risk for Infection and Impaired Wound Healing  Goal: Patient will remain free from infection  Outcome: Progressing   -Pt afebrile. No s/s of infection noted.    Patient is not progressing towards the following goals:

## 2023-05-26 NOTE — PROGRESS NOTES
"OB Antepartum Note    Continued cough this morning.  Denies chest pain or shortness of breath.  Denies contractions, bleeding, or leaking fluid.    /67   Pulse (!) 109   Temp 36.2 °C (97.1 °F) (Temporal)   Resp 20   Ht 1.702 m (5' 7\")   Wt 103 kg (226 lb)   LMP 2022   SpO2 96%   BMI 35.40 kg/m²     General: No apparent distress and appears to speak full sentences without getting winded but occasionally needs to cough at the end of her sentence.  Abdomen: Gravid, nontender  Extremities: No edema    FHT: Baseline of 140s, moderate variability present, decelerations present, decelerations absent  [place]: Quiescent    Fastin  PP breakfast: 119    Respiratory panel neg  CXR neg  CT PE neg  Laryngoscopy thick secretions and reflux but otherwise neg    Assessment:   intrauterine pregnancy at 35 weeks 4 days  Bronchial asthma exacerbation    Plan:  Appreciate comanagement with Dr. Galarza and pulmonology  Goal to maintain pulse ox greater than 95% ; Dulera high-dose and nebulized budesonide  Prednisone 50 mg daily currently for 10 days but may need longer taper  Azithromycin x5 days  Zyrtec daily  Preemptive GDM diet  NST every shift  Ultimate disposition pending ability to maintain sat without O2 assistance    Lacie Umana M.D.    "

## 2023-05-26 NOTE — PROGRESS NOTES
S:  She reports she feels better this evening and her breathing has improved.  O: Patient Vitals for the past 24 hrs:   BP Temp Temp src Pulse Resp SpO2   05/25/23 2317 -- -- -- (!) 101 18 96 %   05/25/23 1950 -- 36.1 °C (97 °F) Temporal (!) 104 17 98 %   05/25/23 1943 -- -- -- 98 18 96 %   05/25/23 1600 128/62 36.3 °C (97.4 °F) Temporal (!) 104 17 96 %   05/25/23 1300 124/71 36.2 °C (97.1 °F) Temporal (!) 106 17 96 %   05/25/23 1003 -- -- -- -- 16 --   05/25/23 0850 127/68 35.9 °C (96.7 °F) Temporal 98 17 95 %   05/25/23 0617 -- -- -- -- 16 --   05/25/23 0600 -- -- -- 87 -- 97 %   05/25/23 0500 -- -- -- 87 -- 96 %   05/25/23 0400 106/53 36.3 °C (97.4 °F) Temporal 89 17 95 %   05/25/23 0313 -- -- -- 97 -- 95 %   05/25/23 0300 -- -- -- 89 -- 95 %   05/25/23 0200 -- -- -- 90 -- 96 %   05/25/23 0100 -- -- -- 95 -- 95 %   05/25/23 0000 -- -- -- (!) 101 -- 96 %   05/24/23 2350 106/51 36.3 °C (97.4 °F) Temporal (!) 104 17 96 %     Lung: clear bilaterally with longer expiratory phase.  No wheezes or rales.    Pulse ox >95% on oxygen support.    EFM: moderate variability and accelerations present.  No decelerations.   Latest Reference Range & Units 05/25/23 06:11 05/25/23 10:15 05/25/23 14:24   POC Glucose, Blood 65 - 99 mg/dL 88 123 (H) 139 (H)   (H): Data is abnormally high  A: IUP 35 3/7 weeks.      Bronchial asthma with exacerbation on admission, appears to be improving slowly and requiring oxygen support.       Tachycardia probably secondary to her medications.      SS-A antibody with previous normal MT interval.  Will need repeat study in 1-2 days.       Post lunch hyperglycemia probably secondary effect of her asthma treatment.  P:  Continue with medical treatment of her asthma.       Begin 2200 calorie GDM diet.       Continue to monitor her glucose levels.        All questions answered to her satisfaction.    Detailed consultation with high complexity decision making.

## 2023-05-26 NOTE — CARE PLAN
The patient is Watcher- medium risk of patient condition declining or worsening.    Shift Goals  Clinical Goals: Decreased SOB  Patient Goals: healthy mom, healthy baby  Family Goals: support    Progress made toward(s) clinical / shift goals:    Problem: Psychosocial - L&D  Goal: Patient will be able to discuss coping skills during hospitalization  Outcome: Progressing     Problem: Cardiac Output  Goal: Patient will remain normotensive throughout hospitalization  Outcome: Progressing     Problem: Risk for Injury  Goal: Patient and fetus will be free of preventable injury/complications  Outcome: Progressing

## 2023-05-27 LAB
GLUCOSE BLD STRIP.AUTO-MCNC: 121 MG/DL (ref 65–99)
GLUCOSE BLD STRIP.AUTO-MCNC: 131 MG/DL (ref 65–99)
GLUCOSE BLD STRIP.AUTO-MCNC: 148 MG/DL (ref 65–99)
GLUCOSE BLD STRIP.AUTO-MCNC: 83 MG/DL (ref 65–99)

## 2023-05-27 PROCEDURE — 700102 HCHG RX REV CODE 250 W/ 637 OVERRIDE(OP): Performed by: OBSTETRICS & GYNECOLOGY

## 2023-05-27 PROCEDURE — 700101 HCHG RX REV CODE 250: Performed by: INTERNAL MEDICINE

## 2023-05-27 PROCEDURE — 700111 HCHG RX REV CODE 636 W/ 250 OVERRIDE (IP): Performed by: INTERNAL MEDICINE

## 2023-05-27 PROCEDURE — 99232 SBSQ HOSP IP/OBS MODERATE 35: CPT | Performed by: INTERNAL MEDICINE

## 2023-05-27 PROCEDURE — 82962 GLUCOSE BLOOD TEST: CPT

## 2023-05-27 PROCEDURE — 770002 HCHG ROOM/CARE - OB PRIVATE (112)

## 2023-05-27 PROCEDURE — 302790 HCHG STAT ANTEPARTUM CARE, DAILY

## 2023-05-27 PROCEDURE — 59025 FETAL NON-STRESS TEST: CPT

## 2023-05-27 PROCEDURE — 94640 AIRWAY INHALATION TREATMENT: CPT

## 2023-05-27 PROCEDURE — A9270 NON-COVERED ITEM OR SERVICE: HCPCS | Performed by: OBSTETRICS & GYNECOLOGY

## 2023-05-27 RX ORDER — IPRATROPIUM BROMIDE AND ALBUTEROL SULFATE 2.5; .5 MG/3ML; MG/3ML
3 SOLUTION RESPIRATORY (INHALATION)
Status: DISCONTINUED | OUTPATIENT
Start: 2023-05-27 | End: 2023-06-06

## 2023-05-27 RX ORDER — IPRATROPIUM BROMIDE AND ALBUTEROL SULFATE 2.5; .5 MG/3ML; MG/3ML
3 SOLUTION RESPIRATORY (INHALATION) ONCE
Status: ACTIVE | OUTPATIENT
Start: 2023-05-27 | End: 2023-05-28

## 2023-05-27 RX ADMIN — IPRATROPIUM BROMIDE AND ALBUTEROL SULFATE 3 ML: .5; 2.5 SOLUTION RESPIRATORY (INHALATION) at 07:06

## 2023-05-27 RX ADMIN — BUDESONIDE INHALATION 0.5 MG: 0.5 SUSPENSION RESPIRATORY (INHALATION) at 07:06

## 2023-05-27 RX ADMIN — FAMOTIDINE 20 MG: 20 TABLET, FILM COATED ORAL at 06:02

## 2023-05-27 RX ADMIN — MOMETASONE FUROATE AND FORMOTEROL FUMARATE DIHYDRATE 2 PUFF: 200; 5 AEROSOL RESPIRATORY (INHALATION) at 19:12

## 2023-05-27 RX ADMIN — OMEPRAZOLE 20 MG: 20 CAPSULE, DELAYED RELEASE ORAL at 06:02

## 2023-05-27 RX ADMIN — IPRATROPIUM BROMIDE AND ALBUTEROL SULFATE 3 ML: .5; 2.5 SOLUTION RESPIRATORY (INHALATION) at 14:35

## 2023-05-27 RX ADMIN — FERROUS SULFATE TAB 325 MG (65 MG ELEMENTAL FE) 325 MG: 325 (65 FE) TAB at 07:24

## 2023-05-27 RX ADMIN — CETIRIZINE HYDROCHLORIDE 10 MG: 10 TABLET, FILM COATED ORAL at 07:24

## 2023-05-27 RX ADMIN — IPRATROPIUM BROMIDE AND ALBUTEROL SULFATE 3 ML: 2.5; .5 SOLUTION RESPIRATORY (INHALATION) at 19:11

## 2023-05-27 RX ADMIN — FAMOTIDINE 20 MG: 20 TABLET, FILM COATED ORAL at 18:32

## 2023-05-27 RX ADMIN — PREDNISONE 50 MG: 20 TABLET ORAL at 06:01

## 2023-05-27 RX ADMIN — PRENATAL WITH FERROUS FUM AND FOLIC ACID 1 TABLET: 3080; 920; 120; 400; 22; 1.84; 3; 20; 10; 1; 12; 200; 27; 25; 2 TABLET ORAL at 07:24

## 2023-05-27 RX ADMIN — HYDROCORTISONE: 10 CREAM TOPICAL at 06:20

## 2023-05-27 RX ADMIN — LEVOTHYROXINE SODIUM 150 MCG: 0.15 TABLET ORAL at 06:02

## 2023-05-27 RX ADMIN — MOMETASONE FUROATE AND FORMOTEROL FUMARATE DIHYDRATE 2 PUFF: 200; 5 AEROSOL RESPIRATORY (INHALATION) at 07:09

## 2023-05-27 RX ADMIN — ALUMINUM HYDROXIDE, MAGNESIUM HYDROXIDE, AND DIMETHICONE 10 ML: 400; 400; 40 SUSPENSION ORAL at 21:06

## 2023-05-27 RX ADMIN — IPRATROPIUM BROMIDE AND ALBUTEROL SULFATE 3 ML: 2.5; .5 SOLUTION RESPIRATORY (INHALATION) at 22:12

## 2023-05-27 RX ADMIN — BENZONATATE 100 MG: 100 CAPSULE ORAL at 21:08

## 2023-05-27 RX ADMIN — ASPIRIN 81 MG 81 MG: 81 TABLET ORAL at 06:02

## 2023-05-27 RX ADMIN — AZITHROMYCIN MONOHYDRATE 250 MG: 250 TABLET ORAL at 06:02

## 2023-05-27 RX ADMIN — ACETAMINOPHEN 1000 MG: 500 TABLET, FILM COATED ORAL at 21:07

## 2023-05-27 RX ADMIN — BENZONATATE 100 MG: 100 CAPSULE ORAL at 06:18

## 2023-05-27 RX ADMIN — IPRATROPIUM BROMIDE AND ALBUTEROL SULFATE 3 ML: .5; 2.5 SOLUTION RESPIRATORY (INHALATION) at 03:09

## 2023-05-27 RX ADMIN — IPRATROPIUM BROMIDE AND ALBUTEROL SULFATE 3 ML: .5; 2.5 SOLUTION RESPIRATORY (INHALATION) at 10:07

## 2023-05-27 RX ADMIN — BUDESONIDE INHALATION 0.5 MG: 0.5 SUSPENSION RESPIRATORY (INHALATION) at 19:12

## 2023-05-27 ASSESSMENT — ENCOUNTER SYMPTOMS
COUGH: 1
VOMITING: 0
FEVER: 0
CHILLS: 0
NAUSEA: 0
SHORTNESS OF BREATH: 1

## 2023-05-27 NOTE — PROGRESS NOTES
1900: Received report from JANA Cervantes. KRIS. POC discussed.    Received respiratory treatments every 4 hours during the night, Amb x1 in halls, maintained O2 sat at 96% on RA.    Report given to KRIS Diaz.

## 2023-05-27 NOTE — PROGRESS NOTES
S:  Pt had audible wheezing this am.  Had a treatment and feels better.  During the visit, she had a prolonged episode of coughing.  O: Patient Vitals for the past 24 hrs:   BP Temp Temp src Pulse Resp SpO2   05/27/23 1007 -- -- -- -- 16 --   05/27/23 0754 120/56 36.2 °C (97.2 °F) Temporal (!) 112 18 96 %   05/27/23 0709 -- -- -- (!) 111 -- 97 %   05/27/23 0700 -- -- -- 98 -- 96 %   05/27/23 0600 -- -- -- (!) 106 -- 98 %   05/27/23 0500 -- -- -- 91 -- 97 %   05/27/23 0415 125/60 36 °C (96.8 °F) Temporal (!) 115 18 96 %   05/27/23 0400 -- -- -- (!) 102 -- 94 %   05/27/23 0309 -- -- -- 93 (!) 24 95 %   05/27/23 0300 -- -- -- 86 -- 97 %   05/27/23 0200 -- -- -- 91 -- 96 %   05/27/23 0100 -- -- -- 88 -- 97 %   05/27/23 0015 120/58 36 °C (96.8 °F) Temporal 98 (!) 26 96 %   05/27/23 0000 -- -- -- 95 -- 95 %   05/26/23 2300 -- -- -- (!) 109 -- 97 %   05/26/23 2253 -- -- -- (!) 114 (!) 26 93 %   05/26/23 2200 -- -- -- (!) 103 -- 97 %   05/26/23 2100 -- -- -- (!) 109 -- 97 %   05/26/23 2015 111/57 36.2 °C (97.2 °F) Temporal (!) 116 (!) 26 97 %   05/26/23 2000 -- -- -- (!) 110 -- 96 %   05/26/23 1905 -- -- -- (!) 110 (!) 24 95 %   05/26/23 1900 -- -- -- (!) 110 -- 97 %   05/26/23 1617 113/78 36.3 °C (97.3 °F) Temporal (!) 104 -- --   05/26/23 1348 -- -- -- -- 16 --     Lungs: clear bilaterally.    EFM: Moderate variability and accelerations present.    A:IUP 35 5/7 weeks     Bronchial asthma but still needs oxygen support.       Glucose satisfactory on regular diet.     SS-A antibody  P: Continue with glucose monitoring.       Pulmonary apparently planning to see pt tomorrow.       Will plan on DE interval in 1-2 days.     Detailed consultation with high complexity decision making.

## 2023-05-27 NOTE — PROGRESS NOTES
S:  She reports she went overnight without breathing treatment and awoke with low pulse ox and having difficulty breathing.  Concerned about variability in treatments.  Does not like GDM diet, now on regular diet.  O: Patient Vitals for the past 24 hrs:   BP Temp Temp src Pulse Resp SpO2   05/26/23 1617 113/78 36.3 °C (97.3 °F) Temporal (!) 104 -- --   05/26/23 1348 -- -- -- -- 16 --   05/26/23 0956 -- -- -- -- 20 --   05/26/23 0823 138/67 36.2 °C (97.1 °F) Temporal (!) 109 16 96 %   05/26/23 0651 -- -- -- -- 16 --   05/26/23 0420 104/57 36.4 °C (97.6 °F) Temporal 90 17 96 %   05/25/23 2352 109/56 36.2 °C (97.2 °F) Temporal (!) 104 18 --   05/25/23 2317 -- -- -- (!) 101 18 96 %   05/25/23 1950 -- 36.1 °C (97 °F) Temporal (!) 104 17 98 %   05/25/23 1943 -- -- -- 98 18 96 %     EFM: moderate variability and accelerations present.  No contractions.    Latest Reference Range & Units Most Recent 05/26/23 06:17 05/26/23 09:25 05/26/23 12:57   POC Glucose, Blood 65 - 99 mg/dL 130 (H)  5/26/23 12:57 75 119 (H) 130 (H)   (H): Data is abnormally high    A:IUP 35 4/7 weeks     Bronchial asthma but still needs oxygen support.       Glucose satisfactory on regular diet.     SS-A antibody  P: Continue with glucose monitoring.       Pulmonary apparently planning to see pt tomorrow.       Will plan on CT interval in 1-2 days.    Detailed consultation with high complexity decision making.

## 2023-05-27 NOTE — PROGRESS NOTES
" PROGRESS NOTE    DATE: 23  Hospital day: 6  Gestational Age: 35w5d, Estimated Date of Delivery: 23  Primary OB/GYN: Saarh Kang MD  [facility] Consultant: Fede Galarza     SUBJECTIVE  Feeling better, but still coughing a lot. Feels best in the afternoon and when nebs are continued q4 hours. Nebs were changed to prn, but she doesn't like this as she continues to need 02 sat. Chest feels tight. Sore throat is gone. GERD still very much pregnant (as it has been the entire pregnancy). Feels that nose is not dry, but rather ? Increased mucous. Still coughing a lot. Got short of breath and slightly tachy with ambulation to [employer], but not nearly as tachy as she was before. Not able to lie flat, but hasn't for quite awhile. Really wants to go home. Horse is at the trainers. Dogs don't go to the barn. No pregnancy concerns. Baby moving well.     OBJECTIVE:  ./56   Pulse (!) 112   Temp 36.2 °C (97.2 °F) (Temporal)   Resp 16   Ht 1.702 m (5' 7\")   Wt 103 kg (226 lb)   SpO2 96%      Review of systems: Pertinent items are noted in HPI.    PHYSICAL EXAM:  General:   Alert, conversational, pleasant, no acute distress, coughing but O2 sat maintaining >95 on 1L during conversation  Lungs:   Diffuse wheezing posterior. L with expiration, R with inspiration and expiration  Heart:   Tachycardic, regular rhythm  Abdomen:  Soft, gravid, non-tender, non-distended  Genitourinary: deferred      OB Ultrasound:  2023 (OP US with Michell) 5lb9oz, 82%, normal DEMI, normal GA interval, vertex      FHT: 140s with moderate LTV. +accels. No variables/decels  [place]: Quiet      Medications:   Scheduled Medications   Medication Dose Frequency    ipratropium-albuterol  3 mL Once    ferrous sulfate  325 mg QDAY with Breakfast    azithromycin  250 mg DAILY    hydrocortisone   BID    famotidine  20 mg BID    cetirizine  10 mg DAILY    prenatal plus vitamin  1 Tablet Daily-0800    mometasone-formoterol  2 Puff BID (RT)    " budesonide  0.5 mg BID (RT)    predniSONE  50 mg DAILY    aspirin  81 mg DAILY    levothyroxine  150 mcg AM ES    omeprazole  20 mg DAILY       Labs:   Respiratory panel negative 5/25    Recent Results (from the past 24 hour(s))   POCT glucose device results    Collection Time: 05/26/23 12:57 PM   Result Value Ref Range    POC Glucose, Blood 130 (H) 65 - 99 mg/dL   POCT glucose device results    Collection Time: 05/26/23  8:13 PM   Result Value Ref Range    POC Glucose, Blood 137 (H) 65 - 99 mg/dL   POCT glucose device results    Collection Time: 05/27/23  6:04 AM   Result Value Ref Range    POC Glucose, Blood 83 65 - 99 mg/dL   POCT glucose device results    Collection Time: 05/27/23  9:15 AM   Result Value Ref Range    POC Glucose, Blood 148 (H) 65 - 99 mg/dL       Imaging:   US-THYROID (5/23): normal    CT-CTA CHEST PULMONARY ARTERY W/ RECONS (5/23)   1.  No large central pulmonary embolus is appreciated, evaluation of the subsegmental branches is essentially nondiagnostic due to motion artifacts. Additional imaging would be required for definitive exclusion of small distal pulmonary emboli.   2.  Left breast mass, recommend referral for mammography and additional workup.     EC-ECHOCARDIOGRAM COMPLETE W/O CONT (5/23)   FNDINGS Left Ventricle Normal left ventricular chamber size. Normal left ventricular wall thickness. Normal left ventricular systolic function. The left ventricular ejection fraction is visually estimated to be 65%. Normal regional wall motion. Diastolic function is difficult to assess with tachycardia. Right Ventricle The right ventricle is normal in size and systolic function. Right Atrium The right atrium is normal in size. Normal inferior vena cava size and inspiratory collapse. Left Atrium The left atrium is normal in size. Left atrial volume index is 14 mL/sq m. Mitral Valve Structurally normal mitral valve without significant stenosis or regurgitation. Aortic Valve Structurally normal  aortic valve without significant stenosis or regurgitation. Tricuspid Valve Structurally normal tricuspid valve without significant stenosis or regurgitation. Unable to estimate right ventricular systolic pressure due to an inadequate tricuspid regurgitant jet. Pulmonic Valve Structurally normal pulmonic valve without significant stenosis or regurgitation. Pericardium No pericardial effusion. Aorta Normal aortic root for body surface area. The ascending aorta diameter is 2.9 cm. Helio Bernal MD (Electronically Signed) Final Date:     21 May 2023 20:14    DX-CHEST-PORTABLE (1 VIEW) ()   No acute cardiac or pulmonary abnormalities are identified.    CONSULTS:   Pulmonology  ENT (Dr. Roblero) bedside laryngoscopy showing findings c/w GERD    ASSESSMENT & PLAN  Carolina Medrano is a 38 y.o.  at 35w5d admitted with acute respiratory failure due to asthma exacerbation    PLAN:  Asthma  Appreciate pulmonary consult  Plan per their note: Continue budesonide, dulera, q4 nebs, prednisone x 10 days (last dose ), Azithromycin x 5 days (last dose ), Zyrtec, nasal rinses  Goal O2 >95%.   Pt continues to need nebulizer treatments and supplemental oxygen  Likely to have a high allergan burden at home  GERD  Continue pepcid, maalox, omeprazole  Elevated Blood glucose  Due to steroid use  SS insulin prn  Continue GDM diet  GTT normal   Fetal surveillance  Reassuring and reactive  BID testing  Delivery mode  Discussed goals. She is undecided at this time. She is well aware of induction versus primary LTCS process.   At this time, she likely would not be able to tolerate labor and pushing. Aware of option for passive second stage   Delivery likely around 37w  Dr. Martin to come discuss anesthetic plan   Platelets  Likely gestational thrombocytopenia. Plt at 28w labs 128  Remains adequate for regional anesthesia   Left breast mass  Incidental finding noted on CTA of chest  Still needs work-up   GBS  positive in urine  Notes allergy to PCN (childhood) and rash with Vancomycin and Clindamycin  Ancef for prophylaxis if planning for   Hashimoto Thyroiditis  Thryoid US normal. Labs normal on admission  Continue home levothyroxine  SSA Ab  No evidence of fetal heart block on last US with Dr. Galarza.   No evidence on NST in hospitial    Plan of care discussed with the patient and she is in agreement with this plan. Plan of care discussed with RN taking carer of patient. All questions and concerns were answered.     Latonia Feliz MD

## 2023-05-27 NOTE — CARE PLAN
The patient is Watcher - Medium risk of patient condition declining or worsening    Shift Goals  Clinical Goals: rest, respiratory support.  Patient Goals: rest  Family Goals: support    Progress made toward(s) clinical / shift goals:        Problem: Knowledge Deficit - L&D  Goal: Patient and family/caregivers will demonstrate understanding of plan of care, disease process/condition, diagnostic tests and medications  Outcome: Progressing     Problem: Risk for Excess Fluid Volume  Goal: Patient will demonstrate pulse, blood pressure and neurologic signs within expected ranges and without any respiratory complications  Outcome: Progressing     Problem: Psychosocial - L&D  Goal: Patient will be able to discuss coping skills during hospitalization  Outcome: Progressing       Patient is not progressing towards the following goals:  N/a

## 2023-05-27 NOTE — CARE PLAN
The patient is Stable - Low risk of patient condition declining or worsening    Shift Goals  Clinical Goals: Rest and Soothe coughing  Patient Goals: Rest  Family Goals: support    Progress made toward(s) clinical / shift goals:      Receiving Treatments from Respiratory and after 3am treatment was able to ambulate the halls, and maintain O2 at 96% on RA.    Took medications to help soothe coughing.        Problem: Knowledge Deficit - L&D  Goal: Patient and family/caregivers will demonstrate understanding of plan of care, disease process/condition, diagnostic tests and medications  Outcome: Progressing

## 2023-05-27 NOTE — PROGRESS NOTES
"Pulmonary Progress Note    Date of admission  2023    Reason for Consultation  Acute hypoxemic respiratory failure      History of Presenting Illness  From Dr. Fleming's note: \"38 y.o. female who presented 2023 with cough and shortness of breath.  She is currently pregnant, , 35 weeks and 6 days. she reports that she has no formal diagnosis of asthma, but has had a reactive airways disease syndrome, especially when she is sick and usually requires a course of prednisone and an albuterol inhaler.  This has always helped her in the past.  Starting in late March, early April patient started having worsening cough.  She has been on short courses of steroids and a low-dose Advair inhaler on and off since that time.  She reports that when she is taken full dose steroids she has had some relief and improvement, however, there has been concern about giving her higher dose steroids due to pregnancy and she has not had a good response to them.  She has been having severe coughing fits and is unable to catch her breath at these times.  She has had response to nebulizers in the past.  She has been checking her saturations at home and they have been around 94%, but when she exerts herself by walking up the stairs, she notes that her heart rate increases into the 150s.  In the ER, they did stand her up and her oxygen saturations dropped to 88% and she was placed on oxygen at that time.  Patient endorses a feeling of tightness in her throat, she has no history of tracheal stenosis.  She does not feel that she has any nasal congestion at this time, but reports that she maybe has been snoring.  She does endorse a history of allergies [place] allerg[place] in the city.\"    Hospital Course  2023 - patient reports that she is feeling slightly better today.  She is not having quite as much coughing when she is talking.  She feels like maybe her throat is slightly less " tight.  Reviewed her spirometry and she does have flattening of the inspiratory limb    5/24/2023 -patient reports that she was able to sleep last night, but is not feeling significantly better today.  She continues to cough.  She feels like the every 4 hours nebulizers are helping    5/25 - moving air well. No wheezing. Sore throat improving. Still using low levels of supplemental oxygen.     Today: States she sleeps sitting partially up.  If she tries to lay flat, she goes in the cognitive beds.  She is on medication for acid reflux.  She does state the medication improves her symptoms but does not completely ameliorate them.  She is also wondering why the DuoNebs have been decreased in frequency.    Review of Systems   Constitutional:  Negative for chills and fever.   Respiratory:  Positive for cough and shortness of breath.    Cardiovascular:  Negative for chest pain.   Gastrointestinal:  Negative for nausea and vomiting.        Vital Signs for last 24 hours   Temp:  [36 °C (96.8 °F)-36.3 °C (97.3 °F)] 36.3 °C (97.3 °F)  Pulse:  [] 109  Resp:  [16-26] 17  BP: (110-125)/(56-72) 122/72  SpO2:  [93 %-99 %] 96 %    Physical Exam  Vitals reviewed. Exam conducted with a chaperone present.   Constitutional:       Appearance: Normal appearance.   HENT:      Head: Normocephalic.   Eyes:      General:         Right eye: No discharge.         Left eye: No discharge.      Conjunctiva/sclera: Conjunctivae normal.   Cardiovascular:      Rate and Rhythm: Regular rhythm. Tachycardia present.   Pulmonary:      Effort: Pulmonary effort is normal. No respiratory distress.      Breath sounds: Normal breath sounds. No wheezing.   Skin:     General: Skin is warm and dry.   Neurological:      General: No focal deficit present.      Mental Status: She is alert and oriented to person, place, and time.   Psychiatric:         Behavior: Behavior normal.       Medications  Current Facility-Administered Medications   Medication Dose  Route Frequency Provider Last Rate Last Admin    ipratropium-albuterol (DUONEB) nebulizer solution  3 mL Nebulization Once Latonia Feliz M.D.        docusate sodium (COLACE) capsule 100 mg  100 mg Oral BID PRN Lacie Umana M.D.        ondansetron (ZOFRAN ODT) dispertab 4 mg  4 mg Oral Q6HRS PRN Lacie Umana M.D.        ferrous sulfate tablet 325 mg  325 mg Oral QDAY with Breakfast Corinne E Capurro, M.D.   325 mg at 05/27/23 0724    azithromycin (ZITHROMAX) tablet 250 mg  250 mg Oral DAILY Nora Paez M.D.   250 mg at 05/27/23 0602    hydrocortisone 1 % cream   Topical BID Nora Paez M.D.   Given at 05/27/23 0620    mag hydrox-al hydrox-simeth (MAALOX PLUS ES or MYLANTA DS) suspension 10 mL  10 mL Oral 4X/DAY PRN Nora Paez M.D.   10 mL at 05/26/23 2031    famotidine (PEPCID) tablet 20 mg  20 mg Oral BID Nora Paez M.D.   20 mg at 05/27/23 0602    cetirizine (ZYRTEC) tablet 10 mg  10 mg Oral DAILY Carlo Toney M.D.   10 mg at 05/27/23 0724    prenatal plus vitamin (STUARTNATAL 1+1) 27-1 MG tablet 1 Tablet  1 Tablet Oral Daily-0800 Carlo Toney M.D.   1 Tablet at 05/27/23 0724    benzonatate (TESSALON) capsule 100 mg  100 mg Oral TID PRN Nora Paez M.D.   100 mg at 05/27/23 0618    diphenhydrAMINE (BENADRYL) tablet/capsule 50 mg  50 mg Oral Q6HRS PRN Nora Paez M.D.        ipratropium-albuterol (DUONEB) nebulizer solution  3 mL Nebulization Q4H PRN (RT) Adriana Fleming D.O.   3 mL at 05/27/23 1435    mometasone-formoterol (Dulera) 200-5 mcg/Act inhaler 2 Puff  2 Puff Inhalation BID (RT) REBEL SchneiderOGabe   2 Puff at 05/27/23 0709    budesonide (PULMICORT) neb susp 0.5 mg  0.5 mg Nebulization BID (RT) REBEL SchneiderO.   0.5 mg at 05/27/23 0706    predniSONE (DELTASONE) tablet 50 mg  50 mg Oral DAILY REBEL SchneiderO.   50 mg at 05/27/23 0601    aspirin (ASA) chewable tab 81 mg  81 mg Oral DAILY Nora  MYKEL Paez   81 mg at 05/27/23 0602    acetaminophen (Tylenol) tablet 650 mg  650 mg Oral Q4HRS PRN Nora Paez M.D.   650 mg at 05/23/23 2255    levothyroxine (SYNTHROID) tablet 150 mcg  150 mcg Oral AM ES Nora Paez M.D.   150 mcg at 05/27/23 0602    omeprazole (PRILOSEC) capsule 20 mg  20 mg Oral DAILY Nora Paez M.D.   20 mg at 05/27/23 0602    acetaminophen (TYLENOL) tablet 1,000 mg  1,000 mg Oral Q6HRS PRN Nora Paez M.D.   1,000 mg at 05/26/23 2030         Assessment/Plan  #Acute hypoxemic respiratory failure, suspect that this is secondary to asthma exacerbation     Titrate O2 to maintain sats >95% in the setting of pregnancy  Continue Dulera high dose with spacer  Continue nebulized budesonide, double ICS to ensure that her deep airways are getting access to the medication  --Reescalated to scheduled every 4 hours  Continue prednisone 50 mg daily, for at least 10 days  Continue Zyrtec daily for control of allergies  Continue nasal rinses  Continue with omeprazole -spoke with the patient and with Dr. Kang about maximizing this therapy.  Respiratory bio fire was negative  Encouraged to ambulate frequently and to use incentive spirometer.  She is achieving 3 L with I-S, which is remarkable.  I did  that it is unlikely that working to be able to consistently maintain a saturation greater than 95% on room air given that she is nearly to term in pregnancy and has altered respiratory physiology.  There is also possible we could discharge home with supplemental oxygen and inhalers, if the patient and OB team were both comfortable.  I will defer that decision.    Total consult time: 45 minutes which included time spent on chart review, personally reviewing pertinent images and labs, time spent counseling and educating the patient and/or family members, and coordinating care with the healthcare team to include consultants.     Davian Pérez, DO  Staff  Pulmonologist and Intensivist  [facility]     Please note that this dictation was created using voice recognition software. The accuracy of the dictation is limited to the abilities of the software. I have made every reasonable attempt to correct obvious errors, but I expect that there are errors of grammar and possibly content that I did not discover before finalizing the note.

## 2023-05-28 LAB
GLUCOSE BLD STRIP.AUTO-MCNC: 101 MG/DL (ref 65–99)
GLUCOSE BLD STRIP.AUTO-MCNC: 110 MG/DL (ref 65–99)
GLUCOSE BLD STRIP.AUTO-MCNC: 139 MG/DL (ref 65–99)
GLUCOSE BLD STRIP.AUTO-MCNC: 83 MG/DL (ref 65–99)

## 2023-05-28 PROCEDURE — 700111 HCHG RX REV CODE 636 W/ 250 OVERRIDE (IP): Performed by: INTERNAL MEDICINE

## 2023-05-28 PROCEDURE — 59025 FETAL NON-STRESS TEST: CPT

## 2023-05-28 PROCEDURE — 700102 HCHG RX REV CODE 250 W/ 637 OVERRIDE(OP): Performed by: OBSTETRICS & GYNECOLOGY

## 2023-05-28 PROCEDURE — 700101 HCHG RX REV CODE 250: Performed by: INTERNAL MEDICINE

## 2023-05-28 PROCEDURE — 770002 HCHG ROOM/CARE - OB PRIVATE (112)

## 2023-05-28 PROCEDURE — 302790 HCHG STAT ANTEPARTUM CARE, DAILY

## 2023-05-28 PROCEDURE — 94640 AIRWAY INHALATION TREATMENT: CPT

## 2023-05-28 PROCEDURE — A9270 NON-COVERED ITEM OR SERVICE: HCPCS | Performed by: OBSTETRICS & GYNECOLOGY

## 2023-05-28 PROCEDURE — 99232 SBSQ HOSP IP/OBS MODERATE 35: CPT | Performed by: INTERNAL MEDICINE

## 2023-05-28 PROCEDURE — 82962 GLUCOSE BLOOD TEST: CPT | Mod: 91

## 2023-05-28 RX ADMIN — IPRATROPIUM BROMIDE AND ALBUTEROL SULFATE 3 ML: 2.5; .5 SOLUTION RESPIRATORY (INHALATION) at 17:42

## 2023-05-28 RX ADMIN — LEVOTHYROXINE SODIUM 150 MCG: 0.15 TABLET ORAL at 06:23

## 2023-05-28 RX ADMIN — OMEPRAZOLE 20 MG: 20 CAPSULE, DELAYED RELEASE ORAL at 06:24

## 2023-05-28 RX ADMIN — MOMETASONE FUROATE AND FORMOTEROL FUMARATE DIHYDRATE 2 PUFF: 200; 5 AEROSOL RESPIRATORY (INHALATION) at 19:50

## 2023-05-28 RX ADMIN — FERROUS SULFATE TAB 325 MG (65 MG ELEMENTAL FE) 325 MG: 325 (65 FE) TAB at 07:22

## 2023-05-28 RX ADMIN — ASPIRIN 81 MG 81 MG: 81 TABLET ORAL at 06:23

## 2023-05-28 RX ADMIN — IPRATROPIUM BROMIDE AND ALBUTEROL SULFATE 3 ML: 2.5; .5 SOLUTION RESPIRATORY (INHALATION) at 10:11

## 2023-05-28 RX ADMIN — FAMOTIDINE 20 MG: 20 TABLET, FILM COATED ORAL at 06:23

## 2023-05-28 RX ADMIN — BENZONATATE 100 MG: 100 CAPSULE ORAL at 06:24

## 2023-05-28 RX ADMIN — FAMOTIDINE 20 MG: 20 TABLET, FILM COATED ORAL at 18:18

## 2023-05-28 RX ADMIN — ACETAMINOPHEN 1000 MG: 500 TABLET, FILM COATED ORAL at 12:50

## 2023-05-28 RX ADMIN — PREDNISONE 50 MG: 20 TABLET ORAL at 06:25

## 2023-05-28 RX ADMIN — ALUMINUM HYDROXIDE, MAGNESIUM HYDROXIDE, AND DIMETHICONE 10 ML: 400; 400; 40 SUSPENSION ORAL at 12:49

## 2023-05-28 RX ADMIN — IPRATROPIUM BROMIDE AND ALBUTEROL SULFATE 3 ML: 2.5; .5 SOLUTION RESPIRATORY (INHALATION) at 13:58

## 2023-05-28 RX ADMIN — BUDESONIDE INHALATION 0.5 MG: 0.5 SUSPENSION RESPIRATORY (INHALATION) at 19:47

## 2023-05-28 RX ADMIN — IPRATROPIUM BROMIDE AND ALBUTEROL SULFATE 3 ML: 2.5; .5 SOLUTION RESPIRATORY (INHALATION) at 02:06

## 2023-05-28 RX ADMIN — IPRATROPIUM BROMIDE AND ALBUTEROL SULFATE 3 ML: 2.5; .5 SOLUTION RESPIRATORY (INHALATION) at 06:49

## 2023-05-28 RX ADMIN — AZITHROMYCIN MONOHYDRATE 250 MG: 250 TABLET ORAL at 06:23

## 2023-05-28 RX ADMIN — PRENATAL WITH FERROUS FUM AND FOLIC ACID 1 TABLET: 3080; 920; 120; 400; 22; 1.84; 3; 20; 10; 1; 12; 200; 27; 25; 2 TABLET ORAL at 07:22

## 2023-05-28 RX ADMIN — IPRATROPIUM BROMIDE AND ALBUTEROL SULFATE 3 ML: 2.5; .5 SOLUTION RESPIRATORY (INHALATION) at 22:02

## 2023-05-28 RX ADMIN — BENZONATATE 100 MG: 100 CAPSULE ORAL at 21:43

## 2023-05-28 RX ADMIN — MOMETASONE FUROATE AND FORMOTEROL FUMARATE DIHYDRATE 2 PUFF: 200; 5 AEROSOL RESPIRATORY (INHALATION) at 06:49

## 2023-05-28 RX ADMIN — BUDESONIDE INHALATION 0.5 MG: 0.5 SUSPENSION RESPIRATORY (INHALATION) at 06:48

## 2023-05-28 RX ADMIN — ACETAMINOPHEN 1000 MG: 500 TABLET, FILM COATED ORAL at 21:43

## 2023-05-28 RX ADMIN — CETIRIZINE HYDROCHLORIDE 10 MG: 10 TABLET, FILM COATED ORAL at 06:24

## 2023-05-28 RX ADMIN — ALUMINUM HYDROXIDE, MAGNESIUM HYDROXIDE, AND DIMETHICONE 10 ML: 400; 400; 40 SUSPENSION ORAL at 21:43

## 2023-05-28 ASSESSMENT — ENCOUNTER SYMPTOMS
VOMITING: 0
COUGH: 1
SHORTNESS OF BREATH: 1
CHILLS: 0
NAUSEA: 0
FEVER: 0

## 2023-05-28 ASSESSMENT — PAIN DESCRIPTION - PAIN TYPE: TYPE: ACUTE PAIN

## 2023-05-28 NOTE — PROGRESS NOTES
"Pulmonary Progress Note    Date of admission  2023    Reason for Consultation  Acute hypoxemic respiratory failure      History of Presenting Illness  From Dr. Fleming's note: \"38 y.o. female who presented 2023 with cough and shortness of breath.  She is currently pregnant, , 35 weeks and 6 days. she reports that she has no formal diagnosis of asthma, but has had a reactive airways disease syndrome, especially when she is sick and usually requires a course of prednisone and an albuterol inhaler.  This has always helped her in the past.  Starting in late March, early April patient started having worsening cough.  She has been on short courses of steroids and a low-dose Advair inhaler on and off since that time.  She reports that when she is taken full dose steroids she has had some relief and improvement, however, there has been concern about giving her higher dose steroids due to pregnancy and she has not had a good response to them.  She has been having severe coughing fits and is unable to catch her breath at these times.  She has had response to nebulizers in the past.  She has been checking her saturations at home and they have been around 94%, but when she exerts herself by walking up the stairs, she notes that her heart rate increases into the 150s.  In the ER, they did stand her up and her oxygen saturations dropped to 88% and she was placed on oxygen at that time.  Patient endorses a feeling of tightness in her throat, she has no history of tracheal stenosis.  She does not feel that she has any nasal congestion at this time, but reports that she maybe has been snoring.  She does endorse a history of allergies [place] exposed to more allergens than in the city.\"    Hospital Course  2023 - patient reports that she is feeling slightly better today.  She is not having quite as much coughing when she is talking.  She feels like maybe her throat is slightly less " tight.  Reviewed her spirometry and she does have flattening of the inspiratory limb    5/24/2023 -patient reports that she was able to sleep last night, but is not feeling significantly better today.  She continues to cough.  She feels like the every 4 hours nebulizers are helping    5/25 - moving air well. No wheezing. Sore throat improving. Still using low levels of supplemental oxygen.     5/27: States she sleeps sitting partially up.  If she tries to lay flat, she goes in the cognitive beds.  She is on medication for acid reflux.  She does state the medication improves her symptoms but does not completely ameliorate them.  She is also wondering why the DuoNebs have been decreased in frequency.    Today: Reviewed and examined to walk around the unit with oxygen.  Still coughing.  Duo nebs being increased to every 4 hours again helped her.  Primary team also maximized acid reflux meds.    Review of Systems   Constitutional:  Negative for chills and fever.   Respiratory:  Positive for cough and shortness of breath.    Cardiovascular:  Negative for chest pain.   Gastrointestinal:  Negative for nausea and vomiting.        Vital Signs for last 24 hours   Temp:  [36 °C (96.8 °F)-36.3 °C (97.3 °F)] 36.2 °C (97.1 °F)  Pulse:  [] 105  Resp:  [16-26] 20  BP: (119-130)/(60-72) 121/70  SpO2:  [94 %-98 %] 96 %    Physical Exam  Vitals reviewed. Exam conducted with a chaperone present.   Constitutional:       Appearance: Normal appearance.   HENT:      Head: Normocephalic.   Eyes:      General:         Right eye: No discharge.         Left eye: No discharge.      Conjunctiva/sclera: Conjunctivae normal.   Cardiovascular:      Rate and Rhythm: Regular rhythm. Tachycardia present.   Pulmonary:      Effort: Pulmonary effort is normal. No respiratory distress.      Breath sounds: Normal breath sounds. No wheezing.   Skin:     General: Skin is warm and dry.   Neurological:      General: No focal deficit present.      Mental  Status: She is alert and oriented to person, place, and time.   Psychiatric:         Behavior: Behavior normal.       Medications  Current Facility-Administered Medications   Medication Dose Route Frequency Provider Last Rate Last Admin    ipratropium-albuterol (DUONEB) nebulizer solution  3 mL Nebulization Q4HRS (RT) Davian Pérez D.O.   3 mL at 05/28/23 1011    docusate sodium (COLACE) capsule 100 mg  100 mg Oral BID PRN Lacie Umana M.D.        ondansetron (ZOFRAN ODT) dispertab 4 mg  4 mg Oral Q6HRS PRN Lacie Umana M.D.        ferrous sulfate tablet 325 mg  325 mg Oral QDAY with Breakfast Corinne E Capurro, M.D.   325 mg at 05/28/23 0722    hydrocortisone 1 % cream   Topical BID Nora Paez M.D.   Given at 05/27/23 0620    mag hydrox-al hydrox-simeth (MAALOX PLUS ES or MYLANTA DS) suspension 10 mL  10 mL Oral 4X/DAY PRN Nora Paez M.D.   10 mL at 05/28/23 1249    famotidine (PEPCID) tablet 20 mg  20 mg Oral BID Nora Paez M.D.   20 mg at 05/28/23 0623    cetirizine (ZYRTEC) tablet 10 mg  10 mg Oral DAILY Carlo Toney M.D.   10 mg at 05/28/23 0624    prenatal plus vitamin (STUARTNATAL 1+1) 27-1 MG tablet 1 Tablet  1 Tablet Oral Daily-0800 Carlo Toney M.D.   1 Tablet at 05/28/23 0722    benzonatate (TESSALON) capsule 100 mg  100 mg Oral TID PRN Nora Paez M.D.   100 mg at 05/28/23 0624    diphenhydrAMINE (BENADRYL) tablet/capsule 50 mg  50 mg Oral Q6HRS PRN Nora Paez M.D.        mometasone-formoterol (Dulera) 200-5 mcg/Act inhaler 2 Puff  2 Puff Inhalation BID (RT) Adriana Fleming D.O.   2 Puff at 05/28/23 0649    budesonide (PULMICORT) neb susp 0.5 mg  0.5 mg Nebulization BID (RT) Adriana Fleming D.O.   0.5 mg at 05/28/23 0648    predniSONE (DELTASONE) tablet 50 mg  50 mg Oral DAILY REBEL SchneiderO.   50 mg at 05/28/23 0625    aspirin (ASA) chewable tab 81 mg  81 mg Oral DAILY Nora Paez M.D.   81 mg  at 23 0623    acetaminophen (Tylenol) tablet 650 mg  650 mg Oral Q4HRS PRN Nora Paez M.D.   650 mg at 23 2255    levothyroxine (SYNTHROID) tablet 150 mcg  150 mcg Oral AM ES Nora Paez M.D.   150 mcg at 23 0623    omeprazole (PRILOSEC) capsule 20 mg  20 mg Oral DAILY Nora Paez M.D.   20 mg at 23 0624    acetaminophen (TYLENOL) tablet 1,000 mg  1,000 mg Oral Q6HRS PRN Nora Paez M.D.   1,000 mg at 23 1250         Assessment/Plan  #Acute hypoxemic respiratory failure, suspect that this is secondary to asthma exacerbation     Titrate O2 to maintain sats >95% in the setting of pregnancy  Continue Dulera high dose with spacer  Continue nebulized budesonide, double ICS to ensure that her deep airways are getting access to the medication  --Reescalated to scheduled every 4 hours on    Continue prednisone 50 mg daily, for at least 10 days  Continue Zyrtec daily for control of allergies  Continue nasal rinses  Continue with omeprazole -spoke with the patient and with Dr. Kang about maximizing this therapy on .   Respiratory bio fire was negative  Encouraged to ambulate frequently and to use incentive spirometer.  She is achieving 3 L with I-S, which is remarkable.  I did  that it is unlikely that working to be able to consistently maintain a saturation greater than 95% on room air given that she is nearly to term in pregnancy and has altered respiratory physiology.   Ultimately, it is OB's decision but I do think it is reasonable for Carolina to remain in the hospital until delivery. I do also think it is reasonable to discuss a scheduled , as a potential prolonged labor could exacerbate her asthma further.       Total consult time: 38 minutes which included time spent on chart review, personally reviewing pertinent images and labs, time spent counseling and educating the patient and/or family members, and coordinating care  with the healthcare team to include consultants.     Davian Pérez, DO  Staff Pulmonologist and Intensivist  [facility]     Please note that this dictation was created using voice recognition software. The accuracy of the dictation is limited to the abilities of the software. I have made every reasonable attempt to correct obvious errors, but I expect that there are errors of grammar and possibly content that I did not discover before finalizing the note.

## 2023-05-28 NOTE — PROGRESS NOTES
0700: Report received from Michelle TRACY RN. POC discussed.     0725: Assessment done. Pt denies LOF or VB. Reports occasional UCs and + FM. POC discussed with pt. Encouraged pt to call with needs.     0747: External monitors applied. Reactive NST obtained.     0855: External monitors removed.     1120: Dr. Feliz at bedside. POC discussed.     1420: Dr. Galarza at bedside. POC discussed.     1900: Report given to Michelle TRACY RN. POC discussed.

## 2023-05-28 NOTE — PROGRESS NOTES
" PROGRESS NOTE    DATE: 23  Hospital day: 7  Gestational Age: 35w6d, Estimated Date of Delivery: 23  Primary OB/GYN: Sarah Kang MD  [facility] Consultant: Fede Galarza     SUBJECTIVE  Feeling better. Still coughing, but much less. Had a good night. Staying up on q4 treatments has helped. Still gets SOB with ambulation. Good FM. No LOF/VB,     OBJECTIVE:  /65   Pulse (!) 114   Temp 36.1 °C (97 °F) (Temporal)   Resp 20   Ht 1.702 m (5' 7\")   Wt 103 kg (226 lb)   SpO2 96%        Review of systems: Pertinent items are noted in HPI.    PHYSICAL EXAM:  General:   Alert, conversational, pleasant, no acute distress, coughing but O2 sat maintaining >95 on 1L during conversation  Lungs:   CTAB  Heart:   Tachycardic, regular rhythm  Abdomen:  Soft, gravid, non-tender, non-distended  Genitourinary: deferred      OB Ultrasound:  2023 (OP US with Michell) 5lb9oz, 82%, normal DEMI, normal TN interval, vertex      FHT: 150s with moderate LTV. +accels. No variables/decels  Winfred: occasional      Medications:   Scheduled Medications   Medication Dose Frequency    ipratropium-albuterol  3 mL Q4HRS (RT)    ferrous sulfate  325 mg QDAY with Breakfast    hydrocortisone   BID    famotidine  20 mg BID    cetirizine  10 mg DAILY    prenatal plus vitamin  1 Tablet Daily-0800    mometasone-formoterol  2 Puff BID (RT)    budesonide  0.5 mg BID (RT)    predniSONE  50 mg DAILY    aspirin  81 mg DAILY    levothyroxine  150 mcg AM ES    omeprazole  20 mg DAILY       Labs:   Respiratory panel negative     Fasting 83        Fasting 83      Imaging:   US-THYROID (): normal    CT-CTA CHEST PULMONARY ARTERY W/ RECONS ()   1.  No large central pulmonary embolus is appreciated, evaluation of the subsegmental branches is essentially nondiagnostic due to motion artifacts. Additional imaging would be required for definitive exclusion of small distal pulmonary emboli.   2.  Left breast mass, " recommend referral for mammography and additional workup.     EC-ECHOCARDIOGRAM COMPLETE W/O CONT ()   FNDINGS Left Ventricle Normal left ventricular chamber size. Normal left ventricular wall thickness. Normal left ventricular systolic function. The left ventricular ejection fraction is visually estimated to be 65%. Normal regional wall motion. Diastolic function is difficult to assess with tachycardia. Right Ventricle The right ventricle is normal in size and systolic function. Right Atrium The right atrium is normal in size. Normal inferior vena cava size and inspiratory collapse. Left Atrium The left atrium is normal in size. Left atrial volume index is 14 mL/sq m. Mitral Valve Structurally normal mitral valve without significant stenosis or regurgitation. Aortic Valve Structurally normal aortic valve without significant stenosis or regurgitation. Tricuspid Valve Structurally normal tricuspid valve without significant stenosis or regurgitation. Unable to estimate right ventricular systolic pressure due to an inadequate tricuspid regurgitant jet. Pulmonic Valve Structurally normal pulmonic valve without significant stenosis or regurgitation. Pericardium No pericardial effusion. Aorta Normal aortic root for body surface area. The ascending aorta diameter is 2.9 cm. Helio Bernal MD (Electronically Signed) Final Date:     21 May 2023 20:14    DX-CHEST-PORTABLE (1 VIEW) ()   No acute cardiac or pulmonary abnormalities are identified.    CONSULTS:   Pulmonology  ENT (Dr. Roblero) bedside laryngoscopy showing findings c/w GERD    ASSESSMENT & PLAN  Carolina Medrano is a 38 y.o.  at 35w6d admitted with acute respiratory failure due to asthma exacerbation    Plan inpatient management until delivery    PLAN:  Asthma  Appreciate pulmonary consult  Plan per their note: Continue budesonide, dulera, q4 nebs, prednisone x 10 days (last dose ), Azithromycin x 5 days (last dose ), Zyrtec, nasal  rinses  Goal O2 >95%.   Pt continues to need nebulizer treatments and supplemental oxygen  Likely to have a high allergan burden at home  GERD  Continue pepcid, maalox, omeprazole  Elevated Blood glucose  Due to steroid use  SS insulin prn (has not needed)   Continue GDM diet  GTT normal   Fetal surveillance  Reassuring and reactive  BID testing  Delivery mode  At this time, she likely would not be able to tolerate labor and pushing. Aware of option for passive second stage   Delivery likely around 37w  She is leaning towards LTCS.   Dr. Martin to come discuss anesthetic plan   Platelets  Likely gestational thrombocytopenia. Plt at 28w labs 128  Remains adequate for regional anesthesia   Left breast mass  Incidental finding noted on CTA of chest  Still needs work-up   GBS positive in urine  Notes allergy to PCN (childhood) and rash with Vancomycin and Clindamycin  Ancef for prophylaxis if planning for   Hashimoto Thyroiditis  Thryoid US normal. Labs normal on admission  Continue home levothyroxine  SSA Ab  No evidence of fetal heart block on last US with Dr. Galarza.   No evidence on NST in hospitial    Plan of care discussed with the patient and she is in agreement with this plan. Plan of care discussed with RN taking carer of patient. All questions and concerns were answered.     Latonia Feliz MD

## 2023-05-28 NOTE — PROGRESS NOTES
S: Feeling better but when taking a walk to [employer], she had to stop to rest.  O: Patient Vitals for the past 24 hrs:   BP Temp Temp src Pulse Resp SpO2   05/28/23 1400 -- -- -- (!) 106 18 96 %   05/28/23 1244 121/70 36.2 °C (97.1 °F) Temporal (!) 105 -- 96 %   05/28/23 1013 -- -- -- (!) 114 -- 96 %   05/28/23 0724 130/65 36.1 °C (97 °F) Temporal (!) 101 -- --   05/28/23 0652 -- -- -- 99 20 97 %   05/28/23 0500 -- -- -- 85 -- 95 %   05/28/23 0400 -- -- -- 94 -- 95 %   05/28/23 0300 -- -- -- 94 -- 96 %   05/28/23 0220 119/64 36 °C (96.8 °F) Temporal 99 (!) 24 95 %   05/28/23 0207 -- -- -- 94 -- 95 %   05/28/23 0113 -- -- -- -- -- 97 %   05/28/23 0100 -- -- -- 98 -- 94 %   05/28/23 0000 -- -- -- 100 -- 95 %   05/27/23 2310 121/68 36.1 °C (96.9 °F) Temporal (!) 110 18 95 %   05/27/23 2300 -- -- -- (!) 116 -- 96 %   05/27/23 2215 -- -- -- 100 -- 96 %   05/27/23 2200 -- -- -- (!) 107 -- 96 %   05/27/23 2100 -- -- -- (!) 107 -- 96 %   05/27/23 2000 125/60 36.2 °C (97.2 °F) Temporal (!) 117 (!) 26 95 %   05/27/23 1913 -- -- -- (!) 104 -- 95 %   05/27/23 1630 -- -- -- (!) 109 -- 96 %   05/27/23 1620 122/72 36.3 °C (97.3 °F) Temporal (!) 115 17 96 %   05/27/23 1610 -- -- -- (!) 117 -- 96 %   05/27/23 1600 -- -- -- (!) 113 -- 96 %   05/27/23 1550 -- -- -- (!) 107 -- 95 %   05/27/23 1540 -- -- -- (!) 111 -- 97 %   05/27/23 1530 -- -- -- (!) 121 -- 95 %     EFM: moderate variability and accelerations present     Latest Reference Range & Units 05/28/23 06:29 05/28/23 08:53 05/28/23 14:21   POC Glucose, Blood 65 - 99 mg/dL 83 110 (H) 139 (H)   (H): Data is abnormally high   ULTRASOUND:      Single live IUP, vertex, subjective normal DEMI.  EFW 2665 gm  AUA:36 5/7 weeks.  SC interval 102 msec.  A: IUP 35 6/7 weeks        Bronchial asthma but still needs oxygen support.       Glucose satisfactory on diabetic diet.     SS-A antibody, normal SC interval.   P: Continue with glucose monitoring.      Discussed with pulmonary medicine  regarding biologics.  Informed him from a OB perspective, some biologics are considered to be a reasonable option.  Question is whether she qualifies.  He will assess and consider if she meet criteria.      Plan is to get to near term and delivery by CS with regional anesthesia.         Will try regular diet and monitor her glucose.     Detailed consultation with high complexity decision making.

## 2023-05-29 LAB
GLUCOSE BLD STRIP.AUTO-MCNC: 117 MG/DL (ref 65–99)
GLUCOSE BLD STRIP.AUTO-MCNC: 87 MG/DL (ref 65–99)

## 2023-05-29 PROCEDURE — 700102 HCHG RX REV CODE 250 W/ 637 OVERRIDE(OP): Performed by: OBSTETRICS & GYNECOLOGY

## 2023-05-29 PROCEDURE — A9270 NON-COVERED ITEM OR SERVICE: HCPCS | Performed by: OBSTETRICS & GYNECOLOGY

## 2023-05-29 PROCEDURE — 99232 SBSQ HOSP IP/OBS MODERATE 35: CPT | Performed by: INTERNAL MEDICINE

## 2023-05-29 PROCEDURE — 302790 HCHG STAT ANTEPARTUM CARE, DAILY

## 2023-05-29 PROCEDURE — 94640 AIRWAY INHALATION TREATMENT: CPT

## 2023-05-29 PROCEDURE — 770002 HCHG ROOM/CARE - OB PRIVATE (112)

## 2023-05-29 PROCEDURE — 700101 HCHG RX REV CODE 250: Performed by: INTERNAL MEDICINE

## 2023-05-29 PROCEDURE — 700111 HCHG RX REV CODE 636 W/ 250 OVERRIDE (IP): Performed by: INTERNAL MEDICINE

## 2023-05-29 PROCEDURE — 59025 FETAL NON-STRESS TEST: CPT

## 2023-05-29 PROCEDURE — 82962 GLUCOSE BLOOD TEST: CPT

## 2023-05-29 RX ADMIN — IPRATROPIUM BROMIDE AND ALBUTEROL SULFATE 3 ML: 2.5; .5 SOLUTION RESPIRATORY (INHALATION) at 23:19

## 2023-05-29 RX ADMIN — IPRATROPIUM BROMIDE AND ALBUTEROL SULFATE 3 ML: 2.5; .5 SOLUTION RESPIRATORY (INHALATION) at 15:42

## 2023-05-29 RX ADMIN — FERROUS SULFATE TAB 325 MG (65 MG ELEMENTAL FE) 325 MG: 325 (65 FE) TAB at 07:20

## 2023-05-29 RX ADMIN — LEVOTHYROXINE SODIUM 150 MCG: 0.15 TABLET ORAL at 06:01

## 2023-05-29 RX ADMIN — FAMOTIDINE 20 MG: 20 TABLET, FILM COATED ORAL at 20:19

## 2023-05-29 RX ADMIN — BUDESONIDE INHALATION 0.5 MG: 0.5 SUSPENSION RESPIRATORY (INHALATION) at 19:21

## 2023-05-29 RX ADMIN — PREDNISONE 50 MG: 20 TABLET ORAL at 06:03

## 2023-05-29 RX ADMIN — PRENATAL WITH FERROUS FUM AND FOLIC ACID 1 TABLET: 3080; 920; 120; 400; 22; 1.84; 3; 20; 10; 1; 12; 200; 27; 25; 2 TABLET ORAL at 07:20

## 2023-05-29 RX ADMIN — IPRATROPIUM BROMIDE AND ALBUTEROL SULFATE 3 ML: 2.5; .5 SOLUTION RESPIRATORY (INHALATION) at 19:21

## 2023-05-29 RX ADMIN — BENZONATATE 100 MG: 100 CAPSULE ORAL at 20:20

## 2023-05-29 RX ADMIN — ALUMINUM HYDROXIDE, MAGNESIUM HYDROXIDE, AND DIMETHICONE 30 ML: 400; 400; 40 SUSPENSION ORAL at 20:19

## 2023-05-29 RX ADMIN — IPRATROPIUM BROMIDE AND ALBUTEROL SULFATE 3 ML: 2.5; .5 SOLUTION RESPIRATORY (INHALATION) at 02:14

## 2023-05-29 RX ADMIN — CETIRIZINE HYDROCHLORIDE 10 MG: 10 TABLET, FILM COATED ORAL at 06:02

## 2023-05-29 RX ADMIN — MOMETASONE FUROATE AND FORMOTEROL FUMARATE DIHYDRATE 2 PUFF: 200; 5 AEROSOL RESPIRATORY (INHALATION) at 19:21

## 2023-05-29 RX ADMIN — MOMETASONE FUROATE AND FORMOTEROL FUMARATE DIHYDRATE 2 PUFF: 200; 5 AEROSOL RESPIRATORY (INHALATION) at 07:32

## 2023-05-29 RX ADMIN — OMEPRAZOLE 20 MG: 20 CAPSULE, DELAYED RELEASE ORAL at 06:02

## 2023-05-29 RX ADMIN — BENZONATATE 100 MG: 100 CAPSULE ORAL at 06:01

## 2023-05-29 RX ADMIN — IPRATROPIUM BROMIDE AND ALBUTEROL SULFATE 3 ML: 2.5; .5 SOLUTION RESPIRATORY (INHALATION) at 11:19

## 2023-05-29 RX ADMIN — BUDESONIDE INHALATION 0.5 MG: 0.5 SUSPENSION RESPIRATORY (INHALATION) at 07:31

## 2023-05-29 RX ADMIN — FAMOTIDINE 20 MG: 20 TABLET, FILM COATED ORAL at 06:03

## 2023-05-29 RX ADMIN — IPRATROPIUM BROMIDE AND ALBUTEROL SULFATE 3 ML: 2.5; .5 SOLUTION RESPIRATORY (INHALATION) at 07:30

## 2023-05-29 RX ADMIN — ASPIRIN 81 MG 81 MG: 81 TABLET ORAL at 06:01

## 2023-05-29 RX ADMIN — ALUMINUM HYDROXIDE, MAGNESIUM HYDROXIDE, AND DIMETHICONE 10 ML: 400; 400; 40 SUSPENSION ORAL at 08:30

## 2023-05-29 RX ADMIN — ACETAMINOPHEN 1000 MG: 500 TABLET, FILM COATED ORAL at 20:18

## 2023-05-29 ASSESSMENT — ENCOUNTER SYMPTOMS
COUGH: 1
SHORTNESS OF BREATH: 1
NAUSEA: 0
VOMITING: 0
CHILLS: 0
FEVER: 0

## 2023-05-29 NOTE — PROGRESS NOTES
"Pulmonary Progress Note    Date of admission  2023    Reason for Consultation  Acute hypoxemic respiratory failure      History of Presenting Illness  From Dr. Fleming's note: \"38 y.o. female who presented 2023 with cough and shortness of breath.  She is currently pregnant, , 35 weeks and 6 days. she reports that she has no formal diagnosis of asthma, but has had a reactive airways disease syndrome, especially when she is sick and usually requires a course of prednisone and an albuterol inhaler.  This has always helped her in the past.  Starting in late March, early April patient started having worsening cough.  She has been on short courses of steroids and a low-dose Advair inhaler on and off since that time.  She reports that when she is taken full dose steroids she has had some relief and improvement, however, there has been concern about giving her higher dose steroids due to pregnancy and she has not had a good response to them.  She has been having severe coughing fits and is unable to catch her breath at these times.  She has had response to nebulizers in the past.  She has been checking her saturations at home and they have been around 94%, but when she exerts herself by walking up the stairs, she notes that her heart rate increases into the 150s.  In the ER, they did stand her up and her oxygen saturations dropped to 88% and she was placed on oxygen at that time.  Patient endorses a feeling of tightness in her throat, she has no history of tracheal stenosis.  She does not feel that she has any nasal congestion at this time, but reports that she maybe has been snoring.  She does endorse a history of allergies [place] the [place].\"    Hospital Course  2023 - patient reports that she is feeling slightly better today.  She is not having quite as much coughing when she is talking.  She feels like maybe her throat is slightly less " tight.  Reviewed her spirometry and she does have flattening of the inspiratory limb    5/24/2023 -patient reports that she was able to sleep last night, but is not feeling significantly better today.  She continues to cough.  She feels like the every 4 hours nebulizers are helping    5/25 - moving air well. No wheezing. Sore throat improving. Still using low levels of supplemental oxygen.     5/27: States she sleeps sitting partially up.  If she tries to lay flat, she goes in the cognitive beds.  She is on medication for acid reflux.  She does state the medication improves her symptoms but does not completely ameliorate them.  She is also wondering why the DuoNebs have been decreased in frequency.    5/28: Reviewed and examined to walk around the unit with oxygen.  Still coughing.  Duo nebs being increased to every 4 hours again helped her.  Primary team also maximized acid reflux meds.    Today: No significant new events. Planning for scheduled C/S next Tuesday.     Review of Systems   Constitutional:  Negative for chills and fever.   Respiratory:  Positive for cough and shortness of breath.    Cardiovascular:  Negative for chest pain.   Gastrointestinal:  Negative for nausea and vomiting.        Vital Signs for last 24 hours   Temp:  [35.9 °C (96.6 °F)-36.2 °C (97.1 °F)] 36.1 °C (97 °F)  Pulse:  [] 105  Resp:  [17-20] 17  BP: (115-125)/(55-70) 125/61  SpO2:  [94 %-98 %] 95 %    Physical Exam  Vitals reviewed. Exam conducted with a chaperone present.   Constitutional:       Appearance: Normal appearance.   HENT:      Head: Normocephalic.   Eyes:      General:         Right eye: No discharge.         Left eye: No discharge.      Conjunctiva/sclera: Conjunctivae normal.   Cardiovascular:      Rate and Rhythm: Regular rhythm. Tachycardia present.   Pulmonary:      Effort: Pulmonary effort is normal. No respiratory distress.      Breath sounds: Normal breath sounds. No wheezing.   Skin:     General: Skin is warm  and dry.   Neurological:      General: No focal deficit present.      Mental Status: She is alert and oriented to person, place, and time.   Psychiatric:         Behavior: Behavior normal.       Medications  Current Facility-Administered Medications   Medication Dose Route Frequency Provider Last Rate Last Admin    ipratropium-albuterol (DUONEB) nebulizer solution  3 mL Nebulization Q4HRS (RT) Davian Pérez D.O.   3 mL at 05/29/23 0730    docusate sodium (COLACE) capsule 100 mg  100 mg Oral BID PRN Lacie Umana M.D.        ondansetron (ZOFRAN ODT) dispertab 4 mg  4 mg Oral Q6HRS PRN Lacie Umana M.D.        ferrous sulfate tablet 325 mg  325 mg Oral QDAY with Breakfast Corinne E Capurro, M.D.   325 mg at 05/29/23 0720    hydrocortisone 1 % cream   Topical BID Nora Paez M.D.   Given at 05/27/23 0620    mag hydrox-al hydrox-simeth (MAALOX PLUS ES or MYLANTA DS) suspension 10 mL  10 mL Oral 4X/DAY PRN Nora Paez M.D.   10 mL at 05/29/23 0830    famotidine (PEPCID) tablet 20 mg  20 mg Oral BID Nora Paez M.D.   20 mg at 05/29/23 0603    cetirizine (ZYRTEC) tablet 10 mg  10 mg Oral DAILY Carlo Toney M.D.   10 mg at 05/29/23 0602    prenatal plus vitamin (STUARTNATAL 1+1) 27-1 MG tablet 1 Tablet  1 Tablet Oral Daily-0800 Carlo Toney M.D.   1 Tablet at 05/29/23 0720    benzonatate (TESSALON) capsule 100 mg  100 mg Oral TID PRN Nora Paez M.D.   100 mg at 05/29/23 0601    diphenhydrAMINE (BENADRYL) tablet/capsule 50 mg  50 mg Oral Q6HRS PRN Nora Paez M.D.        mometasone-formoterol (Dulera) 200-5 mcg/Act inhaler 2 Puff  2 Puff Inhalation BID (RT) Adriana Fleming D.O.   2 Puff at 05/29/23 0732    budesonide (PULMICORT) neb susp 0.5 mg  0.5 mg Nebulization BID (RT) REBEL SchneiderOGabe   0.5 mg at 05/29/23 0731    predniSONE (DELTASONE) tablet 50 mg  50 mg Oral DAILY Adriana Fleming D.O.   50 mg at 05/29/23 0603    aspirin  (ASA) chewable tab 81 mg  81 mg Oral DAILY Nora Paez M.D.   81 mg at 05/29/23 0601    acetaminophen (Tylenol) tablet 650 mg  650 mg Oral Q4HRS PRN Nora Paez M.D.   650 mg at 05/23/23 2255    levothyroxine (SYNTHROID) tablet 150 mcg  150 mcg Oral AM ES Nora Paez M.D.   150 mcg at 05/29/23 0601    omeprazole (PRILOSEC) capsule 20 mg  20 mg Oral DAILY Nora Paez M.D.   20 mg at 05/29/23 0602    acetaminophen (TYLENOL) tablet 1,000 mg  1,000 mg Oral Q6HRS PRN Nora Paez M.D.   1,000 mg at 05/28/23 2143         Assessment/Plan  #Acute hypoxemic respiratory failure, suspect that this is secondary to asthma exacerbation     Titrate O2 to maintain sats >95% in the setting of pregnancy  Continue Dulera high dose with spacer  Continue nebulized budesonide, double ICS to ensure that her deep airways are getting access to the medication  --Reescalated to scheduled every 4 hours on 5/27   Continue prednisone 50 mg daily, for at least 10 days  Continue Zyrtec daily for control of allergies  Continue nasal rinses  Continue with omeprazole -spoke with the patient and with Dr. Kang about maximizing this therapy on 5/27.   Respiratory bio fire was negative  Encouraged to ambulate frequently and to use incentive spirometer.  She is achieving 3 L with I-S, which is remarkable.  Agree with planned C/S.   Not a candidate for biologics given Abs Eos <150 and because time of onset for therapy is 30-90 days.       Total consult time: 35 minutes which included time spent on chart review, personally reviewing pertinent images and labs, time spent counseling and educating the patient and/or family members, and coordinating care with the healthcare team to include consultants.     Davian Pérez DO  Staff Pulmonologist and Intensivist  [facility]     Please note that this dictation was created using voice recognition software. The accuracy of the dictation is limited to the  abilities of the software. I have made every reasonable attempt to correct obvious errors, but I expect that there are errors of grammar and possibly content that I did not discover before finalizing the note.

## 2023-05-29 NOTE — CARE PLAN
The patient is Watcher - Medium risk of patient condition declining or worsening    Shift Goals  Clinical Goals: rest, O2 monitoring, NST  Patient Goals: rest  Family Goals: support    Progress made toward(s) clinical / shift goals:    Problem: Knowledge Deficit - L&D  Goal: Patient and family/caregivers will demonstrate understanding of plan of care, disease process/condition, diagnostic tests and medications  Outcome: Progressing   -POC discussed with pt. All questions answered.     Problem: Risk for Infection and Impaired Wound Healing  Goal: Patient will remain free from infection  Outcome: Progressing   -Pt afebrile. No s/s of infection noted.     Patient is not progressing towards the following goals:

## 2023-05-29 NOTE — PROGRESS NOTES
1900- Report received from KRIS Monson. POC discussed.    2132- FM complete, reactive tracing obtained, occasional UCs observed, not painful per pt.    2146- Pt assessment. Report +FM, +occasional UCs that are not painful or regular. Denies VB, LOF, abdominal cramping or pain.

## 2023-05-29 NOTE — PROGRESS NOTES
0700: Report received from Michelle TRACY RN. POC discussed.     0720: Assessment done. Pt denies LOF, VB, or UCs. Reports + FM. POC discussed with pt. Encouraged pt to call with needs.     1141: External monitors applied. Reactive NST obtained.     1340: Dr. Galarza at bedside. POC discussed. Orders received.    1730: Dr. Rothman at bedside. POC discussed.     1900: Report given to Loren ZHU RN. POC discussed.

## 2023-05-29 NOTE — PROGRESS NOTES
S: Pulmonary treatment was late and she reports she became short of breath.  She feels the timing of her treatments need to be precise.  Still dyspneic on exertion and when talking.  O: Patient Vitals for the past 24 hrs:   BP Temp Temp src Pulse Resp SpO2   23 1212 134/60 36.1 °C (96.9 °F) Temporal (!) 110 18 96 %   23 1119 -- -- -- (!) 106 18 96 %   23 0806 125/61 36.1 °C (97 °F) Temporal (!) 105 17 95 %   23 0733 -- -- -- 86 18 96 %   23 0620 121/68 35.9 °C (96.6 °F) Temporal 88 -- 97 %   23 0600 -- -- -- 95 -- 97 %   23 0500 -- -- -- 88 -- 96 %   23 0400 -- -- -- 100 -- 95 %   23 0300 -- -- -- 98 -- 97 %   23 0230 118/55 -- -- -- -- 96 %   23 0214 -- -- -- 98 -- 98 %   23 0200 -- -- -- 98 -- 95 %   23 0100 -- -- -- 90 -- 95 %   23 0000 -- -- -- 94 -- 96 %   23 2300 -- -- -- 98 -- 95 %   23 2203 -- -- -- (!) 105 -- 95 %   23 2146 115/63 36.1 °C (97 °F) Temporal (!) 101 -- 97 %   23 -- -- -- (!) 104 -- 97 %   23 1900 -- -- -- (!) 104 -- 94 %   23 1800 -- -- -- (!) 112 -- 95 %   23 1744 -- -- -- (!) 113 20 96 %   23 1700 -- -- -- (!) 112 -- 96 %   23 1600 -- -- -- (!) 106 -- 96 %   23 1547 121/67 36.1 °C (96.9 °F) Temporal (!) 109 -- --   23 1500 -- -- -- (!) 113 -- 96 %   23 1400 -- -- -- (!) 106 18 96 %     EFM: Moderate variability and accelerations present. No contractions.    LUNG: RLL inspiratory wheezing.  Prolonged expiratory phase.    A: IUP 36 weeks      Bronchial asthma, severe      Fetus appropriate growth and normal NH interval.      SS-A antibody  P: Discontinue glucose monitoring.      Planned  section delivery.        Detailed consultation with high complexity decision making

## 2023-05-30 LAB
ERYTHROCYTE [DISTWIDTH] IN BLOOD BY AUTOMATED COUNT: 43.5 FL (ref 35.9–50)
HCT VFR BLD AUTO: 30.9 % (ref 37–47)
HGB BLD-MCNC: 10.1 G/DL (ref 12–16)
MCH RBC QN AUTO: 27.8 PG (ref 27–33)
MCHC RBC AUTO-ENTMCNC: 32.7 G/DL (ref 32.2–35.5)
MCV RBC AUTO: 85.1 FL (ref 81.4–97.8)
PLATELET # BLD AUTO: 185 K/UL (ref 164–446)
PMV BLD AUTO: 11.6 FL (ref 9–12.9)
RBC # BLD AUTO: 3.63 M/UL (ref 4.2–5.4)
WBC # BLD AUTO: 14.8 K/UL (ref 4.8–10.8)

## 2023-05-30 PROCEDURE — A9270 NON-COVERED ITEM OR SERVICE: HCPCS | Performed by: OBSTETRICS & GYNECOLOGY

## 2023-05-30 PROCEDURE — 700102 HCHG RX REV CODE 250 W/ 637 OVERRIDE(OP): Performed by: OBSTETRICS & GYNECOLOGY

## 2023-05-30 PROCEDURE — 700101 HCHG RX REV CODE 250: Performed by: INTERNAL MEDICINE

## 2023-05-30 PROCEDURE — 94640 AIRWAY INHALATION TREATMENT: CPT

## 2023-05-30 PROCEDURE — 700111 HCHG RX REV CODE 636 W/ 250 OVERRIDE (IP): Performed by: OBSTETRICS & GYNECOLOGY

## 2023-05-30 PROCEDURE — 59025 FETAL NON-STRESS TEST: CPT

## 2023-05-30 PROCEDURE — 770002 HCHG ROOM/CARE - OB PRIVATE (112)

## 2023-05-30 PROCEDURE — 85027 COMPLETE CBC AUTOMATED: CPT

## 2023-05-30 PROCEDURE — 302790 HCHG STAT ANTEPARTUM CARE, DAILY

## 2023-05-30 PROCEDURE — 36415 COLL VENOUS BLD VENIPUNCTURE: CPT

## 2023-05-30 PROCEDURE — 700111 HCHG RX REV CODE 636 W/ 250 OVERRIDE (IP): Performed by: INTERNAL MEDICINE

## 2023-05-30 PROCEDURE — 99231 SBSQ HOSP IP/OBS SF/LOW 25: CPT | Performed by: INTERNAL MEDICINE

## 2023-05-30 RX ORDER — ALUMINA, MAGNESIA, AND SIMETHICONE 2400; 2400; 240 MG/30ML; MG/30ML; MG/30ML
30 SUSPENSION ORAL 4 TIMES DAILY PRN
Status: DISCONTINUED | OUTPATIENT
Start: 2023-05-30 | End: 2023-06-05

## 2023-05-30 RX ADMIN — IPRATROPIUM BROMIDE AND ALBUTEROL SULFATE 3 ML: 2.5; .5 SOLUTION RESPIRATORY (INHALATION) at 15:08

## 2023-05-30 RX ADMIN — BENZONATATE 100 MG: 100 CAPSULE ORAL at 21:36

## 2023-05-30 RX ADMIN — IPRATROPIUM BROMIDE AND ALBUTEROL SULFATE 3 ML: 2.5; .5 SOLUTION RESPIRATORY (INHALATION) at 11:07

## 2023-05-30 RX ADMIN — MOMETASONE FUROATE AND FORMOTEROL FUMARATE DIHYDRATE 2 PUFF: 200; 5 AEROSOL RESPIRATORY (INHALATION) at 19:20

## 2023-05-30 RX ADMIN — ACETAMINOPHEN 1000 MG: 500 TABLET, FILM COATED ORAL at 13:09

## 2023-05-30 RX ADMIN — IPRATROPIUM BROMIDE AND ALBUTEROL SULFATE 3 ML: 2.5; .5 SOLUTION RESPIRATORY (INHALATION) at 19:18

## 2023-05-30 RX ADMIN — PREDNISONE 50 MG: 20 TABLET ORAL at 06:08

## 2023-05-30 RX ADMIN — IPRATROPIUM BROMIDE AND ALBUTEROL SULFATE 3 ML: 2.5; .5 SOLUTION RESPIRATORY (INHALATION) at 23:03

## 2023-05-30 RX ADMIN — ONDANSETRON 4 MG: 4 TABLET, ORALLY DISINTEGRATING ORAL at 17:52

## 2023-05-30 RX ADMIN — MOMETASONE FUROATE AND FORMOTEROL FUMARATE DIHYDRATE 2 PUFF: 200; 5 AEROSOL RESPIRATORY (INHALATION) at 07:21

## 2023-05-30 RX ADMIN — ACETAMINOPHEN 1000 MG: 500 TABLET, FILM COATED ORAL at 21:34

## 2023-05-30 RX ADMIN — IPRATROPIUM BROMIDE AND ALBUTEROL SULFATE 3 ML: 2.5; .5 SOLUTION RESPIRATORY (INHALATION) at 02:57

## 2023-05-30 RX ADMIN — BUDESONIDE INHALATION 0.5 MG: 0.5 SUSPENSION RESPIRATORY (INHALATION) at 07:21

## 2023-05-30 RX ADMIN — IPRATROPIUM BROMIDE AND ALBUTEROL SULFATE 3 ML: 2.5; .5 SOLUTION RESPIRATORY (INHALATION) at 07:21

## 2023-05-30 RX ADMIN — FAMOTIDINE 20 MG: 20 TABLET, FILM COATED ORAL at 06:08

## 2023-05-30 RX ADMIN — ALUMINUM HYDROXIDE, MAGNESIUM HYDROXIDE, AND DIMETHICONE 30 ML: 400; 400; 40 SUSPENSION ORAL at 06:14

## 2023-05-30 RX ADMIN — OMEPRAZOLE 20 MG: 20 CAPSULE, DELAYED RELEASE ORAL at 06:08

## 2023-05-30 RX ADMIN — BENZONATATE 100 MG: 100 CAPSULE ORAL at 13:09

## 2023-05-30 RX ADMIN — LEVOTHYROXINE SODIUM 150 MCG: 0.15 TABLET ORAL at 06:08

## 2023-05-30 RX ADMIN — ACETAMINOPHEN 1000 MG: 500 TABLET, FILM COATED ORAL at 03:57

## 2023-05-30 RX ADMIN — CETIRIZINE HYDROCHLORIDE 10 MG: 10 TABLET, FILM COATED ORAL at 06:08

## 2023-05-30 RX ADMIN — PRENATAL WITH FERROUS FUM AND FOLIC ACID 1 TABLET: 3080; 920; 120; 400; 22; 1.84; 3; 20; 10; 1; 12; 200; 27; 25; 2 TABLET ORAL at 08:35

## 2023-05-30 RX ADMIN — FERROUS SULFATE TAB 325 MG (65 MG ELEMENTAL FE) 325 MG: 325 (65 FE) TAB at 08:35

## 2023-05-30 RX ADMIN — ASPIRIN 81 MG 81 MG: 81 TABLET ORAL at 06:08

## 2023-05-30 RX ADMIN — FAMOTIDINE 20 MG: 20 TABLET, FILM COATED ORAL at 17:46

## 2023-05-30 RX ADMIN — ALUMINUM HYDROXIDE, MAGNESIUM HYDROXIDE, AND DIMETHICONE 30 ML: 400; 400; 40 SUSPENSION ORAL at 21:36

## 2023-05-30 RX ADMIN — BUDESONIDE INHALATION 0.5 MG: 0.5 SUSPENSION RESPIRATORY (INHALATION) at 19:19

## 2023-05-30 ASSESSMENT — ENCOUNTER SYMPTOMS
VOMITING: 0
SHORTNESS OF BREATH: 1
NAUSEA: 0
CHILLS: 0
FEVER: 0
COUGH: 1

## 2023-05-30 ASSESSMENT — PAIN DESCRIPTION - PAIN TYPE
TYPE: ACUTE PAIN
TYPE: ACUTE PAIN

## 2023-05-30 NOTE — PROGRESS NOTES
1900) Report received from Iona LANGSTON RN.    2000) pt assessed, left sided rib/back pain R/T coughing.    2018) tylenol given, heat pad applied.  2100) Pt has pain relief related to interventions in place, no further needs.    2155) Monitoring complete, Reactive NST  2300) Pt requesting breathing treatment, RT called to bedside.   0357) Tylenol for rib/back pain and heat pack applied  0600) Scheduled medications given, pt states that tylenol and heat have been helping  0700) Report to Iona LANGSTON RN

## 2023-05-30 NOTE — PROGRESS NOTES
PROGRESS NOTE    DATE: 23  Hospital day: 7  Gestational Age: 36w0d, Estimated Date of Delivery: 23  Primary OB/GYN: Sarah Kang MD  [facility] Consultant: Fede Galarza     SUBJECTIVE  Feeling better. Still coughing, but much less. Had a good night. Staying up on q4 treatments has helped. Still gets SOB with ambulation. Good FM. No LOF/VB,     OBJECTIVE:  PHYSICAL EXAM:  Vitals:    23 1649   BP: 116/59   Pulse: (!) 105   Resp:    Temp: 36.1 °C (97 °F)   SpO2: 96%   General:    Alert, conversational, pleasant, no acute distress,  coughing but O2 sat maintaining >95 on 1L during conversation  Lungs:    Non-labored, symmetric expansion  Heart:    Tachycardic, regular rhythm  Abdomen:   Soft, gravid, non-tender, non-distended  Genitourinary:  Deferred   Extremities:  Moves all, no edema     OB Ultrasound:  2023 (OP US with Michell) 5lb9oz, 82%, normal DEMI, normal PA interval, vertex      NST Interpretation:  Baseline: 140 bpm  Variability: moderate  Accelerations: present  Decelerations: absent  Tocometer: quiet    Medications:   Scheduled Medications   Medication Dose Frequency    ipratropium-albuterol  3 mL Q4HRS (RT)    ferrous sulfate  325 mg QDAY with Breakfast    hydrocortisone   BID    famotidine  20 mg BID    cetirizine  10 mg DAILY    prenatal plus vitamin  1 Tablet Daily-0800    mometasone-formoterol  2 Puff BID (RT)    budesonide  0.5 mg BID (RT)    predniSONE  50 mg DAILY    aspirin  81 mg DAILY    levothyroxine  150 mcg AM ES    omeprazole  20 mg DAILY     CONSULTS:   Pulmonology  ENT (Dr. Roblero) bedside laryngoscopy showing findings c/w GERD    ASSESSMENT & PLAN  Carolina Medrano is a 38 y.o.  at 36w0d admitted with acute respiratory failure due to asthma exacerbation, HD#8    PLAN:  Asthma  Appreciate pulmonary consult  Plan per their note: Continue budesonide, dulera, q4 nebs, prednisone x 10 days (last dose ), Azithromycin x 5 days (last dose ), Zyrtec, nasal  rinses  Goal O2 >95%.   Pt continues to need nebulizer treatments and supplemental oxygen.  Likely to have a high allergan burden at home.  GERD  Continue pepcid, maalox, omeprazole.  Elevated Blood glucose:  Due to steroid use.  SS insulin prn (has not needed).  Continue GDM diet --> switched back to regular diet.  GTT normal.  Fetal surveillance  Reassuring and reactive.  BID testing.  Delivery mode  Plan for Primary C/S at 37+0 weeks due to pulmonary compromise. Epidural and not spinal due to pulmonary compromise.   Gestational thrombocytopenia  Left breast mass  Incidental finding noted on CTA of chest  Still needs work-up   GBS positive in urine  Notes allergy to PCN (childhood) and rash with Vancomycin and Clindamycin  Ancef for prophylaxis if planning for   Hashimoto Thyroiditis  Thryoid US normal. Labs normal on admission  Continue home levothyroxine  SSA Ab  No evidence of fetal heart block on last US with Dr. Galarza.   No evidence on NST in hospitial    Plan of care discussed with the patient and she is in agreement with this plan. Plan of care discussed with RN taking carer of patient. All questions and concerns were answered.     Kacie Rothman MD

## 2023-05-30 NOTE — PROGRESS NOTES
0700: Report received from Loren ZHU RN. POC discussed.     0835: Assessment done. Pt denies LOF or VB. Reports feeling mild UCs overnight and this morning and + FM. Discussed POC with pt. Encouraged pt to call with needs.     1009: External monitors applied. Reactive NST obtained.     1117: Dr. Dubois at bedside. POC discussed with pt. MD reviewed tracing. External monitors removed.     1900: Report given to Michelle TSANG RN. POC discussed.

## 2023-05-30 NOTE — PROGRESS NOTES
PROGRESS NOTE     DATE: 2023  Hospital day: 8  Gestational Age: 36w1d, Estimated Date of Delivery: 23  Primary OB/GYN: Sarah Kang MD  [facility] Consultant: Fede Galarza      SUBJECTIVE  Feeling better. Was able to lie flat last night while her  was here for about 20 minutes. Still coughing, nieves after treatment. Feels better with keeping up on q4 treatments. SOB with ambulation. I came in right after RT finished neb treatment: tried to decrease her to 0.5liters from 1 but she went down to 88%.     Good FM. No LOF/VB, but she is starting to feel some contractions especially last night.      OBJECTIVE:  23 1107 -- 107 Abnormal  -- -- -- -- 96 % 1 Nasal Cannula -- RV   23 0810 36.2 °C (97.1 °F) 98 19 131/65 -- -- -- 1 Nasal Cannula -- AM   23 0721 -- 94 18 -- -- -- 97 % 1 Nasal Cannula -- RV   23 0259 -- 95 18 -- -- -- 94 % 1 Nasal Cannula -- KS   23 0000 36.1 °C (97 °F) 99 18 109/55 -- -- 93 % 1 Nasal Cannula -- CD   23 -- 108 Abnormal  -- -- -- -- 94 % -- -- --    23 -- 110 Abnormal  -- -- -- -- -- -- -- --    23 -- 94 -- -- -- -- -- -- -- --    23 -- 99 -- -- -- -- 96 % -- -- --    23 -- 99 -- -- -- -- -- -- -- --    23 -- 109 Abnormal  -- -- -- -- 97 % -- -- --    23 -- 111 Abnormal  -- -- -- -- -- -- -- --    23 -- 94 18 -- -- -- 96 % 1 Nasal Cannula -- KS   23 -- 96 -- -- -- -- -- -- -- -- 23 -- 99 -- -- -- -- 97 % -- -- -- 23 -- 92 -- -- -- -- -- -- -- -- 23 -- 95 -- -- -- -- 95 % -- -- -- 23 -- 99 -- -- -- -- -- -- -- -- 23 -- 100 -- -- -- -- 97 % -- -- -- 23 -- 98 -- -- -- -- -- -- -- -- 23 -- 98 -- -- -- -- 96 % 1 Nasal Cannula -- 23 -- 94 -- -- -- -- -- -- -- -- 23 -- 92 -- -- -- -- 97 % -- -- -- KN    05/29/23 2256 -- 94 -- -- -- -- -- -- -- -- KN 05/29/23 2254 -- 93 -- -- -- -- 95 % -- -- -- KN 05/29/23 2252 -- 92 -- -- -- -- -- -- -- -- KN 05/29/23 2249 -- 92 -- -- -- -- 96 % -- -- -- KN 05/29/23 2248 -- 97 -- -- -- -- -- -- -- -- KN 05/29/23 2244 -- 91 -- -- -- -- 97 % -- -- -- KN 05/29/23 2240 -- 94 -- -- -- -- -- -- -- -- KN 05/29/23 2239 -- 98 -- -- -- -- 96 % -- -- -- KN 05/29/23 2236 -- 94 -- -- -- -- -- -- -- -- KN 05/29/23 2234 -- 104 Abnormal  -- -- -- -- 95 % -- -- -- KN 05/29/23 2232 -- 95 -- -- -- -- -- -- -- -- KN 05/29/23 2229 -- 102 Abnormal  -- -- -- -- 95 % 1 Nasal Cannula -- KN 05/29/23 2228 -- 101 Abnormal  -- -- -- -- -- -- -- -- KN 05/29/23 2153 -- 94 -- -- -- -- 96 % -- -- -- KN 05/29/23 2149 -- 92 -- -- -- -- -- -- -- -- KN 05/29/23 2148 -- 95 -- -- -- -- 95 % -- -- -- KN 05/29/23 2145 -- 93 -- -- -- -- -- -- -- -- KN 05/29/23 2143 -- 94 -- -- -- -- 96 % -- -- -- KN 05/29/23 2141 -- 96 -- -- -- -- -- -- -- -- KN 05/29/23 2138 -- 95 -- -- -- -- 96 % -- -- -- KN 05/29/23 2137 -- 94 -- -- -- -- -- -- -- -- KN 05/29/23 2133 -- 99 -- -- -- -- 95 % -- -- -- KN 05/29/23 2129 -- 96 -- -- -- -- -- -- -- -- KN 05/29/23 2128 -- 98 -- -- -- -- 95 % -- -- -- KN 05/29/23 2125 -- 100 -- -- -- -- -- -- -- -- KN 05/29/23 2123 -- 102 Abnormal  -- -- -- -- 95 % -- -- -- KN 05/29/23 2121 -- 93 -- -- -- -- -- -- -- -- KN 05/29/23 2118 -- 93 -- -- -- -- 94 % -- -- -- KN 05/29/23 2117 -- 91 -- -- -- -- -- -- -- -- KN 05/29/23 2113 -- 96 -- -- -- -- 93 % -- -- -- KN 05/29/23 2109 -- 97 -- -- -- -- -- -- -- -- KN 05/29/23 2108 -- 97 -- -- -- -- 95 % -- -- -- KN 05/29/23 2105 -- 94 -- -- -- -- -- -- -- -- KN 05/29/23 2103 -- 101 Abnormal  -- -- -- -- 97 % -- -- -- KN 05/29/23 2101 -- 96 -- -- -- -- -- -- -- --                    PHYSICAL EXAM:  General:   Alert, conversational, pleasant, no acute distress,  coughing  but just finished her neb  treatment with RT  Lungs:   Non-labored, symmetric expansion- clear bilaterally with no inspiratory or expiratory wheezing heard  Heart:  Tachycardic, regular rhythm  Abdomen:  Soft, gravid, non-tender, non-distended  Genitourinary:     Deferred   Extremities:         Moves all, no edema, no calf pain rui LE              OB Ultrasound:  2023 (OP US with Oki) 5lb9oz, 82%, normal DEMI, normal NE interval, vertex      NST Interpretation: Category 1, Reactive, moderate variability, one small variable noted but non-repetitive, occasional contractions seen.      Medications:         Scheduled Medications   Medication Dose Frequency    ipratropium-albuterol  3 mL Q4HRS (RT)    ferrous sulfate  325 mg QDAY with Breakfast    hydrocortisone   BID    famotidine  20 mg BID    cetirizine  10 mg DAILY    prenatal plus vitamin  1 Tablet Daily-0800    mometasone-formoterol  2 Puff BID (RT)    budesonide  0.5 mg BID (RT)    predniSONE  50 mg DAILY    aspirin  81 mg DAILY    levothyroxine  150 mcg AM ES    omeprazole  20 mg DAILY      CONSULTS:   Pulmonology  ENT (Dr. Roblero) s/p laryngoscopy findings c/w GERD     ASSESSMENT & PLAN  Carolina Medrano is a 38 y.o.  at 36w1d admitted with acute respiratory failure due to asthma exacerbation, HD#8     PLAN:  Asthma  Appreciate pulmonary input  Plan per their note: Continue budesonide, dulera, q4 nebs, prednisone x 10 days (last dose ), Azithromycin x 5 days (last dose ), Zyrtec, nasal rinses  Goal O2 >95%.   Pt continues to need nebulizer treatments  q4 scheduled and supplemental oxygen.  Likely to have a high allergan burden at home.    GERD  Continue pepcid, maalox, omeprazole.    Elevated Blood glucose:  Due to steroid use.  SS insulin prn (has not needed).  Continue GDM diet --> switched back to regular diet.  GTT normal.    Fetal surveillance  Reassuring and reactive.  BID testing.    Delivery mode  Plan for Primary C/S at 37+0 weeks due to pulmonary  compromise. Epidural and not spinal due to pulmonary compromise.   She will continue to try to lie flat for some episodes in afternoon when she is best to try to work up to more time flat for c/section.     Gestational thrombocytopenia - its been a week so will repeat cbc.     Left breast mass  Incidental finding noted on CTA of chest  Still needs work-up     GBS positive in urine  Notes allergy to PCN (childhood) and rash with Vancomycin and Clindamycin  Ancef for prophylaxis     Hashimoto Thyroiditis  Thryoid US normal. Labs normal on admission  Continue home levothyroxine    SSA Ab  No evidence of fetal heart block on last US with Dr. Galarza.   No evidence on NST in hospitial

## 2023-05-30 NOTE — PROGRESS NOTES
"Pulmonary Progress Note    Date of admission  2023    Reason for Consultation  Acute hypoxemic respiratory failure      History of Presenting Illness  From Dr. Fleming's note: \"38 y.o. female who presented 2023 with cough and shortness of breath.  She is currently pregnant, , 35 weeks and 6 days. she reports that she has no formal diagnosis of asthma, but has had a reactive airways disease syndrome, especially when she is sick and usually requires a course of prednisone and an albuterol inhaler.  This has always helped her in the past.  Starting in late March, early April patient started having worsening cough.  She has been on short courses of steroids and a low-dose Advair inhaler on and off since that time.  She reports that when she is taken full dose steroids she has had some relief and improvement, however, there has been concern about giving her higher dose steroids due to pregnancy and she has not had a good response to them.  She has been having severe coughing fits and is unable to catch her breath at these times.  She has had response to nebulizers in the past.  She has been checking her saturations at home and they have been around 94%, but when she exerts herself by walking up the stairs, she notes that her heart rate increases into the 150s.  In the ER, they did stand her up and her oxygen saturations dropped to 88% and she was placed on oxygen at that time.  Patient endorses a feeling of tightness in her throat, she has no history of tracheal stenosis.  She does not feel that she has any nasal congestion at this time, but reports that she maybe has been snoring.  She does endorse a history of allergies [place].\"    Hospital Course  2023 - patient reports that she is feeling slightly better today.  She is not having quite as much coughing when she is talking.  She feels like maybe her throat is slightly less " tight.  Reviewed her spirometry and she does have flattening of the inspiratory limb    5/24/2023 -patient reports that she was able to sleep last night, but is not feeling significantly better today.  She continues to cough.  She feels like the every 4 hours nebulizers are helping    5/25 - moving air well. No wheezing. Sore throat improving. Still using low levels of supplemental oxygen.     5/27: States she sleeps sitting partially up.  If she tries to lay flat, she goes in the cognitive beds.  She is on medication for acid reflux.  She does state the medication improves her symptoms but does not completely ameliorate them.  She is also wondering why the DuoNebs have been decreased in frequency.    5/28: Reviewed and examined to walk around the unit with oxygen.  Still coughing.  Duo nebs being increased to every 4 hours again helped her.  Primary team also maximized acid reflux meds.    5/29: No significant new events. Planning for scheduled C/S next Tuesday.     Today: No significant events.  Does subjectively feel that her breathing is getting better.    Review of Systems   Constitutional:  Negative for chills and fever.   Respiratory:  Positive for cough and shortness of breath.    Cardiovascular:  Negative for chest pain.   Gastrointestinal:  Negative for nausea and vomiting.        Vital Signs for last 24 hours   Temp:  [36.1 °C (97 °F)-36.2 °C (97.1 °F)] 36.2 °C (97.1 °F)  Pulse:  [] 110  Resp:  [17-22] 22  BP: (109-131)/(55-65) 131/65  SpO2:  [93 %-97 %] 96 %    Physical Exam  Vitals reviewed. Exam conducted with a chaperone present.   Constitutional:       Appearance: Normal appearance.   HENT:      Head: Normocephalic.   Eyes:      General:         Right eye: No discharge.         Left eye: No discharge.      Conjunctiva/sclera: Conjunctivae normal.   Cardiovascular:      Rate and Rhythm: Regular rhythm. Tachycardia present.   Pulmonary:      Effort: Pulmonary effort is normal. No respiratory  distress.      Breath sounds: Normal breath sounds. No wheezing.   Skin:     General: Skin is warm and dry.   Neurological:      General: No focal deficit present.      Mental Status: She is alert and oriented to person, place, and time.   Psychiatric:         Behavior: Behavior normal.       Medications  Current Facility-Administered Medications   Medication Dose Route Frequency Provider Last Rate Last Admin    mag hydrox-al hydrox-simeth (MAALOX PLUS ES or MYLANTA DS) suspension 30 mL  30 mL Oral 4X/DAY PRN Sarah Kang M.D.        ipratropium-albuterol (DUONEB) nebulizer solution  3 mL Nebulization Q4HRS (RT) Davian Pérez D.O.   3 mL at 05/30/23 1508    docusate sodium (COLACE) capsule 100 mg  100 mg Oral BID PRN Lacie Umana M.D.        ondansetron (ZOFRAN ODT) dispertab 4 mg  4 mg Oral Q6HRS PRN Lacie Umana M.D.        ferrous sulfate tablet 325 mg  325 mg Oral QDAY with Breakfast Corinne E Capurro, M.D.   325 mg at 05/30/23 0835    famotidine (PEPCID) tablet 20 mg  20 mg Oral BID oNra Paez M.D.   20 mg at 05/30/23 0608    cetirizine (ZYRTEC) tablet 10 mg  10 mg Oral DAILY Carlo Toney M.D.   10 mg at 05/30/23 0608    prenatal plus vitamin (STUARTNATAL 1+1) 27-1 MG tablet 1 Tablet  1 Tablet Oral Daily-0800 Carlo Toney M.D.   1 Tablet at 05/30/23 0835    benzonatate (TESSALON) capsule 100 mg  100 mg Oral TID PRN Nora Paez M.D.   100 mg at 05/30/23 1309    diphenhydrAMINE (BENADRYL) tablet/capsule 50 mg  50 mg Oral Q6HRS PRN Nora Paez M.D.        mometasone-formoterol (Dulera) 200-5 mcg/Act inhaler 2 Puff  2 Puff Inhalation BID (RT) Adriana Fleming D.O.   2 Puff at 05/30/23 0721    budesonide (PULMICORT) neb susp 0.5 mg  0.5 mg Nebulization BID (RT) REBEL SchneiderOGabe   0.5 mg at 05/30/23 0721    predniSONE (DELTASONE) tablet 50 mg  50 mg Oral DAILY REBEL SchneiderO.   50 mg at 05/30/23 0608    aspirin (ASA) chewable tab  81 mg  81 mg Oral DAILY Nora Paez M.D.   81 mg at 23 0608    acetaminophen (Tylenol) tablet 650 mg  650 mg Oral Q4HRS PRN Nora Paez M.D.   650 mg at 23 2255    levothyroxine (SYNTHROID) tablet 150 mcg  150 mcg Oral AM ES Nora Paez M.D.   150 mcg at 23 0608    omeprazole (PRILOSEC) capsule 20 mg  20 mg Oral DAILY Nora Paez M.D.   20 mg at 23 0608    acetaminophen (TYLENOL) tablet 1,000 mg  1,000 mg Oral Q6HRS PRN Nora Paez M.D.   1,000 mg at 23 1309         Assessment/Plan  #Acute hypoxemic respiratory failure, suspect that this is secondary to asthma exacerbation     Titrate O2 to maintain sats >95% in the setting of pregnancy  Continue Dulera high dose with spacer  Continue nebulized budesonide, double ICS to ensure that her deep airways are getting access to the medication  --Reescalated to scheduled every 4 hours on    Continue prednisone 50 mg daily, for at least 10 days.  We will likely commence weaning on Monday after her .  She will be close to 2 weeks on steroids at that point and steroid weaning will need to be gradual.  Continue Zyrtec daily for control of allergies  Continue nasal rinses  Continue with omeprazole -spoke with the patient and with Dr. Kang about maximizing this therapy on .   Respiratory bio fire was negative  Encouraged to ambulate frequently and to use incentive spirometer.  She is achieving 3 L with I-S, which is remarkable.  Agree with planned C/S - planned for Monday  Not a candidate for biologics given Abs Eos <150 and because time of onset for therapy is 30-90 days.     Total consult time: 25 minutes which included time spent on chart review, personally reviewing pertinent images and labs, time spent counseling and educating the patient and/or family members, and coordinating care with the healthcare team to include consultants.     Davian Pérez,   Staff Pulmonologist  and Intensivist  [facility]     Please note that this dictation was created using voice recognition software. The accuracy of the dictation is limited to the abilities of the software. I have made every reasonable attempt to correct obvious errors, but I expect that there are errors of grammar and possibly content that I did not discover before finalizing the note.

## 2023-05-30 NOTE — CARE PLAN
The patient is Watcher - Medium risk of patient condition declining or worsening    Shift Goals  Clinical Goals: rest, O2 monitoring, NST  Patient Goals: rest  Family Goals: support    Progress made toward(s) clinical / shift goals:    Problem: Knowledge Deficit - L&D  Goal: Patient and family/caregivers will demonstrate understanding of plan of care, disease process/condition, diagnostic tests and medications  Outcome: Progressing   -POC discussed with pt. All questions answered    Problem: Risk for Infection and Impaired Wound Healing  Goal: Patient will remain free from infection  Outcome: Progressing   -Pt afebrile. No s/s of infection noted.     Patient is not progressing towards the following goals:

## 2023-05-31 LAB
FERRITIN SERPL-MCNC: 11.8 NG/ML (ref 10–291)
IRON SATN MFR SERPL: 62 % (ref 15–55)
IRON SERPL-MCNC: 269 UG/DL (ref 40–170)
TIBC SERPL-MCNC: 431 UG/DL (ref 250–450)
UIBC SERPL-MCNC: 162 UG/DL (ref 110–370)

## 2023-05-31 PROCEDURE — 700111 HCHG RX REV CODE 636 W/ 250 OVERRIDE (IP): Performed by: INTERNAL MEDICINE

## 2023-05-31 PROCEDURE — 94640 AIRWAY INHALATION TREATMENT: CPT

## 2023-05-31 PROCEDURE — 302790 HCHG STAT ANTEPARTUM CARE, DAILY

## 2023-05-31 PROCEDURE — A9270 NON-COVERED ITEM OR SERVICE: HCPCS | Performed by: OBSTETRICS & GYNECOLOGY

## 2023-05-31 PROCEDURE — 700102 HCHG RX REV CODE 250 W/ 637 OVERRIDE(OP): Performed by: OBSTETRICS & GYNECOLOGY

## 2023-05-31 PROCEDURE — 700101 HCHG RX REV CODE 250: Performed by: INTERNAL MEDICINE

## 2023-05-31 PROCEDURE — 770002 HCHG ROOM/CARE - OB PRIVATE (112)

## 2023-05-31 PROCEDURE — 36415 COLL VENOUS BLD VENIPUNCTURE: CPT

## 2023-05-31 PROCEDURE — 59025 FETAL NON-STRESS TEST: CPT

## 2023-05-31 PROCEDURE — 83550 IRON BINDING TEST: CPT

## 2023-05-31 PROCEDURE — 83540 ASSAY OF IRON: CPT

## 2023-05-31 PROCEDURE — 82728 ASSAY OF FERRITIN: CPT

## 2023-05-31 RX ORDER — PREDNISONE 20 MG/1
50 TABLET ORAL DAILY
Status: DISCONTINUED | OUTPATIENT
Start: 2023-06-01 | End: 2023-06-05

## 2023-05-31 RX ADMIN — FERROUS SULFATE TAB 325 MG (65 MG ELEMENTAL FE) 325 MG: 325 (65 FE) TAB at 08:25

## 2023-05-31 RX ADMIN — OMEPRAZOLE 20 MG: 20 CAPSULE, DELAYED RELEASE ORAL at 06:40

## 2023-05-31 RX ADMIN — PRENATAL WITH FERROUS FUM AND FOLIC ACID 1 TABLET: 3080; 920; 120; 400; 22; 1.84; 3; 20; 10; 1; 12; 200; 27; 25; 2 TABLET ORAL at 08:25

## 2023-05-31 RX ADMIN — IPRATROPIUM BROMIDE AND ALBUTEROL SULFATE 3 ML: 2.5; .5 SOLUTION RESPIRATORY (INHALATION) at 19:32

## 2023-05-31 RX ADMIN — ACETAMINOPHEN 1000 MG: 500 TABLET, FILM COATED ORAL at 10:38

## 2023-05-31 RX ADMIN — PREDNISONE 50 MG: 20 TABLET ORAL at 06:42

## 2023-05-31 RX ADMIN — CETIRIZINE HYDROCHLORIDE 10 MG: 10 TABLET, FILM COATED ORAL at 06:41

## 2023-05-31 RX ADMIN — MOMETASONE FUROATE AND FORMOTEROL FUMARATE DIHYDRATE 2 PUFF: 200; 5 AEROSOL RESPIRATORY (INHALATION) at 07:11

## 2023-05-31 RX ADMIN — IPRATROPIUM BROMIDE AND ALBUTEROL SULFATE 3 ML: 2.5; .5 SOLUTION RESPIRATORY (INHALATION) at 03:05

## 2023-05-31 RX ADMIN — ASPIRIN 81 MG 81 MG: 81 TABLET ORAL at 06:40

## 2023-05-31 RX ADMIN — FAMOTIDINE 20 MG: 20 TABLET, FILM COATED ORAL at 18:15

## 2023-05-31 RX ADMIN — BENZONATATE 100 MG: 100 CAPSULE ORAL at 06:40

## 2023-05-31 RX ADMIN — ACETAMINOPHEN 1000 MG: 500 TABLET, FILM COATED ORAL at 23:08

## 2023-05-31 RX ADMIN — IPRATROPIUM BROMIDE AND ALBUTEROL SULFATE 3 ML: 2.5; .5 SOLUTION RESPIRATORY (INHALATION) at 14:23

## 2023-05-31 RX ADMIN — MOMETASONE FUROATE AND FORMOTEROL FUMARATE DIHYDRATE 2 PUFF: 200; 5 AEROSOL RESPIRATORY (INHALATION) at 19:34

## 2023-05-31 RX ADMIN — IPRATROPIUM BROMIDE AND ALBUTEROL SULFATE 3 ML: 2.5; .5 SOLUTION RESPIRATORY (INHALATION) at 10:39

## 2023-05-31 RX ADMIN — ALUMINUM HYDROXIDE, MAGNESIUM HYDROXIDE, AND DIMETHICONE 30 ML: 400; 400; 40 SUSPENSION ORAL at 20:56

## 2023-05-31 RX ADMIN — ALUMINUM HYDROXIDE, MAGNESIUM HYDROXIDE, AND DIMETHICONE 30 ML: 400; 400; 40 SUSPENSION ORAL at 10:38

## 2023-05-31 RX ADMIN — LEVOTHYROXINE SODIUM 150 MCG: 0.15 TABLET ORAL at 06:41

## 2023-05-31 RX ADMIN — BUDESONIDE INHALATION 0.5 MG: 0.5 SUSPENSION RESPIRATORY (INHALATION) at 19:34

## 2023-05-31 RX ADMIN — IPRATROPIUM BROMIDE AND ALBUTEROL SULFATE 3 ML: 2.5; .5 SOLUTION RESPIRATORY (INHALATION) at 07:10

## 2023-05-31 RX ADMIN — BUDESONIDE INHALATION 0.5 MG: 0.5 SUSPENSION RESPIRATORY (INHALATION) at 07:10

## 2023-05-31 RX ADMIN — FAMOTIDINE 20 MG: 20 TABLET, FILM COATED ORAL at 06:41

## 2023-05-31 NOTE — PROGRESS NOTES
S: Feels slightly better today but still with exertional dyspnea.  O: Patient Vitals for the past 24 hrs:   BP Temp Temp src Pulse Resp SpO2   05/30/23 2000 127/67 36.1 °C (97 °F) Temporal (!) 114 20 96 %   05/30/23 1923 -- -- -- 93 -- 99 %   05/30/23 1633 122/59 36.1 °C (96.9 °F) Temporal (!) 114 (!) 22 95 %   05/30/23 1508 -- -- -- (!) 110 (!) 22 96 %   05/30/23 1107 -- -- -- (!) 107 -- 96 %   05/30/23 0810 131/65 36.2 °C (97.1 °F) Temporal 98 19 --   05/30/23 0721 -- -- -- 94 18 97 %   05/30/23 0259 -- -- -- 95 18 94 %   05/30/23 0000 109/55 36.1 °C (97 °F) Temporal 99 18 93 %   05/29/23 2343 -- -- -- (!) 108 -- 94 %   05/29/23 2342 -- -- -- (!) 110 -- --   05/29/23 2336 -- -- -- 94 -- --   05/29/23 2334 -- -- -- 99 -- 96 %   05/29/23 2332 -- -- -- 99 -- --   05/29/23 2329 -- -- -- (!) 109 -- 97 %   05/29/23 2328 -- -- -- (!) 111 -- --   05/29/23 2324 -- -- -- 94 18 96 %   05/29/23 2320 -- -- -- 96 -- --   05/29/23 2319 -- -- -- 99 -- 97 %   05/29/23 2316 -- -- -- 92 -- --   05/29/23 2314 -- -- -- 95 -- 95 %   05/29/23 2312 -- -- -- 99 -- --   05/29/23 2309 -- -- -- 100 -- 97 %   05/29/23 2308 -- -- -- 98 -- --   05/29/23 2304 -- -- -- 98 -- 96 %   05/29/23 2300 -- -- -- 94 -- --   05/29/23 2259 -- -- -- 92 -- 97 %   05/29/23 2256 -- -- -- 94 -- --   05/29/23 2254 -- -- -- 93 -- 95 %   05/29/23 2252 -- -- -- 92 -- --   05/29/23 2249 -- -- -- 92 -- 96 %   05/29/23 2248 -- -- -- 97 -- --   05/29/23 2244 -- -- -- 91 -- 97 %   05/29/23 2240 -- -- -- 94 -- --   05/29/23 2239 -- -- -- 98 -- 96 %   05/29/23 2236 -- -- -- 94 -- --   05/29/23 2234 -- -- -- (!) 104 -- 95 %   05/29/23 2232 -- -- -- 95 -- --   05/29/23 2229 -- -- -- (!) 102 -- 95 %   05/29/23 2228 -- -- -- (!) 101 -- --   05/29/23 2153 -- -- -- 94 -- 96 %   05/29/23 2149 -- -- -- 92 -- --   05/29/23 2148 -- -- -- 95 -- 95 %   05/29/23 2145 -- -- -- 93 -- --   05/29/23 2143 -- -- -- 94 -- 96 %   05/29/23 2141 -- -- -- 96 -- --   05/29/23 2138 -- -- -- 95 --  96 %   05/29/23 2137 -- -- -- 94 -- --   05/29/23 2133 -- -- -- 99 -- 95 %   05/29/23 2129 -- -- -- 96 -- --   05/29/23 2128 -- -- -- 98 -- 95 %   05/29/23 2125 -- -- -- 100 -- --   05/29/23 2123 -- -- -- (!) 102 -- 95 %   05/29/23 2121 -- -- -- 93 -- --   05/29/23 2118 -- -- -- 93 -- 94 %   05/29/23 2117 -- -- -- 91 -- --   05/29/23 2113 -- -- -- 96 -- 93 %   05/29/23 2109 -- -- -- 97 -- --   05/29/23 2108 -- -- -- 97 -- 95 %   05/29/23 2105 -- -- -- 94 -- --   05/29/23 2103 -- -- -- (!) 101 -- 97 %   05/29/23 2101 -- -- -- 96 -- --     EFM: Moderate variability and accelerations present.    A: IUP 36 1/7 weeks      Bronchial asthma, severe      Fetus appropriate growth and normal NY interval.      SS-A antibody  P: Continue with observation and pulmonary care.      Planned delivery next week.    Time: 15 minutes

## 2023-05-31 NOTE — PROGRESS NOTES
0700- Report received. Care assumed. G1 at 36-2 admitted on 5/21 with acute asthma exacerbation.  0805- Pt eating breakfast. Reports breathing is easier this morning. Feels very tired. O2 sat 97% on 1L. Reports +FM. Denies s/s preeclampsia. Discussed pallor/anemia and Dr. Galarza's suggestion of checking iron level. Will complete assessment and FM when done with breakfast.   0847- Text sent to Dr. Galarza regarding above.    0918- TOCO/EFM placed. Denies significant ctx, leaking and bleeding.   1025- O2 sats 92-95% when sleeping. 95-96% while awake. A few wheezes noted on auscultatation, clear with cough. Pt sounds and feels tighter.  1033- Call to RT for earlier neb treatment. (Due at 1100.)  1100- Ambulated in hallway with O2.  1450- Call to Dr Fields with iron study results. MD will review.   1500- O2 sats dipping more frequently to 94% while awake. O2 increased to 1.5L NC.   1900- Report to KRIS Martinez.  1950- Report to Dr. Galarza pt's BPs, no s/s preeclampsia, O2 sats 92-95% this morning while sleeping, and dipping down to 94% more this afternoon. Iron studies reviewed.

## 2023-05-31 NOTE — PROGRESS NOTES
1900- Report received from KRIS Monson. POC discussed.    2130- Pt assessment. Pt reports +FM and feeling occasional UCs that are not painful. Denies VB, LOF. Fetal monitoring completed with reactive NST obtained.     0700- Report given to KRIS Galeana. POC discussed.

## 2023-05-31 NOTE — PROGRESS NOTES
Hospital Day : 8    S: Feels tired all the time.  Good movement.  Breathing tretment gives significant relief.  Good movement, no UC or bleed/dc    O:  Vitals:    05/31/23 0700 05/31/23 0713 05/31/23 0800 05/31/23 1043   BP:   129/58    Pulse: 90 95 100 (!) 110   Resp:  18 17 18   Temp:   36 °C (96.8 °F)    TempSrc:   Temporal    SpO2: 95% 96% 96% 96%   Weight:       Height:           Recent Labs     05/30/23  1322   WBC 14.8*   RBC 3.63*   HEMOGLOBIN 10.1*   HEMATOCRIT 30.9*   MCV 85.1   MCH 27.8   MCHC 32.7   RDW 43.5   PLATELETCT 185   MPV 11.6       No wheeze (during breathing rx), labored      Cat one; occ irritable    A: IUP at 36.2, Asthma, GERD, Elevated BS (steroids), Anemia/Gestational thrombocytopenia, Hashimotos Thyroiditis, SSA Ab pos, L breast mass (incidental finding), fetal status reassuring, GBS pos    P: cont lie flat trial, q shift nst, breathing rx on schedule, iron studies today, csx under epi planned for Monday.

## 2023-05-31 NOTE — CARE PLAN
Problem: Risk for Infection and Impaired Wound Healing  Goal: Patient will remain free from infection  Description: Target End Date:  1 to 3 days    1.  Utilize Standard Precautions at all times to reduce the risk of transmission of microorganisms from both recognized and unrecognized sources of infection  2.  Infection prevention handouts provided (general/device/diagnosis specific) and documented in Patient Education  3.  Limit vaginal exams as necessary  4.  Use aseptic technique during vaginal exams  5.  Administer antibiotic therapy per provider order  6.  Assess for removal of lines and drains  7.  Line/drain care per policy and/or provider orders  Outcome: Progressing     Problem: Risk for Injury  Goal: Patient and fetus will be free of preventable injury/complications  Description: Target End Date:  Prior to discharge or change in level of care    1.  Monitor uterine activity and if applicable progression of labor  2.  Monitor fetal well being  3.  Assure resuscitative equipment is available and within reach  Outcome: Progressing   The patient is Stable - Low risk of patient condition declining or worsening    Shift Goals  Clinical Goals: rest, O2 monitoring, NST  Patient Goals: rest  Family Goals: support    Progress made toward(s) clinical / shift goals:  VS stable, reactive NST obtained. Emotional support provided to pt and SO.

## 2023-05-31 NOTE — DISCHARGE PLANNING
Discharge Planning Assessment Ante Partum    Reason for Referral: Chart review  Address: [place] [address]  Phone: [phone]  Type of Living Situation: living with FOB  Mom Diagnosis: Pregnancy-36.2 weeks, asthma exacerbation, GERD, elevated BS (steroids), anemia/gestational thrombocytopenia, Hashimoto's Thyroiditis.  Plan to deliver by  on Monday, .  Primary Language: English    Father of the Baby: Lisandro Medrano  Involved in baby’s care? Yes  Contact Information: [phone]    Prenatal Care: Yes-Dr. Kang  Mom's PCP: Dr. 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

## 2023-06-01 LAB
HGB RETIC QN AUTO: 28.8 PG/CELL (ref 29–35)
IMM RETICS NFR: 31.8 % (ref 2.6–16.1)
LDH SERPL L TO P-CCNC: 217 U/L (ref 107–266)
RETICS # AUTO: 0.1 M/UL (ref 0.04–0.12)
RETICS/RBC NFR: 2.9 % (ref 0.8–2.6)

## 2023-06-01 PROCEDURE — 59025 FETAL NON-STRESS TEST: CPT

## 2023-06-01 PROCEDURE — A9270 NON-COVERED ITEM OR SERVICE: HCPCS | Performed by: OBSTETRICS & GYNECOLOGY

## 2023-06-01 PROCEDURE — 700111 HCHG RX REV CODE 636 W/ 250 OVERRIDE (IP): Performed by: INTERNAL MEDICINE

## 2023-06-01 PROCEDURE — 94640 AIRWAY INHALATION TREATMENT: CPT

## 2023-06-01 PROCEDURE — 85046 RETICYTE/HGB CONCENTRATE: CPT

## 2023-06-01 PROCEDURE — 700102 HCHG RX REV CODE 250 W/ 637 OVERRIDE(OP): Performed by: OBSTETRICS & GYNECOLOGY

## 2023-06-01 PROCEDURE — 36415 COLL VENOUS BLD VENIPUNCTURE: CPT

## 2023-06-01 PROCEDURE — 83615 LACTATE (LD) (LDH) ENZYME: CPT

## 2023-06-01 PROCEDURE — 770002 HCHG ROOM/CARE - OB PRIVATE (112)

## 2023-06-01 PROCEDURE — 700101 HCHG RX REV CODE 250: Performed by: INTERNAL MEDICINE

## 2023-06-01 PROCEDURE — 302790 HCHG STAT ANTEPARTUM CARE, DAILY

## 2023-06-01 PROCEDURE — 83735 ASSAY OF MAGNESIUM: CPT

## 2023-06-01 RX ADMIN — BENZONATATE 100 MG: 100 CAPSULE ORAL at 21:51

## 2023-06-01 RX ADMIN — IPRATROPIUM BROMIDE AND ALBUTEROL SULFATE 3 ML: 2.5; .5 SOLUTION RESPIRATORY (INHALATION) at 07:05

## 2023-06-01 RX ADMIN — CETIRIZINE HYDROCHLORIDE 10 MG: 10 TABLET, FILM COATED ORAL at 06:23

## 2023-06-01 RX ADMIN — IPRATROPIUM BROMIDE AND ALBUTEROL SULFATE 3 ML: 2.5; .5 SOLUTION RESPIRATORY (INHALATION) at 02:48

## 2023-06-01 RX ADMIN — ACETAMINOPHEN 1000 MG: 500 TABLET, FILM COATED ORAL at 07:42

## 2023-06-01 RX ADMIN — LEVOTHYROXINE SODIUM 150 MCG: 0.15 TABLET ORAL at 06:21

## 2023-06-01 RX ADMIN — FAMOTIDINE 20 MG: 20 TABLET, FILM COATED ORAL at 06:22

## 2023-06-01 RX ADMIN — IPRATROPIUM BROMIDE AND ALBUTEROL SULFATE 3 ML: 2.5; .5 SOLUTION RESPIRATORY (INHALATION) at 19:01

## 2023-06-01 RX ADMIN — IPRATROPIUM BROMIDE AND ALBUTEROL SULFATE 3 ML: 2.5; .5 SOLUTION RESPIRATORY (INHALATION) at 11:20

## 2023-06-01 RX ADMIN — FERROUS SULFATE TAB 325 MG (65 MG ELEMENTAL FE) 325 MG: 325 (65 FE) TAB at 07:43

## 2023-06-01 RX ADMIN — BUDESONIDE INHALATION 0.5 MG: 0.5 SUSPENSION RESPIRATORY (INHALATION) at 19:01

## 2023-06-01 RX ADMIN — MOMETASONE FUROATE AND FORMOTEROL FUMARATE DIHYDRATE 2 PUFF: 200; 5 AEROSOL RESPIRATORY (INHALATION) at 19:02

## 2023-06-01 RX ADMIN — PRENATAL WITH FERROUS FUM AND FOLIC ACID 1 TABLET: 3080; 920; 120; 400; 22; 1.84; 3; 20; 10; 1; 12; 200; 27; 25; 2 TABLET ORAL at 07:43

## 2023-06-01 RX ADMIN — MOMETASONE FUROATE AND FORMOTEROL FUMARATE DIHYDRATE 2 PUFF: 200; 5 AEROSOL RESPIRATORY (INHALATION) at 07:06

## 2023-06-01 RX ADMIN — IPRATROPIUM BROMIDE AND ALBUTEROL SULFATE 3 ML: 2.5; .5 SOLUTION RESPIRATORY (INHALATION) at 00:08

## 2023-06-01 RX ADMIN — ASPIRIN 81 MG 81 MG: 81 TABLET ORAL at 06:25

## 2023-06-01 RX ADMIN — IPRATROPIUM BROMIDE AND ALBUTEROL SULFATE 3 ML: 2.5; .5 SOLUTION RESPIRATORY (INHALATION) at 21:58

## 2023-06-01 RX ADMIN — ACETAMINOPHEN 1000 MG: 500 TABLET, FILM COATED ORAL at 21:51

## 2023-06-01 RX ADMIN — OMEPRAZOLE 20 MG: 20 CAPSULE, DELAYED RELEASE ORAL at 06:23

## 2023-06-01 RX ADMIN — IPRATROPIUM BROMIDE AND ALBUTEROL SULFATE 3 ML: 2.5; .5 SOLUTION RESPIRATORY (INHALATION) at 14:56

## 2023-06-01 RX ADMIN — BENZONATATE 100 MG: 100 CAPSULE ORAL at 06:22

## 2023-06-01 RX ADMIN — PREDNISONE 50 MG: 20 TABLET ORAL at 06:23

## 2023-06-01 RX ADMIN — FAMOTIDINE 20 MG: 20 TABLET, FILM COATED ORAL at 18:35

## 2023-06-01 RX ADMIN — BUDESONIDE INHALATION 0.5 MG: 0.5 SUSPENSION RESPIRATORY (INHALATION) at 09:06

## 2023-06-01 RX ADMIN — ALUMINUM HYDROXIDE, MAGNESIUM HYDROXIDE, AND DIMETHICONE 30 ML: 400; 400; 40 SUSPENSION ORAL at 21:50

## 2023-06-01 ASSESSMENT — PATIENT HEALTH QUESTIONNAIRE - PHQ9
1. LITTLE INTEREST OR PLEASURE IN DOING THINGS: NOT AT ALL
SUM OF ALL RESPONSES TO PHQ9 QUESTIONS 1 AND 2: 0
2. FEELING DOWN, DEPRESSED, IRRITABLE, OR HOPELESS: NOT AT ALL

## 2023-06-01 ASSESSMENT — PAIN DESCRIPTION - PAIN TYPE: TYPE: ACUTE PAIN

## 2023-06-01 NOTE — PROGRESS NOTES
" PROGRESS NOTE    DATE: 2023  Hospital day: 10  Gestational Age: 36w3d, Estimated Date of Delivery: 23  Primary OB/GYN: Sarah Kang MD  [facility] Consultant: Fede Okphillip     SUBJECTIVE  Feeling much better this am than when I saw her last. Last two ams have been better, though she felt tighter in the afternoon/evening. Good FM. No LOF/VB.     OBJECTIVE:  ./53   Pulse 85   Temp 36.1 °C (97 °F) (Temporal)   Resp 20   Ht 1.702 m (5' 7\")   Wt 103 kg (226 lb)   SpO2 98%   O2 use 1-2L per NC     Review of systems: Pertinent items are noted in HPI.    PHYSICAL EXAM:  General:   Alert, conversational, pleasant, no acute distress, coughing but O2 sat maintaining >95 on 1L during conversation  Lungs:   CTAB. Diminished at the base   Heart:   Tachycardic, regular rhythm  Abdomen:  Soft, gravid, non-tender, non-distended  Genitourinary: deferred      OB Ultrasound:  2023 (OP US with Michell) 5lb9oz, 82%, normal DEMI, normal [place] interval, vertex      AM fetal monitoring      Medications:   No current facility-administered medications on file prior to encounter.     Current Outpatient Medications on File Prior to Encounter   Medication Sig Dispense Refill    aspirin (ASA) 81 MG Chew Tab chewable tablet Chew 81 mg every day. Indications: prophylatic in pregnancy      methylPREDNISolone (MEDROL DOSEPAK) 4 MG Tablet Therapy Pack Use as directed on package 21 Tablet 0    ipratropium-albuterol (DUONEB) 0.5-2.5 (3) MG/3ML nebulizer solution Take 3 mL (1 vial) every 6 hours as needed 30 days 360 mL 2    fluticasone-salmeterol (ADVAIR) 100-50 MCG/ACT AEROSOL POWDER, BREATH ACTIVATED Inhale 1 Puff every 12 hours. 60 Each 2    albuterol (PROVENTIL) 2.5mg/3ml Nebu Soln solution for nebulization Inhale 1 via nebulizer every 6 hours as needed 75 mL 0    ipratropium (ATROVENT) 0.02 % Solution Inhale 1 via nebulizer every 6 hours as needed 62.5 mL 0    fluticasone-salmeterol (ADVAIR) 250-50 MCG/ACT AEROSOL POWDER, " BREATH ACTIVATED Inhale 1 Puff every 12 hours. 1 Each 1    albuterol 108 (90 Base) MCG/ACT Aero Soln inhalation aerosol Inhale 2 Puffs every 6 hours as needed for Shortness of Breath. 8.5 g 1    omeprazole (PRILOSEC) 10 MG CAPSULE DELAYED RELEASE Take 1 capsule by mouth 2 times a day 60 Capsule 30    ondansetron (ZOFRAN) 8 MG Tab Take 1 tablet by mouth once a day as needed 30 Tablet 0    levothyroxine (SYNTHROID) 150 MCG Tab Take 1 Tablet by mouth every morning on an empty stomach. 30 Tablet 1       Labs:     Recent Results (from the past 24 hour(s))   IRON/TOTAL IRON BIND    Collection Time: 23 10:52 AM   Result Value Ref Range    Iron 269 (H) 40 - 170 ug/dL    Total Iron Binding 431 250 - 450 ug/dL    Unsat Iron Binding 162 110 - 370 ug/dL    % Saturation 62 (H) 15 - 55 %   FERRITIN    Collection Time: 23 10:52 AM   Result Value Ref Range    Ferritin 11.8 10.0 - 291.0 ng/mL   RETICULOCYTES COUNT    Collection Time: 23  6:11 AM   Result Value Ref Range    Reticulocyte Count 2.9 (H) 0.8 - 2.6 %    Retic, Absolute 0.10 0.04 - 0.12 M/uL    Imm. Reticulocyte Fraction 31.8 (H) 2.6 - 16.1 %    Retic Hgb Equivalent 28.8 (L) 29.0 - 35.0 pg/cell   LDH    Collection Time: 23  6:11 AM   Result Value Ref Range    LDH Total 217 107 - 266 U/L       Imaging: none new    CONSULTS:   Pulmonology  ENT (Dr. Roblero) bedside laryngoscopy showing findings c/w GERD    ASSESSMENT & PLAN  Carolina Medrano is a 38 y.o.  at 36w3d admitted with acute respiratory failure due to asthma exacerbation    PLAN:  Asthma  Appreciate pulmonary consult  Plan per their note: Continue budesonide, dulera, q4 nebs, prednisone (planned through delivery, then taper per pulm), Azithromycin x 5 days (last dose ), Zyrtec, nasal rinses  Can consider stress dose steroid with delivery   Goal O2 >95% while pregnant  Pt continues to need nebulizer treatments and supplemental oxygen  GERD  Continue pepcid, maalox,  omeprazole  Elevated Blood glucose  Due to steroid use  SS insulin prn (has not needed)   Regular diet.   GTT normal   Fetal surveillance  Reassuring and reactive  BID testing  Platelets  Likely gestational thrombocytopenia. Plt at 28w labs 128  Currently increased to 185 while on steroid use  Remains adequate for regional anesthesia   Left breast mass  Incidental finding noted on CTA of chest  Still needs work-up   GBS positive in urine  Notes allergy to PCN (childhood) and rash with Vancomycin and Clindamycin  Ancef for prophylaxis if planning for   Hashimoto Thyroiditis  Thryoid US normal. Labs normal on admission  Continue home levothyroxine  SSA Ab  No evidence of fetal heart block on last US with Dr. Galarza.   No evidence on NST in hospital  -   Anemia  Fe studies normal  Likely chronic dissease   -   Delivery Mode  Plan ERCS on   S/p consult with Dr. Martin       Plan of care discussed with the patient and she is in agreement with this plan. Plan of care discussed with RN taking carer of patient. All questions and concerns were answered.     Latonia Feliz MD

## 2023-06-01 NOTE — CARE PLAN
The patient is Watcher - Medium risk of patient condition declining or worsening    Shift Goals  Clinical Goals: keep lungs open, minitor vitals, monitor baby  Patient Goals: rest  Family Goals: support    Progress made toward(s) clinical / shift goals:    Problem: Risk for Excess Fluid Volume  Goal: Patient will demonstrate pulse, blood pressure and neurologic signs within expected ranges and without any respiratory complications  Outcome: Progressing  Note: Pt is under close monitoring and lungs has been clear.     Problem: Pain  Goal: Patient's pain will be alleviated or reduced to the patient’s comfort goal  Flowsheets (Taken 6/1/2023 0906)  Pain Rating Scale (NPRS): 5  OB Pain Level: 2-Requires Intervention  OB Pain Intervention: Medication - See MAR  Note: Pain was at 5 and tylenol was given.       Patient is not progressing towards the following goals:

## 2023-06-01 NOTE — PROGRESS NOTES
1900- Report received from KRIS Galeana. POC discussed.    2056- Pt assessment. Reports +FM, +HA. Denies VB, LOF, UCs, abdominal cramping.    2304- NST completed. Monitors removed.

## 2023-06-01 NOTE — CARE PLAN
Problem: Pain  Goal: Patient's pain will be alleviated or reduced to the patient’s comfort goal  Description: Target End Date:  Prior to discharge or change in level of care    1.  Document pain using the appropriate pain scale per order or unit policy  2.  Administer pain medications per provider order and/or assist with epidural/spinal placement as needed  3.  Pain management medications as ordered  4.  Educate and implement non-pharmacologic comfort measures (i.e. relaxation, distraction, massage, cold/heat therapy, etc.)  5.  Assess for nonverbal signs of ineffective coping with pain and offer pain medication and/or epidural anesthesia  Outcome: Progressing     Problem: Risk for Injury  Goal: Patient and fetus will be free of preventable injury/complications  Description: Target End Date:  Prior to discharge or change in level of care    1.  Monitor uterine activity and if applicable progression of labor  2.  Monitor fetal well being  3.  Assure resuscitative equipment is available and within reach  Outcome: Progressing   The patient is Watcher - Medium risk of patient condition declining or worsening    Shift Goals  Clinical Goals: monitor VS, reactive tracing  Patient Goals: rest, shower  Family Goals: support    Progress made toward(s) clinical / shift goals:  VS stable, reactive tracing obtained. Pt able to sleep/rest in between rounds. Showered prior to bed.

## 2023-06-01 NOTE — PROGRESS NOTES
0700 report received, assessment done. Pt is feeling well she just got a breathing treatment . Lungs are clear with diminished sounds at the bottom.  Her meds  Were given and spoke to RT re her treatments during the day. Family at bedside.  1200 pt is comfortable, takes walks without difficulties. Takes her O2 tank with her. O2 Sat remains above 95%.  1600 NO CHANGE IN STATUS.  1900 report given to Michelle PONCE

## 2023-06-02 PROCEDURE — 700102 HCHG RX REV CODE 250 W/ 637 OVERRIDE(OP): Performed by: OBSTETRICS & GYNECOLOGY

## 2023-06-02 PROCEDURE — A9270 NON-COVERED ITEM OR SERVICE: HCPCS | Performed by: OBSTETRICS & GYNECOLOGY

## 2023-06-02 PROCEDURE — 770002 HCHG ROOM/CARE - OB PRIVATE (112)

## 2023-06-02 PROCEDURE — 302790 HCHG STAT ANTEPARTUM CARE, DAILY

## 2023-06-02 PROCEDURE — 700111 HCHG RX REV CODE 636 W/ 250 OVERRIDE (IP): Performed by: INTERNAL MEDICINE

## 2023-06-02 PROCEDURE — 700101 HCHG RX REV CODE 250: Performed by: INTERNAL MEDICINE

## 2023-06-02 PROCEDURE — 59025 FETAL NON-STRESS TEST: CPT

## 2023-06-02 PROCEDURE — 99232 SBSQ HOSP IP/OBS MODERATE 35: CPT | Performed by: INTERNAL MEDICINE

## 2023-06-02 PROCEDURE — 94640 AIRWAY INHALATION TREATMENT: CPT

## 2023-06-02 RX ORDER — INSULIN LISPRO 100 [IU]/ML
8 INJECTION, SOLUTION INTRAVENOUS; SUBCUTANEOUS ONCE
Status: DISCONTINUED | OUTPATIENT
Start: 2023-06-02 | End: 2023-06-02

## 2023-06-02 RX ADMIN — LEVOTHYROXINE SODIUM 150 MCG: 0.15 TABLET ORAL at 06:24

## 2023-06-02 RX ADMIN — FERROUS SULFATE TAB 325 MG (65 MG ELEMENTAL FE) 325 MG: 325 (65 FE) TAB at 07:35

## 2023-06-02 RX ADMIN — IPRATROPIUM BROMIDE AND ALBUTEROL SULFATE 3 ML: 2.5; .5 SOLUTION RESPIRATORY (INHALATION) at 01:51

## 2023-06-02 RX ADMIN — MOMETASONE FUROATE AND FORMOTEROL FUMARATE DIHYDRATE 2 PUFF: 200; 5 AEROSOL RESPIRATORY (INHALATION) at 07:14

## 2023-06-02 RX ADMIN — FAMOTIDINE 20 MG: 20 TABLET, FILM COATED ORAL at 06:25

## 2023-06-02 RX ADMIN — IPRATROPIUM BROMIDE AND ALBUTEROL SULFATE 3 ML: 2.5; .5 SOLUTION RESPIRATORY (INHALATION) at 18:59

## 2023-06-02 RX ADMIN — IPRATROPIUM BROMIDE AND ALBUTEROL SULFATE 3 ML: 2.5; .5 SOLUTION RESPIRATORY (INHALATION) at 15:12

## 2023-06-02 RX ADMIN — BENZONATATE 100 MG: 100 CAPSULE ORAL at 06:22

## 2023-06-02 RX ADMIN — IPRATROPIUM BROMIDE AND ALBUTEROL SULFATE 3 ML: 2.5; .5 SOLUTION RESPIRATORY (INHALATION) at 10:54

## 2023-06-02 RX ADMIN — ALUMINUM HYDROXIDE, MAGNESIUM HYDROXIDE, AND DIMETHICONE 30 ML: 400; 400; 40 SUSPENSION ORAL at 21:41

## 2023-06-02 RX ADMIN — BENZONATATE 100 MG: 100 CAPSULE ORAL at 23:02

## 2023-06-02 RX ADMIN — PREDNISONE 50 MG: 20 TABLET ORAL at 06:23

## 2023-06-02 RX ADMIN — BUDESONIDE INHALATION 0.5 MG: 0.5 SUSPENSION RESPIRATORY (INHALATION) at 10:54

## 2023-06-02 RX ADMIN — ACETAMINOPHEN 1000 MG: 500 TABLET, FILM COATED ORAL at 21:41

## 2023-06-02 RX ADMIN — FAMOTIDINE 20 MG: 20 TABLET, FILM COATED ORAL at 18:02

## 2023-06-02 RX ADMIN — OMEPRAZOLE 20 MG: 20 CAPSULE, DELAYED RELEASE ORAL at 06:25

## 2023-06-02 RX ADMIN — PRENATAL WITH FERROUS FUM AND FOLIC ACID 1 TABLET: 3080; 920; 120; 400; 22; 1.84; 3; 20; 10; 1; 12; 200; 27; 25; 2 TABLET ORAL at 07:34

## 2023-06-02 RX ADMIN — MOMETASONE FUROATE AND FORMOTEROL FUMARATE DIHYDRATE 2 PUFF: 200; 5 AEROSOL RESPIRATORY (INHALATION) at 19:01

## 2023-06-02 RX ADMIN — ASPIRIN 81 MG 81 MG: 81 TABLET ORAL at 06:21

## 2023-06-02 RX ADMIN — BUDESONIDE INHALATION 0.5 MG: 0.5 SUSPENSION RESPIRATORY (INHALATION) at 18:59

## 2023-06-02 RX ADMIN — IPRATROPIUM BROMIDE AND ALBUTEROL SULFATE 3 ML: 2.5; .5 SOLUTION RESPIRATORY (INHALATION) at 22:20

## 2023-06-02 RX ADMIN — IPRATROPIUM BROMIDE AND ALBUTEROL SULFATE 3 ML: 2.5; .5 SOLUTION RESPIRATORY (INHALATION) at 07:13

## 2023-06-02 RX ADMIN — CETIRIZINE HYDROCHLORIDE 10 MG: 10 TABLET, FILM COATED ORAL at 06:24

## 2023-06-02 ASSESSMENT — ENCOUNTER SYMPTOMS
SHORTNESS OF BREATH: 1
VOMITING: 0
FEVER: 0
COUGH: 1
NAUSEA: 0
CHILLS: 0

## 2023-06-02 ASSESSMENT — PATIENT HEALTH QUESTIONNAIRE - PHQ9
2. FEELING DOWN, DEPRESSED, IRRITABLE, OR HOPELESS: NOT AT ALL
SUM OF ALL RESPONSES TO PHQ9 QUESTIONS 1 AND 2: 0
1. LITTLE INTEREST OR PLEASURE IN DOING THINGS: NOT AT ALL

## 2023-06-02 NOTE — CARE PLAN
The patient is Watcher - Medium risk of patient condition declining or worsening    Shift Goals  Clinical Goals: keep O2 sat up  Patient Goals: safe baby  Family Goals: support    Progress made toward(s) clinical / shift goals:    Problem: Knowledge Deficit - L&D  Goal: Patient and family/caregivers will demonstrate understanding of plan of care, disease process/condition, diagnostic tests and medications  Outcome: Progressing  Note: POC was discussed with pt for the day.     Problem: Risk for Injury  Goal: Patient and fetus will be free of preventable injury/complications  Outcome: Progressing  Flowsheets (Taken 6/2/2023 2024)  Mode: External [facility] [place]  Note: Monitoring 1 hour Q shift       Patient is not progressing towards the following goals:

## 2023-06-02 NOTE — PROGRESS NOTES
Report received, assessment done. Pt is comfortable, O2 sat around 95 with 1 liter of O2.  1200 no change , pt to the garden for picturs tolerated well.  1600 no change family at bed side.  1900 no change. Report given to night shift RN

## 2023-06-02 NOTE — PROGRESS NOTES
1900- Report received from KRIS Wilson. POC discussed.    2000- Pt ambulating in hallway.     2151- Pt assessment. Pt reports +FM, +UCs (not painful). Denies VB, LOF, abdominal cramping. Monitoring complete, reactive NST obtained.

## 2023-06-02 NOTE — CARE PLAN
Problem: Pain  Goal: Patient's pain will be alleviated or reduced to the patient’s comfort goal  Description: Target End Date:  Prior to discharge or change in level of care    1.  Document pain using the appropriate pain scale per order or unit policy  2.  Administer pain medications per provider order and/or assist with epidural/spinal placement as needed  3.  Pain management medications as ordered  4.  Educate and implement non-pharmacologic comfort measures (i.e. relaxation, distraction, massage, cold/heat therapy, etc.)  5.  Assess for nonverbal signs of ineffective coping with pain and offer pain medication and/or epidural anesthesia  Outcome: Progressing     Problem: Risk for Injury  Goal: Patient and fetus will be free of preventable injury/complications  Description: Target End Date:  Prior to discharge or change in level of care    1.  Monitor uterine activity and if applicable progression of labor  2.  Monitor fetal well being  3.  Assure resuscitative equipment is available and within reach  Outcome: Progressing   The patient is Watcher - Medium risk of patient condition declining or worsening    Shift Goals  Clinical Goals: Reactive NST, VS monitoring, cluster care  Patient Goals: rest  Family Goals: support    Progress made toward(s) clinical / shift goals:  Pain managed with PRN medications. Reactive NST obtained. VS stable. Care clustered to minimize interruptions. Pt able to sleep in between rounds.     Patient is not progressing towards the following goals:

## 2023-06-02 NOTE — PROGRESS NOTES
" PROGRESS NOTE    DATE: 2023   Hospital day: 11  Gestational Age: 36w4d, Estimated Date of Delivery: 23  Primary OB/GYN: Sarah Kang MD  [facility] Consultant: Fede Galarza     SUBJECTIVE  While the patient is feeling better than our last meeting, , she feels slightly tighter today than yesterday.  She has generally been requiring 1 L NC to maintain 95%, dipped slightly at night and is on 2 L NOC to maintain. Good FM. No LOF/VB.  Intermittent Ariel Bennett contractions.  Discussed delivery planning with her primary OB today by telephone.  Headed down to the [facility] for maternity pictures (on portable O2).      OBJECTIVE:  /71   Pulse (!) 110   Temp 36.3 °C (97.3 °F) (Temporal)   Resp 20   Ht 1.702 m (5' 7\")   Wt 103 kg (226 lb)   LMP 2022   SpO2 97%   Breastfeeding No   BMI 35.40 kg/m²       Review of systems: Pertinent items are noted in HPI.    PHYSICAL EXAM:  General:   Alert, conversational, pleasant, no acute distress, no coughing during conversation  Lungs:   CTAB.    Heart:   Tachycardic, regular rhythm  Abdomen:  Soft, gravid, non-tender, non-distended  Genitourinary: deferred      OB Ultrasound:  2023 (OP US with Michell) 5lb9oz, 82%, normal DEMI, normal [place] interval, vertex      AM NST  0800: 135/mod duglas/+accels, -decels  Interpretation: reactive NST  [place]: irregular CTX, some seen 8-10min apart    Medications:   Scheduled Medications   Medication Dose Frequency    predniSONE  50 mg DAILY    ipratropium-albuterol  3 mL Q4HRS (RT)    ferrous sulfate  325 mg QDAY with Breakfast    famotidine  20 mg BID    cetirizine  10 mg DAILY    prenatal plus vitamin  1 Tablet Daily-0800    mometasone-formoterol  2 Puff BID (RT)    budesonide  0.5 mg BID (RT)    aspirin  81 mg DAILY    levothyroxine  150 mcg AM ES    omeprazole  20 mg DAILY    PRN medications: mag hydrox-al hydrox-simeth, docusate sodium, ondansetron, benzonatate, diphenhydrAMINE, acetaminophen, acetaminophen "       Labs:     No results found for this or any previous visit (from the past 24 hour(s)).      Imaging: none new    CONSULTS:   Pulmonology  ENT (Dr. Rbolero) bedside laryngoscopy showing findings c/w GERD  MFM    ASSESSMENT & PLAN  Carolina Medrano is a 38 y.o.  at 36w4d admitted with acute respiratory failure due to asthma exacerbation.  Continue current POC and inpatient status until delivery in 3d.      PLAN:  Asthma  Appreciate pulmonary consult  Plan per their note: Continue budesonide, dulera, q4 nebs, prednisone (planned through delivery, then taper per pulm), Azithromycin x 5 days (last dose ), Zyrtec, nasal rinses  Can consider stress dose steroid with delivery   Goal O2 >95% while pregnant  Pt continues to need nebulizer treatments and supplemental oxygen  GERD  Continue pepcid, maalox, omeprazole  Elevated Blood glucose  Due to steroid use  SS insulin prn (has not needed)   Regular diet.   GTT normal   Fetal surveillance  Reassuring and reactive  BID testing  Platelets  Likely gestational thrombocytopenia. Plt at 28w labs 128  Currently increased to 185 while on steroid use  Remains adequate for regional anesthesia   Left breast mass  Incidental finding noted on CTA of chest  Still needs work-up   GBS positive in urine  Notes allergy to PCN (childhood) and rash with Vancomycin and Clindamycin  Ancef for prophylaxis if planning for   Hashimoto Thyroiditis  Thryoid US normal. Labs normal on admission  Continue home levothyroxine  SSA Ab+  No evidence of fetal heart block (normal LA interval) on last US with Dr. Galarza.   No evidence on NST in hospital  -   Anemia  Fe studies normal  Likely chronic dissease   -   Delivery Mode  Plan ERCS on   S/p consult with Dr. Martin       Plan of care discussed with the patient and she is in agreement with this plan. Plan of care discussed with RN taking care of patient and the patient's partner in attendance this am. All questions and  concerns were answered.     Carlo Toney MD, MS, FACOG, 6/2/2023, 10:58 AM

## 2023-06-03 PROCEDURE — 59025 FETAL NON-STRESS TEST: CPT

## 2023-06-03 PROCEDURE — 700101 HCHG RX REV CODE 250: Performed by: INTERNAL MEDICINE

## 2023-06-03 PROCEDURE — A9270 NON-COVERED ITEM OR SERVICE: HCPCS | Performed by: OBSTETRICS & GYNECOLOGY

## 2023-06-03 PROCEDURE — 99232 SBSQ HOSP IP/OBS MODERATE 35: CPT | Performed by: OBSTETRICS & GYNECOLOGY

## 2023-06-03 PROCEDURE — 94640 AIRWAY INHALATION TREATMENT: CPT

## 2023-06-03 PROCEDURE — 302790 HCHG STAT ANTEPARTUM CARE, DAILY

## 2023-06-03 PROCEDURE — 700102 HCHG RX REV CODE 250 W/ 637 OVERRIDE(OP): Performed by: OBSTETRICS & GYNECOLOGY

## 2023-06-03 PROCEDURE — 700111 HCHG RX REV CODE 636 W/ 250 OVERRIDE (IP): Performed by: INTERNAL MEDICINE

## 2023-06-03 PROCEDURE — 770002 HCHG ROOM/CARE - OB PRIVATE (112)

## 2023-06-03 RX ADMIN — IPRATROPIUM BROMIDE AND ALBUTEROL SULFATE 3 ML: 2.5; .5 SOLUTION RESPIRATORY (INHALATION) at 10:53

## 2023-06-03 RX ADMIN — CETIRIZINE HYDROCHLORIDE 10 MG: 10 TABLET, FILM COATED ORAL at 05:51

## 2023-06-03 RX ADMIN — LEVOTHYROXINE SODIUM 150 MCG: 0.15 TABLET ORAL at 05:50

## 2023-06-03 RX ADMIN — BUDESONIDE INHALATION 0.5 MG: 0.5 SUSPENSION RESPIRATORY (INHALATION) at 06:01

## 2023-06-03 RX ADMIN — MOMETASONE FUROATE AND FORMOTEROL FUMARATE DIHYDRATE 2 PUFF: 200; 5 AEROSOL RESPIRATORY (INHALATION) at 18:18

## 2023-06-03 RX ADMIN — PRENATAL WITH FERROUS FUM AND FOLIC ACID 1 TABLET: 3080; 920; 120; 400; 22; 1.84; 3; 20; 10; 1; 12; 200; 27; 25; 2 TABLET ORAL at 09:02

## 2023-06-03 RX ADMIN — IPRATROPIUM BROMIDE AND ALBUTEROL SULFATE 3 ML: 2.5; .5 SOLUTION RESPIRATORY (INHALATION) at 22:28

## 2023-06-03 RX ADMIN — IPRATROPIUM BROMIDE AND ALBUTEROL SULFATE 3 ML: 2.5; .5 SOLUTION RESPIRATORY (INHALATION) at 04:45

## 2023-06-03 RX ADMIN — FAMOTIDINE 20 MG: 20 TABLET, FILM COATED ORAL at 16:49

## 2023-06-03 RX ADMIN — PREDNISONE 50 MG: 20 TABLET ORAL at 05:49

## 2023-06-03 RX ADMIN — FERROUS SULFATE TAB 325 MG (65 MG ELEMENTAL FE) 325 MG: 325 (65 FE) TAB at 09:02

## 2023-06-03 RX ADMIN — IPRATROPIUM BROMIDE AND ALBUTEROL SULFATE 3 ML: 2.5; .5 SOLUTION RESPIRATORY (INHALATION) at 14:08

## 2023-06-03 RX ADMIN — BUDESONIDE INHALATION 0.5 MG: 0.5 SUSPENSION RESPIRATORY (INHALATION) at 18:18

## 2023-06-03 RX ADMIN — IPRATROPIUM BROMIDE AND ALBUTEROL SULFATE 3 ML: 2.5; .5 SOLUTION RESPIRATORY (INHALATION) at 18:18

## 2023-06-03 RX ADMIN — MOMETASONE FUROATE AND FORMOTEROL FUMARATE DIHYDRATE 2 PUFF: 200; 5 AEROSOL RESPIRATORY (INHALATION) at 06:02

## 2023-06-03 RX ADMIN — FAMOTIDINE 20 MG: 20 TABLET, FILM COATED ORAL at 05:50

## 2023-06-03 RX ADMIN — OMEPRAZOLE 20 MG: 20 CAPSULE, DELAYED RELEASE ORAL at 05:51

## 2023-06-03 RX ADMIN — ALUMINUM HYDROXIDE, MAGNESIUM HYDROXIDE, AND DIMETHICONE 30 ML: 400; 400; 40 SUSPENSION ORAL at 16:49

## 2023-06-03 RX ADMIN — ASPIRIN 81 MG 81 MG: 81 TABLET ORAL at 05:50

## 2023-06-03 NOTE — PROGRESS NOTES
1900: Report received from Katie PONCE, POC discussed.    1930: Initial assessment completed. Pt is resting comfortably in bed. Pt denies any pain, vaginal bleeding, LOF or ctx. Pt reports + FM. Work of breathing is decreased upon assessment and pt reports improvement with nebulizer treatments. POC for the night discussed and agreed upon per pt. Pt will call when ready for her NST and nightly PRN medications.     0700: Report given to Ricarda PONCE, pt stable at time of handoff. POC discussed and agreed upon per RN's.

## 2023-06-03 NOTE — RESPIRATORY CARE
Visited pt for breathing tx, Pt asleep, with no signs of distress. Will hold med at this time, but will be available if pt awakes and requires breathing tx.

## 2023-06-03 NOTE — PROGRESS NOTES
S: Feeling better today.  Asked by Dr. Kang to see pt to help determine timing of delivery.  O: Patient Vitals for the past 24 hrs:   BP Temp Temp src Pulse Resp SpO2   06/02/23 2000 123/61 36 °C (96.8 °F) Temporal (!) 102 20 96 %   06/02/23 1912 -- -- -- (!) 108 -- 98 %   06/02/23 1907 -- -- -- 97 -- 98 %   06/02/23 1902 -- -- -- 96 -- --   06/02/23 1901 -- -- -- 97 -- 97 %   06/02/23 1857 -- -- -- 92 -- 95 %   06/02/23 1852 -- -- -- (!) 101 -- --   06/02/23 1652 -- -- -- (!) 101 -- 94 %   06/02/23 1647 -- -- -- 98 -- 96 %   06/02/23 1642 -- -- -- (!) 105 -- 96 %   06/02/23 1637 -- -- -- (!) 106 -- 96 %   06/02/23 1632 -- -- -- (!) 104 -- 95 %   06/02/23 1627 -- -- -- (!) 107 -- 95 %   06/02/23 1622 115/56 36.3 °C (97.4 °F) Temporal 100 19 95 %   06/02/23 1617 -- -- -- (!) 109 -- 95 %   06/02/23 1612 -- -- -- (!) 109 -- 95 %   06/02/23 1607 -- -- -- (!) 104 -- 94 %   06/02/23 1602 -- -- -- (!) 102 -- 95 %   06/02/23 1557 -- -- -- (!) 110 -- 95 %   06/02/23 1552 124/63 -- -- (!) 110 -- 96 %   06/02/23 1547 -- -- -- (!) 111 -- 95 %   06/02/23 1542 -- -- -- (!) 110 -- 95 %   06/02/23 1537 -- -- -- (!) 108 -- 96 %   06/02/23 1532 -- -- -- (!) 111 -- 95 %   06/02/23 1527 -- -- -- (!) 113 -- 96 %   06/02/23 1522 -- -- -- (!) 109 -- 96 %   06/02/23 1517 -- -- -- (!) 105 -- 98 %   06/02/23 1512 -- -- -- (!) 104 -- 95 %   06/02/23 1507 -- -- -- (!) 104 -- 95 %   06/02/23 1502 -- -- -- (!) 106 -- 94 %   06/02/23 1457 -- -- -- (!) 106 -- 94 %   06/02/23 1452 -- -- -- (!) 112 -- 96 %   06/02/23 1447 -- -- -- 74 -- --   06/02/23 1437 -- -- -- (!) 106 -- 95 %   06/02/23 1432 -- -- -- (!) 113 -- 95 %   06/02/23 1427 -- -- -- (!) 111 -- 96 %   06/02/23 1422 -- -- -- (!) 109 -- 96 %   06/02/23 1417 -- -- -- (!) 102 -- 95 %   06/02/23 1412 -- -- -- (!) 105 -- 94 %   06/02/23 1407 -- -- -- (!) 107 -- 96 %   06/02/23 1402 -- -- -- (!) 104 -- 95 %   06/02/23 1357 -- -- -- (!) 109 -- --   06/02/23 1356 -- -- -- 99 -- 96 %   06/02/23  1351 -- -- -- (!) 104 -- 97 %   06/02/23 1346 -- -- -- (!) 106 -- 97 %   06/02/23 1342 -- -- -- (!) 111 -- --   06/02/23 1341 -- -- -- (!) 110 -- 96 %   06/02/23 1337 -- -- -- (!) 111 -- --   06/02/23 1336 -- -- -- (!) 109 -- 96 %   06/02/23 1331 -- -- -- (!) 108 -- 96 %   06/02/23 1326 -- -- -- (!) 108 -- 96 %   06/02/23 1322 -- -- -- (!) 111 -- --   06/02/23 1321 -- -- -- (!) 109 -- 94 %   06/02/23 1317 -- -- -- (!) 109 -- --   06/02/23 1316 -- -- -- (!) 116 -- 95 %   06/02/23 1311 -- -- -- (!) 115 -- 95 %   06/02/23 1306 -- -- -- (!) 119 -- 94 %   06/02/23 1302 -- -- -- (!) 114 -- --   06/02/23 1301 117/63 36.4 °C (97.5 °F) Temporal (!) 115 20 96 %   06/02/23 1257 -- -- -- (!) 105 -- --   06/02/23 1256 -- -- -- (!) 115 -- 96 %   06/02/23 1251 -- -- -- (!) 113 -- 96 %   06/02/23 1246 -- -- -- (!) 115 -- 96 %   06/02/23 1242 -- -- -- (!) 115 -- --   06/02/23 1241 -- -- -- (!) 111 -- 95 %   06/02/23 1237 -- -- -- (!) 117 -- --   06/02/23 1236 -- -- -- (!) 116 -- 96 %   06/02/23 1231 (!) 147/67 -- -- (!) 112 -- 96 %   06/02/23 1201 -- -- -- (!) 117 -- --   06/02/23 1156 -- -- -- 96 -- 96 %   06/02/23 1151 -- -- -- (!) 117 -- 95 %   06/02/23 1147 -- -- -- (!) 118 -- --   06/02/23 1146 -- -- -- (!) 119 -- 96 %   06/02/23 1142 -- -- -- (!) 116 -- --   06/02/23 1141 -- -- -- (!) 125 -- 96 %   06/02/23 1136 -- -- -- (!) 118 -- 97 %   06/02/23 1131 -- -- -- (!) 116 -- 97 %   06/02/23 1127 -- -- -- (!) 119 -- --   06/02/23 1126 -- -- -- (!) 121 -- 96 %   06/02/23 1122 -- -- -- (!) 122 -- --   06/02/23 1121 -- -- -- (!) 121 -- 95 %   06/02/23 1116 -- -- -- (!) 125 -- 96 %   06/02/23 1111 -- -- -- (!) 113 -- 96 %   06/02/23 1107 -- -- -- (!) 108 -- --   06/02/23 1106 -- -- -- (!) 119 -- 97 %   06/02/23 1102 -- -- -- (!) 107 -- --   06/02/23 1101 -- -- -- (!) 105 -- 98 %   06/02/23 1056 -- -- -- (!) 105 -- 98 %   06/02/23 1051 -- -- -- (!) 109 -- 96 %   06/02/23 1047 -- -- -- (!) 114 -- --   06/02/23 1046 -- -- -- (!) 111 --  95 %   06/02/23 1042 -- -- -- (!) 109 -- --   06/02/23 1041 -- -- -- (!) 113 -- 96 %   06/02/23 1026 -- -- -- (!) 116 -- 94 %   06/02/23 0936 -- -- -- (!) 119 -- 97 %   06/02/23 0932 -- -- -- (!) 114 -- --   06/02/23 0931 -- -- -- (!) 110 -- 97 %   06/02/23 0926 -- -- -- (!) 107 -- 97 %   06/02/23 0922 -- -- -- (!) 118 -- --   06/02/23 0921 -- -- -- (!) 113 -- 97 %   06/02/23 0831 -- -- -- (!) 110 -- --   06/02/23 0821 -- -- -- 94 -- 98 %   06/02/23 0817 -- -- -- (!) 107 -- --   06/02/23 0816 -- -- -- 100 -- 97 %   06/02/23 0812 -- -- -- (!) 101 -- --   06/02/23 0811 -- -- -- 98 -- 97 %   06/02/23 0806 -- -- -- 98 -- 98 %   06/02/23 0802 -- -- -- (!) 103 -- --   06/02/23 0801 -- -- -- (!) 104 -- 96 %   06/02/23 0757 -- -- -- (!) 102 -- --   06/02/23 0756 -- -- -- (!) 103 -- 98 %   06/02/23 0752 -- -- -- (!) 103 -- --   06/02/23 0751 -- -- -- (!) 107 -- 97 %   06/02/23 0746 -- -- -- (!) 103 -- 96 %   06/02/23 0742 -- -- -- 99 -- --   06/02/23 0740 -- -- -- (!) 107 -- 93 %   06/02/23 0737 -- -- -- 100 -- --   06/02/23 0735 131/71 36.3 °C (97.3 °F) Temporal (!) 105 20 97 %   06/02/23 0732 -- -- -- (!) 105 -- --   06/02/23 0730 -- -- -- (!) 102 -- 93 %   06/02/23 0726 -- -- -- (!) 105 -- --   06/02/23 0725 -- -- -- (!) 103 -- 93 %   06/02/23 0720 -- -- -- 100 -- 99 %   06/02/23 0717 -- -- -- (!) 102 -- --   06/02/23 0715 -- -- -- 99 -- 100 %   06/02/23 0712 -- -- -- 91 -- --   06/02/23 0710 -- -- -- 94 -- 97 %   06/02/23 0706 -- -- -- (!) 103 -- --   06/02/23 0705 -- -- -- 90 -- 98 %   06/02/23 0700 -- -- -- 97 -- 97 %   06/02/23 0657 -- -- -- 90 -- --   06/02/23 0655 -- -- -- 87 -- 97 %   06/02/23 0652 -- -- -- 88 -- --   06/02/23 0650 -- -- -- 85 -- 97 %   06/02/23 0646 -- -- -- 89 -- --   06/02/23 0645 -- -- -- 86 -- 98 %   06/02/23 0640 -- -- -- 89 -- 97 %   06/02/23 0637 -- -- -- 87 -- --   06/02/23 0635 -- -- -- 87 -- 95 %   06/02/23 0632 -- -- -- 89 -- --   06/02/23 0630 -- -- -- 95 -- 98 %   06/02/23 0626 -- --  -- 93 -- --   06/02/23 0625 -- -- -- 89 -- 97 %   06/02/23 0620 -- -- -- 95 -- 97 %   06/02/23 0617 -- -- -- 92 -- --   06/02/23 0615 -- -- -- 91 -- 98 %   06/02/23 0612 -- -- -- 89 -- --   06/02/23 0610 -- -- -- 90 -- 98 %   06/02/23 0606 -- -- -- 91 -- --   06/02/23 0605 -- -- -- 87 -- 97 %   06/02/23 0600 -- -- -- 87 -- 98 %   06/02/23 0557 -- -- -- 87 -- --   06/02/23 0555 -- -- -- 85 -- 98 %   06/02/23 0552 -- -- -- 85 -- --   06/02/23 0550 -- -- -- 88 -- 94 %   06/02/23 0546 -- -- -- 96 -- --   06/02/23 0540 -- -- -- 88 -- 97 %   06/02/23 0536 -- -- -- 93 -- --   06/02/23 0535 -- -- -- 90 -- 96 %   06/02/23 0531 -- -- -- 88 -- --   06/02/23 0530 -- -- -- 92 -- 95 %   06/02/23 0527 -- -- -- 88 -- --   06/02/23 0525 -- -- -- 91 -- 94 %   06/02/23 0520 -- -- -- 92 -- 95 %   06/02/23 0516 -- -- -- 94 -- --   06/02/23 0515 -- -- -- 94 -- 96 %   06/02/23 0511 -- -- -- 90 -- --   06/02/23 0510 -- -- -- 94 -- 95 %   06/02/23 0507 -- -- -- (!) 101 -- --   06/02/23 0505 -- -- -- 95 -- 95 %   06/02/23 0500 -- -- -- 98 -- 97 %   06/02/23 0456 -- -- -- 90 -- --   06/02/23 0455 -- -- -- 88 -- 97 %   06/02/23 0451 -- -- -- 91 -- --   06/02/23 0450 -- -- -- 94 -- 96 %   06/02/23 0447 -- -- -- 94 -- --   06/02/23 0445 -- -- -- 94 -- 97 %   06/02/23 0440 -- -- -- 92 -- 98 %   06/02/23 0436 -- -- -- (!) 104 -- --   06/02/23 0435 -- -- -- 94 -- 94 %   06/02/23 0431 -- -- -- 95 -- --   06/02/23 0430 -- -- -- 95 -- 94 %   06/02/23 0427 -- -- -- 96 -- --   06/02/23 0425 -- -- -- 96 -- 93 %   06/02/23 0420 -- -- -- 95 -- 93 %   06/02/23 0416 -- -- -- (!) 105 -- --   06/02/23 0415 -- -- -- 92 -- 97 %   06/02/23 0411 -- -- -- 96 -- --   06/02/23 0410 -- -- -- 94 -- 96 %   06/02/23 0407 -- -- -- 95 -- --   06/02/23 0405 -- -- -- 95 -- 96 %   06/02/23 0400 -- -- -- 94 -- 96 %   06/02/23 0356 -- -- -- 96 -- --   06/02/23 0355 -- -- -- 95 -- 96 %   06/02/23 0351 -- -- -- 94 -- --   06/02/23 0350 -- -- -- 94 -- 96 %   06/02/23 0347 -- -- --  95 -- --   06/02/23 0345 -- -- -- 95 -- 96 %   06/02/23 0340 -- -- -- 96 -- 96 %   06/02/23 0336 -- -- -- 94 -- --   06/02/23 0335 -- -- -- 96 -- 97 %   06/02/23 0331 -- -- -- 97 -- --   06/02/23 0330 -- -- -- 95 -- 97 %   06/02/23 0327 -- -- -- 97 -- --   06/02/23 0325 -- -- -- 87 -- 98 %   06/02/23 0320 -- -- -- 95 -- 97 %   06/02/23 0316 -- -- -- 98 -- --   06/02/23 0315 -- -- -- 91 -- 97 %   06/02/23 0311 -- -- -- 94 -- --   06/02/23 0310 -- -- -- 96 -- 97 %   06/02/23 0307 -- -- -- 95 -- --   06/02/23 0305 -- -- -- 96 -- 97 %   06/02/23 0300 -- -- -- 96 -- 97 %   06/02/23 0256 -- -- -- 95 -- --   06/02/23 0255 -- -- -- 99 -- 97 %   06/02/23 0251 -- -- -- 99 -- --   06/02/23 0250 -- -- -- 99 -- 95 %   06/02/23 0247 -- -- -- 98 -- --   06/02/23 0245 -- -- -- 98 -- 96 %   06/02/23 0240 -- -- -- 96 -- 96 %   06/02/23 0236 -- -- -- 96 -- --   06/02/23 0235 -- -- -- (!) 102 -- 95 %   06/02/23 0231 -- -- -- (!) 103 -- --   06/02/23 0230 -- -- -- 99 -- 96 %   06/02/23 0227 -- -- -- 98 -- --   06/02/23 0225 -- -- -- 97 -- 96 %   06/02/23 0220 -- -- -- 98 -- 96 %   06/02/23 0216 -- -- -- 98 -- --   06/02/23 0215 -- -- -- 97 -- 95 %   06/02/23 0211 -- -- -- 97 -- --   06/02/23 0210 -- -- -- 97 -- 95 %   06/02/23 0207 -- -- -- (!) 102 -- --   06/02/23 0206 -- -- -- 99 -- --   06/02/23 0205 118/56 36.1 °C (97 °F) Temporal 99 18 94 %   06/02/23 0200 -- -- -- (!) 104 -- 99 %   06/02/23 0155 -- -- -- 95 -- 99 %   06/02/23 0150 -- -- -- 96 -- 94 %   06/02/23 0146 -- -- -- 94 -- --   06/02/23 0145 -- -- -- 94 -- 93 %   06/02/23 0142 -- -- -- 94 -- --   06/02/23 0140 -- -- -- 98 -- 94 %   06/02/23 0135 -- -- -- 92 -- 94 %   06/02/23 0130 -- -- -- 91 -- 95 %   06/02/23 0126 -- -- -- 89 -- --   06/02/23 0125 -- -- -- 94 -- 93 %   06/02/23 0122 -- -- -- 89 -- --   06/02/23 0120 -- -- -- 90 -- 96 %   06/02/23 0115 -- -- -- 92 -- 96 %   06/02/23 0110 -- -- -- 93 -- 95 %   06/02/23 0106 -- -- -- 100 -- --   06/02/23 0105 -- -- -- 92 --  94 %   06/02/23 0102 -- -- -- 94 -- --   06/02/23 0100 -- -- -- 94 -- 94 %   06/02/23 0055 -- -- -- 90 -- 94 %   06/02/23 0050 -- -- -- 93 -- 94 %   06/02/23 0046 -- -- -- 94 -- --   06/02/23 0045 -- -- -- 93 -- 94 %   06/02/23 0042 -- -- -- 95 -- --   06/02/23 0040 -- -- -- 94 -- 94 %   06/02/23 0035 -- -- -- 94 -- 94 %   06/02/23 0030 -- -- -- 92 -- 94 %   06/02/23 0026 -- -- -- 96 -- --   06/02/23 0025 -- -- -- 93 -- 94 %   06/02/23 0022 -- -- -- 92 -- --   06/02/23 0020 -- -- -- 93 -- 94 %   06/02/23 0015 -- -- -- 93 -- 94 %   06/02/23 0010 -- -- -- 95 -- 94 %   06/02/23 0006 -- -- -- 92 -- --   06/02/23 0005 -- -- -- 93 -- 94 %   06/02/23 0002 -- -- -- 91 -- --   06/02/23 0000 -- -- -- 93 -- 95 %   06/01/23 2355 -- -- -- 92 -- 93 %   06/01/23 2350 -- -- -- 91 -- 94 %   06/01/23 2346 -- -- -- 97 -- --   06/01/23 2345 -- -- -- 93 -- 94 %   06/01/23 2342 -- -- -- 100 -- --   06/01/23 2340 -- -- -- 96 -- 93 %   06/01/23 2335 -- -- -- 94 -- 93 %   06/01/23 2330 -- -- -- (!) 101 -- 99 %   06/01/23 2326 -- -- -- 92 -- --   06/01/23 2325 -- -- -- 94 -- 95 %   06/01/23 2322 -- -- -- 93 -- --   06/01/23 2320 -- -- -- 97 -- 96 %   06/01/23 2315 -- -- -- 95 -- 96 %   06/01/23 2310 -- -- -- (!) 103 -- 96 %   06/01/23 2306 -- -- -- 99 -- --   06/01/23 2305 -- -- -- 100 -- 96 %   06/01/23 2302 -- -- -- 98 -- --   06/01/23 2300 -- -- -- (!) 101 -- 96 %   06/01/23 2255 -- -- -- 99 -- 96 %   06/01/23 2250 -- -- -- 99 -- 96 %   06/01/23 2246 -- -- -- 100 -- --   06/01/23 2245 -- -- -- 97 -- 96 %   06/01/23 2242 -- -- -- 97 -- --   06/01/23 2240 -- -- -- 98 -- 95 %   06/01/23 2235 -- -- -- 98 -- 96 %   06/01/23 2230 -- -- -- (!) 101 -- 95 %   06/01/23 2226 -- -- -- 97 -- --   06/01/23 2225 -- -- -- 99 -- 96 %   06/01/23 2222 -- -- -- (!) 102 -- --   06/01/23 2220 115/54 36.1 °C (96.9 °F) Temporal 95 18 95 %   06/01/23 2215 -- -- -- (!) 101 -- 96 %   06/01/23 2210 -- -- -- (!) 112 -- 92 %   06/01/23 2206 -- -- -- (!) 102 -- --    06/01/23 2205 -- -- -- (!) 103 -- 99 %   06/01/23 2202 -- -- -- (!) 101 -- --   06/01/23 2200 -- -- -- (!) 106 -- 97 %   06/01/23 2155 -- -- -- (!) 104 -- 97 %   06/01/23 2150 -- -- -- 97 -- 96 %   06/01/23 2146 -- -- -- 98 -- --   06/01/23 2145 -- -- -- 95 -- 97 %   06/01/23 2142 -- -- -- 95 -- --   06/01/23 2140 -- -- -- 90 -- 97 %   06/01/23 2135 -- -- -- 97 -- 95 %   06/01/23 2130 -- -- -- (!) 101 -- 96 %   06/01/23 2126 -- -- -- 100 -- --   06/01/23 2125 -- -- -- 100 -- 95 %   06/01/23 2122 -- -- -- 98 -- --   06/01/23 2120 -- -- -- 100 -- 96 %   06/01/23 2115 -- -- -- 99 -- 96 %   06/01/23 2110 -- -- -- (!) 101 -- 96 %   06/01/23 2106 -- -- -- 99 -- --   06/01/23 2105 -- -- -- 100 -- 95 %   06/01/23 2102 -- -- -- (!) 103 -- --   06/01/23 2100 -- -- -- 97 -- 95 %   06/01/23 2055 -- -- -- 96 -- 95 %   06/01/23 2050 -- -- -- (!) 103 -- 95 %   06/01/23 2046 -- -- -- (!) 107 -- --   06/01/23 2045 -- -- -- (!) 113 -- 95 %   06/01/23 2042 -- -- -- (!) 118 -- --   06/01/23 2040 -- -- -- (!) 117 -- 89 %     Heart: tachycardic  Pt able to talk but has some shortness of breath.  A: IUP 36 4/7 weeks      Bronchial asthma, improving slowly  P:  Discussed with Carolina to see how long she can lay supine before starting to feel dyspneic.  Will do this daily and if no change, then delivery at 37 weeks.  If improving, consider prolonging pregnancy to optimize time she can lay supine.           Discussed it is also reasonable to be in a semi fowlers position if needed.      Case discussed with Dr. Kang.

## 2023-06-03 NOTE — PROGRESS NOTES
0700 - Report received. POC discussed.   0800 - Pt denies LOF/vaginal bleeding. Reports irregular contractions. +FM.   1400 - Updated Dr Galarza on pt oxygen desaturations while laying down. Pt requires 3L O2 when flat on back to maintain target oxygen saturation.   1900 - Report given. POC discussed.

## 2023-06-03 NOTE — PROGRESS NOTES
Antepartum Progress Note    Carolina Medrano   36w5d      Subjective:  Pt is doing well no with no current distress- still requiring 1 liter oxygen . No fetal complaints but was feeling some contractions last night. +fm, no leaking fluid , no bleeding.    Last night - she was able to lie flat for about an hour.     Objective:   Vitals:    23 0602 23 0832 23 1054 23 1200   BP:  133/72  121/59   Pulse:  93  (!) 105   Resp: 16 17 16 16   Temp:  36.2 °C (97.1 °F)  36.2 °C (97.2 °F)   TempSrc:  Temporal  Temporal   SpO2:  96%  95%   Weight:       Height:         Gen: no acute distress  CV RRR  Chest: clear to aus. Conor LE  Abd: gravid nt/nd  Ext: no calf pain conor LE  SVE: deferred  Fetal non stress test: category 1, 140's reactive, mod variability, no decels, occ ctxns    Medication Dose Frequency    predniSONE  50 mg DAILY    ipratropium-albuterol  3 mL Q4HRS (RT)    ferrous sulfate  325 mg QDAY with Breakfast    famotidine  20 mg BID    cetirizine  10 mg DAILY    prenatal plus vitamin  1 Tablet Daily-0800    mometasone-formoterol  2 Puff BID (RT)    budesonide  0.5 mg BID (RT)    aspirin  81 mg DAILY    levothyroxine  150 mcg AM ES    omeprazole  20 mg DAILY    PRN medications: mag hydrox-al hydrox-simeth, docusate sodium, ondansetron, benzonatate, diphenhydrAMINE, acetaminophen, acetaminophen     Labs:  No results found for this or any previous visit (from the past 24 hour(s)).    OB Ultrasound:  2023 (OP US with Oki) 5lb9oz, 82%, normal DEMI, normal NV interval, vertex     Assessment: Plan  Carolina Medrano is a 38 y.o.  at 36w5d admitted with acute respiratory failure due to asthma exacerbation.  Continue current POC and inpatient status until delivery     PLAN:  Asthma  Appreciate pulmonary consult  Plan per their note: Continue budesonide, dulera, q4 nebs, prednisone (planned through delivery, then taper per pulm), Azithromycin x 5 days (last dose ), Zyrtec,  nasal rinses  Can consider stress dose steroid with delivery   Goal O2 >95% while pregnant  Pt continues to need nebulizer treatments and supplemental oxygen- and getting scheduled tx  GERD  Continue pepcid, maalox, omeprazole  Elevated Blood glucose  Due to steroid use  SS insulin prn (has not needed)   Regular diet.   GTT normal   Fetal surveillance  Reassuring and reactive  BID testing  Platelets  Likely gestational thrombocytopenia. Plt at 28w labs 128  Currently increased to 185 while on steroid use  Remains adequate for regional anesthesia   Left breast mass  Incidental finding noted on CTA of chest  Still needs work-up   GBS positive in urine  Notes allergy to PCN (childhood) and rash with Vancomycin and Clindamycin  Ancef for prophylaxis if planning for   Hashimoto Thyroiditis  Thryoid US normal. Labs normal on admission  Continue home levothyroxine  SSA Ab+  No evidence of fetal heart block (normal PA interval) on last US with Dr. Galarza.   No evidence on NST in hospital  -   Anemia  Fe studies normal  Likely chronic dissease   -   Delivery Mode  Plan ERCS on   S/p consult with Dr. Mario Dubois M.D.

## 2023-06-03 NOTE — PROGRESS NOTES
Pulmonary Progress Note    Date of admission  2023    Reason for Consultation  Acute hypoxemic respiratory failure      History of Presenting Illness  38 y.o. female who presented 2023 with cough and shortness of breath.  She is currently pregnant, , 35 weeks and 6 days. she reports that she has no formal diagnosis of asthma, but has had a reactive airways disease syndrome, especially when she is sick and usually requires a course of prednisone and an albuterol inhaler.  This has always helped her in the past.  Starting in late March, early April patient started having worsening cough.  She has been on short courses of steroids and a low-dose Advair inhaler on and off since that time.  She reports that when she is taken full dose steroids she has had some relief and improvement, however, there has been concern about giving her higher dose steroids due to pregnancy and she has not had a good response to them.  She has been having severe coughing fits and is unable to catch her breath at these times.  She has had response to nebulizers in the past.  She has been checking her saturations at home and they have been around 94%, but when she exerts herself by walking up the stairs, she notes that her heart rate increases into the 150s.  In the ER, they did stand her up and her oxygen saturations dropped to 88% and she was placed on oxygen at that time.  Patient endorses a feeling of tightness in her throat, she has no history of tracheal stenosis.  She does not feel that she has any nasal congestion at this time, but reports that she maybe has been snoring.  She does endorse a history of allergies [place]    Hospital Course  2023 - patient reports that she is feeling slightly better today.  She is not having quite as much coughing when she is talking.  She feels like maybe her throat is slightly less tight.  Reviewed her spirometry and  "she does have flattening of the inspiratory limb    5/24/2023 -patient reports that she was able to sleep last night, but is not feeling significantly better today.  She continues to cough.  She feels like the every 4 hours nebulizers are helping    \"5/25 - moving air well. No wheezing. Sore throat improving. Still using low levels of supplemental oxygen.     5/27: States she sleeps sitting partially up.  If she tries to lay flat, she goes in the cognitive beds.  She is on medication for acid reflux.  She does state the medication improves her symptoms but does not completely ameliorate them.  She is also wondering why the DuoNebs have been decreased in frequency.    5/28: Reviewed and examined to walk around the unit with oxygen.  Still coughing.  Duo nebs being increased to every 4 hours again helped her.  Primary team also maximized acid reflux meds.    5/29: No significant new events. Planning for scheduled C/S next Tuesday.     5/30: No significant events.  Does subjectively feel that her breathing is getting better.\" Dr Pérez's note     6/2: Patient reports that overall she is feeling better each day.  She was able to go outside today to do her maternity pictures with her .  She feels less tightness in her throat and has been coughing less.    Review of Systems   Constitutional:  Negative for chills and fever.   Respiratory:  Positive for cough and shortness of breath.    Cardiovascular:  Negative for chest pain.   Gastrointestinal:  Negative for nausea and vomiting.      Vital Signs for last 24 hours   Temp:  [36 °C (96.8 °F)-36.4 °C (97.5 °F)] 36 °C (96.8 °F)  Pulse:  [] 84  Resp:  [18-20] 20  BP: (115-147)/(54-71) 123/61  SpO2:  [92 %-100 %] 97 %    Physical Exam  Vitals reviewed.   Constitutional:       Appearance: Normal appearance.   HENT:      Head: Normocephalic.   Eyes:      Conjunctiva/sclera: Conjunctivae normal.   Cardiovascular:      Rate and Rhythm: Normal rate and regular " rhythm.   Pulmonary:      Effort: Pulmonary effort is normal. No respiratory distress.      Breath sounds: Normal breath sounds. No wheezing.   Skin:     General: Skin is warm and dry.   Neurological:      General: No focal deficit present.      Mental Status: She is alert and oriented to person, place, and time.   Psychiatric:         Behavior: Behavior normal.       Medications  Current Facility-Administered Medications   Medication Dose Route Frequency Provider Last Rate Last Admin    predniSONE (DELTASONE) tablet 50 mg  50 mg Oral DAILY REBEL WingOGabe   50 mg at 06/02/23 0623    mag hydrox-al hydrox-simeth (MAALOX PLUS ES or MYLANTA DS) suspension 30 mL  30 mL Oral 4X/DAY PRN Sarah Kang M.D.   30 mL at 06/02/23 2141    ipratropium-albuterol (DUONEB) nebulizer solution  3 mL Nebulization Q4HRS (RT) REBEL WingOGabe   3 mL at 06/02/23 1859    docusate sodium (COLACE) capsule 100 mg  100 mg Oral BID PRN Lacie Umana M.D.        ondansetron (ZOFRAN ODT) dispertab 4 mg  4 mg Oral Q6HRS PRN Lacie Umana M.D.   4 mg at 05/30/23 1752    ferrous sulfate tablet 325 mg  325 mg Oral QDAY with Breakfast Corinne E Capurro, M.D.   325 mg at 06/02/23 0735    famotidine (PEPCID) tablet 20 mg  20 mg Oral BID Nora Paez M.D.   20 mg at 06/02/23 1802    cetirizine (ZYRTEC) tablet 10 mg  10 mg Oral DAILY Carlo Toney M.D.   10 mg at 06/02/23 0624    prenatal plus vitamin (STUARTNATAL 1+1) 27-1 MG tablet 1 Tablet  1 Tablet Oral Daily-0800 Carlo Toney M.D.   1 Tablet at 06/02/23 0734    benzonatate (TESSALON) capsule 100 mg  100 mg Oral TID PRN Nora Paez M.D.   100 mg at 06/02/23 0622    diphenhydrAMINE (BENADRYL) tablet/capsule 50 mg  50 mg Oral Q6HRS PRN Nora Paez M.D.        mometasone-formoterol (Dulera) 200-5 mcg/Act inhaler 2 Puff  2 Puff Inhalation BID (RT) Adriana Fleming D.O.   2 Puff at 06/02/23 1901    budesonide (PULMICORT) neb susp  0.5 mg  0.5 mg Nebulization BID (RT) Adriana Fleming D.O.   0.5 mg at 23 1859    aspirin (ASA) chewable tab 81 mg  81 mg Oral DAILY Nora Paez M.D.   81 mg at 23 0621    acetaminophen (Tylenol) tablet 650 mg  650 mg Oral Q4HRS PRN Nora Paez M.D.   650 mg at 23 2255    levothyroxine (SYNTHROID) tablet 150 mcg  150 mcg Oral AM ES Nora Paez M.D.   150 mcg at 23 0624    omeprazole (PRILOSEC) capsule 20 mg  20 mg Oral DAILY Nora Paez M.D.   20 mg at 23 06    acetaminophen (TYLENOL) tablet 1,000 mg  1,000 mg Oral Q6HRS PRN Nora Paez M.D.   1,000 mg at 23         Assessment/Plan  #Acute hypoxemic respiratory failure, suspect that this is secondary to asthma exacerbation     Titrate O2 to maintain sats >95% in the setting of pregnancy  Continue Dulera high dose with spacer  Continue nebulized budesonide, double ICS to ensure that her deep airways are getting access to the medication  --Reescalated to scheduled every 4 hours on    Continue prednisone 50 mg daily, for at least 10 days.  We will likely commence weaning on Monday after her .  She will be close to 2 weeks on steroids at that point and steroid weaning will need to be gradual.  Continue Zyrtec daily for control of allergies  Continue nasal rinses  Continue with omeprazole, Pepcid, and Maalox  Encouraged to ambulate frequently and to use incentive spirometer.  She is achieving 3 L with I-S, which is remarkable.  Agree with planned C/S - planned for   Not a candidate for biologics given Abs Eos <150 and because time of onset for therapy is 30-90 days.     Adriana Fleming, DO   Pulmonary and Critical Care

## 2023-06-03 NOTE — CARE PLAN
The patient is Watcher - Medium risk of patient condition declining or worsening    Shift Goals  Clinical Goals: comfort with breathing, O2 remain stable  Patient Goals: comfort and rest  Family Goals: support Chau

## 2023-06-03 NOTE — PROGRESS NOTES
S: Last night, she was able to lay supine for 30 minutes.  Today, she began to have a decrease in her pulse ox to 91% but was asymptomatic.  She has needed O2 support at 2 L/min since noon today.    O: Patient Vitals for the past 24 hrs:   BP Temp Temp src Pulse Resp SpO2   06/03/23 1540 -- -- -- (!) 108 -- 94 %   06/03/23 1535 -- -- -- (!) 117 -- 95 %   06/03/23 1530 -- -- -- (!) 109 -- 95 %   06/03/23 1525 -- -- -- -- -- 96 %   06/03/23 1520 -- -- -- (!) 111 -- 96 %   06/03/23 1515 -- -- -- (!) 109 -- 95 %   06/03/23 1510 -- -- -- (!) 116 -- 95 %   06/03/23 1505 -- -- -- -- -- 95 %   06/03/23 1500 -- -- -- (!) 112 -- 94 %   06/03/23 1455 -- -- -- (!) 113 -- 94 %   06/03/23 1450 -- -- -- (!) 116 -- 93 %   06/03/23 1445 -- -- -- -- -- 96 %   06/03/23 1440 -- -- -- (!) 117 -- 96 %   06/03/23 1435 -- -- -- (!) 117 -- 95 %   06/03/23 1430 -- -- -- (!) 114 -- 94 %   06/03/23 1425 -- -- -- -- -- 94 %   06/03/23 1420 -- -- -- (!) 110 -- 95 %   06/03/23 1415 -- -- -- (!) 116 -- 93 %   06/03/23 1410 -- -- -- (!) 101 -- 97 %   06/03/23 1408 -- -- -- -- 16 --   06/03/23 1405 -- -- -- -- -- 94 %   06/03/23 1400 -- -- -- (!) 109 -- 95 %   06/03/23 1355 -- -- -- (!) 108 -- 96 %   06/03/23 1350 -- -- -- (!) 113 -- 96 %   06/03/23 1345 -- -- -- -- -- 95 %   06/03/23 1310 -- -- -- 96 -- 97 %   06/03/23 1305 -- -- -- 95 -- 95 %   06/03/23 1300 -- -- -- (!) 102 -- 95 %   06/03/23 1255 -- -- -- 99 -- 95 %   06/03/23 1250 -- -- -- (!) 101 -- 95 %   06/03/23 1245 -- -- -- 98 -- 94 %   06/03/23 1240 -- -- -- (!) 103 -- 96 %   06/03/23 1235 -- -- -- 98 -- 96 %   06/03/23 1230 -- -- -- (!) 102 -- 96 %   06/03/23 1225 -- -- -- (!) 106 -- 92 %   06/03/23 1220 -- -- -- (!) 109 -- 93 %   06/03/23 1215 -- -- -- (!) 103 -- 93 %   06/03/23 1210 -- -- -- (!) 111 -- 94 %   06/03/23 1205 121/59 -- -- (!) 109 -- 94 %   06/03/23 1200 121/59 36.2 °C (97.2 °F) Temporal (!) 105 16 95 %   06/03/23 1155 -- -- -- (!) 105 -- 94 %   06/03/23 1150 -- -- -- (!)  108 -- 93 %   06/03/23 1145 -- -- -- (!) 113 -- 93 %   06/03/23 1140 -- -- -- (!) 117 -- 92 %   06/03/23 1135 -- -- -- (!) 114 -- 93 %   06/03/23 1130 -- -- -- (!) 118 -- 94 %   06/03/23 1125 -- -- -- (!) 117 -- 95 %   06/03/23 1120 -- -- -- (!) 115 -- 94 %   06/03/23 1115 -- -- -- (!) 121 -- 94 %   06/03/23 1110 -- -- -- (!) 122 -- 96 %   06/03/23 1105 -- -- -- (!) 117 -- 96 %   06/03/23 1100 -- -- -- (!) 112 -- 96 %   06/03/23 1055 -- -- -- (!) 103 -- 95 %   06/03/23 1054 -- -- -- -- 16 --   06/03/23 1050 -- -- -- (!) 112 -- 95 %   06/03/23 1045 -- -- -- (!) 105 -- 96 %   06/03/23 1040 -- -- -- (!) 113 -- 96 %   06/03/23 1035 -- -- -- (!) 108 -- 95 %   06/03/23 1030 -- -- -- (!) 114 -- 95 %   06/03/23 1025 -- -- -- (!) 110 -- 96 %   06/03/23 1020 -- -- -- (!) 117 -- 96 %   06/03/23 1015 -- -- -- (!) 117 -- 96 %   06/03/23 0940 -- -- -- (!) 107 -- --   06/03/23 0935 123/63 -- -- (!) 104 -- 96 %   06/03/23 0930 -- -- -- (!) 105 -- 96 %   06/03/23 0925 -- -- -- (!) 107 -- 96 %   06/03/23 0920 -- -- -- (!) 110 -- 95 %   06/03/23 0915 -- -- -- (!) 105 -- 94 %   06/03/23 0910 -- -- -- (!) 107 -- 96 %   06/03/23 0905 -- -- -- (!) 111 -- 94 %   06/03/23 0900 -- -- -- (!) 109 -- 94 %   06/03/23 0855 -- -- -- (!) 102 -- 95 %   06/03/23 0850 -- -- -- (!) 104 -- 96 %   06/03/23 0845 -- -- -- (!) 104 -- 95 %   06/03/23 0840 -- -- -- 95 -- 96 %   06/03/23 0835 133/72 -- -- 90 -- 98 %   06/03/23 0832 133/72 36.2 °C (97.1 °F) Temporal 93 17 96 %   06/03/23 0830 -- -- -- (!) 106 -- 94 %   06/03/23 0825 -- -- -- (!) 105 -- 95 %   06/03/23 0820 -- -- -- 97 -- 96 %   06/03/23 0815 -- -- -- 92 -- 95 %   06/03/23 0810 -- -- -- 96 -- 95 %   06/03/23 0805 -- -- -- 100 -- 96 %   06/03/23 0800 -- -- -- 99 -- 93 %   06/03/23 0755 -- -- -- 100 -- 97 %   06/03/23 0750 -- -- -- (!) 105 -- 100 %   06/03/23 0735 -- -- -- 90 -- 96 %   06/03/23 0730 -- -- -- 87 -- 97 %   06/03/23 0725 -- -- -- 90 -- 97 %   06/03/23 0720 -- -- -- 89 -- 97 %    06/03/23 0715 -- -- -- 91 -- 96 %   06/03/23 0710 -- -- -- 93 -- 96 %   06/03/23 0705 -- -- -- 97 -- 95 %   06/03/23 0700 -- -- -- 96 -- 95 %   06/03/23 0602 -- -- -- -- 16 --   06/03/23 0440 114/58 36.2 °C (97.1 °F) Temporal 88 20 97 %   06/03/23 0300 -- -- -- 85 -- 97 %   06/03/23 0200 -- -- -- 86 -- 96 %   06/03/23 0100 -- -- -- 94 -- 96 %   06/03/23 0000 -- -- -- 88 -- 96 %   06/02/23 2320 129/57 -- -- 95 -- 95 %   06/02/23 2310 (!) 143/67 36.3 °C (97.4 °F) Temporal 92 18 96 %   06/02/23 2300 -- -- -- 91 -- 96 %   06/02/23 2250 -- -- -- 92 -- 96 %   06/02/23 2240 -- -- -- (!) 107 -- 95 %   06/02/23 2230 -- -- -- 98 -- 93 %   06/02/23 2222 -- -- -- (!) 105 -- 96 %   06/02/23 2220 -- -- -- 89 -- --   06/02/23 2210 -- -- -- 89 -- 96 %   06/02/23 2200 -- -- -- 82 -- 96 %   06/02/23 2150 -- -- -- 84 -- 97 %   06/02/23 2140 -- -- -- 90 -- 96 %   06/02/23 2130 -- -- -- 93 -- 97 %   06/02/23 2120 -- -- -- 89 -- 97 %   06/02/23 2110 -- -- -- 97 -- 96 %   06/02/23 2100 -- -- -- 93 -- 95 %   06/02/23 2050 -- -- -- 98 -- 97 %   06/02/23 2040 -- -- -- 100 -- 96 %   06/02/23 2030 -- -- -- 93 -- 98 %   06/02/23 2020 -- -- -- (!) 55 -- 95 %   06/02/23 2000 123/61 36 °C (96.8 °F) Temporal (!) 102 20 96 %   06/02/23 1950 -- -- -- (!) 113 -- 96 %   06/02/23 1912 -- -- -- (!) 108 -- 98 %   06/02/23 1910 -- -- -- 100 -- --   06/02/23 1907 -- -- -- 97 -- 98 %   06/02/23 1902 -- -- -- 96 -- --   06/02/23 1901 -- -- -- 97 -- 97 %   06/02/23 1857 -- -- -- 92 -- 95 %   06/02/23 1852 -- -- -- (!) 101 -- --   06/02/23 1652 -- -- -- (!) 101 -- 94 %   06/02/23 1647 -- -- -- 98 -- 96 %   06/02/23 1642 -- -- -- (!) 105 -- 96 %   06/02/23 1637 -- -- -- (!) 106 -- 96 %   06/02/23 1632 -- -- -- (!) 104 -- 95 %   06/02/23 1627 -- -- -- (!) 107 -- 95 %   06/02/23 1622 115/56 36.3 °C (97.4 °F) Temporal 100 19 95 %   06/02/23 1617 -- -- -- (!) 109 -- 95 %   06/02/23 1612 -- -- -- (!) 109 -- 95 %   06/02/23 1607 -- -- -- (!) 104 -- 94 %   06/02/23  1602 -- -- -- (!) 102 -- 95 %   06/02/23 1557 -- -- -- (!) 110 -- 95 %   06/02/23 1552 124/63 -- -- (!) 110 -- 96 %   06/02/23 1547 -- -- -- (!) 111 -- 95 %   06/02/23 1542 -- -- -- (!) 110 -- 95 %     LUNG: Bilateral bronchial sounds.  No rales or rhonchi.  A: IUP 36 5/7 weeks      Bronchial asthma seems to have worsen today.  Not able to tolerate supine position today and dyspneic when talking.  P: Retest supine position tomorrow.       She will need hydrocortisone bolus 100 mg IV at time of delivery.  May need second dose 8 hours later.  Discussed with Dr. Dubois.    Follow up detailed hospital consultation with high complexity decision making.

## 2023-06-04 PROCEDURE — 770002 HCHG ROOM/CARE - OB PRIVATE (112)

## 2023-06-04 PROCEDURE — 700111 HCHG RX REV CODE 636 W/ 250 OVERRIDE (IP): Performed by: INTERNAL MEDICINE

## 2023-06-04 PROCEDURE — A9270 NON-COVERED ITEM OR SERVICE: HCPCS | Performed by: OBSTETRICS & GYNECOLOGY

## 2023-06-04 PROCEDURE — A9270 NON-COVERED ITEM OR SERVICE: HCPCS | Performed by: INTERNAL MEDICINE

## 2023-06-04 PROCEDURE — 302790 HCHG STAT ANTEPARTUM CARE, DAILY

## 2023-06-04 PROCEDURE — 94640 AIRWAY INHALATION TREATMENT: CPT

## 2023-06-04 PROCEDURE — 700102 HCHG RX REV CODE 250 W/ 637 OVERRIDE(OP): Performed by: OBSTETRICS & GYNECOLOGY

## 2023-06-04 PROCEDURE — 700102 HCHG RX REV CODE 250 W/ 637 OVERRIDE(OP): Performed by: INTERNAL MEDICINE

## 2023-06-04 PROCEDURE — 700101 HCHG RX REV CODE 250: Performed by: INTERNAL MEDICINE

## 2023-06-04 PROCEDURE — 59025 FETAL NON-STRESS TEST: CPT

## 2023-06-04 PROCEDURE — 99231 SBSQ HOSP IP/OBS SF/LOW 25: CPT | Performed by: OBSTETRICS & GYNECOLOGY

## 2023-06-04 PROCEDURE — 99232 SBSQ HOSP IP/OBS MODERATE 35: CPT | Performed by: INTERNAL MEDICINE

## 2023-06-04 RX ADMIN — IPRATROPIUM BROMIDE AND ALBUTEROL SULFATE 3 ML: 2.5; .5 SOLUTION RESPIRATORY (INHALATION) at 02:41

## 2023-06-04 RX ADMIN — ACETAMINOPHEN 1000 MG: 500 TABLET, FILM COATED ORAL at 22:18

## 2023-06-04 RX ADMIN — MOMETASONE FUROATE AND FORMOTEROL FUMARATE DIHYDRATE 2 PUFF: 200; 5 AEROSOL RESPIRATORY (INHALATION) at 07:15

## 2023-06-04 RX ADMIN — FAMOTIDINE 20 MG: 20 TABLET, FILM COATED ORAL at 06:05

## 2023-06-04 RX ADMIN — PRENATAL WITH FERROUS FUM AND FOLIC ACID 1 TABLET: 3080; 920; 120; 400; 22; 1.84; 3; 20; 10; 1; 12; 200; 27; 25; 2 TABLET ORAL at 11:18

## 2023-06-04 RX ADMIN — BUDESONIDE INHALATION 0.5 MG: 0.5 SUSPENSION RESPIRATORY (INHALATION) at 19:19

## 2023-06-04 RX ADMIN — MOMETASONE FUROATE AND FORMOTEROL FUMARATE DIHYDRATE 2 PUFF: 200; 5 AEROSOL RESPIRATORY (INHALATION) at 19:29

## 2023-06-04 RX ADMIN — IPRATROPIUM BROMIDE AND ALBUTEROL SULFATE 3 ML: 2.5; .5 SOLUTION RESPIRATORY (INHALATION) at 22:35

## 2023-06-04 RX ADMIN — PREDNISONE 50 MG: 20 TABLET ORAL at 06:05

## 2023-06-04 RX ADMIN — CETIRIZINE HYDROCHLORIDE 10 MG: 10 TABLET, FILM COATED ORAL at 06:05

## 2023-06-04 RX ADMIN — IPRATROPIUM BROMIDE AND ALBUTEROL SULFATE 3 ML: 2.5; .5 SOLUTION RESPIRATORY (INHALATION) at 07:14

## 2023-06-04 RX ADMIN — BUDESONIDE INHALATION 0.5 MG: 0.5 SUSPENSION RESPIRATORY (INHALATION) at 07:14

## 2023-06-04 RX ADMIN — ALUMINUM HYDROXIDE, MAGNESIUM HYDROXIDE, AND DIMETHICONE 30 ML: 400; 400; 40 SUSPENSION ORAL at 17:31

## 2023-06-04 RX ADMIN — IPRATROPIUM BROMIDE AND ALBUTEROL SULFATE 3 ML: 2.5; .5 SOLUTION RESPIRATORY (INHALATION) at 15:30

## 2023-06-04 RX ADMIN — IPRATROPIUM BROMIDE AND ALBUTEROL SULFATE 3 ML: 2.5; .5 SOLUTION RESPIRATORY (INHALATION) at 19:19

## 2023-06-04 RX ADMIN — OMEPRAZOLE 20 MG: 20 CAPSULE, DELAYED RELEASE ORAL at 06:05

## 2023-06-04 RX ADMIN — IPRATROPIUM BROMIDE AND ALBUTEROL SULFATE 3 ML: 2.5; .5 SOLUTION RESPIRATORY (INHALATION) at 10:56

## 2023-06-04 RX ADMIN — FAMOTIDINE 20 MG: 20 TABLET, FILM COATED ORAL at 17:31

## 2023-06-04 RX ADMIN — LEVOTHYROXINE SODIUM 150 MCG: 0.15 TABLET ORAL at 06:05

## 2023-06-04 RX ADMIN — ALUMINUM HYDROXIDE, MAGNESIUM HYDROXIDE, AND DIMETHICONE 30 ML: 400; 400; 40 SUSPENSION ORAL at 22:19

## 2023-06-04 RX ADMIN — FERROUS SULFATE TAB 325 MG (65 MG ELEMENTAL FE) 325 MG: 325 (65 FE) TAB at 11:18

## 2023-06-04 RX ADMIN — ALUMINUM HYDROXIDE, MAGNESIUM HYDROXIDE, AND DIMETHICONE 30 ML: 400; 400; 40 SUSPENSION ORAL at 11:19

## 2023-06-04 ASSESSMENT — ENCOUNTER SYMPTOMS
FEVER: 0
VOMITING: 0
CHILLS: 0
NAUSEA: 0
COUGH: 1
SHORTNESS OF BREATH: 1

## 2023-06-04 NOTE — CARE PLAN
Problem: Knowledge Deficit - L&D  Goal: Patient and family/caregivers will demonstrate understanding of plan of care, disease process/condition, diagnostic tests and medications  Outcome: Progressing     Problem: Discharge Barriers/Planning  Goal: Patient's continuum of care needs are met  Outcome: Progressing   The patient is Watcher - Medium risk of patient condition declining or worsening    Shift Goals  Clinical Goals: comfort with breathing, O2 remain stable  Patient Goals: comfort and rest  Family Goals: support Chau    Progress made toward(s) clinical / shift goals:  increase in O2 requirements    Patient is not progressing towards the following goals:

## 2023-06-04 NOTE — PROGRESS NOTES
Pulmonary Progress Note    Date of admission  2023    Reason for Consultation  Acute hypoxemic respiratory failure      History of Presenting Illness  38 y.o. female who presented 2023 with cough and shortness of breath.  She is currently pregnant, , 35 weeks and 6 days. she reports that she has no formal diagnosis of asthma, but has had a reactive airways disease syndrome, especially when she is sick and usually requires a course of prednisone and an albuterol inhaler.  This has always helped her in the past.  Starting in late March, early April patient started having worsening cough.  She has been on short courses of steroids and a low-dose Advair inhaler on and off since that time.  She reports that when she is taken full dose steroids she has had some relief and improvement, however, there has been concern about giving her higher dose steroids due to pregnancy and she has not had a good response to them.  She has been having severe coughing fits and is unable to catch her breath at these times.  She has had response to nebulizers in the past.  She has been checking her saturations at home and they have been around 94%, but when she exerts herself by walking up the stairs, she notes that her heart rate increases into the 150s.  In the ER, they did stand her up and her oxygen saturations dropped to 88% and she was placed on oxygen at that time.  Patient endorses a feeling of tightness in her throat, she has no history of tracheal stenosis.  She does not feel that she has any nasal congestion at this time, but reports that she maybe has been snoring.  She does endorse a history of allergies [place] allerg[place] than in the city.    Hospital Course  2023 - patient reports that she is feeling slightly better today.  She is not having quite as much coughing when she is talking.  She feels like maybe her throat is slightly less tight.  Reviewed her spirometry and  "she does have flattening of the inspiratory limb    2023 -patient reports that she was able to sleep last night, but is not feeling significantly better today.  She continues to cough.  She feels like the every 4 hours nebulizers are helping    \" - moving air well. No wheezing. Sore throat improving. Still using low levels of supplemental oxygen.     : States she sleeps sitting partially up.  If she tries to lay flat, she goes in the cognitive beds.  She is on medication for acid reflux.  She does state the medication improves her symptoms but does not completely ameliorate them.  She is also wondering why the DuoNebs have been decreased in frequency.    : Reviewed and examined to walk around the unit with oxygen.  Still coughing.  Duo nebs being increased to every 4 hours again helped her.  Primary team also maximized acid reflux meds.    : No significant new events. Planning for scheduled C/S next Tuesday.     : No significant events.  Does subjectively feel that her breathing is getting better.\" Dr Pérez's note     : Patient reports that overall she is feeling better each day.  She was able to go outside today to do her maternity pictures with her .  She feels less tightness in her throat and has been coughing less.    : Patient feels that she is doing little bit better today, she missed a scheduled nebulizer overnight 2 nights ago and has felt like she is being trying to catch up since then.  She is on 2 L nasal cannula today.  Planning for  tomorrow afternoon.    Review of Systems   Constitutional:  Negative for chills and fever.   Respiratory:  Positive for cough and shortness of breath.    Cardiovascular:  Negative for chest pain.   Gastrointestinal:  Negative for nausea and vomiting.      Vital Signs for last 24 hours   Temp:  [35.8 °C (96.5 °F)-36.3 °C (97.3 °F)] 35.8 °C (96.5 °F)  Pulse:  [] 108  Resp:  [16-20] 18  BP: (112-133)/(57-72) 122/62  SpO2: "  [92 %-98 %] 96 %    Physical Exam  Vitals reviewed.   Constitutional:       Appearance: Normal appearance.   HENT:      Head: Normocephalic.   Eyes:      Conjunctiva/sclera: Conjunctivae normal.   Cardiovascular:      Rate and Rhythm: Normal rate and regular rhythm.   Pulmonary:      Effort: Pulmonary effort is normal. No respiratory distress.      Breath sounds: Normal breath sounds. No wheezing.   Skin:     General: Skin is warm and dry.   Neurological:      General: No focal deficit present.      Mental Status: She is alert and oriented to person, place, and time.   Psychiatric:         Behavior: Behavior normal.       Medications  Current Facility-Administered Medications   Medication Dose Route Frequency Provider Last Rate Last Admin    predniSONE (DELTASONE) tablet 50 mg  50 mg Oral DAILY REBEL WingOGabe   50 mg at 06/04/23 0605    mag hydrox-al hydrox-simeth (MAALOX PLUS ES or MYLANTA DS) suspension 30 mL  30 mL Oral 4X/DAY PRN Sarah Kagn M.D.   30 mL at 06/03/23 1649    ipratropium-albuterol (DUONEB) nebulizer solution  3 mL Nebulization Q4HRS (RT) REBEL WingO.   3 mL at 06/04/23 0714    docusate sodium (COLACE) capsule 100 mg  100 mg Oral BID PRN Lacie Umana M.D.        ondansetron (ZOFRAN ODT) dispertab 4 mg  4 mg Oral Q6HRS PRN Lacie Umana M.D.   4 mg at 05/30/23 1752    ferrous sulfate tablet 325 mg  325 mg Oral QDAY with Breakfast Corinne E Capurro, M.D.   325 mg at 06/03/23 0902    famotidine (PEPCID) tablet 20 mg  20 mg Oral BID Nora Paez M.D.   20 mg at 06/04/23 0605    cetirizine (ZYRTEC) tablet 10 mg  10 mg Oral DAILY Carlo Toney M.D.   10 mg at 06/04/23 0605    prenatal plus vitamin (STUARTNATAL 1+1) 27-1 MG tablet 1 Tablet  1 Tablet Oral Daily-0800 Carlo Toney M.D.   1 Tablet at 06/03/23 0902    benzonatate (TESSALON) capsule 100 mg  100 mg Oral TID PRN Nora Paez M.D.   100 mg at 06/02/23 7749    diphenhydrAMINE  (BENADRYL) tablet/capsule 50 mg  50 mg Oral Q6HRS PRN Nora Paez M.D.        mometasone-formoterol (Dulera) 200-5 mcg/Act inhaler 2 Puff  2 Puff Inhalation BID (RT) Adriana Fleming D.O.   2 Puff at 23 0715    budesonide (PULMICORT) neb susp 0.5 mg  0.5 mg Nebulization BID (RT) REBEL SchneiderOGabe   0.5 mg at 23 0714    aspirin (ASA) chewable tab 81 mg  81 mg Oral DAILY Nora Paez M.D.   81 mg at 23 0550    acetaminophen (Tylenol) tablet 650 mg  650 mg Oral Q4HRS PRN Nora Paez M.D.   650 mg at 23 2255    levothyroxine (SYNTHROID) tablet 150 mcg  150 mcg Oral AM ES Nora Paez M.D.   150 mcg at 23 0605    omeprazole (PRILOSEC) capsule 20 mg  20 mg Oral DAILY Nora Paez M.D.   20 mg at 23 0605    acetaminophen (TYLENOL) tablet 1,000 mg  1,000 mg Oral Q6HRS PRN Nora Paez M.D.   1,000 mg at 23 2141         Assessment/Plan  #Acute hypoxemic respiratory failure, suspect that this is secondary to asthma exacerbation     Titrate O2 to maintain sats >95% in the setting of pregnancy  Continue Dulera high dose with spacer  Continue nebulized budesonide, double ICS to ensure that her deep airways are getting access to the medication  --Reescalated to scheduled every 4 hours on    Continue prednisone 50 mg daily, for at least 10 days.  We will likely commence weaning on Monday after her .  She will be close to 2 weeks on steroids at that point and steroid weaning will need to be gradual.  Continue Zyrtec daily for control of allergies  Continue nasal rinses  Continue with omeprazole, Pepcid, and Maalox  Encouraged to ambulate frequently and to use incentive spirometer.  She is achieving 3 L with I-S, which is remarkable.  Agree with planned C/S - planned for   Not a candidate for biologics given Abs Eos <150 and because time of onset for therapy is 30-90 days.     She will need close  follow-up in the clinic after she is discharged home    Adriana Fleming, DO   Pulmonary and Critical Care

## 2023-06-04 NOTE — PROGRESS NOTES
S: Feeling better today.  Has not done her supine test today, she is waiting for her treatment first.  O: Patient Vitals for the past 24 hrs:   BP Temp Temp src Pulse Resp SpO2   06/04/23 1205 119/67 36.1 °C (97 °F) Temporal (!) 117 17 95 %   06/04/23 1056 -- -- -- (!) 110 18 95 %   06/04/23 0750 122/62 35.8 °C (96.5 °F) Temporal (!) 108 18 96 %   06/04/23 0715 -- -- -- 92 18 99 %   06/04/23 0500 -- -- -- 92 -- 96 %   06/04/23 0400 -- -- -- 94 -- 97 %   06/04/23 0330 -- -- -- 97 -- 97 %   06/04/23 0300 -- -- -- (!) 103 -- 96 %   06/04/23 0242 -- -- -- 91 -- 97 %   06/04/23 0230 112/59 36.2 °C (97.1 °F) Temporal 84 20 98 %   06/04/23 0200 -- -- -- 85 -- 97 %   06/04/23 0130 -- -- -- 84 -- 97 %   06/04/23 0100 -- -- -- 86 -- 97 %   06/04/23 0030 -- -- -- 89 -- 98 %   06/04/23 0000 -- -- -- 93 -- 96 %   06/03/23 2330 -- -- -- 97 -- 96 %   06/03/23 2300 -- -- -- 91 -- 96 %   06/03/23 2240 117/57 36.1 °C (96.9 °F) Temporal 92 16 96 %   06/03/23 2230 -- -- -- -- -- 95 %   06/03/23 2200 -- -- -- 99 -- 95 %   06/03/23 2100 -- -- -- 99 -- 95 %   06/03/23 2000 114/64 36.3 °C (97.3 °F) Temporal (!) 105 18 98 %   06/03/23 1820 -- -- -- 97 -- 95 %   06/03/23 1700 133/64 -- -- (!) 104 -- 97 %   06/03/23 1621 133/64 36.2 °C (97.2 °F) Temporal (!) 104 17 95 %   06/03/23 1600 -- -- -- (!) 112 -- 97 %   06/03/23 1540 -- -- -- (!) 108 -- 94 %   06/03/23 1535 -- -- -- (!) 117 -- 95 %   06/03/23 1530 -- -- -- (!) 109 -- 95 %   06/03/23 1525 -- -- -- -- -- 96 %   06/03/23 1520 -- -- -- (!) 111 -- 96 %   06/03/23 1515 -- -- -- (!) 109 -- 95 %   06/03/23 1510 -- -- -- (!) 116 -- 95 %   06/03/23 1505 -- -- -- -- -- 95 %   06/03/23 1500 -- -- -- (!) 112 -- 94 %   06/03/23 1455 -- -- -- (!) 113 -- 94 %   06/03/23 1450 -- -- -- (!) 116 -- 93 %   06/03/23 1445 -- -- -- -- -- 96 %   06/03/23 1440 -- -- -- (!) 117 -- 96 %   06/03/23 1435 -- -- -- (!) 117 -- 95 %   06/03/23 1430 -- -- -- (!) 114 -- 94 %   06/03/23 1425 -- -- -- -- -- 94 %    23 1420 -- -- -- (!) 110 -- 95 %   23 1415 -- -- -- (!) 116 -- 93 %   23 1410 -- -- -- (!) 101 -- 97 %   23 1408 -- -- -- -- 16 --   23 1405 -- -- -- -- -- 94 %   23 1400 -- -- -- (!) 109 -- 95 %   23 1355 -- -- -- (!) 108 -- 96 %   23 1350 -- -- -- (!) 113 -- 96 %   23 1345 -- -- -- -- -- 95 %   23 1310 -- -- -- 96 -- 97 %   23 1305 -- -- -- 95 -- 95 %   23 1300 -- -- -- (!) 102 -- 95 %   23 1255 -- -- -- 99 -- 95 %   23 1250 -- -- -- (!) 101 -- 95 %   23 1245 -- -- -- 98 -- 94 %     LUNGS: Clear bilaterally, no rhonchi or rales.   Latest Reference Range & Units 23 06:11   Reticulocyte Count 0.8 - 2.6 % 2.9 (H)   Retic, Absolute 0.04 - 0.12 M/uL 0.10   Imm. Reticulocyte Fraction 2.6 - 16.1 % 31.8 (H)   Retic Hgb Equivalent 29.0 - 35.0 pg/cell 28.8 (L)   (H): Data is abnormally high  (L): Data is abnormally low  A: IUP 36 6/7 weeks      Bronchial asthma appears to be better today.  P:  Case discussed with Dr. Kang.  Scheduled delivery tomorrow is a reasonable decision.        Treatment to be done piror to  section.        Pt may be more hemodynamically stable with epidural v. Spinal anesthesia.  Dr. Kang will discuss with anesthesia.    Subsequent inpatient consultation, expanded problem focused with moderate decision making.

## 2023-06-04 NOTE — PROGRESS NOTES
1420 - Report received from KRIS Padilla and plan of care discussed.   1500 -Pt in shower.  Pt c/o shortness of breath during and post shower. RT called for respiratory treatment.   1540 - RT at bedside  1900 - Report given to KRIS Rojas

## 2023-06-04 NOTE — PROGRESS NOTES
1900: In to see pt and with Day Shift RN and pt was out of room.  1955: Pt back in room; states she did not received a dinner tray this evening.  Food services are now closed. She said she would figure it out, probably [employer] of her spouse will bring something in.    2044: pt applied monitors for evening NST.    2156: NST complete    0000: pt resting quietly    0230: pt called out requesting breathing treatment    0600: AM meds given; ASA held    0700: report to Valerie SMALLWOOD RN

## 2023-06-04 NOTE — CARE PLAN
Problem: Bronchoconstriction  Goal: Improve in air movement and diminished wheezing  Description: Target End Date:  2 to 3 days    1.  Implement inhaled treatments  2.  Evaluate and manage medication effects  Outcome: Progressing  Douneb Q4H  Dulera BID  Pulmicort BID

## 2023-06-04 NOTE — CARE PLAN
Problem: Knowledge Deficit - L&D  Goal: Patient and family/caregivers will demonstrate understanding of plan of care, disease process/condition, diagnostic tests and medications  Outcome: Progressing     Problem: Risk for Excess Fluid Volume  Goal: Patient will demonstrate pulse, blood pressure and neurologic signs within expected ranges and without any respiratory complications  Outcome: Progressing     Problem: Pain  Goal: Patient's pain will be alleviated or reduced to the patient’s comfort goal  Outcome: Progressing     Problem: Risk for Fluid Imbalance  Goal: Patient's fluid volume balance will be maintained or improve  Outcome: Progressing     The patient is Watcher - Medium risk of patient condition declining or worsening    Shift Goals  Clinical Goals: comfort with breathing, O2 remain stable  Patient Goals: comfort and rest  Family Goals: support Chau      Patient is not progressing towards the following goals:

## 2023-06-04 NOTE — PROGRESS NOTES
0700: Report received from Makenzie PONCE. MOHSEN discussed.     Pt reports good FM, denies LOF or VB. Reports irregular contractions.     1420: Report to Ruth CONNOLLY discussed.

## 2023-06-04 NOTE — PROGRESS NOTES
IUP at 37wks  S)Pt without c/o, up ambulating to [employer]. +fm, -lof, vb  O) 122/68, pulse 108, 97% 1liter O2  CVS: rrr  Lungs: clear rui  Abd: gravid  Ext: no edema  NST: ct 1 reactive, occ uc's  A/P IUP at 37 0/7wks with Brochial asthma   - Dr. Galarza following, see all oders   - pt schedule for c/s tomorrow - stress dose steroids     Gerd - continue meds      Plts - gestational thrombocytopenia - stable      Left breast mass - w/u after delivery      GBS+ urine       Hashimoto Thyroiditis - cont levothyroxine      Anemia - stable

## 2023-06-05 ENCOUNTER — ANESTHESIA EVENT (OUTPATIENT)
Dept: OBGYN | Facility: MEDICAL CENTER | Age: 38
End: 2023-06-05
Payer: COMMERCIAL

## 2023-06-05 ENCOUNTER — ANESTHESIA (OUTPATIENT)
Dept: OBGYN | Facility: MEDICAL CENTER | Age: 38
End: 2023-06-05
Payer: COMMERCIAL

## 2023-06-05 LAB
ABO GROUP BLD: ABNORMAL
ACTION RH IMMUNE GLOB 8505RHG: NORMAL
BARCODED ABORH UBTYP: 600
BARCODED ABORH UBTYP: 600
BARCODED PRD CODE UBPRD: ABNORMAL
BARCODED PRD CODE UBPRD: ABNORMAL
BARCODED UNIT NUM UBUNT: ABNORMAL
BARCODED UNIT NUM UBUNT: ABNORMAL
BASOPHILS # BLD AUTO: 0.2 % (ref 0–1.8)
BASOPHILS # BLD: 0.03 K/UL (ref 0–0.12)
BLD GP AB INVEST PLASRBC-IMP: ABNORMAL
BLD GP AB SCN SERPL QL: ABNORMAL
COMPONENT R 8504R: ABNORMAL
COMPONENT R 8504R: ABNORMAL
EOSINOPHIL # BLD AUTO: 0 K/UL (ref 0–0.51)
EOSINOPHIL NFR BLD: 0 % (ref 0–6.9)
ERYTHROCYTE [DISTWIDTH] IN BLOOD BY AUTOMATED COUNT: 45.8 FL (ref 35.9–50)
HCT VFR BLD AUTO: 34.8 % (ref 37–47)
HGB BLD-MCNC: 11.2 G/DL (ref 12–16)
IMM GRANULOCYTES # BLD AUTO: 0.2 K/UL (ref 0–0.11)
IMM GRANULOCYTES NFR BLD AUTO: 1.2 % (ref 0–0.9)
IMMUNE ROSETTING TEST 8505FMH: NORMAL
LYMPHOCYTES # BLD AUTO: 1.06 K/UL (ref 1–4.8)
LYMPHOCYTES NFR BLD: 6.5 % (ref 22–41)
MCH RBC QN AUTO: 27.8 PG (ref 27–33)
MCHC RBC AUTO-ENTMCNC: 32.2 G/DL (ref 32.2–35.5)
MCV RBC AUTO: 86.4 FL (ref 81.4–97.8)
MONOCYTES # BLD AUTO: 0.22 K/UL (ref 0–0.85)
MONOCYTES NFR BLD AUTO: 1.4 % (ref 0–13.4)
NEUTROPHILS # BLD AUTO: 14.78 K/UL (ref 1.82–7.42)
NEUTROPHILS NFR BLD: 90.7 % (ref 44–72)
NRBC # BLD AUTO: 0 K/UL
NRBC BLD-RTO: 0 /100 WBC (ref 0–0.2)
NUMBER OF RH DOSES IND 8505RD: 1
PLATELET # BLD AUTO: 188 K/UL (ref 164–446)
PMV BLD AUTO: 11.9 FL (ref 9–12.9)
PRODUCT TYPE UPROD: ABNORMAL
PRODUCT TYPE UPROD: ABNORMAL
RBC # BLD AUTO: 4.03 M/UL (ref 4.2–5.4)
RH BLD: ABNORMAL
T PALLIDUM AB SER QL IA: NORMAL
UNIT STATUS USTAT: ABNORMAL
UNIT STATUS USTAT: ABNORMAL
WBC # BLD AUTO: 16.3 K/UL (ref 4.8–10.8)
XXX BLOOD GROUP AB TITR SERPL AHG: 4 {TITER}

## 2023-06-05 PROCEDURE — 700102 HCHG RX REV CODE 250 W/ 637 OVERRIDE(OP): Performed by: OBSTETRICS & GYNECOLOGY

## 2023-06-05 PROCEDURE — A9270 NON-COVERED ITEM OR SERVICE: HCPCS | Performed by: OBSTETRICS & GYNECOLOGY

## 2023-06-05 PROCEDURE — 86870 RBC ANTIBODY IDENTIFICATION: CPT

## 2023-06-05 PROCEDURE — 86780 TREPONEMA PALLIDUM: CPT

## 2023-06-05 PROCEDURE — 700105 HCHG RX REV CODE 258: Performed by: OBSTETRICS & GYNECOLOGY

## 2023-06-05 PROCEDURE — 01961 ANES CESAREAN DELIVERY ONLY: CPT | Performed by: INTERNAL MEDICINE

## 2023-06-05 PROCEDURE — A9270 NON-COVERED ITEM OR SERVICE: HCPCS | Performed by: INTERNAL MEDICINE

## 2023-06-05 PROCEDURE — 160029 HCHG SURGERY MINUTES - 1ST 30 MINS LEVEL 4: Performed by: OBSTETRICS & GYNECOLOGY

## 2023-06-05 PROCEDURE — 99232 SBSQ HOSP IP/OBS MODERATE 35: CPT | Performed by: INTERNAL MEDICINE

## 2023-06-05 PROCEDURE — 700111 HCHG RX REV CODE 636 W/ 250 OVERRIDE (IP): Performed by: INTERNAL MEDICINE

## 2023-06-05 PROCEDURE — 700111 HCHG RX REV CODE 636 W/ 250 OVERRIDE (IP): Performed by: OBSTETRICS & GYNECOLOGY

## 2023-06-05 PROCEDURE — 770002 HCHG ROOM/CARE - OB PRIVATE (112)

## 2023-06-05 PROCEDURE — 36415 COLL VENOUS BLD VENIPUNCTURE: CPT

## 2023-06-05 PROCEDURE — C1755 CATHETER, INTRASPINAL: HCPCS | Performed by: OBSTETRICS & GYNECOLOGY

## 2023-06-05 PROCEDURE — 85461 HEMOGLOBIN FETAL: CPT

## 2023-06-05 PROCEDURE — 94640 AIRWAY INHALATION TREATMENT: CPT

## 2023-06-05 PROCEDURE — 700102 HCHG RX REV CODE 250 W/ 637 OVERRIDE(OP): Performed by: INTERNAL MEDICINE

## 2023-06-05 PROCEDURE — 86886 COOMBS TEST INDIRECT TITER: CPT

## 2023-06-05 PROCEDURE — 160041 HCHG SURGERY MINUTES - EA ADDL 1 MIN LEVEL 4: Performed by: OBSTETRICS & GYNECOLOGY

## 2023-06-05 PROCEDURE — 700105 HCHG RX REV CODE 258: Performed by: INTERNAL MEDICINE

## 2023-06-05 PROCEDURE — 86850 RBC ANTIBODY SCREEN: CPT

## 2023-06-05 PROCEDURE — 700101 HCHG RX REV CODE 250: Performed by: INTERNAL MEDICINE

## 2023-06-05 PROCEDURE — 86922 COMPATIBILITY TEST ANTIGLOB: CPT | Mod: 91

## 2023-06-05 PROCEDURE — 86900 BLOOD TYPING SEROLOGIC ABO: CPT

## 2023-06-05 PROCEDURE — 160009 HCHG ANES TIME/MIN: Performed by: OBSTETRICS & GYNECOLOGY

## 2023-06-05 PROCEDURE — 85025 COMPLETE CBC W/AUTO DIFF WBC: CPT

## 2023-06-05 PROCEDURE — 160048 HCHG OR STATISTICAL LEVEL 1-5: Performed by: OBSTETRICS & GYNECOLOGY

## 2023-06-05 PROCEDURE — 86901 BLOOD TYPING SEROLOGIC RH(D): CPT

## 2023-06-05 PROCEDURE — 160035 HCHG PACU - 1ST 60 MINS PHASE I: Performed by: OBSTETRICS & GYNECOLOGY

## 2023-06-05 PROCEDURE — 160002 HCHG RECOVERY MINUTES (STAT): Performed by: OBSTETRICS & GYNECOLOGY

## 2023-06-05 RX ORDER — SODIUM CHLORIDE, SODIUM GLUCONATE, SODIUM ACETATE, POTASSIUM CHLORIDE AND MAGNESIUM CHLORIDE 526; 502; 368; 37; 30 MG/100ML; MG/100ML; MG/100ML; MG/100ML; MG/100ML
INJECTION, SOLUTION INTRAVENOUS
Status: DISCONTINUED | OUTPATIENT
Start: 2023-06-05 | End: 2023-06-05 | Stop reason: SURG

## 2023-06-05 RX ORDER — OXYTOCIN 10 [USP'U]/ML
INJECTION, SOLUTION INTRAMUSCULAR; INTRAVENOUS PRN
Status: DISCONTINUED | OUTPATIENT
Start: 2023-06-05 | End: 2023-06-05 | Stop reason: SURG

## 2023-06-05 RX ORDER — METHYLPREDNISOLONE SODIUM SUCCINATE 40 MG/ML
40 INJECTION, POWDER, LYOPHILIZED, FOR SOLUTION INTRAMUSCULAR; INTRAVENOUS EVERY 8 HOURS
Status: DISCONTINUED | OUTPATIENT
Start: 2023-06-05 | End: 2023-06-06

## 2023-06-05 RX ORDER — MORPHINE SULFATE 0.5 MG/ML
INJECTION, SOLUTION EPIDURAL; INTRATHECAL; INTRAVENOUS
Status: COMPLETED | OUTPATIENT
Start: 2023-06-05 | End: 2023-06-05

## 2023-06-05 RX ORDER — VITAMIN A ACETATE, BETA CAROTENE, ASCORBIC ACID, CHOLECALCIFEROL, .ALPHA.-TOCOPHEROL ACETATE, DL-, THIAMINE MONONITRATE, RIBOFLAVIN, NIACINAMIDE, PYRIDOXINE HYDROCHLORIDE, FOLIC ACID, CYANOCOBALAMIN, CALCIUM CARBONATE, FERROUS FUMARATE, ZINC OXIDE, CUPRIC OXIDE 3080; 12; 120; 400; 1; 1.84; 3; 20; 22; 920; 25; 200; 27; 10; 2 [IU]/1; UG/1; MG/1; [IU]/1; MG/1; MG/1; MG/1; MG/1; MG/1; [IU]/1; MG/1; MG/1; MG/1; MG/1; MG/1
1 TABLET, FILM COATED ORAL
Status: DISCONTINUED | OUTPATIENT
Start: 2023-06-06 | End: 2023-06-09 | Stop reason: HOSPADM

## 2023-06-05 RX ORDER — DIPHENHYDRAMINE HYDROCHLORIDE 50 MG/ML
12.5 INJECTION INTRAMUSCULAR; INTRAVENOUS EVERY 6 HOURS PRN
Status: ACTIVE | OUTPATIENT
Start: 2023-06-05 | End: 2023-06-06

## 2023-06-05 RX ORDER — CEFAZOLIN SODIUM 1 G/3ML
INJECTION, POWDER, FOR SOLUTION INTRAMUSCULAR; INTRAVENOUS PRN
Status: DISCONTINUED | OUTPATIENT
Start: 2023-06-05 | End: 2023-06-05 | Stop reason: SURG

## 2023-06-05 RX ORDER — OXYCODONE HYDROCHLORIDE 5 MG/1
5 TABLET ORAL EVERY 4 HOURS PRN
Status: DISCONTINUED | OUTPATIENT
Start: 2023-06-06 | End: 2023-06-09 | Stop reason: HOSPADM

## 2023-06-05 RX ORDER — HYDROMORPHONE HYDROCHLORIDE 1 MG/ML
0.2 INJECTION, SOLUTION INTRAMUSCULAR; INTRAVENOUS; SUBCUTANEOUS
Status: DISPENSED | OUTPATIENT
Start: 2023-06-05 | End: 2023-06-06

## 2023-06-05 RX ORDER — SODIUM CHLORIDE, SODIUM LACTATE, POTASSIUM CHLORIDE, CALCIUM CHLORIDE 600; 310; 30; 20 MG/100ML; MG/100ML; MG/100ML; MG/100ML
INJECTION, SOLUTION INTRAVENOUS CONTINUOUS
Status: DISCONTINUED | OUTPATIENT
Start: 2023-06-05 | End: 2023-06-08

## 2023-06-05 RX ORDER — OXYTOCIN 10 [USP'U]/ML
10 INJECTION, SOLUTION INTRAMUSCULAR; INTRAVENOUS
Status: DISCONTINUED | OUTPATIENT
Start: 2023-06-05 | End: 2023-06-09 | Stop reason: HOSPADM

## 2023-06-05 RX ORDER — ALUMINA, MAGNESIA, AND SIMETHICONE 2400; 2400; 240 MG/30ML; MG/30ML; MG/30ML
10 SUSPENSION ORAL 4 TIMES DAILY PRN
Status: DISCONTINUED | OUTPATIENT
Start: 2023-06-05 | End: 2023-06-09 | Stop reason: HOSPADM

## 2023-06-05 RX ORDER — ONDANSETRON 2 MG/ML
4 INJECTION INTRAMUSCULAR; INTRAVENOUS EVERY 6 HOURS PRN
Status: ACTIVE | OUTPATIENT
Start: 2023-06-05 | End: 2023-06-06

## 2023-06-05 RX ORDER — SODIUM CHLORIDE, SODIUM GLUCONATE, SODIUM ACETATE, POTASSIUM CHLORIDE AND MAGNESIUM CHLORIDE 526; 502; 368; 37; 30 MG/100ML; MG/100ML; MG/100ML; MG/100ML; MG/100ML
1500 INJECTION, SOLUTION INTRAVENOUS ONCE
Status: COMPLETED | OUTPATIENT
Start: 2023-06-05 | End: 2023-06-05

## 2023-06-05 RX ORDER — CITRIC ACID/SODIUM CITRATE 334-500MG
30 SOLUTION, ORAL ORAL ONCE
Status: COMPLETED | OUTPATIENT
Start: 2023-06-05 | End: 2023-06-05

## 2023-06-05 RX ORDER — ONDANSETRON 4 MG/1
4 TABLET, ORALLY DISINTEGRATING ORAL EVERY 6 HOURS PRN
Status: DISCONTINUED | OUTPATIENT
Start: 2023-06-06 | End: 2023-06-09 | Stop reason: HOSPADM

## 2023-06-05 RX ORDER — IBUPROFEN 800 MG/1
800 TABLET ORAL EVERY 8 HOURS PRN
Status: DISCONTINUED | OUTPATIENT
Start: 2023-06-09 | End: 2023-06-09 | Stop reason: HOSPADM

## 2023-06-05 RX ORDER — ACETAMINOPHEN 500 MG
1000 TABLET ORAL EVERY 6 HOURS
Status: COMPLETED | OUTPATIENT
Start: 2023-06-05 | End: 2023-06-06

## 2023-06-05 RX ORDER — SODIUM CHLORIDE, SODIUM LACTATE, POTASSIUM CHLORIDE, CALCIUM CHLORIDE 600; 310; 30; 20 MG/100ML; MG/100ML; MG/100ML; MG/100ML
INJECTION, SOLUTION INTRAVENOUS PRN
Status: DISCONTINUED | OUTPATIENT
Start: 2023-06-05 | End: 2023-06-09 | Stop reason: HOSPADM

## 2023-06-05 RX ORDER — KETOROLAC TROMETHAMINE 30 MG/ML
INJECTION, SOLUTION INTRAMUSCULAR; INTRAVENOUS PRN
Status: DISCONTINUED | OUTPATIENT
Start: 2023-06-05 | End: 2023-06-05 | Stop reason: SURG

## 2023-06-05 RX ORDER — DIPHENHYDRAMINE HCL 25 MG
25 TABLET ORAL EVERY 6 HOURS PRN
Status: DISCONTINUED | OUTPATIENT
Start: 2023-06-06 | End: 2023-06-09 | Stop reason: HOSPADM

## 2023-06-05 RX ORDER — OXYCODONE HYDROCHLORIDE 10 MG/1
10 TABLET ORAL EVERY 4 HOURS PRN
Status: DISPENSED | OUTPATIENT
Start: 2023-06-05 | End: 2023-06-06

## 2023-06-05 RX ORDER — ONDANSETRON 2 MG/ML
4 INJECTION INTRAMUSCULAR; INTRAVENOUS EVERY 6 HOURS PRN
Status: DISCONTINUED | OUTPATIENT
Start: 2023-06-06 | End: 2023-06-09 | Stop reason: HOSPADM

## 2023-06-05 RX ORDER — CALCIUM CARBONATE 500 MG/1
1000 TABLET, CHEWABLE ORAL EVERY 6 HOURS PRN
Status: DISCONTINUED | OUTPATIENT
Start: 2023-06-05 | End: 2023-06-09 | Stop reason: HOSPADM

## 2023-06-05 RX ORDER — ACETAMINOPHEN 500 MG
1000 TABLET ORAL EVERY 6 HOURS
Status: COMPLETED | OUTPATIENT
Start: 2023-06-06 | End: 2023-06-09

## 2023-06-05 RX ORDER — SODIUM CHLORIDE, SODIUM LACTATE, POTASSIUM CHLORIDE, CALCIUM CHLORIDE 600; 310; 30; 20 MG/100ML; MG/100ML; MG/100ML; MG/100ML
INJECTION, SOLUTION INTRAVENOUS ONCE
Status: ACTIVE | OUTPATIENT
Start: 2023-06-05 | End: 2023-06-06

## 2023-06-05 RX ORDER — CEFAZOLIN SODIUM 1 G/3ML
2 INJECTION, POWDER, FOR SOLUTION INTRAMUSCULAR; INTRAVENOUS ONCE
Status: DISCONTINUED | OUTPATIENT
Start: 2023-06-05 | End: 2023-06-05 | Stop reason: HOSPADM

## 2023-06-05 RX ORDER — METOCLOPRAMIDE HYDROCHLORIDE 5 MG/ML
10 INJECTION INTRAMUSCULAR; INTRAVENOUS ONCE
Status: COMPLETED | OUTPATIENT
Start: 2023-06-05 | End: 2023-06-05

## 2023-06-05 RX ORDER — DOCUSATE SODIUM 100 MG/1
100 CAPSULE, LIQUID FILLED ORAL 2 TIMES DAILY PRN
Status: DISCONTINUED | OUTPATIENT
Start: 2023-06-05 | End: 2023-06-09 | Stop reason: HOSPADM

## 2023-06-05 RX ORDER — SIMETHICONE 125 MG
125 TABLET,CHEWABLE ORAL 4 TIMES DAILY PRN
Status: DISCONTINUED | OUTPATIENT
Start: 2023-06-05 | End: 2023-06-09 | Stop reason: HOSPADM

## 2023-06-05 RX ORDER — DIPHENHYDRAMINE HYDROCHLORIDE 50 MG/ML
25 INJECTION INTRAMUSCULAR; INTRAVENOUS EVERY 6 HOURS PRN
Status: ACTIVE | OUTPATIENT
Start: 2023-06-05 | End: 2023-06-06

## 2023-06-05 RX ORDER — OXYCODONE HYDROCHLORIDE 5 MG/1
5 TABLET ORAL EVERY 4 HOURS PRN
Status: DISPENSED | OUTPATIENT
Start: 2023-06-05 | End: 2023-06-06

## 2023-06-05 RX ORDER — DEXAMETHASONE SODIUM PHOSPHATE 4 MG/ML
INJECTION, SOLUTION INTRA-ARTICULAR; INTRALESIONAL; INTRAMUSCULAR; INTRAVENOUS; SOFT TISSUE PRN
Status: DISCONTINUED | OUTPATIENT
Start: 2023-06-05 | End: 2023-06-05 | Stop reason: HOSPADM

## 2023-06-05 RX ORDER — ACETAMINOPHEN 500 MG
1000 TABLET ORAL EVERY 6 HOURS PRN
Status: DISCONTINUED | OUTPATIENT
Start: 2023-06-09 | End: 2023-06-09 | Stop reason: HOSPADM

## 2023-06-05 RX ORDER — BUPIVACAINE HYDROCHLORIDE 7.5 MG/ML
INJECTION, SOLUTION INTRASPINAL
Status: COMPLETED | OUTPATIENT
Start: 2023-06-05 | End: 2023-06-05

## 2023-06-05 RX ORDER — HYDROMORPHONE HYDROCHLORIDE 1 MG/ML
0.4 INJECTION, SOLUTION INTRAMUSCULAR; INTRAVENOUS; SUBCUTANEOUS
Status: DISPENSED | OUTPATIENT
Start: 2023-06-05 | End: 2023-06-06

## 2023-06-05 RX ORDER — PREDNISONE 20 MG/1
50 TABLET ORAL DAILY
Status: DISCONTINUED | OUTPATIENT
Start: 2023-06-07 | End: 2023-06-06

## 2023-06-05 RX ORDER — KETOROLAC TROMETHAMINE 30 MG/ML
30 INJECTION, SOLUTION INTRAMUSCULAR; INTRAVENOUS EVERY 6 HOURS
Status: COMPLETED | OUTPATIENT
Start: 2023-06-05 | End: 2023-06-06

## 2023-06-05 RX ORDER — DIPHENHYDRAMINE HYDROCHLORIDE 50 MG/ML
25 INJECTION INTRAMUSCULAR; INTRAVENOUS EVERY 6 HOURS PRN
Status: DISCONTINUED | OUTPATIENT
Start: 2023-06-06 | End: 2023-06-09 | Stop reason: HOSPADM

## 2023-06-05 RX ORDER — EPHEDRINE SULFATE 50 MG/ML
10 INJECTION, SOLUTION INTRAVENOUS
Status: ACTIVE | OUTPATIENT
Start: 2023-06-05 | End: 2023-06-06

## 2023-06-05 RX ORDER — METHYLPREDNISOLONE SODIUM SUCCINATE 125 MG/2ML
125 INJECTION, POWDER, LYOPHILIZED, FOR SOLUTION INTRAMUSCULAR; INTRAVENOUS
Status: COMPLETED | OUTPATIENT
Start: 2023-06-05 | End: 2023-06-05

## 2023-06-05 RX ORDER — ONDANSETRON 2 MG/ML
INJECTION INTRAMUSCULAR; INTRAVENOUS PRN
Status: DISCONTINUED | OUTPATIENT
Start: 2023-06-05 | End: 2023-06-05 | Stop reason: SURG

## 2023-06-05 RX ORDER — IBUPROFEN 800 MG/1
800 TABLET ORAL EVERY 8 HOURS
Status: COMPLETED | OUTPATIENT
Start: 2023-06-06 | End: 2023-06-09

## 2023-06-05 RX ORDER — OXYCODONE HYDROCHLORIDE 10 MG/1
10 TABLET ORAL EVERY 4 HOURS PRN
Status: DISCONTINUED | OUTPATIENT
Start: 2023-06-06 | End: 2023-06-09 | Stop reason: HOSPADM

## 2023-06-05 RX ADMIN — OXYTOCIN 20 UNITS: 10 INJECTION, SOLUTION INTRAMUSCULAR; INTRAVENOUS at 14:57

## 2023-06-05 RX ADMIN — BUDESONIDE INHALATION 0.5 MG: 0.5 SUSPENSION RESPIRATORY (INHALATION) at 07:05

## 2023-06-05 RX ADMIN — BUPIVACAINE HYDROCHLORIDE IN DEXTROSE 1.6 ML: 7.5 INJECTION, SOLUTION SUBARACHNOID at 14:34

## 2023-06-05 RX ADMIN — OXYTOCIN 10 UNITS: 10 INJECTION, SOLUTION INTRAMUSCULAR; INTRAVENOUS at 15:17

## 2023-06-05 RX ADMIN — IPRATROPIUM BROMIDE AND ALBUTEROL SULFATE 3 ML: 2.5; .5 SOLUTION RESPIRATORY (INHALATION) at 20:05

## 2023-06-05 RX ADMIN — KETOROLAC TROMETHAMINE 30 MG: 30 INJECTION, SOLUTION INTRAMUSCULAR; INTRAVENOUS at 21:38

## 2023-06-05 RX ADMIN — MOMETASONE FUROATE AND FORMOTEROL FUMARATE DIHYDRATE 2 PUFF: 200; 5 AEROSOL RESPIRATORY (INHALATION) at 20:06

## 2023-06-05 RX ADMIN — FERROUS SULFATE TAB 325 MG (65 MG ELEMENTAL FE) 325 MG: 325 (65 FE) TAB at 07:05

## 2023-06-05 RX ADMIN — ALUMINUM HYDROXIDE, MAGNESIUM HYDROXIDE, AND DIMETHICONE 30 ML: 400; 400; 40 SUSPENSION ORAL at 10:56

## 2023-06-05 RX ADMIN — SODIUM CITRATE AND CITRIC ACID MONOHYDRATE 30 ML: 500; 334 SOLUTION ORAL at 14:08

## 2023-06-05 RX ADMIN — METOCLOPRAMIDE 10 MG: 5 INJECTION, SOLUTION INTRAMUSCULAR; INTRAVENOUS at 14:08

## 2023-06-05 RX ADMIN — PREDNISONE 50 MG: 20 TABLET ORAL at 08:04

## 2023-06-05 RX ADMIN — PRENATAL WITH FERROUS FUM AND FOLIC ACID 1 TABLET: 3080; 920; 120; 400; 22; 1.84; 3; 20; 10; 1; 12; 200; 27; 25; 2 TABLET ORAL at 08:05

## 2023-06-05 RX ADMIN — ACETAMINOPHEN 1000 MG: 500 TABLET, FILM COATED ORAL at 23:25

## 2023-06-05 RX ADMIN — HYDROMORPHONE HYDROCHLORIDE 0.4 MG: 1 INJECTION, SOLUTION INTRAMUSCULAR; INTRAVENOUS; SUBCUTANEOUS at 18:17

## 2023-06-05 RX ADMIN — FAMOTIDINE 20 MG: 20 TABLET, FILM COATED ORAL at 07:05

## 2023-06-05 RX ADMIN — SODIUM CHLORIDE, SODIUM GLUCONATE, SODIUM ACETATE, POTASSIUM CHLORIDE AND MAGNESIUM CHLORIDE: 526; 502; 368; 37; 30 INJECTION, SOLUTION INTRAVENOUS at 14:30

## 2023-06-05 RX ADMIN — FENTANYL CITRATE 15 MCG: 50 INJECTION, SOLUTION INTRAMUSCULAR; INTRAVENOUS at 14:34

## 2023-06-05 RX ADMIN — IPRATROPIUM BROMIDE AND ALBUTEROL SULFATE 3 ML: 2.5; .5 SOLUTION RESPIRATORY (INHALATION) at 07:05

## 2023-06-05 RX ADMIN — IPRATROPIUM BROMIDE AND ALBUTEROL SULFATE 3 ML: 2.5; .5 SOLUTION RESPIRATORY (INHALATION) at 23:34

## 2023-06-05 RX ADMIN — METHYLPREDNISOLONE SODIUM SUCCINATE 40 MG: 40 INJECTION, POWDER, FOR SOLUTION INTRAMUSCULAR; INTRAVENOUS at 23:24

## 2023-06-05 RX ADMIN — KETOROLAC TROMETHAMINE 30 MG: 30 INJECTION, SOLUTION INTRAMUSCULAR; INTRAVENOUS at 15:17

## 2023-06-05 RX ADMIN — CEFAZOLIN 2 G: 1 INJECTION, POWDER, FOR SOLUTION INTRAMUSCULAR; INTRAVENOUS at 14:40

## 2023-06-05 RX ADMIN — MOMETASONE FUROATE AND FORMOTEROL FUMARATE DIHYDRATE 2 PUFF: 200; 5 AEROSOL RESPIRATORY (INHALATION) at 10:29

## 2023-06-05 RX ADMIN — SODIUM CHLORIDE, SODIUM GLUCONATE, SODIUM ACETATE, POTASSIUM CHLORIDE AND MAGNESIUM CHLORIDE 1000 ML: 526; 502; 368; 37; 30 INJECTION, SOLUTION INTRAVENOUS at 14:08

## 2023-06-05 RX ADMIN — ACETAMINOPHEN 1000 MG: 500 TABLET, FILM COATED ORAL at 17:14

## 2023-06-05 RX ADMIN — DEXAMETHASONE SODIUM PHOSPHATE 8 MG: 4 INJECTION INTRA-ARTICULAR; INTRALESIONAL; INTRAMUSCULAR; INTRAVENOUS; SOFT TISSUE at 14:40

## 2023-06-05 RX ADMIN — ONDANSETRON 4 MG: 2 INJECTION INTRAMUSCULAR; INTRAVENOUS at 14:30

## 2023-06-05 RX ADMIN — OXYTOCIN 125 ML/HR: 10 INJECTION, SOLUTION INTRAMUSCULAR; INTRAVENOUS at 17:04

## 2023-06-05 RX ADMIN — CETIRIZINE HYDROCHLORIDE 10 MG: 10 TABLET, FILM COATED ORAL at 07:05

## 2023-06-05 RX ADMIN — METHYLPREDNISOLONE SODIUM SUCCINATE 125 MG: 125 INJECTION, POWDER, FOR SOLUTION INTRAMUSCULAR; INTRAVENOUS at 14:09

## 2023-06-05 RX ADMIN — PHENYLEPHRINE HYDROCHLORIDE 0.5 MCG/KG/MIN: 10 INJECTION INTRAVENOUS at 14:34

## 2023-06-05 RX ADMIN — MORPHINE SULFATE 150 MCG: 0.5 INJECTION, SOLUTION EPIDURAL; INTRATHECAL; INTRAVENOUS at 14:34

## 2023-06-05 RX ADMIN — OXYCODONE 5 MG: 5 TABLET ORAL at 17:22

## 2023-06-05 RX ADMIN — OMEPRAZOLE 20 MG: 20 CAPSULE, DELAYED RELEASE ORAL at 07:05

## 2023-06-05 RX ADMIN — IPRATROPIUM BROMIDE AND ALBUTEROL SULFATE 3 ML: 2.5; .5 SOLUTION RESPIRATORY (INHALATION) at 10:28

## 2023-06-05 RX ADMIN — BUDESONIDE INHALATION 0.5 MG: 0.5 SUSPENSION RESPIRATORY (INHALATION) at 20:05

## 2023-06-05 RX ADMIN — IPRATROPIUM BROMIDE AND ALBUTEROL SULFATE 3 ML: 2.5; .5 SOLUTION RESPIRATORY (INHALATION) at 13:35

## 2023-06-05 RX ADMIN — FAMOTIDINE 20 MG: 10 INJECTION, SOLUTION INTRAVENOUS at 14:08

## 2023-06-05 RX ADMIN — LEVOTHYROXINE SODIUM 150 MCG: 0.15 TABLET ORAL at 05:39

## 2023-06-05 RX ADMIN — IPRATROPIUM BROMIDE AND ALBUTEROL SULFATE 3 ML: 2.5; .5 SOLUTION RESPIRATORY (INHALATION) at 02:40

## 2023-06-05 RX ADMIN — ALUMINUM HYDROXIDE, MAGNESIUM HYDROXIDE, AND DIMETHICONE 10 ML: 400; 400; 40 SUSPENSION ORAL at 22:09

## 2023-06-05 ASSESSMENT — ENCOUNTER SYMPTOMS
CHILLS: 0
SHORTNESS OF BREATH: 1
VOMITING: 0
FEVER: 0
COUGH: 1
NAUSEA: 0

## 2023-06-05 ASSESSMENT — PAIN SCALES - GENERAL: PAIN_LEVEL: 1

## 2023-06-05 NOTE — CARE PLAN
Problem: Bronchoconstriction  Goal: Improve in air movement and diminished wheezing  Description: Target End Date:  2 to 3 days    1.  Implement inhaled treatments  2.  Evaluate and manage medication effects  Outcome: Progressing  Duoneb Q4H  Pulmicort BID  Dulera BID

## 2023-06-05 NOTE — ADDENDUM NOTE
Addendum  created 06/05/23 1657 by Bill Mae M.D.    Order list changed, Pharmacy for encounter modified

## 2023-06-05 NOTE — PROGRESS NOTES
"Pulmonary Progress Note    Date of admission  2023    Reason for Consultation  Acute hypoxemic respiratory failure      History of Presenting Illness  Edited HPI from Dr. Fleming note from previous date of service:  \" 38 y.o. female who presented 2023 with cough and shortness of breath.  She is currently pregnant, , 35 weeks and 6 days. she reports that she has no formal diagnosis of asthma, but has had a reactive airways disease syndrome, especially when she is sick and usually requires a course of prednisone and an albuterol inhaler.  This has always helped her in the past.  Starting in late March, early April patient started having worsening cough.  She has been on short courses of steroids and a low-dose Advair inhaler on and off since that time.  She reports that when she is taken full dose steroids she has had some relief and improvement, however, there has been concern about giving her higher dose steroids due to pregnancy and she has not had a good response to them.  She has been having severe coughing fits and is unable to catch her breath at these times.  She has had response to nebulizers in the past.  She has been checking her saturations at home and they have been around 94%, but when she exerts herself by walking up the stairs, she notes that her heart rate increases into the 150s.  In the ER, they did stand her up and her oxygen saturations dropped to 88% and she was placed on oxygen at that time.  Patient endorses a feeling of tightness in her throat, she has no history of tracheal stenosis.  She does not feel that she has any nasal congestion at this time, but reports that she maybe has been snoring.  She does endorse a history of allergies [place] more allerg[place] than in the city.\"    Hospital Course  2023 - patient reports that she is feeling slightly better today.  She is not having quite as much coughing when she is talking.  She feels " "like maybe her throat is slightly less tight.  Reviewed her spirometry and she does have flattening of the inspiratory limb    2023 -patient reports that she was able to sleep last night, but is not feeling significantly better today.  She continues to cough.  She feels like the every 4 hours nebulizers are helping    \" - moving air well. No wheezing. Sore throat improving. Still using low levels of supplemental oxygen.     : States she sleeps sitting partially up.  If she tries to lay flat, she goes in the cognitive beds.  She is on medication for acid reflux.  She does state the medication improves her symptoms but does not completely ameliorate them.  She is also wondering why the DuoNebs have been decreased in frequency.    : Reviewed and examined to walk around the unit with oxygen.  Still coughing.  Duo nebs being increased to every 4 hours again helped her.  Primary team also maximized acid reflux meds.    : No significant new events. Planning for scheduled C/S next Tuesday.     : No significant events.  Does subjectively feel that her breathing is getting better.\" Dr Pérez's note     : Patient reports that overall she is feeling better each day.  She was able to go outside today to do her maternity pictures with her .  She feels less tightness in her throat and has been coughing less.    : Patient feels that she is doing little bit better today, she missed a scheduled nebulizer overnight 2 nights ago and has felt like she is being trying to catch up since then.  She is on 2 L nasal cannula today.  Planning for  tomorrow afternoon.    : first day rounding on pt. Planned for Csection today. Afebrile. 1.5-2LPM stable.     Review of Systems   Constitutional:  Negative for chills and fever.   Respiratory:  Positive for cough and shortness of breath.    Cardiovascular:  Negative for chest pain.   Gastrointestinal:  Negative for nausea and vomiting.      Vital " Signs for last 24 hours   Temp:  [36.1 °C (97 °F)-37.1 °C (98.7 °F)] 36.4 °C (97.6 °F)  Pulse:  [] 113  Resp:  [16-20] 20  BP: (117-137)/(57-78) 133/66  SpO2:  [93 %-98 %] 97 %    Physical Exam  Vitals reviewed.   Constitutional:       Appearance: Normal appearance.   HENT:      Head: Normocephalic.   Eyes:      Conjunctiva/sclera: Conjunctivae normal.   Cardiovascular:      Rate and Rhythm: Normal rate and regular rhythm.   Pulmonary:      Effort: Pulmonary effort is normal. No respiratory distress.      Breath sounds: Normal breath sounds. No wheezing.   Skin:     General: Skin is warm and dry.   Neurological:      General: No focal deficit present.      Mental Status: She is alert and oriented to person, place, and time.   Psychiatric:         Behavior: Behavior normal.       Medications  Current Facility-Administered Medications   Medication Dose Route Frequency Provider Last Rate Last Admin    electrolyte-A (PLASMALYTE-A) infusion 1,500 mL  1,500 mL Intravenous Once Bill Mae M.D.        Na citrate-citric acid (BICITRA) 500-334 MG/5ML solution 30 mL  30 mL Oral Once Bill Mae M.D.        famotidine (PEPCID) injection 20 mg  20 mg Intravenous Once Bill Mae M.D.        metoclopramide (REGLAN) injection 10 mg  10 mg Intravenous Once Bill Mae M.D.        lactated ringers (LR) infusion   Intravenous Continuous Sarah Kang M.D.        oxytocin (Pitocin) infusion bolus (for post delivery)  20 Units Intravenous Once Sarah Kang M.D.        Followed by    oxytocin (Pitocin) infusion (for post delivery)  125 mL/hr Intravenous Continuous Sarah Kang M.D.        ceFAZolin (ANCEF) injection 2 g  2 g Intravenous Once Sarah Kang M.D.        methylPREDNISolone sod succ (SOLU-MEDROL) 125 MG injection 125 mg  125 mg Intravenous Once PRN Bill Mae M.D.        methylPREDNISolone (SOLU-MEDROL) 40 MG injection 40 mg  40 mg Intravenous Q8HRS Bill BRUCE  MYKEL Mae        [START ON 6/7/2023] predniSONE (DELTASONE) tablet 50 mg  50 mg Oral DAILY Bill Mae M.D.        PHENYLEPHRINE 40 MG/250 ML NS PREMIX             mag hydrox-al hydrox-simeth (MAALOX PLUS ES or MYLANTA DS) suspension 30 mL  30 mL Oral 4X/DAY PRN Sarah Kang M.D.   30 mL at 06/05/23 1056    ipratropium-albuterol (DUONEB) nebulizer solution  3 mL Nebulization Q4HRS (RT) Davian Pérez D.O.   3 mL at 06/05/23 1335    docusate sodium (COLACE) capsule 100 mg  100 mg Oral BID PRN Lacie Umana M.D.        ondansetron (ZOFRAN ODT) dispertab 4 mg  4 mg Oral Q6HRS PRN Lacie Umana M.D.   4 mg at 05/30/23 1752    ferrous sulfate tablet 325 mg  325 mg Oral QDAY with Breakfast Corinne E Capurro, M.D.   325 mg at 06/05/23 0705    famotidine (PEPCID) tablet 20 mg  20 mg Oral BID Nora Paez M.D.   20 mg at 06/05/23 0705    cetirizine (ZYRTEC) tablet 10 mg  10 mg Oral DAILY Carlo Toney M.D.   10 mg at 06/05/23 0705    prenatal plus vitamin (STUARTNATAL 1+1) 27-1 MG tablet 1 Tablet  1 Tablet Oral Daily-0800 Carlo Toney M.D.   1 Tablet at 06/05/23 0805    benzonatate (TESSALON) capsule 100 mg  100 mg Oral TID PRN Nora Paez M.D.   100 mg at 06/02/23 2302    diphenhydrAMINE (BENADRYL) tablet/capsule 50 mg  50 mg Oral Q6HRS PRN Nora Paez M.D.        mometasone-formoterol (Dulera) 200-5 mcg/Act inhaler 2 Puff  2 Puff Inhalation BID (RT) Adriana Fleming D.O.   2 Puff at 06/05/23 1029    budesonide (PULMICORT) neb susp 0.5 mg  0.5 mg Nebulization BID (RT) Adriana Fleming D.O.   0.5 mg at 06/05/23 0705    aspirin (ASA) chewable tab 81 mg  81 mg Oral DAILY Nora Paez M.D.   81 mg at 06/03/23 0550    acetaminophen (Tylenol) tablet 650 mg  650 mg Oral Q4HRS PRN Nora Paez M.D.   650 mg at 05/23/23 2255    levothyroxine (SYNTHROID) tablet 150 mcg  150 mcg Oral AM ES Nora Paez M.D.   150 mcg at 06/05/23 0593     omeprazole (PRILOSEC) capsule 20 mg  20 mg Oral DAILY Nora Paez M.D.   20 mg at 06/05/23 0705    acetaminophen (TYLENOL) tablet 1,000 mg  1,000 mg Oral Q6HRS PRN Nora Paez M.D.   1,000 mg at 06/04/23 4257     Assessment/Plan  #Acute hypoxemic respiratory failure, suspect that this is secondary to asthma exacerbation     Titrate O2 to maintain sats >95% in the setting of pregnancy  Continue Dulera high dose with spacer  Continue nebulized budesonide, double ICS to ensure that her deep airways are getting access to the medication  Cont IV steroids.  Wean postpartum.  Continue Zyrtec daily for control of allergies  Continue nasal rinses  Continue with omeprazole, Pepcid, and Maalox  Encouraged to ambulate frequently and to use incentive spirometer.  She is achieving 3 L with I-S, which is remarkable.  Not a candidate for biologics given Abs Eos <150 and because time of onset for therapy is 30-90 days.     This note was generated using voice recognition software which has a chance of producing errors of grammar and content.  I have made every reasonable attempt to find and correct any errors, but it should be expected that some may not be found prior to finalization of this note.  __________  Luke Loaiza MD  Pulmonary and Critical Care Medicine  Counts include 234 beds at the [facility]

## 2023-06-05 NOTE — ANESTHESIA TIME REPORT
Anesthesia Start and Stop Event Times     Date Time Event    6/5/2023 1415 Ready for Procedure     1430 Anesthesia Start     1529 Anesthesia Stop        Responsible Staff  06/05/23    Name Role Begin End    Bill Mae M.D. Anesth 1430 1529        Overtime Reason:  no overtime (within assigned shift)    Comments:

## 2023-06-05 NOTE — ANESTHESIA PROCEDURE NOTES
Spinal Block    Date/Time: 6/5/2023 2:34 PM    Performed by: Bill Mae M.D.  Authorized by: Bill Mae M.D.    Patient Location:  OR  Start Time:  6/5/2023 2:34 PM  End Time:  6/5/2023 2:35 PM  Reason for Block: primary anesthetic    patient identified, IV checked, site marked, risks and benefits discussed, surgical consent, monitors and equipment checked, pre-op evaluation and timeout performed    Patient Position:  Sitting  Prep: ChloraPrep, patient draped and sterile technique    Monitoring:  Blood pressure, continuous pulse oximetry and heart rate  Approach:  Midline  Location:  L3-4  Injection Technique:  Single-shot  Skin infiltration:  Lidocaine  Strength:  1%  Dose:  3ml  Needle Type:  Pencan  Needle Gauge:  25 G  CSF flowing pre/post injection:  Yes  Sensory Level:  T4

## 2023-06-05 NOTE — ANESTHESIA POSTPROCEDURE EVALUATION
Patient: Carolina Medrano    Procedure Summary     Date: 23 Room / Location: [facility] OR 01 / SURGERY LABOR AND DELIVERY    Anesthesia Start: 1430 Anesthesia Stop: 152    Procedure: PRIMARY  SECTION (Bilateral: Abdomen) Diagnosis:        delivery delivered      ( Section Delivered)    Surgeons: Sarah Kang M.D. Responsible Provider: Bill Mae M.D.    Anesthesia Type: spinal ASA Status: 2          Final Anesthesia Type: spinal  Last vitals  BP   Blood Pressure: 128/64    Temp   35.9 °C (96.7 °F)    Pulse   (!) 102   Resp   18    SpO2   96 %      Anesthesia Post Evaluation    Patient location during evaluation: PACU  Patient participation: complete - patient participated  Level of consciousness: awake and alert  Pain score: 1    Airway patency: patent  Anesthetic complications: no  Cardiovascular status: hemodynamically stable  Respiratory status: acceptable  Hydration status: euvolemic    PONV: none    patient able to participate, but full recovery from regional anesthesia has not occurred and is not expected within the stipulated timeframe for the completion of the evaluation      No notable events documented.     Nurse Pain Score: 1 (NPRS)

## 2023-06-05 NOTE — OP REPORT
Operative procedure note    Procedure performed  1. Primary        Preoperative diagnosis  1. IUP term  2. Severe reactive airway disease    Postoperative diagnosis  Same    Surgeon: Sarah Kang M.D.  Assistant: Shruthi King MD    Anesthesiologist: MD Tu  Anesthesia: spinal    Estimated blood loss: 500 cc    female presentation Vertex APGAR 8/8  Uterus Normal, Tubes Normal, ovaries Normal       Procedure in detail  After informed consent had been obtained the patient was seen to the operating room where anesthesia was found to be adequate.  The present was prepped and draped in the usual sterile fashion, a timeout was performed.  Pfannenstiel skin incision was made and carried down to the underlying layer of fascia with the knife.  The fascia was then nicked in the midline elevated and the fascial incision extended laterally on both sides with Tubbs scissors.  The rectus muscles were then dissected off the posterior aspect of the fascia both superiorly and inferiorly.  They were  in the midline the peritoneum was identified and entered sharply with Metzenbaum scissors.  The peritoneal incision was then extended superiorly and inferiorly with good visualization of bowel and bladder.  The Noble O retractor was placed into the incision and checked to make sure there was no bowel entrapment.  The vesicular uterine peritoneum was identified and a bladder flap created.  The uterus was incised in a low transverse fashion and extended bluntly.  The infant was delivered through the incision without complication, after 30 seconds of delayed cord clamping the cord was clamped x2 and cut.  The infant was then handed to awaiting nursing staff.  The placenta then delivered spontaneously.  The uterus was cleared of all clots and debris with a moist lap sponge.  The uterine incision closed with a #1 chromic in a running locked fashion.  Hemostasis was found to be adequate at the hysterotomy.  The  abdomen was irrigated cleared of all clots and debris with a moist lap sponge.  The Nobel O retractor was removed.  The peritoneum was identified and loosely closed with 0 Vicryl.  The fascia was then closed with 0 Vicryl in a running fashion.  Subcutaneous layers were checked for hemostasis which was noted to be adequate reapproximated with a 2-0 plain.  The skin was closed in a subcuticular fashion with a 4-0 Vicryl.  Sponge lap needle counts were all correct x2 and the patient is taken to the recovery room in good condition.

## 2023-06-05 NOTE — PROGRESS NOTES
0700 Report received from Crystal PONCE, patient stable and has no requests at this time.     1100 report given to Zoraida PONCE, care relinquished at this time.

## 2023-06-05 NOTE — ANESTHESIA PREPROCEDURE EVALUATION
Case: [id number] Date/Time: 23 1415    Procedure: PRIMARY  SECTION    Pre-op diagnosis: REACTIVE AIRWAY DISEASE, RESPIRATORY DISTRESS-37 WEEKS    Location: [facility] OR  / SURGERY LABOR AND DELIVERY    Surgeons: Sarah Kang M.D.      Patient presents for      Anesthesia: No problems with Anesthesia.  Resp: No COPD. Hx of asthma worsening in last two weeks, been admitted for asthma exacerbation and AHRF requiring O2, now down to 1-2L NC. Patient able to lay flat now and gets scheduled treatments and is on steroids. Will stress dose prior to surgery.   Cards: No pre-eclampsia, or known cardiac abnormalities. ECHO normal.   Heme: No known bleeding disorders, platelets reviewed.    Risks of procedure discussed including: infection, bleeding, nerve damage, and post-dural puncture headache.       Relevant Problems   ENDO   (positive) Hypothyroidism due to Hashimoto's thyroiditis      Other   (positive) Indication for care in labor and delivery, antepartum   (positive) Reactive airway disease without complication       Physical Exam    Airway   Mallampati: II  TM distance: >3 FB  Neck ROM: full       Cardiovascular - normal exam  Rhythm: regular  Rate: normal  (-) murmur     Dental - normal exam           Pulmonary - normal exam  Breath sounds clear to auscultation     Abdominal    Neurological - normal exam                 Anesthesia Plan    ASA 2       Plan - spinal   Neuraxial block will be primary anesthetic                Postoperative Plan: Postoperative administration of opioids is intended.    Pertinent diagnostic labs and testing reviewed    Informed Consent:    Anesthetic plan and risks discussed with patient.

## 2023-06-05 NOTE — PROGRESS NOTES
1900- Report received from Ruth PONCE  0692- provider informed about FHT, no new orders at this time  0700- report given to Mari PONCE

## 2023-06-06 LAB
ERYTHROCYTE [DISTWIDTH] IN BLOOD BY AUTOMATED COUNT: 44.7 FL (ref 35.9–50)
HCT VFR BLD AUTO: 30.8 % (ref 37–47)
HGB BLD-MCNC: 10.2 G/DL (ref 12–16)
MAGNESIUM SERPL-MCNC: 1.9 MG/DL (ref 1.5–2.5)
MCH RBC QN AUTO: 28.3 PG (ref 27–33)
MCHC RBC AUTO-ENTMCNC: 33.1 G/DL (ref 32.2–35.5)
MCV RBC AUTO: 85.3 FL (ref 81.4–97.8)
PLATELET # BLD AUTO: 180 K/UL (ref 164–446)
PMV BLD AUTO: 11.8 FL (ref 9–12.9)
RBC # BLD AUTO: 3.61 M/UL (ref 4.2–5.4)
WBC # BLD AUTO: 20 K/UL (ref 4.8–10.8)

## 2023-06-06 PROCEDURE — 700111 HCHG RX REV CODE 636 W/ 250 OVERRIDE (IP): Performed by: INTERNAL MEDICINE

## 2023-06-06 PROCEDURE — 700102 HCHG RX REV CODE 250 W/ 637 OVERRIDE(OP): Performed by: INTERNAL MEDICINE

## 2023-06-06 PROCEDURE — 700111 HCHG RX REV CODE 636 W/ 250 OVERRIDE (IP): Performed by: OBSTETRICS & GYNECOLOGY

## 2023-06-06 PROCEDURE — A9270 NON-COVERED ITEM OR SERVICE: HCPCS | Performed by: INTERNAL MEDICINE

## 2023-06-06 PROCEDURE — 700102 HCHG RX REV CODE 250 W/ 637 OVERRIDE(OP): Performed by: OBSTETRICS & GYNECOLOGY

## 2023-06-06 PROCEDURE — 99232 SBSQ HOSP IP/OBS MODERATE 35: CPT | Performed by: INTERNAL MEDICINE

## 2023-06-06 PROCEDURE — 36415 COLL VENOUS BLD VENIPUNCTURE: CPT

## 2023-06-06 PROCEDURE — 700101 HCHG RX REV CODE 250: Performed by: INTERNAL MEDICINE

## 2023-06-06 PROCEDURE — A9270 NON-COVERED ITEM OR SERVICE: HCPCS | Performed by: OBSTETRICS & GYNECOLOGY

## 2023-06-06 PROCEDURE — 770002 HCHG ROOM/CARE - OB PRIVATE (112)

## 2023-06-06 PROCEDURE — 94640 AIRWAY INHALATION TREATMENT: CPT

## 2023-06-06 PROCEDURE — 85027 COMPLETE CBC AUTOMATED: CPT

## 2023-06-06 RX ORDER — PREDNISONE 20 MG/1
50 TABLET ORAL DAILY
Status: DISCONTINUED | OUTPATIENT
Start: 2023-06-06 | End: 2023-06-06

## 2023-06-06 RX ORDER — IPRATROPIUM BROMIDE AND ALBUTEROL SULFATE 2.5; .5 MG/3ML; MG/3ML
3 SOLUTION RESPIRATORY (INHALATION)
Status: DISCONTINUED | OUTPATIENT
Start: 2023-06-06 | End: 2023-06-09 | Stop reason: HOSPADM

## 2023-06-06 RX ORDER — PREDNISONE 20 MG/1
40 TABLET ORAL DAILY
Status: DISCONTINUED | OUTPATIENT
Start: 2023-06-07 | End: 2023-06-09 | Stop reason: HOSPADM

## 2023-06-06 RX ADMIN — IPRATROPIUM BROMIDE AND ALBUTEROL SULFATE 3 ML: 2.5; .5 SOLUTION RESPIRATORY (INHALATION) at 19:28

## 2023-06-06 RX ADMIN — FERROUS SULFATE TAB 325 MG (65 MG ELEMENTAL FE) 325 MG: 325 (65 FE) TAB at 08:41

## 2023-06-06 RX ADMIN — FAMOTIDINE 20 MG: 20 TABLET, FILM COATED ORAL at 06:00

## 2023-06-06 RX ADMIN — LEVOTHYROXINE SODIUM 150 MCG: 0.15 TABLET ORAL at 05:59

## 2023-06-06 RX ADMIN — ACETAMINOPHEN 1000 MG: 500 TABLET, FILM COATED ORAL at 05:58

## 2023-06-06 RX ADMIN — KETOROLAC TROMETHAMINE 30 MG: 30 INJECTION, SOLUTION INTRAMUSCULAR; INTRAVENOUS at 03:21

## 2023-06-06 RX ADMIN — KETOROLAC TROMETHAMINE 30 MG: 30 INJECTION, SOLUTION INTRAMUSCULAR; INTRAVENOUS at 09:09

## 2023-06-06 RX ADMIN — IBUPROFEN 800 MG: 800 TABLET, FILM COATED ORAL at 20:49

## 2023-06-06 RX ADMIN — CETIRIZINE HYDROCHLORIDE 10 MG: 10 TABLET, FILM COATED ORAL at 05:59

## 2023-06-06 RX ADMIN — MOMETASONE FUROATE AND FORMOTEROL FUMARATE DIHYDRATE 2 PUFF: 200; 5 AEROSOL RESPIRATORY (INHALATION) at 19:30

## 2023-06-06 RX ADMIN — OXYCODONE 5 MG: 5 TABLET ORAL at 07:31

## 2023-06-06 RX ADMIN — IPRATROPIUM BROMIDE AND ALBUTEROL SULFATE 3 ML: 2.5; .5 SOLUTION RESPIRATORY (INHALATION) at 07:32

## 2023-06-06 RX ADMIN — ACETAMINOPHEN 1000 MG: 500 TABLET, FILM COATED ORAL at 12:10

## 2023-06-06 RX ADMIN — PRENATAL WITH FERROUS FUM AND FOLIC ACID 1 TABLET: 3080; 920; 120; 400; 22; 1.84; 3; 20; 10; 1; 12; 200; 27; 25; 2 TABLET ORAL at 08:41

## 2023-06-06 RX ADMIN — HYDROMORPHONE HYDROCHLORIDE 0.4 MG: 1 INJECTION, SOLUTION INTRAMUSCULAR; INTRAVENOUS; SUBCUTANEOUS at 06:33

## 2023-06-06 RX ADMIN — FAMOTIDINE 20 MG: 20 TABLET, FILM COATED ORAL at 18:18

## 2023-06-06 RX ADMIN — IPRATROPIUM BROMIDE AND ALBUTEROL SULFATE 3 ML: 2.5; .5 SOLUTION RESPIRATORY (INHALATION) at 12:10

## 2023-06-06 RX ADMIN — BUDESONIDE INHALATION 0.5 MG: 0.5 SUSPENSION RESPIRATORY (INHALATION) at 07:32

## 2023-06-06 RX ADMIN — ALUMINUM HYDROXIDE, MAGNESIUM HYDROXIDE, AND DIMETHICONE 10 ML: 400; 400; 40 SUSPENSION ORAL at 21:07

## 2023-06-06 RX ADMIN — ACETAMINOPHEN 1000 MG: 500 TABLET, FILM COATED ORAL at 18:17

## 2023-06-06 RX ADMIN — METHYLPREDNISOLONE SODIUM SUCCINATE 40 MG: 40 INJECTION, POWDER, FOR SOLUTION INTRAMUSCULAR; INTRAVENOUS at 06:00

## 2023-06-06 RX ADMIN — IPRATROPIUM BROMIDE AND ALBUTEROL SULFATE 3 ML: 2.5; .5 SOLUTION RESPIRATORY (INHALATION) at 03:08

## 2023-06-06 RX ADMIN — MOMETASONE FUROATE AND FORMOTEROL FUMARATE DIHYDRATE 2 PUFF: 200; 5 AEROSOL RESPIRATORY (INHALATION) at 07:32

## 2023-06-06 RX ADMIN — IPRATROPIUM BROMIDE AND ALBUTEROL SULFATE 3 ML: 2.5; .5 SOLUTION RESPIRATORY (INHALATION) at 15:22

## 2023-06-06 RX ADMIN — OXYCODONE HYDROCHLORIDE 10 MG: 10 TABLET ORAL at 12:11

## 2023-06-06 RX ADMIN — KETOROLAC TROMETHAMINE 30 MG: 30 INJECTION, SOLUTION INTRAMUSCULAR; INTRAVENOUS at 15:42

## 2023-06-06 RX ADMIN — PREDNISONE 50 MG: 20 TABLET ORAL at 06:33

## 2023-06-06 ASSESSMENT — PAIN DESCRIPTION - PAIN TYPE: TYPE: ACUTE PAIN

## 2023-06-06 ASSESSMENT — ENCOUNTER SYMPTOMS
SHORTNESS OF BREATH: 1
COUGH: 1
FEVER: 0
CHILLS: 0
VOMITING: 0
NAUSEA: 0

## 2023-06-06 NOTE — PROGRESS NOTES
1218 - C/S prep for a  with EDC 23 making her 37.0. Monitors on x2. VSS on 1L O2.   1427 - to OR1  1455 - Delivery of a female infant, APGARS 8/8.  1522 - To S229 for recovery with infant and FOB accompanying.   - Report to KRIS Emerson.

## 2023-06-06 NOTE — LACTATION NOTE
Initial lactation visit:    Met with Amalia to provide pumping assistance, as baby is currently in  Nursery for oxygen support. Amalia reports that she has already pumped, with a yield of 6mL of colostrum! She has just finished with her pumping session, and now has visitors at the bedside. Amalia states that she is aware of benefits of hand expression and will initiate hand expressing following later pump sessions. She reports that her flanges fit well, and denies any need for lactation assistance at this time.    Breastfeeding Plan:    Pump and hand express every three hours until reunited with baby to begin latching.    Amalia is encouraged to call for RN/Lactation Consultant support with any breastfeeding concerns while inpatient.

## 2023-06-06 NOTE — LACTATION NOTE
Infant just returned to room full of family. She states BF is going well and denies need @this time. Encouraged to call for for lactation as needed. She denies nipple soreness/breakdown or difficulty with latches. She states tomorrow will be better; likely to continue in L&D due to oximetry with oxygen support due to medical hx of Severe Reactive Airway.Teach to have RN contact lactation.

## 2023-06-06 NOTE — PROGRESS NOTES
Obstetrics  Section Postpartum Progress Note    Events  No acute events; thanks to pulm team for continued assistance    Subjective:  Doing okay.  Feels pretty tired. Feels like breathing is still strained.. Reports pain is controlled. Lochia minimal. Ambulating. Voiding without difficulty. + Flatus. No bowel movement. Denies headaches or changes in vision. Breastfeeding.     PHYSICAL EXAM:  Vitals:   Vitals:    23 0000 23 0420 23 0732 23 0754   BP: 132/70 115/63  134/74   Pulse: (!) 103 96 85 97   Resp: 20 20 20 18   Temp: 36.2 °C (97.2 °F) 36.1 °C (96.9 °F)  36.1 °C (96.9 °F)   TempSrc: Temporal Temporal  Temporal   SpO2: 95% 95% 96%    Weight:       Height:          General: Alert, conversational, pleasant, no acute distress.  CVS: Regular rate.  PULM: No respiratory distress, symmetric expansion.  ABD: Soft, non-tender, non-distended, fundus firm, non-tender, below the umbilicus. Incision dressing clean and dry. No surrounding erythema or drainage.   : Deferred  Extremities: Moves all, trace edema.     Labs Reviewed and Significant for:  Recent Labs     23  1230 23  0440   WBC 16.3* 20.0*   RBC 4.03* 3.61*   HEMOGLOBIN 11.2* 10.2*   HEMATOCRIT 34.8* 30.8*   MCV 86.4 85.3   MCH 27.8 28.3   RDW 45.8 44.7   PLATELETCT 188 180   MPV 11.9 11.8   NEUTSPOLYS 90.70*  --    LYMPHOCYTES 6.50*  --    MONOCYTES 1.40  --    EOSINOPHILS 0.00  --    BASOPHILS 0.20  --         Assessment and Plan:    Carolina Medrano is a 38 y.o.  postop day 1 s/p , Low Transverse  at 37w0d,  cc    ASSESSMENT:  Postpartum state/postop state  Respiratory--recs noted; overall status improved but still needs resp support with nebs and O2    PLAN:  - Continue routine postpartum/postop care.   - D/c zyrtec, start po taper of prednisone, d/w pulmicort and start singulair  -Wean oxygen prn  -Anticipate home in 2-3d    Carlotta Laws M.D.

## 2023-06-06 NOTE — PROGRESS NOTES
1900: Received report from Zoraida PONCE, at bedside, plan of care discussed. Call light in reach, pt encouraged to use when in need of assistance. Plan to get pt up to ambulate and to toilet after next respiratory treatment and dc dominguez.     0145: Dr. King at bedside to assess dressing, blood spot noted at beginning of shift and has been growing and getting bigger under dressing. Dressing removed and incision assessed, new mepilex dressing placed over incision. Fundal rub 2 below U, bleeding light.     0700: Report given to Tory PONCE, plan of care discussed.

## 2023-06-06 NOTE — CARE PLAN
Problem: Bronchoconstriction  Goal: Improve in air movement and diminished wheezing  Description: Target End Date:  2 to 3 days    1.  Implement inhaled treatments  2.  Evaluate and manage medication effects  Outcome: Progressing  Flowsheets  Taken 6/3/2023 1410 by Dontae Glynn RRT  Bronchodilator Goals/Outcome: Patient at Stable Baseline  Taken 5/25/2023 0624 by Dontae Glynn RRT  Bronchodilator Indications: History / Diagnosis  Note:     Respiratory Update    Treatment modality: Duo/Dulera  Frequency:Q6/BID    Pt tolerating current treatments well with no adverse reactions.

## 2023-06-06 NOTE — PROGRESS NOTES
S:  Pt doing well, no c/o, lochia small, pain controlled, not yet passing gas, not yet voiding since dominguez removed, baby doing well, breast feeding. Nursing has concerns about the patient's dressing with some blood saturating.    O:    Vitals:    23 1736 23 1737 23 1940 23 0000   BP:   127/73 132/70   Pulse: 94 98 (!) 102 (!) 103   Resp:   20 20   Temp:   36.2 °C (97.2 °F) 36.2 °C (97.2 °F)   TempSrc:   Temporal Temporal   SpO2: 96%  95% 95%   Weight:       Height:               Gen - NAD        Lung - normal effort, no distress        Abd - approp mild TTP, no peritoneal signs, dressing with 3x3cm circular area of blood saturation, dressing removed and wound= C/D/I, no active bleeding, no s/s infection, new dressing placed        Ext - No s/s DVT, nontender         Recent Labs     23  1230   WBC 16.3*   RBC 4.03*   HEMOGLOBIN 11.2*   HEMATOCRIT 34.8*   MCV 86.4   MCH 27.8   RDW 45.8   PLATELETCT 188   MPV 11.9   NEUTSPOLYS 90.70*   LYMPHOCYTES 6.50*   MONOCYTES 1.40   EOSINOPHILS 0.00   BASOPHILS 0.20        A:  POD#1 s/p primary  at 37wks for severe reactive airway disease/asthma exacerbation - doing well postpartum         P:  Continue routine post partum care, ambulation encouraged, will watch dressing for blood saturations, may shower later today, continue respiratory medications and breathing treatments

## 2023-06-06 NOTE — PROGRESS NOTES
0700 Report received, plan of care discussed.   Pt, infant and pt's  in room. Nursing infant and supplementing with donor breast milk.   RT administrating schedule nebulizer treatments.   0730 Medicated with 5mg of oxycodone for complaints of pain 6/10  0900 pt states pain is improved. Assessment done. No c/o SOB, lungs clear, dressing CDI without drainage.   1200 Napping on and off.   1800 Infant nursing well. Family in room.  No c/o SOB, on .75 liter O2.   1900 Report to Idania Chacko.

## 2023-06-06 NOTE — PROGRESS NOTES
"Pulmonary Progress Note    Date of admission  2023    Reason for Consultation  Acute hypoxemic respiratory failure      History of Presenting Illness  38 y.o. female who presented 2023 with cough and shortness of breath.  She is currently pregnant, , 35 weeks and 6 days. she reports that she has no formal diagnosis of asthma, but has had a reactive airways disease syndrome, especially when she is sick and usually requires a course of prednisone and an albuterol inhaler.  This has always helped her in the past.  Starting in late March, early April patient started having worsening cough.  She has been on short courses of steroids and a low-dose Advair inhaler on and off since that time.  She reports that when she is taken full dose steroids she has had some relief and improvement, however, there has been concern about giving her higher dose steroids due to pregnancy and she has not had a good response to them.  She has been having severe coughing fits and is unable to catch her breath at these times.  She has had response to nebulizers in the past.  She has been checking her saturations at home and they have been around 94%, but when she exerts herself by walking up the stairs, she notes that her heart rate increases into the 150s.  In the ER, they did stand her up and her oxygen saturations dropped to 88% and she was placed on oxygen at that time.  Patient endorses a feeling of tightness in her throat, she has no history of tracheal stenosis.  She does not feel that she has any nasal congestion at this time, but reports that she maybe has been snoring.  She does endorse a history of allergies [place] the city.\"    Hospital Course  2023 - patient reports that she is feeling slightly better today.  She is not having quite as much coughing when she is talking.  She feels like maybe her throat is slightly less tight.  Reviewed her spirometry " "and she does have flattening of the inspiratory limb    2023 -patient reports that she was able to sleep last night, but is not feeling significantly better today.  She continues to cough.  She feels like the every 4 hours nebulizers are helping    \" - moving air well. No wheezing. Sore throat improving. Still using low levels of supplemental oxygen.     : States she sleeps sitting partially up.  If she tries to lay flat, she goes in the cognitive beds.  She is on medication for acid reflux.  She does state the medication improves her symptoms but does not completely ameliorate them.  She is also wondering why the DuoNebs have been decreased in frequency.    : Reviewed and examined to walk around the unit with oxygen.  Still coughing.  Duo nebs being increased to every 4 hours again helped her.  Primary team also maximized acid reflux meds.    : No significant new events. Planning for scheduled C/S next Tuesday.     : No significant events.  Does subjectively feel that her breathing is getting better.\" Dr Pérez's note     : Patient reports that overall she is feeling better each day.  She was able to go outside today to do her maternity pictures with her .  She feels less tightness in her throat and has been coughing less.    : Patient feels that she is doing little bit better today, she missed a scheduled nebulizer overnight 2 nights ago and has felt like she is being trying to catch up since then.  She is on 2 L nasal cannula today.  Planning for  tomorrow afternoon.    : first day rounding on pt. Planned for Csection today. Afebrile. 1.5-2LPM stable.     : POD#1 following uncomplicated . APGAR 8/8. Both infant and patient doing well. No respiratory concerns reported by nursing staff overnight. 1LPM NC.    Review of Systems   Constitutional:  Negative for chills and fever.   Respiratory:  Positive for cough and shortness of breath.  "   Cardiovascular:  Negative for chest pain.   Gastrointestinal:  Negative for nausea and vomiting.      Vital Signs for last 24 hours   Temp:  [35.9 °C (96.7 °F)-37.1 °C (98.7 °F)] 36.1 °C (96.9 °F)  Pulse:  [] 97  Resp:  [18-20] 18  BP: (115-137)/(58-81) 134/74  SpO2:  [95 %-97 %] 96 %    Physical Exam  Vitals and nursing note reviewed.   Constitutional:       Appearance: Normal appearance.      Comments: Very pleasant   HENT:      Head: Normocephalic.   Eyes:      Conjunctiva/sclera: Conjunctivae normal.   Cardiovascular:      Rate and Rhythm: Normal rate and regular rhythm.   Pulmonary:      Effort: Pulmonary effort is normal. No respiratory distress.      Breath sounds: Normal breath sounds. No wheezing.   Skin:     General: Skin is warm and dry.   Neurological:      General: No focal deficit present.      Mental Status: She is alert and oriented to person, place, and time.   Psychiatric:         Behavior: Behavior normal.       Medications  Current Facility-Administered Medications   Medication Dose Route Frequency Provider Last Rate Last Admin    [START ON 6/7/2023] predniSONE (DELTASONE) tablet 40 mg  40 mg Oral DAILY Luke Loaiza M.D.        lactated ringers (LR) infusion   Intravenous Continuous Sarah Kang M.D.        LR infusion   Intravenous Once Sarah Kang M.D.        oxytocin (Pitocin) infusion bolus (for post delivery)  20 Units Intravenous Once Sarah Kang M.D.        Followed by    oxytocin (Pitocin) infusion (for post delivery)  125 mL/hr Intravenous Continuous Sarah Kang M.D. 125 mL/hr at 06/05/23 1704 125 mL/hr at 06/05/23 1704    oxytocin (PITOCIN) injection 10 Units  10 Units Intramuscular Once PRN Sarah Kang M.D.        lactated ringers infusion   Intravenous PRN Sarah Kang M.D.        ibuprofen (MOTRIN) tablet 800 mg  800 mg Oral Q8HRS Sarah Kang M.D.        Followed by    [START ON 6/9/2023] ibuprofen (MOTRIN) tablet 800 mg   800 mg Oral Q8HRS PRN Sarah Kang M.D.        acetaminophen (TYLENOL) tablet 1,000 mg  1,000 mg Oral Q6HRS Sarah Kang M.D.        Followed by    [START ON 6/9/2023] acetaminophen (TYLENOL) tablet 1,000 mg  1,000 mg Oral Q6HRS PRPREETI Kang M.D.        oxyCODONE immediate-release (ROXICODONE) tablet 5 mg  5 mg Oral Q4HRS PRPREETI Kagn M.D.        oxyCODONE immediate release (ROXICODONE) tablet 10 mg  10 mg Oral Q4HRS PRPREETI Kang M.D.        ondansetron (ZOFRAN) syringe/vial injection 4 mg  4 mg Intravenous Q6HRS PRPREETI Kang M.D.        Or    ondansetron (ZOFRAN ODT) dispertab 4 mg  4 mg Oral Q6HRS PRPREETI Kang M.D.        diphenhydrAMINE (BENADRYL) tablet/capsule 25 mg  25 mg Oral Q6HRS TOMA Kang M.D.        Or    diphenhydrAMINE (BENADRYL) injection 25 mg  25 mg Intravenous Q6HRS TOMA Kang M.D.        docusate sodium (COLACE) capsule 100 mg  100 mg Oral BID PRPREETI Kang M.D.        magnesium hydroxide (MILK OF MAGNESIA) suspension 30 mL  30 mL Oral Q6HRS PRN Sarah Kang M.D.        prenatal plus vitamin (STUARTNATAL 1+1) 27-1 MG tablet 1 Tablet  1 Tablet Oral Daily-0800 Sarah Kang M.D.        simethicone (Mylicon) chewable tablet 125 mg  125 mg Oral 4X/DAY PRN Sarah Kang M.D.        calcium carbonate (Tums) chewable tab 1,000 mg  1,000 mg Oral Q6HRS PRPREETI Kang M.D.        acetaminophen (TYLENOL) tablet 1,000 mg  1,000 mg Oral Q6HR Bill Mae M.D.   1,000 mg at 06/06/23 0558    ketorolac (TORADOL) injection 30 mg  30 mg Intravenous Q6HR Bill Mae M.D.   30 mg at 06/06/23 0321    oxyCODONE immediate-release (ROXICODONE) tablet 5 mg  5 mg Oral Q4HRS PRN Bill Mae M.D.   5 mg at 06/06/23 0731    oxyCODONE immediate release (ROXICODONE) tablet 10 mg  10 mg Oral Q4HRS PRN Bill Mae M.D.        HYDROmorphone (Dilaudid) injection 0.2 mg  0.2 mg Intravenous  Q2HRS PRN Bill Mae M.D.        HYDROmorphone (Dilaudid) injection 0.4 mg  0.4 mg Intravenous Q2HRS PRN iBll Mae M.D.   0.4 mg at 06/06/23 0633    ePHEDrine injection 10 mg  10 mg Intravenous Q5 MIN PRN Bill Mae M.D.        ondansetron (ZOFRAN) syringe/vial injection 4 mg  4 mg Intravenous Q6HRS PRN Bill Mae M.D.        diphenhydrAMINE (BENADRYL) injection 12.5 mg  12.5 mg Intravenous Q6HRS PRN Bill Mae M.D.        diphenhydrAMINE (BENADRYL) injection 12.5 mg  12.5 mg Intravenous Q6HRS PRN Bill Mae M.D.        Or    diphenhydrAMINE (BENADRYL) injection 25 mg  25 mg Intravenous Q6HRS PRN Bill Mae M.D.        Or    naloxone HCl (NARCAN) 20 mg in  mL infusion  0.4 mg/hr Intravenous Q6HRS PRN Bill Mae M.D.        mag hydrox-al hydrox-simeth (MAALOX PLUS ES or MYLANTA DS) suspension 10 mL  10 mL Oral 4X/DAY PRN Shruthi King M.D.   10 mL at 06/05/23 2209    ipratropium-albuterol (DUONEB) nebulizer solution  3 mL Nebulization Q4HRS (RT) Davian Pérez D.O.   3 mL at 06/06/23 0732    ferrous sulfate tablet 325 mg  325 mg Oral QDAY with Breakfast Corinne E Capurro, M.D.   325 mg at 06/05/23 0705    famotidine (PEPCID) tablet 20 mg  20 mg Oral BID Nora Paez M.D.   20 mg at 06/06/23 0600    benzonatate (TESSALON) capsule 100 mg  100 mg Oral TID PRN Nora Paez M.D.   100 mg at 06/02/23 2302    mometasone-formoterol (Dulera) 200-5 mcg/Act inhaler 2 Puff  2 Puff Inhalation BID (RT) Adriana Fleming D.O.   2 Puff at 06/06/23 0732    budesonide (PULMICORT) neb susp 0.5 mg  0.5 mg Nebulization BID (RT) Adriana Fleming D.O.   0.5 mg at 06/06/23 0732    levothyroxine (SYNTHROID) tablet 150 mcg  150 mcg Oral AM ES Nora Paez M.D.   150 mcg at 06/06/23 0559    omeprazole (PRILOSEC) capsule 20 mg  20 mg Oral DAILY Nora Paez M.D.   20 mg at 06/05/23 0705     Assessment/Plan  #Acute asthma  exacerbation  #Acute hypoxemic respiratory failure due to above    Transition to prednisone PO 40mg x 10 day taper (40mg x 2d, 20mgx 2d, 10mg x2d, 5mgx 5d)   DC Zyrtec- crosses into breastmilk, can cause drowsiness/irritability in infant  DC pulmicort  Continue Dulera high dose with spacer  Consider addition of singulair as outpatient (relatively safe with breastfeeding)  No IgE level; check as outpatient when off steroids  Check Mg level , maintain > 2  Titrate O2 to maintain sats >92%  Encouraged to ambulate frequently and to use incentive spirometer.      This note was generated using voice recognition software which has a chance of producing errors of grammar and content.  I have made every reasonable attempt to find and correct any errors, but it should be expected that some may not be found prior to finalization of this note.  __________  Luke Loaiza MD  Pulmonary and Critical Care Medicine  [facility]

## 2023-06-06 NOTE — LACTATION NOTE
Note on door requesting do not disturb- resting. Talked with RN who states the infant has been breastfeeding. Request that RN calls for lactation support as patient wakes or requests supports. Verbal understanding noted.

## 2023-06-07 PROBLEM — G89.18 ACUTE POST-OPERATIVE PAIN: Status: ACTIVE | Noted: 2023-06-07

## 2023-06-07 PROBLEM — J45.51 SEVERE PERSISTENT ASTHMA WITH ACUTE EXACERBATION: Status: ACTIVE | Noted: 2023-06-07

## 2023-06-07 PROBLEM — Z98.891 S/P CESAREAN SECTION: Status: ACTIVE | Noted: 2023-06-07

## 2023-06-07 LAB
ALBUMIN SERPL BCP-MCNC: 3.6 G/DL (ref 3.2–4.9)
ALBUMIN/GLOB SERPL: 1.5 G/DL
ALP SERPL-CCNC: 127 U/L (ref 30–99)
ALT SERPL-CCNC: 191 U/L (ref 2–50)
ANION GAP SERPL CALC-SCNC: 11 MMOL/L (ref 7–16)
AST SERPL-CCNC: 66 U/L (ref 12–45)
BILIRUB SERPL-MCNC: 0.2 MG/DL (ref 0.1–1.5)
BUN SERPL-MCNC: 8 MG/DL (ref 8–22)
CALCIUM ALBUM COR SERPL-MCNC: 9.9 MG/DL (ref 8.5–10.5)
CALCIUM SERPL-MCNC: 9.6 MG/DL (ref 8.5–10.5)
CHLORIDE SERPL-SCNC: 105 MMOL/L (ref 96–112)
CO2 SERPL-SCNC: 26 MMOL/L (ref 20–33)
CREAT SERPL-MCNC: 0.63 MG/DL (ref 0.5–1.4)
GFR SERPLBLD CREATININE-BSD FMLA CKD-EPI: 117 ML/MIN/1.73 M 2
GLOBULIN SER CALC-MCNC: 2.4 G/DL (ref 1.9–3.5)
GLUCOSE SERPL-MCNC: 92 MG/DL (ref 65–99)
POTASSIUM SERPL-SCNC: 3.7 MMOL/L (ref 3.6–5.5)
PROT SERPL-MCNC: 6 G/DL (ref 6–8.2)
SODIUM SERPL-SCNC: 142 MMOL/L (ref 135–145)

## 2023-06-07 PROCEDURE — A9270 NON-COVERED ITEM OR SERVICE: HCPCS | Performed by: OBSTETRICS & GYNECOLOGY

## 2023-06-07 PROCEDURE — 94640 AIRWAY INHALATION TREATMENT: CPT

## 2023-06-07 PROCEDURE — 36415 COLL VENOUS BLD VENIPUNCTURE: CPT

## 2023-06-07 PROCEDURE — 700102 HCHG RX REV CODE 250 W/ 637 OVERRIDE(OP): Performed by: OBSTETRICS & GYNECOLOGY

## 2023-06-07 PROCEDURE — 83615 LACTATE (LD) (LDH) ENZYME: CPT

## 2023-06-07 PROCEDURE — 700111 HCHG RX REV CODE 636 W/ 250 OVERRIDE (IP): Performed by: INTERNAL MEDICINE

## 2023-06-07 PROCEDURE — 85025 COMPLETE CBC W/AUTO DIFF WBC: CPT

## 2023-06-07 PROCEDURE — 99232 SBSQ HOSP IP/OBS MODERATE 35: CPT | Performed by: INTERNAL MEDICINE

## 2023-06-07 PROCEDURE — 770002 HCHG ROOM/CARE - OB PRIVATE (112)

## 2023-06-07 PROCEDURE — 700101 HCHG RX REV CODE 250: Performed by: INTERNAL MEDICINE

## 2023-06-07 PROCEDURE — 80053 COMPREHEN METABOLIC PANEL: CPT

## 2023-06-07 RX ADMIN — MOMETASONE FUROATE AND FORMOTEROL FUMARATE DIHYDRATE 2 PUFF: 200; 5 AEROSOL RESPIRATORY (INHALATION) at 20:04

## 2023-06-07 RX ADMIN — LEVOTHYROXINE SODIUM 150 MCG: 0.15 TABLET ORAL at 05:51

## 2023-06-07 RX ADMIN — PREDNISONE 40 MG: 20 TABLET ORAL at 05:50

## 2023-06-07 RX ADMIN — IPRATROPIUM BROMIDE AND ALBUTEROL SULFATE 3 ML: 2.5; .5 SOLUTION RESPIRATORY (INHALATION) at 14:57

## 2023-06-07 RX ADMIN — FAMOTIDINE 20 MG: 20 TABLET, FILM COATED ORAL at 17:58

## 2023-06-07 RX ADMIN — IPRATROPIUM BROMIDE AND ALBUTEROL SULFATE 3 ML: 2.5; .5 SOLUTION RESPIRATORY (INHALATION) at 20:04

## 2023-06-07 RX ADMIN — PRENATAL WITH FERROUS FUM AND FOLIC ACID 1 TABLET: 3080; 920; 120; 400; 22; 1.84; 3; 20; 10; 1; 12; 200; 27; 25; 2 TABLET ORAL at 10:14

## 2023-06-07 RX ADMIN — MOMETASONE FUROATE AND FORMOTEROL FUMARATE DIHYDRATE 2 PUFF: 200; 5 AEROSOL RESPIRATORY (INHALATION) at 07:09

## 2023-06-07 RX ADMIN — ACETAMINOPHEN 1000 MG: 500 TABLET, FILM COATED ORAL at 05:50

## 2023-06-07 RX ADMIN — IBUPROFEN 800 MG: 800 TABLET, FILM COATED ORAL at 05:51

## 2023-06-07 RX ADMIN — ACETAMINOPHEN 1000 MG: 500 TABLET, FILM COATED ORAL at 00:13

## 2023-06-07 RX ADMIN — ALUMINUM HYDROXIDE, MAGNESIUM HYDROXIDE, AND DIMETHICONE 10 ML: 400; 400; 40 SUSPENSION ORAL at 11:50

## 2023-06-07 RX ADMIN — FAMOTIDINE 20 MG: 20 TABLET, FILM COATED ORAL at 05:51

## 2023-06-07 RX ADMIN — IBUPROFEN 800 MG: 800 TABLET, FILM COATED ORAL at 13:44

## 2023-06-07 RX ADMIN — IPRATROPIUM BROMIDE AND ALBUTEROL SULFATE 3 ML: 2.5; .5 SOLUTION RESPIRATORY (INHALATION) at 07:09

## 2023-06-07 RX ADMIN — IBUPROFEN 800 MG: 800 TABLET, FILM COATED ORAL at 21:40

## 2023-06-07 RX ADMIN — OXYCODONE HYDROCHLORIDE 10 MG: 10 TABLET ORAL at 10:14

## 2023-06-07 RX ADMIN — FERROUS SULFATE TAB 325 MG (65 MG ELEMENTAL FE) 325 MG: 325 (65 FE) TAB at 10:14

## 2023-06-07 RX ADMIN — ACETAMINOPHEN 1000 MG: 500 TABLET, FILM COATED ORAL at 11:50

## 2023-06-07 RX ADMIN — IPRATROPIUM BROMIDE AND ALBUTEROL SULFATE 3 ML: 2.5; .5 SOLUTION RESPIRATORY (INHALATION) at 02:09

## 2023-06-07 RX ADMIN — OXYCODONE HYDROCHLORIDE 10 MG: 10 TABLET ORAL at 00:56

## 2023-06-07 RX ADMIN — OXYCODONE HYDROCHLORIDE 10 MG: 10 TABLET ORAL at 17:59

## 2023-06-07 RX ADMIN — ACETAMINOPHEN 1000 MG: 500 TABLET, FILM COATED ORAL at 17:58

## 2023-06-07 ASSESSMENT — EDINBURGH POSTNATAL DEPRESSION SCALE (EPDS)
I HAVE FELT SCARED OR PANICKY FOR NO GOOD REASON: NO, NOT AT ALL
I HAVE BEEN ANXIOUS OR WORRIED FOR NO GOOD REASON: NO, NOT AT ALL
I HAVE BEEN ABLE TO LAUGH AND SEE THE FUNNY SIDE OF THINGS: AS MUCH AS I ALWAYS COULD
I HAVE FELT SAD OR MISERABLE: NO, NOT AT ALL
I HAVE BEEN SO UNHAPPY THAT I HAVE BEEN CRYING: ONLY OCCASIONALLY
THE THOUGHT OF HARMING MYSELF HAS OCCURRED TO ME: NEVER
I HAVE LOOKED FORWARD WITH ENJOYMENT TO THINGS: AS MUCH AS I EVER DID
I HAVE BLAMED MYSELF UNNECESSARILY WHEN THINGS WENT WRONG: YES, SOME OF THE TIME
THINGS HAVE BEEN GETTING ON TOP OF ME: NO, MOST OF THE TIME I HAVE COPED QUITE WELL
I HAVE BEEN SO UNHAPPY THAT I HAVE HAD DIFFICULTY SLEEPING: NOT AT ALL

## 2023-06-07 ASSESSMENT — ENCOUNTER SYMPTOMS
COUGH: 1
CHILLS: 0
SHORTNESS OF BREATH: 1
FEVER: 0
NAUSEA: 0
VOMITING: 0

## 2023-06-07 ASSESSMENT — PAIN DESCRIPTION - PAIN TYPE
TYPE: ACUTE PAIN
TYPE: ACUTE PAIN;SURGICAL PAIN

## 2023-06-07 NOTE — PROGRESS NOTES
"OB Post Operative Note    Doing well his morning. Moving around no dizziness. Normal lochia. Pain is controlled. Eating and voiding without difficulty. Working on breastfeeding. She did not stop her O2 until this am but is doing well and maintaining sats greater than 96%. She will sleep without O2 tonight. Ideally she would like to go home in the am.    Vitals  /77   Pulse 89   Temp 36.2 °C (97.1 °F) (Temporal)   Resp 20   Ht 1.702 m (5' 7\")   Wt 103 kg (226 lb)   LMP 09/19/2022   SpO2 97%   Breastfeeding Yes   BMI 35.40 kg/m²     Gen: NAD, resting comfortably  GI: soft, non tender to palpation, incision c/d/i with Mepilex dressing in place  :fundus firm and below umbilicus  Ext: no edema      Recent Labs     06/05/23  1230 06/06/23  0440   WBC 16.3* 20.0*   RBC 4.03* 3.61*   HEMOGLOBIN 11.2* 10.2*   HEMATOCRIT 34.8* 30.8*   MCV 86.4 85.3   MCH 27.8 28.3   RDW 45.8 44.7   PLATELETCT 188 180   MPV 11.9 11.8   NEUTSPOLYS 90.70*  --    LYMPHOCYTES 6.50*  --    MONOCYTES 1.40  --    EOSINOPHILS 0.00  --    BASOPHILS 0.20  --        Assessment:  POD day # 2 s/p PLTCS  Bronchial asthma exacerbation    Plan:  D/C O2 overnight  Routine post operative care  Discharge home on POD# 3     Lacie Umana M.D.    "

## 2023-06-07 NOTE — PROGRESS NOTES
"Pulmonary Progress Note    Date of admission  2023    Reason for Consultation  Acute hypoxemic respiratory failure      History of Presenting Illness  38 y.o. female who presented 2023 with cough and shortness of breath.  She is currently pregnant, , 35 weeks and 6 days. she reports that she has no formal diagnosis of asthma, but has had a reactive airways disease syndrome, especially when she is sick and usually requires a course of prednisone and an albuterol inhaler.  This has always helped her in the past.  Starting in late March, early April patient started having worsening cough.  She has been on short courses of steroids and a low-dose Advair inhaler on and off since that time.  She reports that when she is taken full dose steroids she has had some relief and improvement, however, there has been concern about giving her higher dose steroids due to pregnancy and she has not had a good response to them.  She has been having severe coughing fits and is unable to catch her breath at these times.  She has had response to nebulizers in the past.  She has been checking her saturations at home and they have been around 94%, but when she exerts herself by walking up the stairs, she notes that her heart rate increases into the 150s.  In the ER, they did stand her up and her oxygen saturations dropped to 88% and she was placed on oxygen at that time.  Patient endorses a feeling of tightness in her throat, she has no history of tracheal stenosis.  She does not feel that she has any nasal congestion at this time, but reports that she maybe has been snoring.  She does endorse a history of allergies [place] allerg[place] in the city.\"    Hospital Course  2023 - patient reports that she is feeling slightly better today.  She is not having quite as much coughing when she is talking.  She feels like maybe her throat is slightly less tight.  Reviewed her spirometry " "and she does have flattening of the inspiratory limb    2023 -patient reports that she was able to sleep last night, but is not feeling significantly better today.  She continues to cough.  She feels like the every 4 hours nebulizers are helping    \" - moving air well. No wheezing. Sore throat improving. Still using low levels of supplemental oxygen.     : States she sleeps sitting partially up.  If she tries to lay flat, she goes in the cognitive beds.  She is on medication for acid reflux.  She does state the medication improves her symptoms but does not completely ameliorate them.  She is also wondering why the DuoNebs have been decreased in frequency.    : Reviewed and examined to walk around the unit with oxygen.  Still coughing.  Duo nebs being increased to every 4 hours again helped her.  Primary team also maximized acid reflux meds.    : No significant new events. Planning for scheduled C/S next Tuesday.     : No significant events.  Does subjectively feel that her breathing is getting better.\" Dr Pérez's note     : Patient reports that overall she is feeling better each day.  She was able to go outside today to do her maternity pictures with her .  She feels less tightness in her throat and has been coughing less.    : Patient feels that she is doing little bit better today, she missed a scheduled nebulizer overnight 2 nights ago and has felt like she is being trying to catch up since then.  She is on 2 L nasal cannula today.  Planning for  tomorrow afternoon.    : first day rounding on pt. Planned for Csection today. Afebrile. 1.5-2LPM stable.     : POD#1 following uncomplicated . APGAR 8/8. Both infant and patient doing well. No respiratory concerns reported by nursing staff overnight. 1LPM NC.    : doing very well. Weaned off O2 at rest. Plans to ambulate today off O2. Off zyrtec and pulmicort. Duonebs q6H.    Review of Systems "   Constitutional:  Negative for chills and fever.   Respiratory:  Positive for cough and shortness of breath.    Cardiovascular:  Negative for chest pain.   Gastrointestinal:  Negative for nausea and vomiting.      Vital Signs for last 24 hours   Temp:  [36 °C (96.8 °F)-36.3 °C (97.3 °F)] 36 °C (96.8 °F)  Pulse:  [] 91  Resp:  [17-20] 18  BP: (121-144)/(63-77) 144/71  SpO2:  [94 %-100 %] 97 %    Physical Exam  Vitals and nursing note reviewed.   Constitutional:       Appearance: Normal appearance.      Comments: Very pleasant   HENT:      Head: Normocephalic.   Eyes:      Conjunctiva/sclera: Conjunctivae normal.   Cardiovascular:      Rate and Rhythm: Normal rate and regular rhythm.   Pulmonary:      Effort: Pulmonary effort is normal. No respiratory distress.      Breath sounds: Normal breath sounds. No wheezing.   Skin:     General: Skin is warm and dry.   Neurological:      General: No focal deficit present.      Mental Status: She is alert and oriented to person, place, and time.   Psychiatric:         Behavior: Behavior normal.       Medications  Current Facility-Administered Medications   Medication Dose Route Frequency Provider Last Rate Last Admin    predniSONE (DELTASONE) tablet 40 mg  40 mg Oral DAILY Luke Loaiza M.D.   40 mg at 06/07/23 0550    ipratropium-albuterol (DUONEB) nebulizer solution  3 mL Nebulization Q4H PRN (RT) Luke Loaiza M.D.   3 mL at 06/06/23 1210    ipratropium-albuterol (DUONEB) nebulizer solution  3 mL Nebulization Q6HRS (RT) Luke Loaiza M.D.   3 mL at 06/07/23 0709    lactated ringers (LR) infusion   Intravenous Continuous Sarah Kang M.D.        oxytocin (Pitocin) infusion (for post delivery)  125 mL/hr Intravenous Continuous Sarah Kang M.D. 125 mL/hr at 06/05/23 1704 125 mL/hr at 06/05/23 1704    oxytocin (PITOCIN) injection 10 Units  10 Units Intramuscular Once PRN Sarah Kang M.D.        lactated ringers infusion   Intravenous PRN  Sarah Kang M.D.        ibuprofen (MOTRIN) tablet 800 mg  800 mg Oral Q8HRS Sarah Kang M.D.   800 mg at 06/07/23 0551    Followed by    [START ON 6/9/2023] ibuprofen (MOTRIN) tablet 800 mg  800 mg Oral Q8HRS PRPREETI Kang M.D.        acetaminophen (TYLENOL) tablet 1,000 mg  1,000 mg Oral Q6HRS Sarah Kang M.D.   1,000 mg at 06/07/23 0550    Followed by    [START ON 6/9/2023] acetaminophen (TYLENOL) tablet 1,000 mg  1,000 mg Oral Q6HRS PRPREETI Kang M.D.        oxyCODONE immediate-release (ROXICODONE) tablet 5 mg  5 mg Oral Q4HRS PRPREETI Knag M.D.        oxyCODONE immediate release (ROXICODONE) tablet 10 mg  10 mg Oral Q4HRS TOMA Kang M.D.   10 mg at 06/07/23 0056    ondansetron (ZOFRAN) syringe/vial injection 4 mg  4 mg Intravenous Q6HRS TOMA Kang M.D.        Or    ondansetron (ZOFRAN ODT) dispertab 4 mg  4 mg Oral Q6HRS TOMA Kang M.D.        diphenhydrAMINE (BENADRYL) tablet/capsule 25 mg  25 mg Oral Q6HRS TOMA Kang M.D.        Or    diphenhydrAMINE (BENADRYL) injection 25 mg  25 mg Intravenous Q6HRS TOMA Kang M.D.        docusate sodium (COLACE) capsule 100 mg  100 mg Oral BID PRPREETI Kang M.D.        magnesium hydroxide (MILK OF MAGNESIA) suspension 30 mL  30 mL Oral Q6HRS PRPREETI Kang M.D.        prenatal plus vitamin (STUARTNATAL 1+1) 27-1 MG tablet 1 Tablet  1 Tablet Oral Daily-0800 Sarah Kang M.D.   1 Tablet at 06/06/23 0841    simethicone (Mylicon) chewable tablet 125 mg  125 mg Oral 4X/DAY PRN Sarah Kang M.D.        calcium carbonate (Tums) chewable tab 1,000 mg  1,000 mg Oral Q6HRS PRN Sarah Kang M.D.        mag hydrox-al hydrox-simeth (MAALOX PLUS ES or MYLANTA DS) suspension 10 mL  10 mL Oral 4X/DAY PRN Shruthi King M.D.   10 mL at 06/06/23 2107    ferrous sulfate tablet 325 mg  325 mg Oral QDAY with Breakfast Corinne E Capurro, M.D.   325  mg at 06/06/23 0841    famotidine (PEPCID) tablet 20 mg  20 mg Oral BID Nora Paez M.D.   20 mg at 06/07/23 0551    benzonatate (TESSALON) capsule 100 mg  100 mg Oral TID PRN Nora Paez M.D.   100 mg at 06/02/23 2302    mometasone-formoterol (Dulera) 200-5 mcg/Act inhaler 2 Puff  2 Puff Inhalation BID (RT) Adriana Fleming D.O.   2 Puff at 06/07/23 0709    levothyroxine (SYNTHROID) tablet 150 mcg  150 mcg Oral AM ES Nora Paez M.D.   150 mcg at 06/07/23 0551    omeprazole (PRILOSEC) capsule 20 mg  20 mg Oral DAILY Nora Paez M.D.   20 mg at 06/05/23 0705     Assessment/Plan  #Acute asthma exacerbation  #Acute hypoxemic respiratory failure due to above    Complete prednisone taper started 6/6: 40mg x 2d, 20mgx 2d, 10mg x2d, 5mgx 5d  Continue Dulera high dose with spacer -> transition to Symbicort 160 home rx when ready for discharge  Cont hold pulmicort and syrtec- crosses into breastmilk, can cause drowsiness/irritability in infant. Will consider addition of singulair as outpatient (relatively safe with breastfeeding)  No IgE level; will check as outpatient when off steroids  Replete Mg level to maintain > 2  Titrate O2 to maintain sats >92%, cont to encouraged to ambulate frequently and use incentive spirometer.    Will arrange for f/u in pulm clinic 1-2 weeks post-discharge    This note was generated using voice recognition software which has a chance of producing errors of grammar and content.  I have made every reasonable attempt to find and correct any errors, but it should be expected that some may not be found prior to finalization of this note.  __________  Luke Loaiza MD  Pulmonary and Critical Care Medicine  [facility]

## 2023-06-07 NOTE — CARE PLAN
Problem: Bronchoconstriction  Goal: Improve in air movement and diminished wheezing  Description: Target End Date:  2 to 3 days    1.  Implement inhaled treatments  2.  Evaluate and manage medication effects  Outcome: Progressing  Flowsheets  Taken 6/3/2023 1410 by Dontae Glynn RRT  Bronchodilator Goals/Outcome: Patient at Stable Baseline  Taken 5/25/2023 0624 by Dontae Glynn RRT  Bronchodilator Indications: History / Diagnosis  Note:     Respiratory Update    Treatment modality: Duo/Dulera  Frequency:Q6/BID    Pt tolerating current treatments well with no adverse reactions.      Pt currently tolerating RA trail since 7am.

## 2023-06-07 NOTE — LACTATION NOTE
This note was copied from a baby's chart.  Follow up Lactation Visit:    Met with Amalia and her baby girl to review latch, as Amalia is noting some tenderness (no visual damage to nipples), and RN noticed infant suck pattern appeared to be somewhat chompy.     Baby's latch is initially slightly shallow, but infant repositioned tummy-to-tummy with latch improvement and smooth suck visualized. Amalia reports that nipples continue to be slightly tender, but she is experiencing no sharp pain with feeds. Positioning and latch techniques reviewed, as well as nipple care.    Breastfeeding Plan:    Continue cue-based breastfeeding, at least one feed every three hours, for a minimum of 8+ feeds per 24 hours.    She will be picking up her [employer] double electric pump this afternoon, and is aware of active referral to [facility], if interested in outpatient lactation support.

## 2023-06-07 NOTE — CARE PLAN
The patient is Watcher - Medium risk of patient condition declining or worsening    Shift Goals  Clinical Goals: Healthy mom  Patient Goals: minimal respiratory effort  Family Goals: Present/    Progress made toward(s) clinical / shift goals:        Problem: Risk for Excess Fluid Volume  Goal: Patient will demonstrate pulse, blood pressure and neurologic signs within expected ranges and without any respiratory complications  Outcome: Progressing     Problem: Psychosocial - L&D  Goal: Patient's level of anxiety will decrease  Outcome: Progressing  Goal: Patient will be able to discuss coping skills during hospitalization  Outcome: Progressing       Patient is not progressing towards the following goals:    N/a.

## 2023-06-07 NOTE — LACTATION NOTE
This note was copied from a baby's chart.  Mom is a 34 y/o P2 who delivered baby girl weighing 7 # 3.3 oz at  38.6 wks. Mom has been in LD for several week with respiratory difficulty.  LC came to mom's room for education. Mom reports baby is feeding well. Baby last fed at 0930 and baby only fed one side and mother pumped the opposite breast. Mom had a bottle of approx 20-25 mls of EXBM at bedside.   Mom has hypersensitive breasts but reports that nipples are minimally tender but remain intact. Mom declines any assist and has no questions for lactation. Mom will be getting a pump from [facility] before discharge.   LC reviewed demand feeds of 8 or more times in 24 hours.

## 2023-06-08 LAB
BASOPHILS # BLD AUTO: 0.1 % (ref 0–1.8)
BASOPHILS # BLD: 0.02 K/UL (ref 0–0.12)
EOSINOPHIL # BLD AUTO: 0.05 K/UL (ref 0–0.51)
EOSINOPHIL NFR BLD: 0.3 % (ref 0–6.9)
ERYTHROCYTE [DISTWIDTH] IN BLOOD BY AUTOMATED COUNT: 48 FL (ref 35.9–50)
HCT VFR BLD AUTO: 32.7 % (ref 37–47)
HGB BLD-MCNC: 10.6 G/DL (ref 12–16)
IMM GRANULOCYTES # BLD AUTO: 0.11 K/UL (ref 0–0.11)
IMM GRANULOCYTES NFR BLD AUTO: 0.7 % (ref 0–0.9)
LDH SERPL L TO P-CCNC: 263 U/L (ref 107–266)
LYMPHOCYTES # BLD AUTO: 2.22 K/UL (ref 1–4.8)
LYMPHOCYTES NFR BLD: 14.5 % (ref 22–41)
MAGNESIUM SERPL-MCNC: 3.6 MG/DL (ref 1.5–2.5)
MAGNESIUM SERPL-MCNC: 4.6 MG/DL (ref 1.5–2.5)
MAGNESIUM SERPL-MCNC: 4.7 MG/DL (ref 1.5–2.5)
MAGNESIUM SERPL-MCNC: 5.2 MG/DL (ref 1.5–2.5)
MCH RBC QN AUTO: 28.2 PG (ref 27–33)
MCHC RBC AUTO-ENTMCNC: 32.4 G/DL (ref 32.2–35.5)
MCV RBC AUTO: 87 FL (ref 81.4–97.8)
MONOCYTES # BLD AUTO: 0.62 K/UL (ref 0–0.85)
MONOCYTES NFR BLD AUTO: 4 % (ref 0–13.4)
NEUTROPHILS # BLD AUTO: 12.33 K/UL (ref 1.82–7.42)
NEUTROPHILS NFR BLD: 80.4 % (ref 44–72)
NRBC # BLD AUTO: 0 K/UL
NRBC BLD-RTO: 0 /100 WBC (ref 0–0.2)
PLATELET # BLD AUTO: 201 K/UL (ref 164–446)
PMV BLD AUTO: 11.9 FL (ref 9–12.9)
RBC # BLD AUTO: 3.76 M/UL (ref 4.2–5.4)
WBC # BLD AUTO: 15.4 K/UL (ref 4.8–10.8)

## 2023-06-08 PROCEDURE — 700111 HCHG RX REV CODE 636 W/ 250 OVERRIDE (IP): Performed by: INTERNAL MEDICINE

## 2023-06-08 PROCEDURE — 700111 HCHG RX REV CODE 636 W/ 250 OVERRIDE (IP): Performed by: OBSTETRICS & GYNECOLOGY

## 2023-06-08 PROCEDURE — 700101 HCHG RX REV CODE 250: Performed by: INTERNAL MEDICINE

## 2023-06-08 PROCEDURE — 94640 AIRWAY INHALATION TREATMENT: CPT

## 2023-06-08 PROCEDURE — 700102 HCHG RX REV CODE 250 W/ 637 OVERRIDE(OP): Performed by: OBSTETRICS & GYNECOLOGY

## 2023-06-08 PROCEDURE — 83735 ASSAY OF MAGNESIUM: CPT | Mod: 91

## 2023-06-08 PROCEDURE — 770002 HCHG ROOM/CARE - OB PRIVATE (112)

## 2023-06-08 PROCEDURE — A9270 NON-COVERED ITEM OR SERVICE: HCPCS | Performed by: OBSTETRICS & GYNECOLOGY

## 2023-06-08 PROCEDURE — 700105 HCHG RX REV CODE 258: Performed by: OBSTETRICS & GYNECOLOGY

## 2023-06-08 PROCEDURE — 36415 COLL VENOUS BLD VENIPUNCTURE: CPT

## 2023-06-08 RX ORDER — MAGNESIUM SULFATE HEPTAHYDRATE 40 MG/ML
2 INJECTION, SOLUTION INTRAVENOUS ONCE
Status: DISCONTINUED | OUTPATIENT
Start: 2023-06-08 | End: 2023-06-08 | Stop reason: CLARIF

## 2023-06-08 RX ORDER — MAGNESIUM SULFATE HEPTAHYDRATE 40 MG/ML
4 INJECTION, SOLUTION INTRAVENOUS ONCE
Status: CANCELLED | OUTPATIENT
Start: 2023-06-08 | End: 2023-06-08

## 2023-06-08 RX ORDER — CALCIUM GLUCONATE 94 MG/ML
1 INJECTION, SOLUTION INTRAVENOUS
Status: DISCONTINUED | OUTPATIENT
Start: 2023-06-08 | End: 2023-06-09 | Stop reason: HOSPADM

## 2023-06-08 RX ORDER — MAGNESIUM SULFATE HEPTAHYDRATE 40 MG/ML
2 INJECTION, SOLUTION INTRAVENOUS CONTINUOUS
Status: DISCONTINUED | OUTPATIENT
Start: 2023-06-08 | End: 2023-06-09 | Stop reason: HOSPADM

## 2023-06-08 RX ORDER — MAGNESIUM SULFATE HEPTAHYDRATE 40 MG/ML
2 INJECTION, SOLUTION INTRAVENOUS CONTINUOUS
Status: CANCELLED | OUTPATIENT
Start: 2023-06-08

## 2023-06-08 RX ORDER — MAGNESIUM SULFATE HEPTAHYDRATE 40 MG/ML
4 INJECTION, SOLUTION INTRAVENOUS ONCE
Status: COMPLETED | OUTPATIENT
Start: 2023-06-08 | End: 2023-06-08

## 2023-06-08 RX ORDER — SODIUM CHLORIDE, SODIUM LACTATE, POTASSIUM CHLORIDE, CALCIUM CHLORIDE 600; 310; 30; 20 MG/100ML; MG/100ML; MG/100ML; MG/100ML
INJECTION, SOLUTION INTRAVENOUS CONTINUOUS
Status: DISCONTINUED | OUTPATIENT
Start: 2023-06-08 | End: 2023-06-09 | Stop reason: HOSPADM

## 2023-06-08 RX ADMIN — ACETAMINOPHEN 1000 MG: 500 TABLET, FILM COATED ORAL at 06:17

## 2023-06-08 RX ADMIN — IPRATROPIUM BROMIDE AND ALBUTEROL SULFATE 3 ML: 2.5; .5 SOLUTION RESPIRATORY (INHALATION) at 19:46

## 2023-06-08 RX ADMIN — SODIUM CHLORIDE, POTASSIUM CHLORIDE, SODIUM LACTATE AND CALCIUM CHLORIDE: 600; 310; 30; 20 INJECTION, SOLUTION INTRAVENOUS at 19:00

## 2023-06-08 RX ADMIN — IPRATROPIUM BROMIDE AND ALBUTEROL SULFATE 3 ML: 2.5; .5 SOLUTION RESPIRATORY (INHALATION) at 02:56

## 2023-06-08 RX ADMIN — SODIUM CHLORIDE, POTASSIUM CHLORIDE, SODIUM LACTATE AND CALCIUM CHLORIDE: 600; 310; 30; 20 INJECTION, SOLUTION INTRAVENOUS at 01:39

## 2023-06-08 RX ADMIN — IPRATROPIUM BROMIDE AND ALBUTEROL SULFATE 3 ML: 2.5; .5 SOLUTION RESPIRATORY (INHALATION) at 14:07

## 2023-06-08 RX ADMIN — MAGNESIUM SULFATE IN WATER 4 G: 40 INJECTION, SOLUTION INTRAVENOUS at 01:39

## 2023-06-08 RX ADMIN — ACETAMINOPHEN 1000 MG: 500 TABLET, FILM COATED ORAL at 17:10

## 2023-06-08 RX ADMIN — ACETAMINOPHEN 1000 MG: 500 TABLET, FILM COATED ORAL at 12:16

## 2023-06-08 RX ADMIN — IBUPROFEN 800 MG: 800 TABLET, FILM COATED ORAL at 06:18

## 2023-06-08 RX ADMIN — PRENATAL WITH FERROUS FUM AND FOLIC ACID 1 TABLET: 3080; 920; 120; 400; 22; 1.84; 3; 20; 10; 1; 12; 200; 27; 25; 2 TABLET ORAL at 08:44

## 2023-06-08 RX ADMIN — FAMOTIDINE 20 MG: 20 TABLET, FILM COATED ORAL at 17:10

## 2023-06-08 RX ADMIN — IBUPROFEN 800 MG: 800 TABLET, FILM COATED ORAL at 22:20

## 2023-06-08 RX ADMIN — OMEPRAZOLE 20 MG: 20 CAPSULE, DELAYED RELEASE ORAL at 06:18

## 2023-06-08 RX ADMIN — PREDNISONE 40 MG: 20 TABLET ORAL at 06:18

## 2023-06-08 RX ADMIN — IBUPROFEN 800 MG: 800 TABLET, FILM COATED ORAL at 14:11

## 2023-06-08 RX ADMIN — MAGNESIUM SULFATE HEPTAHYDRATE 2 G/HR: 40 INJECTION, SOLUTION INTRAVENOUS at 19:55

## 2023-06-08 RX ADMIN — FERROUS SULFATE TAB 325 MG (65 MG ELEMENTAL FE) 325 MG: 325 (65 FE) TAB at 08:44

## 2023-06-08 RX ADMIN — MOMETASONE FUROATE AND FORMOTEROL FUMARATE DIHYDRATE 2 PUFF: 200; 5 AEROSOL RESPIRATORY (INHALATION) at 08:05

## 2023-06-08 RX ADMIN — MAGNESIUM SULFATE HEPTAHYDRATE 2 G/HR: 40 INJECTION, SOLUTION INTRAVENOUS at 02:01

## 2023-06-08 RX ADMIN — FAMOTIDINE 20 MG: 20 TABLET, FILM COATED ORAL at 06:17

## 2023-06-08 RX ADMIN — LEVOTHYROXINE SODIUM 150 MCG: 0.15 TABLET ORAL at 06:17

## 2023-06-08 RX ADMIN — ACETAMINOPHEN 1000 MG: 500 TABLET, FILM COATED ORAL at 00:00

## 2023-06-08 RX ADMIN — OXYCODONE HYDROCHLORIDE 10 MG: 10 TABLET ORAL at 17:09

## 2023-06-08 RX ADMIN — MOMETASONE FUROATE AND FORMOTEROL FUMARATE DIHYDRATE 2 PUFF: 200; 5 AEROSOL RESPIRATORY (INHALATION) at 19:48

## 2023-06-08 RX ADMIN — IPRATROPIUM BROMIDE AND ALBUTEROL SULFATE 3 ML: 2.5; .5 SOLUTION RESPIRATORY (INHALATION) at 07:59

## 2023-06-08 RX ADMIN — OXYCODONE HYDROCHLORIDE 10 MG: 10 TABLET ORAL at 04:06

## 2023-06-08 ASSESSMENT — PATIENT HEALTH QUESTIONNAIRE - PHQ9
1. LITTLE INTEREST OR PLEASURE IN DOING THINGS: NOT AT ALL
2. FEELING DOWN, DEPRESSED, IRRITABLE, OR HOPELESS: NOT AT ALL
SUM OF ALL RESPONSES TO PHQ9 QUESTIONS 1 AND 2: 0
SUM OF ALL RESPONSES TO PHQ9 QUESTIONS 1 AND 2: 0
1. LITTLE INTEREST OR PLEASURE IN DOING THINGS: NOT AT ALL
2. FEELING DOWN, DEPRESSED, IRRITABLE, OR HOPELESS: NOT AT ALL

## 2023-06-08 ASSESSMENT — ENCOUNTER SYMPTOMS
FEVER: 0
COUGH: 1
SHORTNESS OF BREATH: 1
NAUSEA: 0
CHILLS: 0
VOMITING: 0

## 2023-06-08 ASSESSMENT — PAIN DESCRIPTION - PAIN TYPE
TYPE: ACUTE PAIN;SURGICAL PAIN

## 2023-06-08 NOTE — LACTATION NOTE
This note was copied from a baby's chart.  Follow up lactation visit : Mom reports feedings are going well and she feels like her milk is surging. Mom was able to pump two small bottles after baby breast fed last night. Mom does report some cluster feeding last night. LC encouraged mom to allow baby to attempt to empty breast before pump ing off extra for comfort. Mo pumped for 11 minutes after a feeding. LC discussed lessening that time and pump for about five minutes for comfort. LC reviewed supply and demand.  Mom has information for pump rental and picking up her HTH pump. Mom will not be discharged today as she was restarting on Mag. Sulf.   Upon breast assessment. Mom 's breasts are soft and have room to fill. LC discussed engorgement care if needed. Encouraged mom to call for lactation support as needed.

## 2023-06-08 NOTE — PROGRESS NOTES
"Pulmonary Progress Note    Date of admission  2023    Reason for Consultation  Acute hypoxemic respiratory failure      History of Presenting Illness  38 y.o. female who presented 2023 with cough and shortness of breath.  She is currently pregnant, , 35 weeks and 6 days. she reports that she has no formal diagnosis of asthma, but has had a reactive airways disease syndrome, especially when she is sick and usually requires a course of prednisone and an albuterol inhaler.  This has always helped her in the past.  Starting in late March, early April patient started having worsening cough.  She has been on short courses of steroids and a low-dose Advair inhaler on and off since that time.  She reports that when she is taken full dose steroids she has had some relief and improvement, however, there has been concern about giving her higher dose steroids due to pregnancy and she has not had a good response to them.  She has been having severe coughing fits and is unable to catch her breath at these times.  She has had response to nebulizers in the past.  She has been checking her saturations at home and they have been around 94%, but when she exerts herself by walking up the stairs, she notes that her heart rate increases into the 150s.  In the ER, they did stand her up and her oxygen saturations dropped to 88% and she was placed on oxygen at that time.  Patient endorses a feeling of tightness in her throat, she has no history of tracheal stenosis.  She does not feel that she has any nasal congestion at this time, but reports that she maybe has been snoring.  She does endorse a history of allergies [place] more allerg[place] than in the city.\"    Hospital Course  2023 - patient reports that she is feeling slightly better today.  She is not having quite as much coughing when she is talking.  She feels like maybe her throat is slightly less tight.  Reviewed her spirometry " "and she does have flattening of the inspiratory limb    2023 -patient reports that she was able to sleep last night, but is not feeling significantly better today.  She continues to cough.  She feels like the every 4 hours nebulizers are helping    \" - moving air well. No wheezing. Sore throat improving. Still using low levels of supplemental oxygen.     : States she sleeps sitting partially up.  If she tries to lay flat, she goes in the cognitive beds.  She is on medication for acid reflux.  She does state the medication improves her symptoms but does not completely ameliorate them.  She is also wondering why the DuoNebs have been decreased in frequency.    : Reviewed and examined to walk around the unit with oxygen.  Still coughing.  Duo nebs being increased to every 4 hours again helped her.  Primary team also maximized acid reflux meds.    : No significant new events. Planning for scheduled C/S next Tuesday.     : No significant events.  Does subjectively feel that her breathing is getting better.\" Dr Pérez's note     : Patient reports that overall she is feeling better each day.  She was able to go outside today to do her maternity pictures with her .  She feels less tightness in her throat and has been coughing less.    : Patient feels that she is doing little bit better today, she missed a scheduled nebulizer overnight 2 nights ago and has felt like she is being trying to catch up since then.  She is on 2 L nasal cannula today.  Planning for  tomorrow afternoon.    : first day rounding on pt. Planned for Csection today. Afebrile. 1.5-2LPM stable.     : POD#1 following uncomplicated . APGAR 8/8. Both infant and patient doing well. No respiratory concerns reported by nursing staff overnight. 1LPM NC.    : doing very well. Weaned off O2 at rest. Plans to ambulate today off O2. Off zyrtec and pulmicort. Duonebs q6H.    : borderline " hypertensive, started on magnesium for postpartum preeclampsia treatment, CMP shows mild transaminitis. Remains on RA and continues to do well from a pulmonary perspective. Mg 3.6    Review of Systems   Constitutional:  Negative for chills and fever.   Respiratory:  Positive for cough and shortness of breath.    Cardiovascular:  Negative for chest pain.   Gastrointestinal:  Negative for nausea and vomiting.      Vital Signs for last 24 hours   Temp:  [35.8 °C (96.5 °F)-36.9 °C (98.5 °F)] 36.8 °C (98.3 °F)  Pulse:  [] 86  Resp:  [16-19] 16  BP: (109-140)/(57-83) 129/66  SpO2:  [89 %-100 %] 94 %    Physical Exam  Vitals and nursing note reviewed.   Constitutional:       Appearance: Normal appearance.      Comments: Very pleasant   HENT:      Head: Normocephalic.   Eyes:      Conjunctiva/sclera: Conjunctivae normal.   Cardiovascular:      Rate and Rhythm: Normal rate and regular rhythm.   Pulmonary:      Effort: Pulmonary effort is normal. No respiratory distress.      Breath sounds: Normal breath sounds. No wheezing.   Skin:     General: Skin is warm and dry.   Neurological:      General: No focal deficit present.      Mental Status: She is alert and oriented to person, place, and time.   Psychiatric:         Behavior: Behavior normal.       Medications  Current Facility-Administered Medications   Medication Dose Route Frequency Provider Last Rate Last Admin    calcium GLUConate injection 1 g  1 g Intravenous Once PRN Lacie Umana M.D.        magnesium sulfate 40 g/1000mL infusion  2 g/hr Intravenous Continuous Lacie Umana M.D.   Stopped at 06/09/23 0203    lactated ringers infusion   Intravenous Continuous Kacie Rothman M.D.   Stopped at 06/09/23 0203    predniSONE (DELTASONE) tablet 40 mg  40 mg Oral DAILY Luke Loaiza M.D.   40 mg at 06/09/23 0608    ipratropium-albuterol (DUONEB) nebulizer solution  3 mL Nebulization Q4H PRN (RT) Luke Loaiza M.D.   3 mL at 06/06/23 1210     ipratropium-albuterol (DUONEB) nebulizer solution  3 mL Nebulization Q6HRS (RT) Luke Loaiza M.D.   3 mL at 06/09/23 0836    oxytocin (Pitocin) infusion (for post delivery)  125 mL/hr Intravenous Continuous Sarah Kang M.D. 125 mL/hr at 06/05/23 1704 125 mL/hr at 06/05/23 1704    oxytocin (PITOCIN) injection 10 Units  10 Units Intramuscular Once PRN Sarah Kang M.D.        lactated ringers infusion   Intravenous PRN Sarah Kang M.D.        ibuprofen (MOTRIN) tablet 800 mg  800 mg Oral Q8HRS PRN Sarah Kang M.D.        acetaminophen (TYLENOL) tablet 1,000 mg  1,000 mg Oral Q6HRS PRN Sarah Kang M.D.        oxyCODONE immediate-release (ROXICODONE) tablet 5 mg  5 mg Oral Q4HRS PRN Sarah Kang M.D.        oxyCODONE immediate release (ROXICODONE) tablet 10 mg  10 mg Oral Q4HRS PRN Sarah Kang M.D.   10 mg at 06/09/23 1500    ondansetron (ZOFRAN) syringe/vial injection 4 mg  4 mg Intravenous Q6HRS PRPREETI Kang M.D.        Or    ondansetron (ZOFRAN ODT) dispertab 4 mg  4 mg Oral Q6HRS PRN Sarah Kang M.D.        diphenhydrAMINE (BENADRYL) tablet/capsule 25 mg  25 mg Oral Q6HRS PRPREETI Kang M.D.        Or    diphenhydrAMINE (BENADRYL) injection 25 mg  25 mg Intravenous Q6HRS PRPREETI Kang M.D.        docusate sodium (COLACE) capsule 100 mg  100 mg Oral BID PRN Sarah Kang M.D.        magnesium hydroxide (MILK OF MAGNESIA) suspension 30 mL  30 mL Oral Q6HRS PRN Sarah Kang M.D.        prenatal plus vitamin (STUARTNATAL 1+1) 27-1 MG tablet 1 Tablet  1 Tablet Oral Daily-0800 Sarah Kang M.D.   1 Tablet at 06/09/23 0718    simethicone (Mylicon) chewable tablet 125 mg  125 mg Oral 4X/DAY PRN Sarah Kang M.D.        calcium carbonate (Tums) chewable tab 1,000 mg  1,000 mg Oral Q6HRS PRN Sarah Kang M.D.        mag hydrox-al hydrox-simeth (MAALOX PLUS ES or MYLANTA DS) suspension 10 mL  10 mL Oral 4X/DAY PRN  Shruthi King M.D.   10 mL at 06/07/23 1150    ferrous sulfate tablet 325 mg  325 mg Oral QDAY with Breakfast Corinne E Capurro, M.D.   325 mg at 06/09/23 0717    famotidine (PEPCID) tablet 20 mg  20 mg Oral BID Nora Paez M.D.   20 mg at 06/09/23 0607    benzonatate (TESSALON) capsule 100 mg  100 mg Oral TID PRN Nora Paez M.D.   100 mg at 06/02/23 2302    mometasone-formoterol (Dulera) 200-5 mcg/Act inhaler 2 Puff  2 Puff Inhalation BID (RT) Adriana Fleming D.O.   2 Puff at 06/09/23 0836    levothyroxine (SYNTHROID) tablet 150 mcg  150 mcg Oral AM ES Nora Paez M.D.   150 mcg at 06/09/23 0607    omeprazole (PRILOSEC) capsule 20 mg  20 mg Oral DAILY Nora Paez M.D.   20 mg at 06/09/23 0607     Assessment/Plan  #Acute asthma exacerbation  #Acute hypoxemic respiratory failure due to above    Complete prednisone taper started 6/6: 40mg x 2d, 20mgx 2d, 10mg x2d, 5mgx 5d  Continue Dulera high dose with spacer -> will need rx for Symbicort 160 when ready for discharge  Cont hold pulmicort and syrtec- crosses into breastmilk, can cause drowsiness/irritability in infant. Will consider addition of singulair as outpatient (relatively safe with breastfeeding)  No IgE level; will check as outpatient when off steroids  Replete Mg level to maintain > 2  Titrate O2 to maintain sats >92%, cont to encouraged to ambulate frequently and use incentive spirometer.    Scheduled for f/u in pulmonary clinic 6/20 with Dr Lilly    This note was generated using voice recognition software which has a chance of producing errors of grammar and content.  I have made every reasonable attempt to find and correct any errors, but it should be expected that some may not be found prior to finalization of this note.  __________  Luke Loaiza MD  Pulmonary and Critical Care Medicine  [facility]

## 2023-06-08 NOTE — CARE PLAN
The patient is Watcher - Medium risk of patient condition declining or worsening    Shift Goals  Clinical Goals: free from infevtion and not bleeding  Patient Goals: going home with baby  Family Goals: support    Progress made toward(s) clinical / shift goals:    Problem: Risk for Excess Fluid Volume  Goal: Patient will demonstrate pulse, blood pressure and neurologic signs within expected ranges and without any respiratory complications  Outcome: Progressing  Note: BP is regularly monitored      Problem: Risk for Infection and Impaired Wound Healing  Goal: Patient will remain free from infection  Outcome: Progressing  Note: Pt is free from infection.     Problem: Risk for Infection and Impaired Wound Healing  Goal: Patient will remain free from infection  Outcome: Progressing  Note: Pt is free from infection.       Patient is not progressing towards the following goals:

## 2023-06-08 NOTE — CARE PLAN
The patient is Stable - Low risk of patient condition declining or worsening    Shift Goals  Clinical Goals: vitals and bleeding remain stable  Patient Goals: bonding and go home soon  Family Goals: support Chau

## 2023-06-08 NOTE — PROGRESS NOTES
"OB Post Operative Note    BP intermittently non severe range elevated. Denies HA, visual disturbance, abdominal pain. Denies significant swelling.     Vitals  BP (!) 144/72   Pulse (!) 104   Temp 36.2 °C (97.2 °F) (Temporal)   Resp 18   Ht 1.702 m (5' 7\")   Wt 103 kg (226 lb)   LMP 09/19/2022   SpO2 97%   Breastfeeding Yes   BMI 35.40 kg/m²     Gen: NAD, resting comfortably  GI: soft, non tender to palpation, incision c/d/i with Mepilex dressing in place  :fundus firm and below umbilicus  Ext: no edema    CBC and LDH pending  Creatinine:0.6  AST:66  ALT: 191    Assessment:  POD day # 2 s/p PLTCS  Bronchial asthma exacerbation  Post partum preeclampsia with severe features    Plan:  Discussed results with patient  Magnesium ordered for seizure prophylaxis  Aim for mag level 5 to prevent respiratory suppression      Lacie Umana M.D.    "

## 2023-06-08 NOTE — PROGRESS NOTES
Obstetrics  Section Postpartum Progress Note    Events: Magnesium started for seizure prophylaxis at 2 AM on .    Subjective:  The patient is very upset that she is receiving magnesium for seizure prophylaxis.  She is tearful.  She reports that she did not get a lot of sleep last night.  Magnesium sulfate was started at 2 AM.  She really desires discharge home.    She is breast-feeding Annelise without difficulty.  Her lochia is minimal.  She reports that she has not been taking any pain medication but she might take some this afternoon.  She is due for nebulizer treatment.  She states that since the magnesium started she does feel like her breathing is slightly different but overall she is breathing well.  She denies headaches.  She is ambulating without difficulty.  She declined a Duncan catheter.  She is voiding spontaneously.  She has no other concerns at this time.  Her mother is at bedside.    PHYSICAL EXAM:  Vitals:   Vitals:    23 1618   BP:    Pulse: 99   Resp: 17   Temp: 35.9 °C (96.7 °F)   SpO2: 93%   General: Alert, conversational, pleasant, no acute distress.  CVS: Regular rate.  PULM: No respiratory distress, symmetric expansion.  ABD: Soft, non-tender, non-distended, fundus firm, non-tender, below the umbilicus. Incision dressing clean and dry. No surrounding erythema or drainage.   : Deferred  Extremities: Moves all, trace edema.     Labs Reviewed and Significant for:   Latest Reference Range & Units 23 04:40 23 22:33   WBC 4.8 - 10.8 K/uL 20.0 (H) 15.4 (H)   RBC 4.20 - 5.40 M/uL 3.61 (L) 3.76 (L)   Hemoglobin 12.0 - 16.0 g/dL 10.2 (L) 10.6 (L)   Hematocrit 37.0 - 47.0 % 30.8 (L) 32.7 (L)   MCV 81.4 - 97.8 fL 85.3 87.0   MCH 27.0 - 33.0 pg 28.3 28.2   MCHC 32.2 - 35.5 g/dL 33.1 32.4   RDW 35.9 - 50.0 fL 44.7 48.0   Platelet Count 164 - 446 K/uL 180 201   MPV 9.0 - 12.9 fL 11.8 11.9      Latest Reference Range & Units 23 22:33   Sodium 135 - 145 mmol/L 142    Potassium 3.6 - 5.5 mmol/L 3.7   Chloride 96 - 112 mmol/L 105   Co2 20 - 33 mmol/L 26   Anion Gap 7.0 - 16.0  11.0   Glucose 65 - 99 mg/dL 92   Bun 8 - 22 mg/dL 8   Creatinine 0.50 - 1.40 mg/dL 0.63   GFR (CKD-EPI) >60 mL/min/1.73 m 2 117   Calcium 8.5 - 10.5 mg/dL 9.6   Correct Calcium 8.5 - 10.5 mg/dL 9.9   AST(SGOT) 12 - 45 U/L 66 (H)   ALT(SGPT) 2 - 50 U/L 191 (H)   Alkaline Phosphatase 30 - 99 U/L 127 (H)   Total Bilirubin 0.1 - 1.5 mg/dL 0.2     Assessment and Plan:    Carolina Medrano is a 38 y.o.  status post primary  section, POD #3    ASSESSMENT:  Postpartum sate  Bronchial asthma exacerbation, resolving  Transaminitis  Preeclampsia with severe features    PLAN:  - Continue routine postpartum/postoperative care.   - Magnesium for 24 hours, will discontinue around 2 AM on .  - Monitor blood pressures and urine output.  - Possible discharge home tomorrow evening if the patient's blood pressures remain stable.    Kacie Rothman M.D.

## 2023-06-08 NOTE — PROGRESS NOTES
1900: Report received from Michelle PONCE, POC discussed.     1930: Initial assessment complete. Pt is sitting comfortably in bed. Pt denies pain at this time, fundal, vaginal bleeding and incision WNL. POC for the night discussed and pt is agreeable with plan. All questions and needs met at this time.     2154: MD Umana notified of pts BP's order received for STAT CMP.    2350: MD Umana notified of pts CMP results.    0000: MD Umana at bedside, orders received to begin Magnesium sulfate an pp preE protocol orders. Orders also received to not allow pts magnesium level to go over 5.     0100: MD Umana notified that pt has questions about POC before RN starts Magnesium.     0130: MD Umana at bedside, POC discussed with pt and questions answered. RN will start magnesium.     0700: Report given to Katie PONCE, pt stable at time of handoff. POC discussed and agreed upon per RN's.

## 2023-06-09 ENCOUNTER — PHARMACY VISIT (OUTPATIENT)
Dept: PHARMACY | Facility: MEDICAL CENTER | Age: 38
End: 2023-06-09
Payer: COMMERCIAL

## 2023-06-09 VITALS
DIASTOLIC BLOOD PRESSURE: 71 MMHG | BODY MASS INDEX: 35.47 KG/M2 | WEIGHT: 226 LBS | OXYGEN SATURATION: 92 % | SYSTOLIC BLOOD PRESSURE: 135 MMHG | HEART RATE: 88 BPM | TEMPERATURE: 98.3 F | HEIGHT: 67 IN | RESPIRATION RATE: 16 BRPM

## 2023-06-09 LAB — MAGNESIUM SERPL-MCNC: 3.7 MG/DL (ref 1.5–2.5)

## 2023-06-09 PROCEDURE — A9270 NON-COVERED ITEM OR SERVICE: HCPCS | Performed by: OBSTETRICS & GYNECOLOGY

## 2023-06-09 PROCEDURE — 36415 COLL VENOUS BLD VENIPUNCTURE: CPT

## 2023-06-09 PROCEDURE — 94640 AIRWAY INHALATION TREATMENT: CPT

## 2023-06-09 PROCEDURE — 700102 HCHG RX REV CODE 250 W/ 637 OVERRIDE(OP): Performed by: OBSTETRICS & GYNECOLOGY

## 2023-06-09 PROCEDURE — 83735 ASSAY OF MAGNESIUM: CPT

## 2023-06-09 PROCEDURE — RXMED WILLOW AMBULATORY MEDICATION CHARGE: Performed by: OBSTETRICS & GYNECOLOGY

## 2023-06-09 PROCEDURE — 700101 HCHG RX REV CODE 250: Performed by: INTERNAL MEDICINE

## 2023-06-09 PROCEDURE — 700111 HCHG RX REV CODE 636 W/ 250 OVERRIDE (IP): Performed by: INTERNAL MEDICINE

## 2023-06-09 PROCEDURE — 99232 SBSQ HOSP IP/OBS MODERATE 35: CPT | Performed by: INTERNAL MEDICINE

## 2023-06-09 RX ORDER — IPRATROPIUM BROMIDE AND ALBUTEROL SULFATE 2.5; .5 MG/3ML; MG/3ML
3 SOLUTION RESPIRATORY (INHALATION) EVERY 4 HOURS PRN
Qty: 540 ML | Refills: 0 | Status: SHIPPED | OUTPATIENT
Start: 2023-06-09 | End: 2023-07-09

## 2023-06-09 RX ORDER — OXYCODONE HYDROCHLORIDE 5 MG/1
5 TABLET ORAL EVERY 4 HOURS PRN
Qty: 24 TABLET | Refills: 0 | Status: SHIPPED | OUTPATIENT
Start: 2023-06-09 | End: 2023-06-09 | Stop reason: SDUPTHER

## 2023-06-09 RX ORDER — BUDESONIDE AND FORMOTEROL FUMARATE DIHYDRATE 160; 4.5 UG/1; UG/1
2 AEROSOL RESPIRATORY (INHALATION) 2 TIMES DAILY
Qty: 10.2 G | Refills: 3 | Status: SHIPPED | OUTPATIENT
Start: 2023-06-09 | End: 2024-01-04 | Stop reason: SDUPTHER

## 2023-06-09 RX ORDER — IBUPROFEN 600 MG/1
600 TABLET ORAL EVERY 6 HOURS PRN
Qty: 56 TABLET | Refills: 0 | Status: SHIPPED | OUTPATIENT
Start: 2023-06-09 | End: 2023-06-23

## 2023-06-09 RX ORDER — OXYCODONE HYDROCHLORIDE 10 MG/1
5 TABLET ORAL EVERY 4 HOURS PRN
Qty: 12 TABLET | Refills: 0 | Status: SHIPPED | OUTPATIENT
Start: 2023-06-09 | End: 2023-06-13

## 2023-06-09 RX ORDER — PREDNISONE 10 MG/1
TABLET ORAL
Qty: 9 TABLET | Refills: 0 | Status: SHIPPED | OUTPATIENT
Start: 2023-06-09 | End: 2023-06-18

## 2023-06-09 RX ORDER — MONTELUKAST SODIUM 10 MG/1
10 TABLET ORAL DAILY
Qty: 30 TABLET | Refills: 3 | Status: SHIPPED | OUTPATIENT
Start: 2023-06-09 | End: 2023-11-22 | Stop reason: SDUPTHER

## 2023-06-09 RX ORDER — ACETAMINOPHEN 500 MG
500 TABLET ORAL EVERY 4 HOURS PRN
Qty: 42 TABLET | Refills: 0 | Status: SHIPPED | OUTPATIENT
Start: 2023-06-09 | End: 2023-06-16

## 2023-06-09 RX ADMIN — ACETAMINOPHEN 1000 MG: 500 TABLET, FILM COATED ORAL at 12:15

## 2023-06-09 RX ADMIN — OXYCODONE HYDROCHLORIDE 10 MG: 10 TABLET ORAL at 20:27

## 2023-06-09 RX ADMIN — FERROUS SULFATE TAB 325 MG (65 MG ELEMENTAL FE) 325 MG: 325 (65 FE) TAB at 07:17

## 2023-06-09 RX ADMIN — IPRATROPIUM BROMIDE AND ALBUTEROL SULFATE 3 ML: 2.5; .5 SOLUTION RESPIRATORY (INHALATION) at 08:36

## 2023-06-09 RX ADMIN — IBUPROFEN 800 MG: 800 TABLET, FILM COATED ORAL at 06:07

## 2023-06-09 RX ADMIN — FAMOTIDINE 20 MG: 20 TABLET, FILM COATED ORAL at 17:55

## 2023-06-09 RX ADMIN — IPRATROPIUM BROMIDE AND ALBUTEROL SULFATE 3 ML: 2.5; .5 SOLUTION RESPIRATORY (INHALATION) at 16:24

## 2023-06-09 RX ADMIN — LEVOTHYROXINE SODIUM 150 MCG: 0.15 TABLET ORAL at 06:07

## 2023-06-09 RX ADMIN — ACETAMINOPHEN 1000 MG: 500 TABLET, FILM COATED ORAL at 06:07

## 2023-06-09 RX ADMIN — MOMETASONE FUROATE AND FORMOTEROL FUMARATE DIHYDRATE 2 PUFF: 200; 5 AEROSOL RESPIRATORY (INHALATION) at 08:36

## 2023-06-09 RX ADMIN — PREDNISONE 40 MG: 20 TABLET ORAL at 06:08

## 2023-06-09 RX ADMIN — IBUPROFEN 800 MG: 800 TABLET, FILM COATED ORAL at 14:53

## 2023-06-09 RX ADMIN — OXYCODONE HYDROCHLORIDE 10 MG: 10 TABLET ORAL at 15:00

## 2023-06-09 RX ADMIN — FAMOTIDINE 20 MG: 20 TABLET, FILM COATED ORAL at 06:07

## 2023-06-09 RX ADMIN — ACETAMINOPHEN 1000 MG: 500 TABLET, FILM COATED ORAL at 01:11

## 2023-06-09 RX ADMIN — PRENATAL WITH FERROUS FUM AND FOLIC ACID 1 TABLET: 3080; 920; 120; 400; 22; 1.84; 3; 20; 10; 1; 12; 200; 27; 25; 2 TABLET ORAL at 07:18

## 2023-06-09 RX ADMIN — OXYCODONE HYDROCHLORIDE 10 MG: 10 TABLET ORAL at 01:14

## 2023-06-09 RX ADMIN — OMEPRAZOLE 20 MG: 20 CAPSULE, DELAYED RELEASE ORAL at 06:07

## 2023-06-09 RX ADMIN — IPRATROPIUM BROMIDE AND ALBUTEROL SULFATE 3 ML: 2.5; .5 SOLUTION RESPIRATORY (INHALATION) at 02:32

## 2023-06-09 ASSESSMENT — PAIN DESCRIPTION - PAIN TYPE
TYPE: ACUTE PAIN;SURGICAL PAIN

## 2023-06-09 ASSESSMENT — PATIENT HEALTH QUESTIONNAIRE - PHQ9
1. LITTLE INTEREST OR PLEASURE IN DOING THINGS: NOT AT ALL
SUM OF ALL RESPONSES TO PHQ9 QUESTIONS 1 AND 2: 0
1. LITTLE INTEREST OR PLEASURE IN DOING THINGS: NOT AT ALL
2. FEELING DOWN, DEPRESSED, IRRITABLE, OR HOPELESS: NOT AT ALL
SUM OF ALL RESPONSES TO PHQ9 QUESTIONS 1 AND 2: 0
2. FEELING DOWN, DEPRESSED, IRRITABLE, OR HOPELESS: NOT AT ALL

## 2023-06-09 NOTE — PROGRESS NOTES
1900: Report received from Katie PONCE, POC discussed.     1905: Initial assessment complete. Pt resting comfortably in bed, does state she is having mild incisional pain but denies medication at this time. Vitals, fundal, incision and vaginal bleeding WNL. Pt denies HA, vision changes, or epigastric pain. POC for the night discussed and agreed upon per pt. All questions and needs met at this time.     0106: MD Rothman called about magnesium level, message left. Awaiting response.     0700: Report given to Katie PONCE, pt stable at time of handoff. POC discussed and agreed upon per RN's.

## 2023-06-09 NOTE — CARE PLAN
The patient is Watcher - Medium risk of patient condition declining or worsening    Shift Goals  Clinical Goals: monitor v/s and labs  Patient Goals: going home soon  Family Goals: support Carolina

## 2023-06-09 NOTE — PROGRESS NOTES
Report received, assessment done. Pt is comfortable,wound has no bleeding. Vaginal bleeding scant.bonding with baby. No sign of infection. Pt tolerates walking and showered.  Working in the room. Orders received.   1900 report given to night shift RN

## 2023-06-09 NOTE — CARE PLAN
The patient is Stable - Low risk of patient condition declining or worsening    Shift Goals  Clinical Goals: monitor vitals and pt condition  Patient Goals: discharge home  Family Goals: support    Progress made toward(s) clinical / shift goals:    Problem: Risk for Infection and Impaired Wound Healing  Goal: Patient will remain free from infection  Outcome: Progressing  Note: Pt does not have any sign of infection     Problem: Pain  Goal: Patient's pain will be alleviated or reduced to the patient’s comfort goal  Flowsheets (Taken 6/9/2023 8369)  Pain Rating Scale (NPRS): 3  OB Pain Level: 1-Coping  Note: Pt was reminded re pain meds       Patient is not progressing towards the following goals:

## 2023-06-09 NOTE — PROGRESS NOTES
Report received, assessment done. Pt is comfortable, denies visual changes, epigastric pain or any swelling. Pt is sad about staying in the hospital. Urine output is WNL.  Pt is bonding with baby and caring for baby and breastfeeding is going well. Pumping in between feedings.  1600 very emotional about staying till 24 hours after mag is Dced.  1900 report given to night shift RN

## 2023-06-09 NOTE — DISCHARGE SUMMARY
Obstetrics Discharge Summary    Admission Date: 2023         Discharge Date: 2023    ADMISSION DIAGNOSIS:  1. 37yo  @ 34w6d  2. Shortness of breath  3. AMA  4. +SSA Ab  5.  Low platelets  6. Hypothryoid  7. Reactive airway disease  8. GBS positive   9. GERD    DISCHARGE DIAGNOSIS:  1. S/p LTCS  2. Acute asthma exacerbation  3. Acute hypoxemic respiratory failure  4. AMA  5. +SSA Ab  6. Low platelets-resolved, presumed gestational thrombocytopenia  7. Hypothyroid   8. GERD  9. Anemia  10. Postpartum preeclampsia with elevated LFTs  11. Left breast mass     Consultations:   [facility] Pulmonology and Critical Care Medicine Team   ENT, Dr. Roblero  [facility], Dr. Galarza       DETAILS OF HOSPITAL STAY  Presenting Problem/History of Present Illness: acute asthma exacerbation    Hospital Course:  Asthma:   Patient is a 38 y.o.  who presented to [facility] with shortness of breath.  She has a history of reactive airway disease which had never been fully managed.  She required admission for acute  hypoxemic respiratory failure.  Work-up for infectious etiology was negative.  She did not have evidence of DVT or pulmonary embolism.  Echocardiogram was normal.  During the course of her hospitalization she required significant oral steroids, IV magnesium to help with respiratory status, every 4 hour nebulizations, and inhaled steroids.  She had a significant oxygen requirement until after delivery of the baby at which point this could be weaned.  She underwent consultation with the pulmonology team.  She also underwent consultation with the ENT where she had a bedside laryngoscopy.  This was normal aside from the finding of GERD.  She was discharged home on a stable routine of Pulmicort, albuterol nebulization, Singulair, and completion of her oral steroid taper.  She was no longer requiring oxygen at the time of discharge.    SSA antibody: Fetus did not have evidence of heart block during the course of hospitalization.  No  "follow-up needed    Low platelets: She was presumed to have gestational thrombocytopenia.  Platelet levels did increase after treatment with oral steroids as well as after delivery.     Hypothyroidism: She continued her oral home levothyroxine course    GERD: Treated with Pepcid.  Symptoms largely resolved following delivery.     Anemia: Asymptomatic.  Secondary to acute blood loss with delivery.     Left breast mass: Noted on CT scan.  Work-up as an outpatient    Delivery: Due to her respiratory status she was delivered at 37 weeks.  She underwent a primary  section.  Surgery was uncomplicated.  Baby girl named Annelise Medellin) was born weighing 7 pounds 15 ounces with Apgars of 8 and 8.  Estimated blood loss for delivery was 500 cc.  Her postoperative course has been unremarkable.     Postpartum PreEclampsia: Postpartum she had some mildly elevated blood pressures.  PIH labs showed elevated transaminitis.  She was asymptomatic for preeclampsia.  However given that preeclampsia cannot be ruled out she was started on magnesium sulfate for 24 hours.  This was discontinued.  She has been normotensive and asymptomatic since that time.  She did not require any IV or oral medications to treat her blood pressure.     APGARs:   8   8      COMPLICATIONS: none    PHYSICAL EXAM:  Vitals: /71   Pulse 88   Temp 36.8 °C (98.3 °F) (Temporal)   Resp 16   Ht 1.702 m (5' 7\")   Wt 103 kg (226 lb)   SpO2 92%   General: Alert, conversational, pleasant, no acute distress  CVS: Regular rate  PULM: No respiratory distress, symmetric expansion  ABD: Soft, non-tender, non-distended, fundus firm, non-tender, below the umbilicus  : Deferred  Extremities: Moves all, trace edema     DISPOSITION: Home.    DISCHARGE MEDICATIONS:     Medication List        START taking these medications        Instructions   Acetaminophen Extra Strength 500 MG Tabs  Generic drug: acetaminophen   Take 1 Tablet by mouth every four hours as " needed for Mild Pain for up to 7 days.  Dose: 500 mg     ibuprofen 600 MG Tabs  Commonly known as: MOTRIN   Take 1 Tablet by mouth every 6 hours as needed for Mild Pain for up to 14 days.  Dose: 600 mg     montelukast 10 MG Tabs  Commonly known as: SINGULAIR   Take 1 Tablet by mouth every day.  Dose: 10 mg     oxyCODONE immediate release 10 MG immediate release tablet  Commonly known as: ROXICODONE   Take 0.5 Tablets by mouth every four hours as needed for Severe Pain for up to 4 days.  Dose: 5 mg     predniSONE 10 MG Tabs  Start taking on: June 9, 2023  Commonly known as: DELTASONE   Take 2 Tablets by mouth every day for 2 days, THEN 1 Tablet every day for 2 days, THEN 0.5 Tablet every day for 5 days.     Symbicort 160-4.5 MCG/ACT Aero  Generic drug: budesonide-formoterol   Inhale 2 Puffs by mouth 2 times a day.  Dose: 2 Puff            CHANGE how you take these medications        Instructions   albuterol 108 (90 Base) MCG/ACT Aers inhalation aerosol  What changed: Another medication with the same name was removed. Continue taking this medication, and follow the directions you see here.   Inhale 2 Puffs every 6 hours as needed for Shortness of Breath.  Dose: 2 Puff     ipratropium-albuterol 0.5-2.5 (3) MG/3ML nebulizer solution  What changed:   how much to take  when to take this  reasons to take this  Commonly known as: DUONEB   Take 3 mL by nebulization every four hours as needed for Shortness of Breath for up to 30 days.  Dose: 3 mL            CONTINUE taking these medications        Instructions   levothyroxine 150 MCG Tabs  Commonly known as: SYNTHROID   Take 1 Tablet by mouth every morning on an empty stomach.  Dose: 150 mcg     omeprazole 10 MG Cpdr  Commonly known as: PRILOSEC   Take 1 capsule by mouth 2 times a day            STOP taking these medications      aspirin 81 MG Chew chewable tablet  Commonly known as: ASA     fluticasone-salmeterol 250-50 MCG/ACT Aepb  Commonly known as: Advair     ipratropium  0.02 % Soln  Commonly known as: ATROVENT     methylPREDNISolone 4 MG Tbpk  Commonly known as: MEDROL DOSEPAK     ondansetron 8 MG Tabs  Commonly known as: ZOFRAN     Wixela Inhub 100-50 MCG/ACT Aepb  Generic drug: fluticasone-salmeterol              DISCHARGE INSTRUCTIONS:  1. Pelvic rest and no heavy lifting greater than 10 pounds for 6 weeks post-operatively.   2. No driving for at least 2 weeks or longer while requiring pain medication.   3. Incision check in 1 week at [facility] [phone]  4. Post-partum vist in 6 weeks at [facility] [phone]  5. Return to the emergency department if experiencing increased vaginal bleeding, severe pain, temperature greater than 100.4, or any other concerns.  6. F/u with pulmonology as scheduled     DISCHARGE CONDITION: Stable.    Ltaonia Feliz MD

## 2023-06-09 NOTE — LACTATION NOTE
Follow up lactation visit:    Met with Amalia & baby to evaluate a feeding this morning. Her breasts are full, but not engorged. She does have a lump in her left breast, which has become more palpable as her breasts have filled. She states that this was evaluated and determined to be benign prior to pregnancy.     Amalia easily latched baby independently on left breast in cross-cradle. Lactation consultant offered additional pillow support to assist with infant positioning/alignment at the breast. Rhythmic, nutritive sucking with swallows appreciated.    Amalia reports that she pumped 6 ounces of milk yesterday afternoon (3 ounces per breast), to ease her breast fullness. Infant  30 minutes later, and was satisfied at the breast. Encouraged decreased pumping, and a focus on relying solely on infant for breast stimulation to prevent oversupply.  We reviewed that thyroid dysfunction can impact lactation, and encouragement given for adequate follow up to ensure she remains euthyroid.    Feeding Plan:    Cue-based breastfeeding, at a minimum of once every three hours, for a total of 8+ feeds per 24 hours.    Amalia is hopeful that she may discharge home tonight. She is advised to call for lactation support with any breastfeeding questions or concerns prior to discharge. Encouraged follow up with [facility], as needed for outpatient support.

## 2023-06-09 NOTE — PROGRESS NOTES
"PostPartum Note    DATE: 2023  Hospital day: 19  Primary OB/GYN: Sarah Kang MD  [facility] Consultant: Fede Glaarza (signed off)     SUBJECTIVE  Feeling good from a respiratory standpoint. Decreased SOB and cough. Ambulating well. Pain adequately controlled. Taking roxicodone a couple of times a day. +flatus and void. No BM. Sore on left side and binder helps. Frustrated that she as placed on Magnesium as she felt nothing had really changed with her BP and elevated LFTs might have been from another source. Denies HA/RUQ pain/scotoma.     OBJECTIVE:  /74   Pulse (!) 105   Temp 36.3 °C (97.4 °F) (Temporal)   Resp 18   Ht 1.702 m (5' 7\")   Wt 103 kg (226 lb)   SpO2 97%   BP Range: 109-157/57-84     Review of systems: Pertinent items are noted in HPI.    PHYSICAL EXAM:  General:   Alert, conversational, pleasant, no acute distress  Lungs:   CTAB. Diminished at the base   Heart:   RRR  Abdomen:  Soft, Fundus below umbilicus  Inicisin:  Mepliex in place. C/d/I  Ext:   No c/c/e    Medications:     Current Facility-Administered Medications:     calcium GLUConate    [COMPLETED] magnesium sulfate **FOLLOWED BY** magnesium sulfate    LR    predniSONE    ipratropium-albuterol    ipratropium-albuterol    [] oxytocin **FOLLOWED BY** oxytocin    oxytocin    LR    ibuprofen **FOLLOWED BY** ibuprofen    acetaminophen **FOLLOWED BY** acetaminophen    oxyCODONE immediate-release    oxyCODONE immediate release    ondansetron **OR** ondansetron    diphenhydrAMINE **OR** diphenhydrAMINE    docusate sodium    magnesium hydroxide    prenatal plus vitamin    simethicone    calcium carbonate    mag hydrox-al hydrox-simeth    ferrous sulfate    famotidine    benzonatate    mometasone-formoterol    levothyroxine    omeprazole    Labs:   Recent Labs     23  2233   WBC 15.4*   RBC 3.76*   HEMOGLOBIN 10.6*   HEMATOCRIT 32.7*   MCV 87.0   MCH 28.2   RDW 48.0   PLATELETCT 201   MPV 11.9   NEUTSPOLYS 80.40*   LYMPHOCYTES " 14.50*   MONOCYTES 4.00   EOSINOPHILS 0.30   BASOPHILS 0.10     Recent Labs     23  2233   SODIUM 142   POTASSIUM 3.7   CHLORIDE 105   CO2 26   GLUCOSE 92   BUN 8     Recent Labs     23  2233   ALBUMIN 3.6   TBILIRUBIN 0.2   ALKPHOSPHAT 127*   TOTPROTEIN 6.0   ALTSGPT 191*   ASTSGOT 66*   CREATININE 0.63     Imaging: none new    CONSULTS:   Pulmonology  ENT (Dr. Roblero) bedside laryngoscopy showing findings c/w GERD    ASSESSMENT & PLAN  Carolina Medrano is a 38 y.o.  s/p primary LTCS 2023    PLAN:  Asthma  Appreciate pulmonary consult  Per Pulmonology: Steroid taper, continue duonebs prn, start Pulmicort and singulair when outpatient.   No longer needing O2 supplementation. Goal >92%   GERD  Continue pepcid, maalox, omeprazole  Elevated Blood glucose  Resolved  Related to steroid use  Platelets  Likely gestational thrombocytopenia.   Resolved   Left breast mass  Incidental finding noted on CTA of chest  Still needs work-up   -   SSA Ab  No heart block in pregnancy noted  No current f/u needed   Hashimoto Thyroiditis  Thryoid US normal. Labs normal on admission  Continue home levothyroxine  -   Anemia  Fe studies normal  Likely chronic dissease   -   PreEclampsia  All BP have been mild to normal range WITHOUT medications  Currently asymptomatic  Dx made on elevated LFTs. Etiology unclear. PreE could not be ruled out, hence she was treated for PreE  Magnesium Sulfate completed at midnight  Discussed goal BP <150/90  -   Anemia:   Due to acute blood loss. Asymptomatic  No current tx needed beyond routine dietary supplementation   -   Routine post op care  -   Discharge planning: possible home this evening if BP adequately controlled.       Plan of care discussed with the patient and she is in agreement with this plan. Plan of care discussed with RN taking carer of patient. All questions and concerns were answered.     Latonia Feliz MD

## 2023-06-10 NOTE — PROGRESS NOTES
1900: Report received from Katie PONCE, POC discussed.     1910: Initial assessment complete, pt is laying comfortably in bed. POC for possible d/c tonight dicussed. All questions and needs met at this time.     2015: MD Working at bedside, V/O to discharge pt home.     2057: Pt discharged home ambulatory with  and infant. Discharge education provided and pt verbalizes understanding. Pt verbalizes she will follow up with her physician in 3 days. All questions and concerns answered at this time.

## 2023-06-10 NOTE — CARE PLAN
The patient is Stable - Low risk of patient condition declining or worsening    Shift Goals  Clinical Goals: discharge home  Patient Goals: go home tonight  Family Goals: Support    Progress made toward(s) clinical / shift goals:  pain control, remained free from infection, breastfeeding well, respiratory effort decreased    Patient is not progressing towards the following goals: N/A

## 2023-06-20 ENCOUNTER — OFFICE VISIT (OUTPATIENT)
Dept: SLEEP MEDICINE | Facility: MEDICAL CENTER | Age: 38
End: 2023-06-20
Attending: STUDENT IN AN ORGANIZED HEALTH CARE EDUCATION/TRAINING PROGRAM
Payer: COMMERCIAL

## 2023-06-20 VITALS
DIASTOLIC BLOOD PRESSURE: 62 MMHG | WEIGHT: 210 LBS | HEART RATE: 101 BPM | SYSTOLIC BLOOD PRESSURE: 118 MMHG | OXYGEN SATURATION: 96 % | BODY MASS INDEX: 32.96 KG/M2 | HEIGHT: 67 IN

## 2023-06-20 DIAGNOSIS — J45.30 MILD PERSISTENT ASTHMA WITHOUT COMPLICATION: ICD-10-CM

## 2023-06-20 PROCEDURE — 3078F DIAST BP <80 MM HG: CPT | Performed by: STUDENT IN AN ORGANIZED HEALTH CARE EDUCATION/TRAINING PROGRAM

## 2023-06-20 PROCEDURE — 3074F SYST BP LT 130 MM HG: CPT | Performed by: STUDENT IN AN ORGANIZED HEALTH CARE EDUCATION/TRAINING PROGRAM

## 2023-06-20 PROCEDURE — 99204 OFFICE O/P NEW MOD 45 MIN: CPT | Performed by: STUDENT IN AN ORGANIZED HEALTH CARE EDUCATION/TRAINING PROGRAM

## 2023-06-20 PROCEDURE — 99213 OFFICE O/P EST LOW 20 MIN: CPT | Performed by: STUDENT IN AN ORGANIZED HEALTH CARE EDUCATION/TRAINING PROGRAM

## 2023-06-20 ASSESSMENT — ENCOUNTER SYMPTOMS
COUGH: 0
WHEEZING: 0
FOCAL WEAKNESS: 0
SPUTUM PRODUCTION: 0
VOMITING: 0
FEVER: 0
EYE DISCHARGE: 0
FALLS: 0
SHORTNESS OF BREATH: 0
CHILLS: 0

## 2023-06-20 ASSESSMENT — FIBROSIS 4 INDEX: FIB4 SCORE: 0.88

## 2023-06-20 NOTE — ASSESSMENT & PLAN NOTE
Called daughter Juani at [phone] for second time regarding potential transfer to [facility] while working on placement but did not get an answer.   Likely exacerbated by her pregnancy and wasn't on any maintenance therapies. Now improved post delivery and on maintenance inhalers and steroid taper.    - continue symbicort - adjust accordingly based on on symptoms and prn albuterol use now that she's finished her steroid taper  - continue singulair  - PRN albuterol   - repeat CBC w/diff and IgE in 4-6 weeks  - PFTs in 4-6 weeks  - f/u pulmonary after above tests to assess how she's doing  - encouraged to stay active/exercise routinely

## 2023-06-20 NOTE — PROGRESS NOTES
Pulmonary Clinic Note    Chief Complaint:  Chief Complaint   Patient presents with    Hospital Follow-up     ED 23-23. Pulm Consult. 23     HPI:   Carolina Medrano is a very pleasant 38 y.o. female who presents to pulmonary clinic for asthma.   Patient was seen by pulmonary inpatient during her hospitalization in 2023 for dyspnea and hypoxia at the end of her pregnancy - she was treated with nebs, steroids and supplemental O2. Had PFT which did not show obstructio but did show some flattening of the inspiratory limb of the FV loop. Seen by ENT on 23 and had bedside scope which showed thick secretions on larynx and hypopharynx, normal VC movement and anatomy, tongue base and posterior glottic changes c/w reflux.   Patient underwent  on  and was subsequently weaned off O2 and discharged home on a prednisone taper which she finished yesterday. She stopped doing scheduled nebulizer treatments about 4 days ago since she was feeling better. She is currently on symbicort and singulair and doing well. Hasn't had to use her rescue inhaler.       Past Medical History:   Diagnosis Date    Cough     Pain 2017    abd., 1/10    Thyroid disease        Past Surgical History:   Procedure Laterality Date    PRIMARY C SECTION N/A 2023    Procedure: PRIMARY  SECTION;  Surgeon: Sarah Kang M.D.;  Location: SURGERY LAB[facility] DELIVERY;  Service: Labor and Delivery    UMBILICAL HERNIA REPAIR  2017    Procedure: UMBILICAL HERNIA REPAIR WITH MESH;  Surgeon: Florencio Tavares M.D.;  Location: SURGERY [facility];  Service:     LIZ BY LAPAROSCOPY  2013    Performed by Florencio Tavares M.D. at SURGERY [facility]    KNEE ARTHROSCOPY      right knee ( times 4)    TONSILLECTOMY AND ADENOIDECTOMY      as a child    UMBILICAL HERNIA REPAIR         Social History     Socioeconomic History    Marital status:      Spouse name: Not on file    Number of  children: Not on file    Years of education: Not on file    Highest education level: Bachelor's degree (e.g., BA, AB, BS)   Occupational History    Not on file   Tobacco Use    Smoking status: Never     Passive exposure: Never    Smokeless tobacco: Never   Vaping Use    Vaping Use: Never used   Substance and Sexual Activity    Alcohol use: Not Currently     Comment: 1-2/week    Drug use: No    Sexual activity: Yes     Partners: Male     Birth control/protection: Other-See Comments   Other Topics Concern    Not on file   Social History Narrative    Not on file     Social Determinants of Health     Financial Resource Strain: Low Risk  (6/6/2023)    Overall Financial Resource Strain (CARDIA)     Difficulty of Paying Living Expenses: Not hard at all   Food Insecurity: No Food Insecurity (6/6/2023)    Hunger Vital Sign     Worried About Running Out of Food in the Last Year: Never true     Ran Out of Food in the Last Year: Never true   Transportation Needs: No Transportation Needs (6/6/2023)    PRAPARE - Transportation     Lack of Transportation (Medical): No     Lack of Transportation (Non-Medical): No   Physical Activity: Insufficiently Active (6/6/2023)    Exercise Vital Sign     Days of Exercise per Week: 2 days     Minutes of Exercise per Session: 60 min   Stress: No Stress Concern Present (6/6/2023)    [place] [employer] - Occupational Stress Questionnaire     Feeling of Stress : Not at all   Social Connections: Unknown (6/6/2023)    Social Connection and Isolation Panel [NHANES]     Frequency of Communication with Friends and Family: More than three times a week     Frequency of Social Gatherings with Friends and Family: Three times a week     Attends Mandaeism Services: Not on file     Active Member of Clubs or Organizations: No     Attends Club or Organization Meetings: Never     Marital Status:    Intimate Partner Violence: Not on file   Housing Stability: Unknown (6/6/2023)     Housing Stability Vital Sign     Unable to Pay for Housing in the Last Year: No     Number of Places Lived in the Last Year: Not on file     Unstable Housing in the Last Year: No          Family History   Problem Relation Age of Onset    Heart Disease Father     Hypertension Father     Alcohol abuse Father     Cancer Paternal Aunt         breast    Cancer Paternal Uncle         colon    Diabetes Maternal Grandfather     Cancer Paternal Grandmother         breast    Autoimmune Disease Paternal Grandmother     Lupus Mother     No Known Problems Brother     Diabetes Maternal Grandmother     Alzheimer's Disease Paternal Grandfather        Current Outpatient Medications on File Prior to Visit   Medication Sig Dispense Refill    ibuprofen (MOTRIN) 600 MG Tab Take 1 Tablet by mouth every 6 hours as needed for Mild Pain for up to 14 days. 56 Tablet 0    budesonide-formoterol (SYMBICORT) 160-4.5 MCG/ACT Aerosol Inhale 2 Puffs by mouth 2 times a day. 10.2 g 3    montelukast (SINGULAIR) 10 MG Tab Take 1 Tablet by mouth every day. 30 Tablet 3    ipratropium-albuterol (DUONEB) 0.5-2.5 (3) MG/3ML nebulizer solution Take 3 mL by nebulization every four hours as needed for Shortness of Breath for up to 30 days. 540 mL 0    albuterol 108 (90 Base) MCG/ACT Aero Soln inhalation aerosol Inhale 2 Puffs every 6 hours as needed for Shortness of Breath. 8.5 g 1    omeprazole (PRILOSEC) 10 MG CAPSULE DELAYED RELEASE Take 1 capsule by mouth 2 times a day 60 Capsule 30    levothyroxine (SYNTHROID) 150 MCG Tab Take 1 Tablet by mouth every morning on an empty stomach. 30 Tablet 1     No current facility-administered medications on file prior to visit.       Allergies: Shellfish allergy, Vancomycin, Clindamycin hcl, Iodine, and Penicillins      ROS:   Review of Systems   Constitutional:  Negative for chills and fever.   HENT:  Negative for hearing loss.    Eyes:  Negative for discharge.   Respiratory:  Negative for cough, sputum production,  "shortness of breath and wheezing.    Cardiovascular:  Negative for chest pain.   Gastrointestinal:  Negative for vomiting.   Musculoskeletal:  Negative for falls.   Skin:  Negative for rash.   Neurological:  Negative for focal weakness.       Vitals:  /62 (BP Location: Left arm, Patient Position: Sitting, BP Cuff Size: Large adult)   Pulse (!) 101   Ht 1.702 m (5' 7\")   Wt 95.3 kg (210 lb)   SpO2 96%     Physical Exam:  Physical Exam  Vitals and nursing note reviewed.   Constitutional:       General: She is not in acute distress.     Appearance: Normal appearance. She is not ill-appearing or toxic-appearing.   HENT:      Head: Normocephalic and atraumatic.      Nose: Nose normal.      Mouth/Throat:      Mouth: Mucous membranes are moist.   Eyes:      General: No scleral icterus.     Conjunctiva/sclera: Conjunctivae normal.   Cardiovascular:      Rate and Rhythm: Normal rate and regular rhythm.   Pulmonary:      Effort: Pulmonary effort is normal. No respiratory distress.      Breath sounds: No wheezing, rhonchi or rales.   Abdominal:      Palpations: Abdomen is soft.   Musculoskeletal:         General: No deformity or signs of injury. Normal range of motion.      Cervical back: Normal range of motion.   Skin:     General: Skin is warm and dry.   Neurological:      General: No focal deficit present.      Mental Status: She is alert. Mental status is at baseline.   Psychiatric:         Mood and Affect: Mood normal.         Behavior: Behavior normal.         Data:    Reviewed     Assessment/Plan:    Problem List Items Addressed This Visit       Mild persistent asthma without complication     Likely exacerbated by her pregnancy and wasn't on any maintenance therapies. Now improved post delivery and on maintenance inhalers and steroid taper.    - continue symbicort - adjust accordingly based on on symptoms and prn albuterol use now that she's finished her steroid taper  - continue singulair  - PRN albuterol   - " repeat CBC w/diff and IgE in 4-6 weeks  - PFTs in 4-6 weeks  - f/u pulmonary after above tests to assess how she's doing  - encouraged to stay active/exercise routinely          Relevant Orders    PULMONARY FUNCTION TESTS -Test requested: Complete Pulmonary Function Test    IGE SERUM    CBC WITH DIFFERENTIAL       Return 6-8 weeks after PFT and labs.     This note was generated using voice recognition software which has a chance of producing errors of grammar and possibly content.  I have made every reasonable attempt to find and correct any obvious errors, but it should be expected that some may not be found prior to finalization of this note.    Time spent in record review prior to patient arrival, reviewing results, and in face-to-face encounter totaled 45 min, excluding any procedures if performed.    Margareth Lilly MD  Pulmonary and Critical Care Medicine  Formerly [facility]

## 2023-06-22 ENCOUNTER — APPOINTMENT (OUTPATIENT)
Dept: OBGYN | Facility: CLINIC | Age: 38
End: 2023-06-22
Payer: COMMERCIAL

## 2023-06-27 ENCOUNTER — OFFICE VISIT (OUTPATIENT)
Dept: OBGYN | Facility: CLINIC | Age: 38
End: 2023-06-27
Payer: COMMERCIAL

## 2023-06-27 DIAGNOSIS — O92.70 LACTATION PROBLEM: ICD-10-CM

## 2023-06-27 DIAGNOSIS — Z91.89 AT RISK FOR POSTPARTUM DEPRESSION: ICD-10-CM

## 2023-06-27 PROCEDURE — 99215 OFFICE O/P EST HI 40 MIN: CPT | Performed by: NURSE PRACTITIONER

## 2023-06-27 NOTE — PROGRESS NOTES
Summary: Very difficult and traumatic  experience with asthma requiring hospital admission. Delivered intentionally at 37 weeks by C/section and baby in NBN initially breathing and low sugars. Mom taught baby Annelise breastfeeding and at 3 weeks, she is growing well, one sided nursing each time appropriately and sleeping 3-4 hour stretches as night. She has had one bottle does ok with a pacifier. Was pumping earlier on and felt like supply fell so rented the platinum and is using it with the Spectra 2x/day. Storing 8oz in the freezer each day.   Today: Mom independently latched Annelise and she removed 82ml from the right breast and content. Pumping not indicated but did discuss many breastfeeding questions.   Plan: Continue with nursing and pumping routine, managing large supply without inflammatory mastitis. Discussed volumes needed per month if interested in slowing down breastfeeding as baby gets older. Discussed wearable pumps and trouble shooting the Spectra bottles that are leaking on her. Processing the pregnancy is recommended. Protecting moms sleep plan in note  Follow up:   Lactation appointment: As needed and Group Encouraged  Baby 's Provider appointment: 2 Month Well Child Check   Referrals: Maternal Mental Health Angie Martinez    Maternal Diagnosis/Problem:  At risk for PPD and Lactation Problem  Infant Diagnosis/Problem:  At risk for breastfeeding difficulties due to abundant supply.     Subjective:     Carolina Medrano is a 38 y.o. female here for lactation care. She is here today with hernan Castelan.     Concerns:   Oversupply , Infant feeding evaluation, and Breastfeeding questions     HPI:   Pertinent  history: c/section and primary    Mother does not have a history of GDM, hypertension prior to pregnancy, GHTN, insulin resistance, multiple gestation, PCOS, thyroid disease, auto immune disease , placenta encapsulation, and breast surgery    Mother does have  advanced maternal age. Common condition(s) that may interfere in milk supply.    History of breast surgeries: No    FEEDING HISTORY:    Previous Breastfeeding History: First baby.   Hospital Course: Very difficult and traumatic  experience with asthma requiring hospital admission. Delivered intentionally at 37 weeks by C/section and baby in NBN initially breashing and low sugars. Mom taught baby Annelise breastfeeding and at 3 weeks, she is growing well, one sided nursing each time appropriately and sleeping 3-4 hour stretches as night.   Currently 2023: Mom taught baby Annelise breastfeeding and at 3 weeks, she is growing well, one sided nursing each time appropriately and sleeping 3-4 hour stretches as night. She has had one bottle does ok with a pacifier. Was pumping earlier on and felt like supply fell so rented the platinum and is using it with the Spectra 2x/day. Storing 8oz in the freezer each day.     Both breasts: No     Supplement: None   Bottle/nipple type: hospital    Nipple Shield Use: None    Breast Pumping:  Frequency: 2x/day  Quantity Obtained: 4oz  Type of Pump: Platinum and Spectra  Flange size/type: 24mm  Wearable: no  NO pain with pumping    Maternal ROS:  Constitutional: No fever, chills. Feeling well  Breasts: No soreness of breasts and No soreness of nipples  Psychiatric: Managing ok  Mental Health: No mention of feeling irritable, agitated, angry, overwhelmed, apathetic, appetite changes, exhausted nor having sleep changes outside infant feeds/demands.  Current Outpatient Medications on File Prior to Visit   Medication Sig Dispense Refill    budesonide-formoterol (SYMBICORT) 160-4.5 MCG/ACT Aerosol Inhale 2 Puffs by mouth 2 times a day. 10.2 g 3    montelukast (SINGULAIR) 10 MG Tab Take 1 Tablet by mouth every day. 30 Tablet 3    ipratropium-albuterol (DUONEB) 0.5-2.5 (3) MG/3ML nebulizer solution Take 3 mL by nebulization every four hours as needed for Shortness of Breath for  up to 30 days. 540 mL 0    albuterol 108 (90 Base) MCG/ACT Aero Soln inhalation aerosol Inhale 2 Puffs every 6 hours as needed for Shortness of Breath. 8.5 g 1    omeprazole (PRILOSEC) 10 MG CAPSULE DELAYED RELEASE Take 1 capsule by mouth 2 times a day 60 Capsule 30    levothyroxine (SYNTHROID) 150 MCG Tab Take 1 Tablet by mouth every morning on an empty stomach. 30 Tablet 1     No current facility-administered medications on file prior to visit.      Past Medical History:   Diagnosis Date    Cough     Pain 08/02/2017    abd., 1/10    Thyroid disease          Objective:     Maternal Physical Lactation Exam  General: No acute distress  Breasts: Symmetrical , Soft, Plugged Duct - no evidence, and Mastitis  - no S/S  Nipples: intact  Psychiatric: Normal mood and affect. Her behavior is normal. Judgment and thought content normal.  Mental Health: Did NOT exhibit sadness, crying, feeling overwhelmed, agitation or hypervigilance.    Assessment/Plan & Lactation Counseling:     Infant Exam on Infant Chart    Infant Weight History:   6/5/23 7#15.7oz  06/27/2023: 8#12.3oz    Infant Intake at Breast:  Right 82ml    Total: 82ml  Milk Transfer at this feeding:   Effective breastfeeding  Pumped: Not indicated   Initiation of Feeding: Infant initiates  Position of Feeding:    Right: cross cradle  Left: left side not offered   Attachment Achieved: rapidly  Nipple shield: N/A       Suck Pattern at the Breast: Suck burst and normal rest  Suck Pattern on the Bottle: Not Indicated   Behavior Following Observed Feeding: sleeping  Nipple Pain: None     Latch: Mom latches independently  Suckling/Feeding: attaches, audible swallows, baby fed effectively, baby roots, elicits JOSI, and rhythmic  Sucking strength: Moderate Strong  Sucking Rhythm Coordinated   Compression: WNL    Once latched, baby fell into a mature and fully integrated SSB pattern.    Swallowing No difficulty noted  If functional feeding, it is quiet, rhythmic, coordinated,  organized, effeicent safe, satisfying and pleasurable for both parent and baby? Yes    Milk Supply Available: abundant     MATERNAL PERSONALIZED BREASTFEEDING PLAN  Discussed concerns and symptoms as listed above in assessment and guidance summarized below. Shared decision making was used between myself and the family for this encounter, home care, and follow up. Topics reviewed included:   4th Trimester: The 12-week period immediately after mom has had the baby. Not everyone has heard of it, but every mother and their  baby will go through it. It is a time of great physical and emotional change as the baby adjusts to being outside the womb, and the family adjusts to new life as parents  During the fourth trimester, one can expect fussiness and crying from the baby and very likely exhaustion for the family. [place] babies are learning to adjust to life outside the womb where it was warm and squishy!  There is a lot of misinformation about babies and their needs, and parents are often encouraged to ignore baby's signals. Bad idea. Babies are “half-baked” at birth and have much to learn with the help of physical and emotional support from caregivers. Taking care of a baby's needs is an investment that pays off with a happier, healthier child and adult.  It can take weeks or even months for your body to feel totally normal again. There is a major hormonal upheaval experienced by moms in the first few weeks after birth, because their bodies are shifting from many pregnancy hormones to a more normal hormonal make-up.  These first three months with baby earth side is a delicate time. Honor it with a mindful dose of support. Mindful Mamma's is an bandar that may help.   Self-Compassion through Relational Support and Interaction.   Be kind to ourself. This is the first step in reducing anxiety and depression. Pay attention to how you are doing.   What do you need? Food, Rest, Sleep, Support, to Laugh/giggle: who will  "you ask today? They want to help you. We want to help you.   How do you stop your self-blame, or your self-criticism?   Get out of the house each day, walk or drive somewhere or ask a friend to drive you,  a \"treat\" at a drive through.   Mood and Anxiety Disorders: Having a new baby can be joyful time for some new moms, but a difficult time for others. For all, it is a time of profound physical and emotional change. Balancing baby, family, partners and friends, work, pets, and preexisting or new life stressors as well as sleep deprivation can be extremely challenging. Untreated depression, sleep exhaustion, and anxiety are each a challenge that must be addressed and in addition can contribute to early cessation of breastfeeding. It is important both for the mental health and physical health of new moms and babies to get on the path to feeling better and if therapeutic support is needed, specific resources are available, please ask.   Sleep or Lack of: Human Giver Syndrome (moms) tells us it’s “self-indulgent” to rest and sleep, which makes as much sense as believing it’s weak or self indulgent to breathe. We discussed strategies to manage restorative sleep, although short amounts, significant to the mental health of the mother. The principle follows:  Mom goes to sleep right after 8pm +/- feeding  Partner covers first late evening feeding (10pm/11pm), settles baby,  then goes to bed  Mom covers next feeding 1am /2am, partner sleeps  Next feeding share  Milk Supply is dependent on glandular tissue development, hormonal influences, how many times the baby removes milk and how well the breasts are emptied in a 24 hour period. This is a biological reality that we can NOT work around. If, for any reason, your baby is not latching, or you are not able to nurse, then it is important for you to remove the milk instead by pumping or hand expression.  There's no magic trick, tea, food, drink, cookie or " supplement that will increase your milk supply. One  must  effectively remove milk to continue to make and maximize milk. In the early days and weeks that can be 8+ times in 24 hours. For older babies, on average 6-7 + times in 24 hours.    Hydration: Staying hydrated is important however lack of hydration is usually not a cause of significantly low milk production.  Everyone needs a different amount of water, depending on their activity and diet. A high salt and/or high-protein diet, high physical activity, or very warm weather/sweating will require more fluids. A person eating a diet high in veggies and fruit, with a lack of physical activity will require fewer fluids. There is no magic number for the # of ounces of water each day.The best way to know that you are well hydrated is by looking at your urine.  Urine should look clear to light pale yellow, and you should need to pee at least every 3 to 4 hours unless you have a large bladder capacity.  Feeding:   Infant feeding well given current interval growth, guidelines to follow:  Feed your baby every 1.5-3 hours, more often if baby acts hungry.   Awaken baby for feeding if going over 3 hours in the day.   Daily goal: 8-12 feedings per 24 hours.    Given infants weight you may allow baby to go longer at night but that generally means shorter durations in the day.  Supplement:   No supplement is needed  Pacing the feeding:  A slow flow nipple helps, but how you feed the baby is more important.  How you are  positioning the bottle can compensate for a faster flow nipple.  When bottle-feeding, the baby should control how much is consumed at a feeding.  Holding the baby in an upright position with the bottle horizontal ensures that the baby gets milk only when sucking.  Here is a nice video demonstrating this concept of paced bottle feeding,  https://www.Movile.com/watch?v=UaSAI5xAQ4N Think baby led, not parent led.  Pacifier Use:  The American Academy of  Pediatrics' Position Paper reports: Although we recommend a conservative approach regarding pacifier use, we do not endorse a complete ban on the use of pacifiers, nor do we support an approach that induces parental guilt concerning their choices about the use of pacifiers. Pacifier use and breastfeeding in term and  newborns- a systematic review and meta-analysis from the  J of Pediatrics Published online 2022. Has found that when pacifiers are used among individuals who have been counseled on the risks, do not interfere with breastfeeding exclusivity or duration. These are parental choices.   Pumping Guidelines:  Wearable pumps are not recommended to establish breastfeeding or regular use unless hyperlactation as you have intentionally and appropriately developed.  If working, it is suggested you pump once with a good personal pump to assure full emptying.  Bra    Consider a hands free bra - Simple Wishes is recommended.  Cake Maternity or Milkmaid for larger size bras  Type of Pump:  Platinum and Spectra  [employer] [place] Settings http://www.AdventureDrop/healthcare-professionals/videos/[place]-platinum-with-hygienikit-video   Speed: Start at 80 then turn down to 60 after 1.5-2 minutes when you see milk in the flange channel  Suction: To comfort, goal of  31%. NEVER to discomfort.   Spectra Settings  Press letdown button when first starting         Cycle: 70 / Vacuum: L1 - L 5 (To comfort)  Once milk lets down, press letdown button again  Cycle: 54 / Vacuum: L1 - L12 (To comfort)  How long will vary woman to woman, typically 8-15 minutes, or 1-2 minutes after flow slows  Flange Fit: Freely moving nipple in the tunnel with some movement of the areola.  Caution with Breast Massage: The word “Massage” means different things in the breastfeeding world. It is described and interpreted in so many different ways and unfortunately potentially harmful. The hands can be safe on a breast and  gentle to help  in many ways but they also can be damaging when used in the wrong way.  Lymphatic drainage massage is appropriate,  open hand , lightly stroking only, and can be highly effective. The massage advice to knead , massage deeply , vibrate with marketed tools is  most concerning. Be gentle with this gland to not increase inflammation.   Storage (Acceptable guidelines for healthy term babies)  10 hours at room temp including your pieces, may rinse but not mandatory  8 days refrigerator, don't need  to refrigerate right away if using fresh pumped milk at the next feeding  Freezer 1 year  Deep freezer 2 years  American Academy or Pediatrics has said you may mix different temperature milks.   If baby drinks breast milk from a fresh or refrigerated bottle of breast milk,  you may return the unused portion to the refrigerator and use once at next feeding.   Breast Care  Plugs (a colloquial term for microscopic ductal inflammation and narrowing)  Inflammatory or infectious mastitis, breastpain including engorgement  or vasospasm  Breast rest - No Massage, don't overfeed of over pump, down regulate production if needed  Advil 800mg every 8 hours for 48 hours with food  Ice - 10 minutes  after feeding then every 30 minutes or as desired for repeating the ice. Don't put the ice directly on the skin.  May add Tylenol 1000mg every 8 hours for 48 hours in between the Advil dosing if needed for pain.  Answers to FAQs: https://www.infantrisk.com/category/breastfeeding  Alcohol & Breastfeeding: What's your time-to-zero?  Cough & Cold Medications while Breastfeeding  Vitamin D Supplementation and Breastfeeding  Antidepressant Use While Breastfeeding: What should I know?  Breastfeeding, Caffeine, and Energy Drinks  Recommended resources  Physicians guide to breastfeeding, must up to date and accurate information available on breastfeeding  https://physicianguidetobreastfeeding.org/  Dads Step #1 (for Partners): If possible, arrange your  life so you can engage with your baby early and often                                                                                 Step #2 (for Moms): Encourage your partner to be hands-on - and let him handle things differently.                                                                                            Step #3 (for Partners): Realize that “your way” of parenting is essential for your child     https://doi.org/10.1093/cercor/hvgo009    Connect with other mothers:  Facebook:   [place] Breastfeeds: https://www.TELOS.com/[place].breastfeeds/  Well-Nourished Babies (Private group for questions and support): https://www.TELOS.com/groups/722174313166422/  Breastfeeding [place] including opportunity to weight your baby and do before and after weights WEIGHT CHECKS  Tuesdays 10am - 11am. Women's Health at [address] [place] [address], [facility] [address] [place] [address], 3rd floor conference room  Check your baby's weight, do a feeding and see how your baby is growing, visit with other mothers, plan on a walk or coffee date after group.  Please download Growth: Baby and Child for Apple or Child Growth Tracker for [email or web address] if you would like to  document and follow your baby's growth curve.  Due to space limitations - limit strollers please (New c/section moms please use your stroller).  We would love to have dads stay, but moms won't breastfeed if there are men in the room, sorry.  The room is generally scheduled for another event following group.  Please take all diapers with you   ONLINE SUPPORT GROUPS  Postpartum Support International (PSI) support groups are conducted using a yhzw-ey-gses support model and are not intended for those experiencing a mental health crisis. Groups are 90 minutes (1.5 hours) in length. The first ~30 minutes is providing information, education, and establishing group guidelines. The next ~60 minutes is “talk time,” in which group members share and talk with each other. Group members must be present for the  group guidelines before joining in the discussion or “talk time.”   In Conclusion:   Managing breastfeeding and milk supply is very dynamic,can be a complex and intimate journey, and is not one size fits all. When obstacles present themselves, it takes confidence, persistence and support. The rights of the child include optimal nutrition and mothers need help to make informed decisions. You  and your baby have been screened for biological, psychological, and social risk factors that might interfere with achieving your goals.  Support is critical. We want the family thriving not just tolerating life. You are now the focus of our Breastfeeding Medicine team; we are here to support your decisions and vision.     Follow up   Please call [phone] our voicemail line or the front office at [phone] to scheduled your next appointment.  Family is encouraged to e-mail or mychart us to update how the plan is working.    A total of 85 minutes, not including infant assessment time,  was spent preparing to see the patient, obtaining and reviewing separately obtained history, performing a medically appropriate examination and evaluation, counseling and educating the family,  documenting clinical information in the electronic health record, independently interpreting weighted feeds and infant growth results, communicating these results to the family and care coordination as detailed in the above note.       DIANE Vallecillo.

## 2023-06-28 DIAGNOSIS — Z91.89 AT RISK FOR POSTPARTUM DEPRESSION: ICD-10-CM

## 2023-07-05 NOTE — H&P
Late entry, H&P update from admission May 21, 2023    38-year-old G1, P0 admitted on May 21 at 34+ weeks gestation with worsening shortness of breath.  Patient had a known history of reactive airway disease, she was ruled out for pulmonary embolism or congestive heart failure.  She was admitted to the hospital for maximization of her respiratory process.  She was seen by pulmonology, started on steroids and had routine respiratory treatments  Her pulmonary status was improved but she continued to have difficulty lying flat.  With consultation from perinatology decision was made to proceed with  delivery at 37 weeks.  Patient is scheduled for  at 37 weeks gestation.

## 2023-07-08 PROCEDURE — RXMED WILLOW AMBULATORY MEDICATION CHARGE: Performed by: OBSTETRICS & GYNECOLOGY

## 2023-07-09 ENCOUNTER — PHARMACY VISIT (OUTPATIENT)
Dept: PHARMACY | Facility: MEDICAL CENTER | Age: 38
End: 2023-07-09
Payer: COMMERCIAL

## 2023-07-28 PROCEDURE — RXMED WILLOW AMBULATORY MEDICATION CHARGE: Performed by: OBSTETRICS & GYNECOLOGY

## 2023-08-01 ENCOUNTER — NON-PROVIDER VISIT (OUTPATIENT)
Dept: SLEEP MEDICINE | Facility: MEDICAL CENTER | Age: 38
End: 2023-08-01
Attending: STUDENT IN AN ORGANIZED HEALTH CARE EDUCATION/TRAINING PROGRAM
Payer: COMMERCIAL

## 2023-08-01 VITALS — HEIGHT: 67 IN | WEIGHT: 213 LBS | BODY MASS INDEX: 33.43 KG/M2

## 2023-08-01 DIAGNOSIS — J45.30 MILD PERSISTENT ASTHMA WITHOUT COMPLICATION: ICD-10-CM

## 2023-08-01 PROCEDURE — 94729 DIFFUSING CAPACITY: CPT | Performed by: STUDENT IN AN ORGANIZED HEALTH CARE EDUCATION/TRAINING PROGRAM

## 2023-08-01 PROCEDURE — 94726 PLETHYSMOGRAPHY LUNG VOLUMES: CPT | Mod: 26 | Performed by: INTERNAL MEDICINE

## 2023-08-01 PROCEDURE — 94726 PLETHYSMOGRAPHY LUNG VOLUMES: CPT | Performed by: STUDENT IN AN ORGANIZED HEALTH CARE EDUCATION/TRAINING PROGRAM

## 2023-08-01 PROCEDURE — 94060 EVALUATION OF WHEEZING: CPT | Mod: 26 | Performed by: INTERNAL MEDICINE

## 2023-08-01 PROCEDURE — 94729 DIFFUSING CAPACITY: CPT | Mod: 26 | Performed by: INTERNAL MEDICINE

## 2023-08-01 PROCEDURE — 94060 EVALUATION OF WHEEZING: CPT | Performed by: STUDENT IN AN ORGANIZED HEALTH CARE EDUCATION/TRAINING PROGRAM

## 2023-08-01 ASSESSMENT — PULMONARY FUNCTION TESTS
FEV1/FVC_PERCENT_PREDICTED: 110
FEV1_LLN: 2.79
FEV1/FVC_PERCENT_LLN: 69
FEV1: 3.76
FEV1/FVC_PERCENT_PREDICTED: 110
FEV1/FVC: 91.65
FEV1/FVC_PERCENT_CHANGE: 2
FEV1/FVC: 90
FVC: 4.18
FEV1/FVC_PERCENT_PREDICTED: 111
FEV1/FVC: 92
FEV1_PERCENT_PREDICTED: 112
FVC_PERCENT_PREDICTED: 100
FEV1_PERCENT_CHANGE: -2
FEV1/FVC_PERCENT_CHANGE: 0
FEV1/FVC_PERCENT_PREDICTED: 108
FEV1_PERCENT_PREDICTED: 111
FEV1/FVC_PERCENT_PREDICTED: 82
FEV1/FVC: 90
FEV1: 3.73
FEV1_PREDICTED: 3.34
FVC_LLN: 3.40
FEV1_PERCENT_CHANGE: 0
FEV1/FVC_PREDICTED: 83
FVC_PERCENT_PREDICTED: 102
FVC: 4.07
FVC_PREDICTED: 4.07

## 2023-08-01 ASSESSMENT — FIBROSIS 4 INDEX: FIB4 SCORE: 0.88

## 2023-08-01 NOTE — PROCEDURES
Technician: Veronika Fajardo RRT, CPFT  Good patient effort & cooperation.  The results of this test meet the ATS/ERS standards for acceptability & reproducibility.  Test was performed on the TopTenREVIEWS Body Plethysmograph-Elite DX system.  Predicted equations for Spirometry are GLI-2012, ITS for lung volumes, and GLI-2017 for DLCO.  The DLCO was uncorrected for Hgb.  A bronchodilator of Ventolin HFA -2puffs via spacer administered.  DLCO performed during dilation period.    Interpretation:  There is no significant obstructive ventilatory defect on spirometry.  There is no significant response to bronchodilators.  This does not rule out transient obstructive airway disorders such as a reported diagnosis of asthma.    Lung volumes are preserved.    Diffusion capacity is supranormal, which is likely normal physiologic variant during the postpartum period.    Flow volume loop is consistent with the above interpretation.    No prior PFTs for comparison.      ILuke M.D. am the author of this note (PFT interpretation).    __________  Luke Loaiza MD  Pulmonary and Critical Care Medicine  [facility]

## 2023-08-03 DIAGNOSIS — E03.8 HYPOTHYROIDISM DUE TO HASHIMOTO'S THYROIDITIS: ICD-10-CM

## 2023-08-03 DIAGNOSIS — E06.3 HYPOTHYROIDISM DUE TO HASHIMOTO'S THYROIDITIS: ICD-10-CM

## 2023-08-03 NOTE — PROGRESS NOTES
Pulmonary Clinic Note    Date of Visit: 2023     Chief Complaint:  Chief Complaint   Patient presents with    Follow-Up     PFT 2023  LABS 2023  LAST SEEN ON 2023 - DR. LILLY        HPI:   Carolina Medrano is a very pleasant 38 y.o. year old female never smoker, with a PMHx of asthma, hypothyroidism, IgA deficiency who presented to the Pulmonary Clinic for a regular follow up. Last seen in the office on 2023 with Dr. Lilly.     Patient is followed by the pulmonary office for asthma.  PFTs in  show an FVC of 4.8 L or 102%, FEV1 3.76 L or 112%, FEV1/FVC 90%, RV 84%, %, DLCO 138% predicted, without positive bronchodilator response.  Patient was hospitalized in May 2023 for dyspnea and hypoxia at the end of her pregnancy which was treated with nebulizers, steroids, and supplemental oxygen.  Patient underwent  on .  Patient was also seen by ENT during her hospitalization was scoped.  It showed thickened secretions on Larynex and hypopharynx with normal vocal cord movement and anatomy and normal tongue base.  It did show posterior glottic changes consistent with reflux.  Patient is currently on Symbicort, Singulair, and albuterol as needed.  IgE 13.  Eosinophils 0.05.     Interval events:  2023-  Patient states her breathing has been stable since the hospital discharge.  She states that she has had chest tightness when working in the barn.  She states that albuterol does help alleviate this.  She states that she is short of breath with mMRC of 1 and will have a dry cough but denies any significant sputum production or wheezing.  She continues to use Symbicort and Singulair.  She has used her albuterol twice since discharge.  She has not had any exacerbations.    Exacerbations this year:  2     Current medication regimen: Symbicort 160, Singulair, albuterol    Oxygen use: None    MMRC Grade: 1- Short of breath when hurrying or going up a small  yaya      Past Medical History:   Diagnosis Date    Cough     Pain 2017    abd., 1/10    Thyroid disease      Past Surgical History:   Procedure Laterality Date    PRIMARY C SECTION N/A 2023    Procedure: PRIMARY  SECTION;  Surgeon: Sarah Kang M.D.;  Location: SURGERY LABOR AND DELIVERY;  Service: Labor and Delivery    UMBILICAL HERNIA REPAIR  2017    Procedure: UMBILICAL HERNIA REPAIR WITH MESH;  Surgeon: Florencio Tavares M.D.;  Location: SURGERY [facility];  Service:     LIZ BY LAPAROSCOPY  2013    Performed by Florencio Tavares M.D. at SURGERY [facility]    KNEE ARTHROSCOPY      right knee ( times 4)    TONSILLECTOMY AND ADENOIDECTOMY      as a child    UMBILICAL HERNIA REPAIR       Social History     Socioeconomic History    Marital status:      Spouse name: Not on file    Number of children: Not on file    Years of education: Not on file    Highest education level: Bachelor's degree (e.g., BA, AB, BS)   Occupational History    Not on file   Tobacco Use    Smoking status: Never     Passive exposure: Never    Smokeless tobacco: Never   Vaping Use    Vaping Use: Never used   Substance and Sexual Activity    Alcohol use: Not Currently     Comment: 1-2/week    Drug use: No    Sexual activity: Yes     Partners: Male     Birth control/protection: Other-See Comments   Other Topics Concern    Not on file   Social History Narrative    Not on file     Social Determinants of Health     Financial Resource Strain: Low Risk  (2023)    Overall Financial Resource Strain (CARDIA)     Difficulty of Paying Living Expenses: Not hard at all   Food Insecurity: No Food Insecurity (2023)    Hunger Vital Sign     Worried About Running Out of Food in the Last Year: Never true     Ran Out of Food in the Last Year: Never true   Transportation Needs: No Transportation Needs (2023)    PRAPARE - Transportation     Lack of Transportation (Medical): No     Lack of  Transportation (Non-Medical): No   Physical Activity: Insufficiently Active (6/6/2023)    Exercise Vital Sign     Days of Exercise per Week: 2 days     Minutes of Exercise per Session: 60 min   Stress: No Stress Concern Present (6/6/2023)    [place] [employer] - Occupational Stress Questionnaire     Feeling of Stress : Not at all   Social Connections: Unknown (6/6/2023)    Social Connection and Isolation Panel [NHANES]     Frequency of Communication with Friends and Family: More than three times a week     Frequency of Social Gatherings with Friends and Family: Three times a week     Attends Christianity Services: Not on file     Active Member of Clubs or Organizations: No     Attends Club or Organization Meetings: Never     Marital Status:    Intimate Partner Violence: Not on file   Housing Stability: Unknown (6/6/2023)    Housing Stability Vital Sign     Unable to Pay for Housing in the Last Year: No     Number of Places Lived in the Last Year: Not on file     Unstable Housing in the Last Year: No        Family History   Problem Relation Age of Onset    Heart Disease Father     Hypertension Father     Alcohol abuse Father     Cancer Paternal Aunt         breast    Cancer Paternal Uncle         colon    Diabetes Maternal Grandfather     Cancer Paternal Grandmother         breast    Autoimmune Disease Paternal Grandmother     Lupus Mother     No Known Problems Brother     Diabetes Maternal Grandmother     Alzheimer's Disease Paternal Grandfather      Current Outpatient Medications on File Prior to Visit   Medication Sig Dispense Refill    levothyroxine (SYNTHROID) 150 MCG Tab Take 1 Tablet by mouth every morning on an empty stomach. 30 Tablet 1    budesonide-formoterol (SYMBICORT) 160-4.5 MCG/ACT Aerosol Inhale 2 Puffs by mouth 2 times a day. 10.2 g 3    montelukast (SINGULAIR) 10 MG Tab Take 1 Tablet by mouth every day. 30 Tablet 3    albuterol 108 (90 Base) MCG/ACT Aero Soln inhalation  "aerosol Inhale 2 Puffs every 6 hours as needed for Shortness of Breath. 8.5 g 1    omeprazole (PRILOSEC) 10 MG CAPSULE DELAYED RELEASE Take 1 capsule by mouth 2 times a day 60 Capsule 30     No current facility-administered medications on file prior to visit.     Allergies: Shellfish allergy, Vancomycin, Clindamycin hcl, Iodine, and Penicillins    ROS:   Review of Systems   Constitutional:  Negative for chills, diaphoresis, fever and malaise/fatigue.   HENT:  Negative for congestion and sinus pain.    Respiratory:  Negative for cough, hemoptysis, sputum production, shortness of breath and wheezing.    Cardiovascular:  Negative for chest pain, palpitations and leg swelling.   Gastrointestinal:  Negative for diarrhea, heartburn, nausea and vomiting.   Musculoskeletal:  Negative for falls and myalgias.   Neurological:  Negative for dizziness, weakness and headaches.     Vitals:  /82 (BP Location: Left arm, Patient Position: Sitting, BP Cuff Size: Adult)   Pulse 98   Resp 16   Ht 1.702 m (5' 7\")   Wt 95.7 kg (211 lb)   SpO2 96%     Physical Exam:  Physical Exam  Constitutional:       General: She is not in acute distress.     Appearance: Normal appearance. She is not ill-appearing, toxic-appearing or diaphoretic.   Cardiovascular:      Rate and Rhythm: Normal rate and regular rhythm.      Heart sounds: No murmur heard.     No friction rub. No gallop.   Pulmonary:      Effort: No respiratory distress.      Breath sounds: Normal breath sounds. No stridor. No wheezing, rhonchi or rales.   Musculoskeletal:         General: No swelling.      Right lower leg: No edema.      Left lower leg: No edema.   Skin:     General: Skin is warm.   Neurological:      General: No focal deficit present.      Mental Status: She is alert and oriented to person, place, and time.   Psychiatric:         Mood and Affect: Mood normal.         Behavior: Behavior normal.         Thought Content: Thought content normal.         Judgment: " Judgment normal.         Laboratory Data:  PFTs: (Date: 2023)-      Impression:  There is no significant obstructive ventilatory defect on spirometry.  There is no significant response to bronchodilators.  This does not rule out transient obstructive airway disorders such as a reported diagnosis of asthma.    Lung volumes are preserved.    Diffusion capacity is supranormal, which is likely normal physiologic variant during the postpartum period.    Flow volume loop is consistent with the above interpretation.    No prior PFTs for comparison.     CTA Chest: (Date: 2023)-  Impression:  1.  No large central pulmonary embolus is appreciated, evaluation of the subsegmental branches is essentially nondiagnostic due to motion artifacts. Additional imaging would be required for definitive exclusion of small distal pulmonary emboli.  2.  Left breast mass, recommend referral for mammography and additional workup.      Assessment and Plan:    Problem List Items Addressed This Visit       Mild persistent asthma without complication     PFTs in  show an FVC of 4.8 L or 102%, FEV1 3.76 L or 112%, FEV1/FVC 90%, RV 84%, %, DLCO 138% predicted, without positive bronchodilator response.  Patient was hospitalized in May 2023 for dyspnea and hypoxia at the end of her pregnancy which was treated with nebulizers, steroids, and supplemental oxygen.  Patient underwent  on .  IgE 13.  Eosinophils 0.05.   Patient is currently on Symbicort 160, Singulair, and albuterol as needed, which is rarely.  Symptomatically, patient states that her symptoms have been stable since discharge.  -- Continue Symbicort 160 and Singulair  -- Albuterol as needed  -- Will draw an allergy panel and if elevated, will send the patient to an allergist for evaluation  -- Advised patient to increase exercise as tolerated  -- Advised patient to remain vaccine against influenza         Relevant Orders    ALLERGENS ZONE 15      Diagnostic studies have been reviewed with the patient.    Return in about 2 months (around 10/11/2023), or if symptoms worsen or fail to improve, for ASTHMA with allergy testing, with Carrie.     This note was generated using voice recognition software which has a chance of producing errors of grammar and possibly content.  I have made every reasonable attempt to find and correct any obvious errors, but it should be expected that some may not be found prior to finalization of this note.    Time spent in record review prior to patient arrival, reviewing results, and in face-to-face encounter totaled 24 min.  __________  YEFRI Domingo  Pulmonary Medicine  [facility]

## 2023-08-04 PROCEDURE — RXMED WILLOW AMBULATORY MEDICATION CHARGE: Performed by: STUDENT IN AN ORGANIZED HEALTH CARE EDUCATION/TRAINING PROGRAM

## 2023-08-04 RX ORDER — LEVOTHYROXINE SODIUM 0.15 MG/1
150 TABLET ORAL
Qty: 30 TABLET | Refills: 1 | Status: SHIPPED | OUTPATIENT
Start: 2023-08-04 | End: 2023-10-25 | Stop reason: SDUPTHER

## 2023-08-07 ENCOUNTER — HOSPITAL ENCOUNTER (OUTPATIENT)
Dept: LAB | Facility: MEDICAL CENTER | Age: 38
End: 2023-08-07
Attending: STUDENT IN AN ORGANIZED HEALTH CARE EDUCATION/TRAINING PROGRAM
Payer: COMMERCIAL

## 2023-08-07 DIAGNOSIS — J45.30 MILD PERSISTENT ASTHMA WITHOUT COMPLICATION: ICD-10-CM

## 2023-08-07 DIAGNOSIS — E03.8 HYPOTHYROIDISM DUE TO HASHIMOTO'S THYROIDITIS: ICD-10-CM

## 2023-08-07 DIAGNOSIS — E06.3 HYPOTHYROIDISM DUE TO HASHIMOTO'S THYROIDITIS: ICD-10-CM

## 2023-08-07 LAB
BASOPHILS # BLD AUTO: 0.3 % (ref 0–1.8)
BASOPHILS # BLD: 0.02 K/UL (ref 0–0.12)
EOSINOPHIL # BLD AUTO: 0.14 K/UL (ref 0–0.51)
EOSINOPHIL NFR BLD: 2.3 % (ref 0–6.9)
ERYTHROCYTE [DISTWIDTH] IN BLOOD BY AUTOMATED COUNT: 44.7 FL (ref 35.9–50)
HCT VFR BLD AUTO: 41.1 % (ref 37–47)
HGB BLD-MCNC: 12.8 G/DL (ref 12–16)
IMM GRANULOCYTES # BLD AUTO: 0.01 K/UL (ref 0–0.11)
IMM GRANULOCYTES NFR BLD AUTO: 0.2 % (ref 0–0.9)
LYMPHOCYTES # BLD AUTO: 1.69 K/UL (ref 1–4.8)
LYMPHOCYTES NFR BLD: 28.2 % (ref 22–41)
MCH RBC QN AUTO: 26.3 PG (ref 27–33)
MCHC RBC AUTO-ENTMCNC: 31.1 G/DL (ref 32.2–35.5)
MCV RBC AUTO: 84.4 FL (ref 81.4–97.8)
MONOCYTES # BLD AUTO: 0.29 K/UL (ref 0–0.85)
MONOCYTES NFR BLD AUTO: 4.8 % (ref 0–13.4)
NEUTROPHILS # BLD AUTO: 3.84 K/UL (ref 1.82–7.42)
NEUTROPHILS NFR BLD: 64.2 % (ref 44–72)
NRBC # BLD AUTO: 0 K/UL
NRBC BLD-RTO: 0 /100 WBC (ref 0–0.2)
PLATELET # BLD AUTO: 167 K/UL (ref 164–446)
PMV BLD AUTO: 11.8 FL (ref 9–12.9)
RBC # BLD AUTO: 4.87 M/UL (ref 4.2–5.4)
TSH SERPL DL<=0.005 MIU/L-ACNC: 0.84 UIU/ML (ref 0.38–5.33)
WBC # BLD AUTO: 6 K/UL (ref 4.8–10.8)

## 2023-08-07 PROCEDURE — 36415 COLL VENOUS BLD VENIPUNCTURE: CPT

## 2023-08-07 PROCEDURE — 85025 COMPLETE CBC W/AUTO DIFF WBC: CPT

## 2023-08-07 PROCEDURE — 84443 ASSAY THYROID STIM HORMONE: CPT

## 2023-08-07 PROCEDURE — 82785 ASSAY OF IGE: CPT

## 2023-08-10 ENCOUNTER — PHARMACY VISIT (OUTPATIENT)
Dept: PHARMACY | Facility: MEDICAL CENTER | Age: 38
End: 2023-08-10
Payer: COMMERCIAL

## 2023-08-10 LAB — IGE SERPL-ACNC: 13 KU/L

## 2023-08-11 ENCOUNTER — OFFICE VISIT (OUTPATIENT)
Dept: SLEEP MEDICINE | Facility: MEDICAL CENTER | Age: 38
End: 2023-08-11
Payer: COMMERCIAL

## 2023-08-11 VITALS
RESPIRATION RATE: 16 BRPM | BODY MASS INDEX: 33.12 KG/M2 | DIASTOLIC BLOOD PRESSURE: 82 MMHG | HEART RATE: 98 BPM | SYSTOLIC BLOOD PRESSURE: 124 MMHG | WEIGHT: 211 LBS | OXYGEN SATURATION: 96 % | HEIGHT: 67 IN

## 2023-08-11 DIAGNOSIS — J45.30 MILD PERSISTENT ASTHMA WITHOUT COMPLICATION: ICD-10-CM

## 2023-08-11 PROCEDURE — 99212 OFFICE O/P EST SF 10 MIN: CPT

## 2023-08-11 PROCEDURE — 99213 OFFICE O/P EST LOW 20 MIN: CPT

## 2023-08-11 PROCEDURE — 3074F SYST BP LT 130 MM HG: CPT

## 2023-08-11 PROCEDURE — 3079F DIAST BP 80-89 MM HG: CPT

## 2023-08-11 ASSESSMENT — FIBROSIS 4 INDEX: FIB4 SCORE: 1.06

## 2023-08-11 ASSESSMENT — ENCOUNTER SYMPTOMS
MYALGIAS: 0
COUGH: 0
SHORTNESS OF BREATH: 0
DIARRHEA: 0
NAUSEA: 0
DIZZINESS: 0
WHEEZING: 0
SPUTUM PRODUCTION: 0
PALPITATIONS: 0
FALLS: 0
DIAPHORESIS: 0
CHILLS: 0
HEMOPTYSIS: 0
HEADACHES: 0
FEVER: 0
WEAKNESS: 0
SINUS PAIN: 0
HEARTBURN: 0
VOMITING: 0

## 2023-08-11 NOTE — ASSESSMENT & PLAN NOTE
PFTs in  show an FVC of 4.8 L or 102%, FEV1 3.76 L or 112%, FEV1/FVC 90%, RV 84%, %, DLCO 138% predicted, without positive bronchodilator response.  Patient was hospitalized in May 2023 for dyspnea and hypoxia at the end of her pregnancy which was treated with nebulizers, steroids, and supplemental oxygen.  Patient underwent  on .  IgE 13.  Eosinophils 0.05.   Patient is currently on Symbicort 160, Singulair, and albuterol as needed, which is rarely.  Symptomatically, patient states that her symptoms have been stable since discharge.  -- Continue Symbicort 160 and Singulair  -- Albuterol as needed  -- Will draw an allergy panel and if elevated, will send the patient to an allergist for evaluation  -- Advised patient to increase exercise as tolerated  -- Advised patient to remain vaccine against influenza

## 2023-08-25 PROCEDURE — RXMED WILLOW AMBULATORY MEDICATION CHARGE: Performed by: OBSTETRICS & GYNECOLOGY

## 2023-08-28 ENCOUNTER — PHARMACY VISIT (OUTPATIENT)
Dept: PHARMACY | Facility: MEDICAL CENTER | Age: 38
End: 2023-08-28
Payer: COMMERCIAL

## 2023-09-20 PROCEDURE — RXMED WILLOW AMBULATORY MEDICATION CHARGE: Performed by: OBSTETRICS & GYNECOLOGY

## 2023-09-20 PROCEDURE — RXMED WILLOW AMBULATORY MEDICATION CHARGE: Performed by: STUDENT IN AN ORGANIZED HEALTH CARE EDUCATION/TRAINING PROGRAM

## 2023-09-26 ENCOUNTER — IMMUNIZATION (OUTPATIENT)
Dept: OCCUPATIONAL MEDICINE | Facility: CLINIC | Age: 38
End: 2023-09-26

## 2023-09-26 DIAGNOSIS — Z23 NEED FOR VACCINATION: Primary | ICD-10-CM

## 2023-09-26 PROCEDURE — 90686 IIV4 VACC NO PRSV 0.5 ML IM: CPT | Performed by: PREVENTIVE MEDICINE

## 2023-10-02 ENCOUNTER — PHARMACY VISIT (OUTPATIENT)
Dept: PHARMACY | Facility: MEDICAL CENTER | Age: 38
End: 2023-10-02
Payer: COMMERCIAL

## 2023-10-10 PROCEDURE — RXMED WILLOW AMBULATORY MEDICATION CHARGE: Performed by: OBSTETRICS & GYNECOLOGY

## 2023-10-11 ENCOUNTER — APPOINTMENT (OUTPATIENT)
Dept: SLEEP MEDICINE | Facility: MEDICAL CENTER | Age: 38
End: 2023-10-11
Payer: COMMERCIAL

## 2023-10-22 ENCOUNTER — PHARMACY VISIT (OUTPATIENT)
Dept: PHARMACY | Facility: MEDICAL CENTER | Age: 38
End: 2023-10-22
Payer: COMMERCIAL

## 2023-10-25 DIAGNOSIS — E03.8 HYPOTHYROIDISM DUE TO HASHIMOTO'S THYROIDITIS: ICD-10-CM

## 2023-10-25 DIAGNOSIS — E06.3 HYPOTHYROIDISM DUE TO HASHIMOTO'S THYROIDITIS: ICD-10-CM

## 2023-10-26 RX ORDER — LEVOTHYROXINE SODIUM 0.15 MG/1
150 TABLET ORAL
Qty: 30 TABLET | Refills: 1 | Status: SHIPPED | OUTPATIENT
Start: 2023-10-26 | End: 2024-01-12 | Stop reason: SDUPTHER

## 2023-11-22 ENCOUNTER — PHARMACY VISIT (OUTPATIENT)
Dept: PHARMACY | Facility: MEDICAL CENTER | Age: 38
End: 2023-11-22
Payer: COMMERCIAL

## 2023-11-22 DIAGNOSIS — J45.51 SEVERE PERSISTENT ASTHMA WITH ACUTE EXACERBATION: ICD-10-CM

## 2023-11-22 PROCEDURE — RXMED WILLOW AMBULATORY MEDICATION CHARGE: Performed by: FAMILY MEDICINE

## 2023-11-27 RX ORDER — MONTELUKAST SODIUM 10 MG/1
10 TABLET ORAL DAILY
Qty: 30 TABLET | Refills: 3 | Status: SHIPPED | OUTPATIENT
Start: 2023-11-27 | End: 2024-01-04 | Stop reason: SDUPTHER

## 2023-11-30 PROCEDURE — RXMED WILLOW AMBULATORY MEDICATION CHARGE: Performed by: STUDENT IN AN ORGANIZED HEALTH CARE EDUCATION/TRAINING PROGRAM

## 2023-12-04 ENCOUNTER — PHARMACY VISIT (OUTPATIENT)
Dept: PHARMACY | Facility: MEDICAL CENTER | Age: 38
End: 2023-12-04
Payer: COMMERCIAL

## 2023-12-04 PROCEDURE — RXOTC WILLOW AMBULATORY OTC CHARGE

## 2023-12-28 PROCEDURE — RXMED WILLOW AMBULATORY MEDICATION CHARGE: Performed by: OBSTETRICS & GYNECOLOGY

## 2023-12-28 PROCEDURE — RXMED WILLOW AMBULATORY MEDICATION CHARGE: Performed by: STUDENT IN AN ORGANIZED HEALTH CARE EDUCATION/TRAINING PROGRAM

## 2023-12-31 ENCOUNTER — PHARMACY VISIT (OUTPATIENT)
Dept: PHARMACY | Facility: MEDICAL CENTER | Age: 38
End: 2023-12-31
Payer: COMMERCIAL

## 2023-12-31 PROCEDURE — RXMED WILLOW AMBULATORY MEDICATION CHARGE: Performed by: FAMILY MEDICINE

## 2024-01-02 ENCOUNTER — HOSPITAL ENCOUNTER (OUTPATIENT)
Dept: LAB | Facility: MEDICAL CENTER | Age: 39
End: 2024-01-02
Payer: COMMERCIAL

## 2024-01-02 DIAGNOSIS — J45.30 MILD PERSISTENT ASTHMA WITHOUT COMPLICATION: ICD-10-CM

## 2024-01-02 PROCEDURE — 82785 ASSAY OF IGE: CPT

## 2024-01-02 PROCEDURE — 86003 ALLG SPEC IGE CRUDE XTRC EA: CPT | Mod: 91

## 2024-01-02 PROCEDURE — 36415 COLL VENOUS BLD VENIPUNCTURE: CPT

## 2024-01-04 ENCOUNTER — OFFICE VISIT (OUTPATIENT)
Dept: SLEEP MEDICINE | Facility: MEDICAL CENTER | Age: 39
End: 2024-01-04
Attending: NURSE PRACTITIONER
Payer: COMMERCIAL

## 2024-01-04 VITALS
HEART RATE: 86 BPM | RESPIRATION RATE: 14 BRPM | SYSTOLIC BLOOD PRESSURE: 106 MMHG | WEIGHT: 225.6 LBS | OXYGEN SATURATION: 98 % | DIASTOLIC BLOOD PRESSURE: 62 MMHG | BODY MASS INDEX: 35.41 KG/M2 | HEIGHT: 67 IN

## 2024-01-04 DIAGNOSIS — E06.3 HYPOTHYROIDISM DUE TO HASHIMOTO'S THYROIDITIS: ICD-10-CM

## 2024-01-04 DIAGNOSIS — E03.8 HYPOTHYROIDISM DUE TO HASHIMOTO'S THYROIDITIS: ICD-10-CM

## 2024-01-04 DIAGNOSIS — J45.30 MILD PERSISTENT ASTHMA WITHOUT COMPLICATION: ICD-10-CM

## 2024-01-04 DIAGNOSIS — J45.51 SEVERE PERSISTENT ASTHMA WITH ACUTE EXACERBATION: ICD-10-CM

## 2024-01-04 LAB
A ALTERNATA IGE QN: <0.1 KU/L
A FUMIGATUS IGE QN: <0.1 KU/L
BERMUDA GRASS IGE QN: <0.1 KU/L
BOXELDER IGE QN: <0.1 KU/L
C SPHAEROSPERMUM IGE QN: <0.1 KU/L
CAT DANDER IGE QN: <0.1 KU/L
CMN PIGWEED IGE QN: <0.1 KU/L
COMMON RAGWEED IGE QN: <0.1 KU/L
COTTONWOOD IGE QN: <0.1 KU/L
D FARINAE IGE QN: <0.1 KU/L
D PTERONYSS IGE QN: <0.1 KU/L
DEPRECATED MISC ALLERGEN IGE RAST QL: NORMAL
DOG DANDER IGE QN: <0.1 KU/L
IGE SERPL-ACNC: 13 KU/L
M RACEMOSUS IGE QN: <0.1 KU/L
MOUSE EPITH IGE QN: 0.16 KU/L
MT JUNIPER IGE QN: <0.1 KU/L
MUGWORT IGE QN: <0.1 KU/L
OLIVE POLN IGE QN: <0.1 KU/L
P NOTATUM IGE QN: <0.1 KU/L
ROACH IGE QN: <0.1 KU/L
SALTWORT IGE QN: <0.1 KU/L
TIMOTHY IGE QN: <0.1 KU/L
WHITE ELM IGE QN: <0.1 KU/L
WHITE MULBERRY IGE QN: <0.1 KU/L
WHITE OAK IGE QN: <0.1 KU/L

## 2024-01-04 PROCEDURE — 99211 OFF/OP EST MAY X REQ PHY/QHP: CPT | Performed by: NURSE PRACTITIONER

## 2024-01-04 PROCEDURE — 99214 OFFICE O/P EST MOD 30 MIN: CPT | Performed by: NURSE PRACTITIONER

## 2024-01-04 PROCEDURE — RXMED WILLOW AMBULATORY MEDICATION CHARGE: Performed by: NURSE PRACTITIONER

## 2024-01-04 PROCEDURE — 3074F SYST BP LT 130 MM HG: CPT | Performed by: NURSE PRACTITIONER

## 2024-01-04 PROCEDURE — 3078F DIAST BP <80 MM HG: CPT | Performed by: NURSE PRACTITIONER

## 2024-01-04 RX ORDER — BUDESONIDE AND FORMOTEROL FUMARATE DIHYDRATE 160; 4.5 UG/1; UG/1
2 AEROSOL RESPIRATORY (INHALATION) 2 TIMES DAILY
Qty: 10.2 G | Refills: 3 | Status: SHIPPED | OUTPATIENT
Start: 2024-01-04

## 2024-01-04 RX ORDER — MONTELUKAST SODIUM 10 MG/1
10 TABLET ORAL DAILY
Qty: 30 TABLET | Refills: 3 | Status: SHIPPED | OUTPATIENT
Start: 2024-01-04

## 2024-01-04 ASSESSMENT — PATIENT HEALTH QUESTIONNAIRE - PHQ9: CLINICAL INTERPRETATION OF PHQ2 SCORE: 0

## 2024-01-04 ASSESSMENT — FIBROSIS 4 INDEX: FIB4 SCORE: 1.06

## 2024-01-04 NOTE — PROGRESS NOTES
Chief Complaint   Patient presents with    Follow-Up     Last seen 2023 Suzanna Templeton f/v       HPI:  Carolina Medrano is a 38 y.o. year old female here today for follow-up on asthma.  Last seen 2023 by YEFRI Eldridge.  Patient is a never smoker past medical history includes hypothyroidism, IgA deficiency, and asthma. PFTs in  show an FVC of 4.8 L or 102%, FEV1 3.76 L or 112%, FEV1/FVC 90%, RV 84%, %, DLCO 138% predicted, without positive bronchodilator response.  Patient was hospitalized in May 2023 for dyspnea and hypoxia at the end of her pregnancy which was treated with nebulizers, steroids, and supplemental oxygen.  Patient underwent  on .  Patient was also seen by ENT during her hospitalization was scoped.  It showed thickened secretions on Larynex and hypopharynx with normal vocal cord movement and anatomy and normal tongue base.  It did show posterior glottic changes consistent with reflux.  Patient is currently on Symbicort, Singulair, and albuterol as needed.  IgE 13.  Eosinophils 0.05.      Currently, patient is symptom-free.  She remains on Symbicort 2 puffs twice daily, Singulair, and albuterol as needed.  An allergen panel was drawn 2 days ago, but no results are available at this time.  Denies any exacerbations.  Denies any cough, wheezing, chest tightness, or new or worsening dyspnea or shortness of breath.      ROS: As per HPI and otherwise negative if not stated.    Past Medical History:   Diagnosis Date    Cough     Pain 2017    abd., 1/10    Thyroid disease        Past Surgical History:   Procedure Laterality Date    PRIMARY C SECTION N/A 2023    Procedure: PRIMARY  SECTION;  Surgeon: Sarah Knag M.D.;  Location: SURGERY LABOR AND DELIVERY;  Service: Labor and Delivery    UMBILICAL HERNIA REPAIR  2017    Procedure: UMBILICAL HERNIA REPAIR WITH MESH;  Surgeon: Florencio Tavares M.D.;  Location: SURGERY [facility];  Service:     LIZ BY LAPAROSCOPY  5/1/2013    Performed by Florencio Tavares M.D. at SURGERY [facility]    KNEE ARTHROSCOPY      right knee ( times 4)    TONSILLECTOMY AND ADENOIDECTOMY      as a child    UMBILICAL HERNIA REPAIR         Family History   Problem Relation Age of Onset    Heart Disease Father     Hypertension Father     Alcohol abuse Father     Cancer Paternal Aunt         breast    Cancer Paternal Uncle         colon    Diabetes Maternal Grandfather     Cancer Paternal Grandmother         breast    Autoimmune Disease Paternal Grandmother     Lupus Mother     No Known Problems Brother     Diabetes Maternal Grandmother     Alzheimer's Disease Paternal Grandfather        Allergies as of 01/04/2024 - Reviewed 08/11/2023   Allergen Reaction Noted    Shellfish allergy Rash 10/17/2012    Vancomycin Rash and Anaphylaxis 10/15/2012    Clindamycin hcl Rash 05/06/2015    Iodine Rash 10/15/2012    Penicillins Hives and Rash 10/15/2012        Vitals:  There were no vitals taken for this visit.    Current medications as of today   Current Outpatient Medications   Medication Sig Dispense Refill    montelukast (SINGULAIR) 10 MG Tab Take 1 Tablet by mouth every day. 30 Tablet 3    levothyroxine (SYNTHROID) 150 MCG Tab Take 1 Tablet by mouth every morning on an empty stomach. 30 Tablet 1    budesonide-formoterol (SYMBICORT) 160-4.5 MCG/ACT Aerosol Inhale 2 Puffs by mouth 2 times a day. 10.2 g 3    albuterol 108 (90 Base) MCG/ACT Aero Soln inhalation aerosol Inhale 2 Puffs every 6 hours as needed for Shortness of Breath. 8.5 g 1    omeprazole (PRILOSEC) 10 MG CAPSULE DELAYED RELEASE Take 1 capsule by mouth 2 times a day 60 Capsule 30     No current facility-administered medications for this visit.         Physical Exam:   Gen:           Alert and oriented, No apparent distress. Mood and affect appropriate, normal interaction with examiner.  Eyes:          PERRL, EOM intact, sclere white, conjunctive  moist.  Ears:          Not examined.   Hearing:     Grossly intact.  Nose:          Normal, no lesions or deformities.  Dentition:    Good dentition.  Oropharynx:   Tongue normal, posterior pharynx without erythema or exudate.  Neck:        Supple, trachea midline, no masses.  Respiratory Effort: No intercostal retractions or use of accessory muscles.   Lung Auscultation:      Clear to auscultation bilaterally; no rales, rhonchi or wheezing.  CV:            Regular rate and rhythm. No murmurs, rubs or gallops.  Abd:           Not examined.   Lymphadenopathy: Not examined.  Gait and Station: Normal.  Digits and Nails: No clubbing, cyanosis, petechiae, or nodes.   Cranial Nerves: II-XII grossly intact.  Skin:        No rashes, lesions or ulcers noted.               Ext:           No cyanosis or edema.      Assessment:  1. Mild persistent asthma without complication          Plan:  Stable and well-controlled on current regimen.  Refills placed as per patient request.  Patient advised to follow-up in 1 year, but can be seen sooner if needed.  Patient advised to message in approximately 2 weeks to inquire about allergen panel that is still not resulted.    Please note that this dictation was created using voice recognition software. I have made every reasonable attempt to correct obvious errors, but it is possible there are errors of grammar and possibly content that I did not discover before finalizing the note.

## 2024-01-11 ENCOUNTER — PATIENT MESSAGE (OUTPATIENT)
Dept: OBGYN | Facility: CLINIC | Age: 39
End: 2024-01-11
Payer: COMMERCIAL

## 2024-01-11 DIAGNOSIS — E06.3 HYPOTHYROIDISM DUE TO HASHIMOTO'S THYROIDITIS: ICD-10-CM

## 2024-01-11 DIAGNOSIS — E03.8 HYPOTHYROIDISM DUE TO HASHIMOTO'S THYROIDITIS: ICD-10-CM

## 2024-01-12 RX ORDER — LEVOTHYROXINE SODIUM 0.15 MG/1
150 TABLET ORAL
Qty: 30 TABLET | Refills: 1 | Status: SHIPPED | OUTPATIENT
Start: 2024-01-12 | End: 2024-03-26 | Stop reason: SDUPTHER

## 2024-01-20 ENCOUNTER — PHARMACY VISIT (OUTPATIENT)
Dept: PHARMACY | Facility: MEDICAL CENTER | Age: 39
End: 2024-01-20
Payer: COMMERCIAL

## 2024-01-20 DIAGNOSIS — E03.8 HYPOTHYROIDISM DUE TO HASHIMOTO'S THYROIDITIS: ICD-10-CM

## 2024-01-20 DIAGNOSIS — E06.3 HYPOTHYROIDISM DUE TO HASHIMOTO'S THYROIDITIS: ICD-10-CM

## 2024-01-20 PROCEDURE — RXMED WILLOW AMBULATORY MEDICATION CHARGE: Performed by: STUDENT IN AN ORGANIZED HEALTH CARE EDUCATION/TRAINING PROGRAM

## 2024-01-20 RX ORDER — LEVOTHYROXINE SODIUM 0.15 MG/1
150 TABLET ORAL
Qty: 30 TABLET | Refills: 1 | Status: CANCELLED | OUTPATIENT
Start: 2024-01-20

## 2024-02-13 PROCEDURE — RXMED WILLOW AMBULATORY MEDICATION CHARGE: Performed by: NURSE PRACTITIONER

## 2024-02-13 PROCEDURE — RXMED WILLOW AMBULATORY MEDICATION CHARGE: Performed by: STUDENT IN AN ORGANIZED HEALTH CARE EDUCATION/TRAINING PROGRAM

## 2024-02-13 PROCEDURE — RXMED WILLOW AMBULATORY MEDICATION CHARGE: Performed by: OBSTETRICS & GYNECOLOGY

## 2024-02-15 RX ORDER — LIDOCAINE AND PRILOCAINE 25; 25 MG/G; MG/G
CREAM TOPICAL ONCE
Status: SHIPPED | OUTPATIENT
Start: 2024-02-15 | End: 2024-02-18

## 2024-02-19 ENCOUNTER — OFFICE VISIT (OUTPATIENT)
Dept: URGENT CARE | Facility: CLINIC | Age: 39
End: 2024-02-19
Payer: COMMERCIAL

## 2024-02-19 ENCOUNTER — PHARMACY VISIT (OUTPATIENT)
Dept: PHARMACY | Facility: MEDICAL CENTER | Age: 39
End: 2024-02-19
Payer: COMMERCIAL

## 2024-02-19 VITALS
DIASTOLIC BLOOD PRESSURE: 78 MMHG | TEMPERATURE: 97.8 F | WEIGHT: 227 LBS | SYSTOLIC BLOOD PRESSURE: 120 MMHG | RESPIRATION RATE: 20 BRPM | HEART RATE: 78 BPM | OXYGEN SATURATION: 100 % | HEIGHT: 67 IN | BODY MASS INDEX: 35.63 KG/M2

## 2024-02-19 DIAGNOSIS — R05.8 SPASMODIC COUGH: ICD-10-CM

## 2024-02-19 DIAGNOSIS — J45.41 MODERATE PERSISTENT ASTHMA WITH ACUTE EXACERBATION: ICD-10-CM

## 2024-02-19 DIAGNOSIS — J22 LRTI (LOWER RESPIRATORY TRACT INFECTION): ICD-10-CM

## 2024-02-19 DIAGNOSIS — J40 BRONCHITIS: ICD-10-CM

## 2024-02-19 PROCEDURE — 99214 OFFICE O/P EST MOD 30 MIN: CPT | Performed by: PHYSICIAN ASSISTANT

## 2024-02-19 PROCEDURE — 3078F DIAST BP <80 MM HG: CPT | Performed by: PHYSICIAN ASSISTANT

## 2024-02-19 PROCEDURE — 3074F SYST BP LT 130 MM HG: CPT | Performed by: PHYSICIAN ASSISTANT

## 2024-02-19 PROCEDURE — RXMED WILLOW AMBULATORY MEDICATION CHARGE: Performed by: PHYSICIAN ASSISTANT

## 2024-02-19 RX ORDER — PREDNISONE 20 MG/1
TABLET ORAL
Qty: 15 TABLET | Refills: 0 | Status: SHIPPED | OUTPATIENT
Start: 2024-02-19 | End: 2024-03-01

## 2024-02-19 RX ORDER — METHYLPREDNISOLONE 4 MG/1
TABLET ORAL
Qty: 21 TABLET | Refills: 0 | Status: SHIPPED | OUTPATIENT
Start: 2024-02-19 | End: 2024-02-19

## 2024-02-19 RX ORDER — DEXTROMETHORPHAN HYDROBROMIDE AND PROMETHAZINE HYDROCHLORIDE 15; 6.25 MG/5ML; MG/5ML
5 SYRUP ORAL EVERY 4 HOURS PRN
Qty: 118 ML | Refills: 0 | Status: SHIPPED | OUTPATIENT
Start: 2024-02-19

## 2024-02-19 RX ORDER — AZITHROMYCIN 250 MG/1
TABLET, FILM COATED ORAL
Qty: 6 TABLET | Refills: 0 | Status: SHIPPED | OUTPATIENT
Start: 2024-02-19

## 2024-02-19 ASSESSMENT — ENCOUNTER SYMPTOMS: COUGH: 1

## 2024-02-19 ASSESSMENT — FIBROSIS 4 INDEX: FIB4 SCORE: 1.06

## 2024-02-19 NOTE — PROGRESS NOTES
Subjective:   Carolina Medrano is a 38 y.o. female who presents for Sinusitis (4 weeks ) and Cough (4 weeks)     This is a pleasant 38-year-old female who presents with One month h/o sinus symptoms.  H/o asthma exacerbation.  Inhalers not helping.  Is feeling chest tightness.  Albuterol not really helping.  Using symbicort and albuterol.  Ear congestion, rhinorrhea.  No headaches.  No f/c/m.  Coughing fits hard to stop.  Difficult to clear ears.  Getting progressively.      Sinusitis  Associated symptoms include coughing.   Cough          Review of Systems   Respiratory:  Positive for cough.        Medications:  albuterol Aers  budesonide-formoterol Aero  levothyroxine Tabs  montelukast Tabs  omeprazole Cpdr    Allergies:             Shellfish allergy, Vancomycin, Clindamycin hcl, Iodine, and Penicillins    Surgical History:         Past Surgical History:   Procedure Laterality Date    PRIMARY C SECTION N/A 2023    Procedure: PRIMARY  SECTION;  Surgeon: Sarah Kang M.D.;  Location: SURGERY LABOR AND DELIVERY;  Service: Labor and Delivery    UMBILICAL HERNIA REPAIR  2017    Procedure: UMBILICAL HERNIA REPAIR WITH MESH;  Surgeon: Florencio Tavares M.D.;  Location: SURGERY [facility];  Service:     LIZ BY LAPAROSCOPY  2013    Performed by Florencio Tavares M.D. at SURGERY [facility]    KNEE ARTHROSCOPY      right knee ( times 4)    TONSILLECTOMY AND ADENOIDECTOMY      as a child    UMBILICAL HERNIA REPAIR         Past Social Hx:  Carolina Medrano  reports that she has never smoked. She has never been exposed to tobacco smoke. She has never used smokeless tobacco. She reports that she does not currently use alcohol. She reports that she does not use drugs.     Past Family Hx:   Carolina Medrano family history includes Alcohol abuse in her father; Alzheimer's Disease in her paternal grandfather; Autoimmune Disease in her paternal grandmother; Cancer in  "her paternal aunt, paternal grandmother, and paternal uncle; Diabetes in her maternal grandfather and maternal grandmother; Heart Disease in her father; Hypertension in her father; Lupus in her mother; No Known Problems in her brother.       Problem list, medications, and allergies reviewed by myself today in Epic.     Objective:     /78   Pulse 78   Temp 36.6 °C (97.8 °F) (Temporal)   Resp 20   Ht 1.702 m (5' 7\")   Wt 103 kg (227 lb)   SpO2 100%   BMI 35.55 kg/m²     Physical Exam  Vitals and nursing note reviewed.   Constitutional:       General: She is not in acute distress.     Appearance: She is well-developed. She is not ill-appearing or diaphoretic.   HENT:      Head: Normocephalic.      Right Ear: Tympanic membrane, ear canal and external ear normal.      Left Ear: Tympanic membrane, ear canal and external ear normal.      Nose: Mucosal edema and rhinorrhea present.      Mouth/Throat:      Pharynx: Posterior oropharyngeal erythema present.   Eyes:      General:         Right eye: No discharge.         Left eye: No discharge.      Conjunctiva/sclera: Conjunctivae normal.      Pupils: Pupils are equal, round, and reactive to light.   Cardiovascular:      Rate and Rhythm: Normal rate and regular rhythm.      Pulses: Normal pulses.      Heart sounds: Normal heart sounds. No murmur heard.     No friction rub.   Pulmonary:      Effort: Pulmonary effort is normal. No accessory muscle usage or respiratory distress.      Breath sounds: No stridor. No wheezing, rhonchi or rales.      Comments: Intermittent rhonchi that clear with cough.  Spasmodic cough throughout examination.  Mild expiratory wheeze.  Abdominal:      Palpations: Abdomen is soft.   Musculoskeletal:         General: Normal range of motion.      Cervical back: Normal range of motion.      Right lower leg: No edema.      Left lower leg: No edema.   Lymphadenopathy:      Cervical: No cervical adenopathy.   Skin:     General: Skin is warm and " dry.   Neurological:      Mental Status: She is alert and oriented to person, place, and time.         Assessment/Plan:     Diagnosis and Associated Orders:     1. Bronchitis  - promethazine-dextromethorphan (PROMETHAZINE-DM) 6.25-15 MG/5ML syrup; Take 5 mL by mouth every four hours as needed for Cough.  Dispense: 118 mL; Refill: 0  - predniSONE (DELTASONE) 20 MG Tab; Take 2 Tablets by mouth every morning with breakfast for 3 days, THEN 1.5 Tablets every morning with breakfast for 3 days, THEN 1 Tablet every morning with breakfast for 3 days, THEN 0.5 Tablets every morning with breakfast for 2 days.  Dispense: 15 Tablet; Refill: 0    2. Moderate persistent asthma with acute exacerbation  - promethazine-dextromethorphan (PROMETHAZINE-DM) 6.25-15 MG/5ML syrup; Take 5 mL by mouth every four hours as needed for Cough.  Dispense: 118 mL; Refill: 0  - predniSONE (DELTASONE) 20 MG Tab; Take 2 Tablets by mouth every morning with breakfast for 3 days, THEN 1.5 Tablets every morning with breakfast for 3 days, THEN 1 Tablet every morning with breakfast for 3 days, THEN 0.5 Tablets every morning with breakfast for 2 days.  Dispense: 15 Tablet; Refill: 0    3. LRTI (lower respiratory tract infection)  - azithromycin (ZITHROMAX) 250 MG Tab; Take 2 tablets by mouth on day one. Take one tablet by mouth daily on the remaining days until gone  Dispense: 6 Tablet; Refill: 0    4. Spasmodic cough        Comments/MDM:  Patient with history of chronic asthma with acute exacerbation.  Continue inhalers.  Patient presents with symptoms consistent with bronchitis and chronic history of asthma with exacerbation.  Based on duration of symptoms with associated constitutional symptoms will cover for bacterial bronchitis.  Vital signs stable and reassuring today in the clinic today do not suspect bacterial pneumonia.  Vital signs stable and reassuring.  Lungs clear to auscultation with rhonchi that clear with cough and intermittent wheeze.   -Oral hydration and rest.   - advised pt that typical course may last 1-3 weeks  - advised to trial hot tea, honey for cough relief  -albuterol inhaler if prescribed  -Oral steroids prescribed, adverse effects of steroids discussed  -Over the counter expectorant as directed; Guaifenesin (Mucinex).  - advised patient to seek medical attention if he/she develops new symptoms such as new-onset fever, difficulty breathing, shortness of breath, chest pain, elevated heart rate, symptoms lasting >3 weeks, or bloody sputum    I personally reviewed prior external notes and test results pertinent to today's visit. Supportive care, natural history, differential diagnoses, and indications for immediate follow-up discussed. Return to clinic or go to ED if symptoms worsen or persist.  Red flag symptoms discussed.  Patient/Parent/Guardian voices understanding. Follow-up with your primary care provider in 3-5 days.  All side effects of medication discussed including allergic response, GI upset, tendon injury, rash, sedation etc    Please note that this dictation was created using voice recognition software. I have made a reasonable attempt to correct obvious errors, but I expect that there are errors of grammar and possibly content that I did not discover before finalizing the note.    This note was electronically signed by Carley Morton PA-C

## 2024-02-20 PROCEDURE — RXMED WILLOW AMBULATORY MEDICATION CHARGE: Performed by: OBSTETRICS & GYNECOLOGY

## 2024-02-20 RX ORDER — LIDOCAINE AND PRILOCAINE 25; 25 MG/G; MG/G
1 CREAM TOPICAL ONCE
Qty: 30 G | Refills: 0 | Status: SHIPPED | OUTPATIENT
Start: 2024-02-20 | End: 2024-03-07

## 2024-03-06 ENCOUNTER — PHARMACY VISIT (OUTPATIENT)
Dept: PHARMACY | Facility: MEDICAL CENTER | Age: 39
End: 2024-03-06
Payer: COMMERCIAL

## 2024-03-06 PROCEDURE — RXMED WILLOW AMBULATORY MEDICATION CHARGE: Performed by: OBSTETRICS & GYNECOLOGY

## 2024-03-06 RX ORDER — CEPHALEXIN 500 MG/1
CAPSULE ORAL
Qty: 28 CAPSULE | Refills: 0 | OUTPATIENT
Start: 2024-03-06

## 2024-03-26 DIAGNOSIS — E06.3 HYPOTHYROIDISM DUE TO HASHIMOTO'S THYROIDITIS: ICD-10-CM

## 2024-03-26 DIAGNOSIS — E03.8 HYPOTHYROIDISM DUE TO HASHIMOTO'S THYROIDITIS: ICD-10-CM

## 2024-03-26 PROCEDURE — RXMED WILLOW AMBULATORY MEDICATION CHARGE: Performed by: NURSE PRACTITIONER

## 2024-03-27 RX ORDER — LEVOTHYROXINE SODIUM 0.15 MG/1
150 TABLET ORAL
Qty: 90 TABLET | Refills: 1 | Status: SHIPPED | OUTPATIENT
Start: 2024-03-27

## 2024-03-31 PROCEDURE — RXMED WILLOW AMBULATORY MEDICATION CHARGE: Performed by: STUDENT IN AN ORGANIZED HEALTH CARE EDUCATION/TRAINING PROGRAM

## 2024-04-04 ENCOUNTER — PHARMACY VISIT (OUTPATIENT)
Dept: PHARMACY | Facility: MEDICAL CENTER | Age: 39
End: 2024-04-04
Payer: COMMERCIAL

## 2024-04-04 PROCEDURE — RXOTC WILLOW AMBULATORY OTC CHARGE

## 2024-06-06 ENCOUNTER — HOSPITAL ENCOUNTER (OUTPATIENT)
Dept: LAB | Facility: MEDICAL CENTER | Age: 39
End: 2024-06-06
Attending: STUDENT IN AN ORGANIZED HEALTH CARE EDUCATION/TRAINING PROGRAM
Payer: COMMERCIAL

## 2024-06-06 DIAGNOSIS — E06.3 HYPOTHYROIDISM DUE TO HASHIMOTO'S THYROIDITIS: ICD-10-CM

## 2024-06-06 DIAGNOSIS — E03.8 HYPOTHYROIDISM DUE TO HASHIMOTO'S THYROIDITIS: ICD-10-CM

## 2024-06-06 LAB — TSH SERPL DL<=0.005 MIU/L-ACNC: 2.66 UIU/ML (ref 0.38–5.33)

## 2024-06-06 PROCEDURE — 36415 COLL VENOUS BLD VENIPUNCTURE: CPT

## 2024-06-06 PROCEDURE — 84443 ASSAY THYROID STIM HORMONE: CPT

## 2024-06-07 DIAGNOSIS — E03.8 HYPOTHYROIDISM DUE TO HASHIMOTO'S THYROIDITIS: ICD-10-CM

## 2024-06-07 DIAGNOSIS — E06.3 HYPOTHYROIDISM DUE TO HASHIMOTO'S THYROIDITIS: ICD-10-CM

## 2024-06-07 PROCEDURE — RXMED WILLOW AMBULATORY MEDICATION CHARGE: Performed by: STUDENT IN AN ORGANIZED HEALTH CARE EDUCATION/TRAINING PROGRAM

## 2024-06-07 RX ORDER — LEVOTHYROXINE SODIUM 175 UG/1
175 TABLET ORAL
Qty: 30 TABLET | Refills: 1 | Status: SHIPPED | OUTPATIENT
Start: 2024-06-07

## 2024-06-09 ENCOUNTER — PHARMACY VISIT (OUTPATIENT)
Dept: PHARMACY | Facility: MEDICAL CENTER | Age: 39
End: 2024-06-09
Payer: COMMERCIAL

## 2024-06-27 ENCOUNTER — OFFICE VISIT (OUTPATIENT)
Dept: MEDICAL GROUP | Facility: CLINIC | Age: 39
End: 2024-06-27
Payer: COMMERCIAL

## 2024-06-27 VITALS
OXYGEN SATURATION: 98 % | BODY MASS INDEX: 35.79 KG/M2 | HEART RATE: 80 BPM | SYSTOLIC BLOOD PRESSURE: 114 MMHG | TEMPERATURE: 98.3 F | DIASTOLIC BLOOD PRESSURE: 68 MMHG | WEIGHT: 228 LBS | HEIGHT: 67 IN

## 2024-06-27 DIAGNOSIS — Z31.9 ENCOUNTER FOR INFERTILITY: ICD-10-CM

## 2024-06-27 DIAGNOSIS — Z23 NEED FOR VACCINATION: ICD-10-CM

## 2024-06-27 DIAGNOSIS — J45.30 MILD PERSISTENT ASTHMA WITHOUT COMPLICATION: ICD-10-CM

## 2024-06-27 DIAGNOSIS — Z83.3 FAMILY HISTORY OF DIABETES MELLITUS (DM): ICD-10-CM

## 2024-06-27 DIAGNOSIS — E03.8 HYPOTHYROIDISM DUE TO HASHIMOTO'S THYROIDITIS: ICD-10-CM

## 2024-06-27 DIAGNOSIS — E06.3 HYPOTHYROIDISM DUE TO HASHIMOTO'S THYROIDITIS: ICD-10-CM

## 2024-06-27 PROCEDURE — RXMED WILLOW AMBULATORY MEDICATION CHARGE: Performed by: NURSE PRACTITIONER

## 2024-06-27 ASSESSMENT — FIBROSIS 4 INDEX: FIB4 SCORE: 1.09

## 2024-06-28 PROBLEM — Z34.90 PREGNANCY: Status: RESOLVED | Noted: 2022-11-10 | Resolved: 2024-06-28

## 2024-06-28 ASSESSMENT — ENCOUNTER SYMPTOMS
DEPRESSION: 0
FOCAL WEAKNESS: 0
EYE REDNESS: 0
VOMITING: 0
NAUSEA: 0
ABDOMINAL PAIN: 0
WEIGHT LOSS: 0
SENSORY CHANGE: 0
SPEECH CHANGE: 0
COUGH: 0
SHORTNESS OF BREATH: 0
FEVER: 0

## 2024-06-28 NOTE — ASSESSMENT & PLAN NOTE
- Continue behavioral strategies such as regular physical activity, choosing nutritious foods and avoiding highly processed foods, and stress management  -Trial of tirzepatide, start 2.5 mg SQ once weekly.  May increase to 5 mg weekly after 4 weeks.  -Patient counseled that this medication should not be continued if pregnancy is achieved.  Additionally, we had a detailed discussion about the fact that we do not have much evidence regarding safety in very early pregnancy with these medications.  After discussion, the patient verbalizes understanding and desires trial of this medication.

## 2024-06-28 NOTE — ASSESSMENT & PLAN NOTE
- Levothyroxine dose recently increased due to TSH greater than 2.5; repeat TSH in a few weeks to assess effect of current dose  -Patient interested in weight loss to promote fertility.  Plan to start tirzepatide and patient counseled that this should not be continued if pregnancy is achieved  -Offered patient further laboratory evaluation to assess for ovarian reserve, ovulatory cycles, and male factor infertility.  Patient would like to continue optimizing treatment of hypothyroidism and achieve weight loss before pursuing the studies.

## 2024-06-28 NOTE — PROGRESS NOTES
Subjective:     CC: infertility evaluation, hypothyroidism    HPI:   Carolina presents today with    Problem   Bmi 35.0-35.9,Adult    Patient reports that she is about the same weight as she was at the end of her pregnancy, delivery about 1 year ago.  She reports significant difficulty with weight loss in the past year.  She has been trying to incorporate more regular and structured physical activity and to focus on nutritious food choices.  She recently weaned from breast-feeding.  Patient is interested in pharmacotherapy to aid with weight loss with hopes of promoting fertility.   No personal or family history of thyroid cancers.       Encounter for Infertility    Patient and her partner have been trying to conceive for about 7 months, since 2023   , current partner is same father of first child  Regular menses Q27-28 dd, rarely with longer cycle up to 33 days  No significant premenstrual symptoms  Weaned off breastfeeding about 6 weeks ago  Patient with history of hypothyroidism and we are currently optimizing TSH for the preconception period.     Mild Persistent Asthma Without Complication    Has not had pneumococcal vaccination as adult     Hypothyroidism Due to Hashimoto's Thyroiditis    24:  Most recent TSH 2.66.  As patient is trying to conceive, her levothyroxine dose was increased to 175 mcg daily.    Historical:  Diagnosed at age 17  Previously managed by Endocrinology, but has been managed by prior PCP due to long duration of stable medication doses.       Pregnancy (Resolved)    7 weeks pregnant  Has appt to establish with OB soon  +N/V, taking pyridoxine and doxylamine for sx           Health Maintenance:   PCV-20 given today    ROS:  Review of Systems   Constitutional:  Negative for fever and weight loss.   Eyes:  Negative for redness.   Respiratory:  Negative for cough and shortness of breath.    Gastrointestinal:  Negative for abdominal pain, nausea and vomiting.   Skin:   "Negative for rash.   Neurological:  Negative for sensory change, speech change and focal weakness.   Psychiatric/Behavioral:  Negative for depression.        Objective:     Exam:  /68 (BP Location: Right arm, Patient Position: Sitting, BP Cuff Size: Large adult)   Pulse 80   Temp 36.8 °C (98.3 °F) (Temporal)   Ht 1.702 m (5' 7\")   Wt 103 kg (228 lb)   SpO2 98%   BMI 35.71 kg/m²  Body mass index is 35.71 kg/m².    Physical Exam  Vitals reviewed.   Constitutional:       General: She is not in acute distress.     Appearance: Normal appearance. She is not ill-appearing.   HENT:      Head: Normocephalic and atraumatic.      Right Ear: External ear normal.      Left Ear: External ear normal.   Eyes:      Conjunctiva/sclera: Conjunctivae normal.   Cardiovascular:      Rate and Rhythm: Normal rate and regular rhythm.      Pulses: Normal pulses.      Heart sounds: Normal heart sounds. No murmur heard.  Pulmonary:      Effort: Pulmonary effort is normal. No respiratory distress.      Breath sounds: Normal breath sounds. No wheezing, rhonchi or rales.   Musculoskeletal:         General: No swelling or deformity.   Skin:     General: Skin is warm and dry.   Neurological:      General: No focal deficit present.      Mental Status: She is alert.      Motor: No weakness.      Gait: Gait normal.   Psychiatric:         Mood and Affect: Mood normal.         Behavior: Behavior normal.         Thought Content: Thought content normal.         Judgment: Judgment normal.           Assessment & Plan:     39 y.o. female with the following -     Problem List Items Addressed This Visit       Hypothyroidism due to Hashimoto's thyroiditis     - Repeat TSH in 2 to 3 weeks, which will allow approximately 6 weeks since dose adjustment         Mild persistent asthma without complication     PCV 20 recommended and administered in clinic today         Encounter for infertility     - Levothyroxine dose recently increased due to TSH greater " than 2.5; repeat TSH in a few weeks to assess effect of current dose  -Patient interested in weight loss to promote fertility.  Plan to start tirzepatide and patient counseled that this should not be continued if pregnancy is achieved  -Offered patient further laboratory evaluation to assess for ovarian reserve, ovulatory cycles, and male factor infertility.  Patient would like to continue optimizing treatment of hypothyroidism and achieve weight loss before pursuing the studies.         BMI 35.0-35.9,adult     - Continue behavioral strategies such as regular physical activity, choosing nutritious foods and avoiding highly processed foods, and stress management  -Trial of tirzepatide, start 2.5 mg SQ once weekly.  May increase to 5 mg weekly after 4 weeks.  -Patient counseled that this medication should not be continued if pregnancy is achieved.  Additionally, we had a detailed discussion about the fact that we do not have much evidence regarding safety in very early pregnancy with these medications.  After discussion, the patient verbalizes understanding and desires trial of this medication.         Relevant Medications    Tirzepatide-Weight Management 2.5 MG/0.5ML Solution Auto-injector    Other Relevant Orders    Comp Metabolic Panel    HEMOGLOBIN A1C     Other Visit Diagnoses       Family history of diabetes mellitus (DM)        Relevant Orders    Comp Metabolic Panel    HEMOGLOBIN A1C    Need for vaccination        Relevant Orders    Pneumococcal Conjugate Vaccine 20-Valent (6 wks+) (Completed)

## 2024-07-02 ENCOUNTER — PHARMACY VISIT (OUTPATIENT)
Dept: PHARMACY | Facility: MEDICAL CENTER | Age: 39
End: 2024-07-02
Payer: COMMERCIAL

## 2024-07-03 ENCOUNTER — TELEPHONE (OUTPATIENT)
Dept: MEDICAL GROUP | Facility: CLINIC | Age: 39
End: 2024-07-03
Payer: COMMERCIAL

## 2024-07-08 DIAGNOSIS — J45.51 SEVERE PERSISTENT ASTHMA WITH ACUTE EXACERBATION: ICD-10-CM

## 2024-07-08 PROCEDURE — RXMED WILLOW AMBULATORY MEDICATION CHARGE: Performed by: STUDENT IN AN ORGANIZED HEALTH CARE EDUCATION/TRAINING PROGRAM

## 2024-07-08 RX ORDER — MONTELUKAST SODIUM 10 MG/1
10 TABLET ORAL DAILY
Qty: 90 TABLET | Refills: 0 | Status: SHIPPED | OUTPATIENT
Start: 2024-07-08

## 2024-07-15 ENCOUNTER — PHARMACY VISIT (OUTPATIENT)
Dept: PHARMACY | Facility: MEDICAL CENTER | Age: 39
End: 2024-07-15
Payer: COMMERCIAL

## 2024-07-18 ENCOUNTER — HOSPITAL ENCOUNTER (OUTPATIENT)
Dept: LAB | Facility: MEDICAL CENTER | Age: 39
End: 2024-07-18
Attending: STUDENT IN AN ORGANIZED HEALTH CARE EDUCATION/TRAINING PROGRAM
Payer: COMMERCIAL

## 2024-07-18 DIAGNOSIS — E03.8 HYPOTHYROIDISM DUE TO HASHIMOTO'S THYROIDITIS: ICD-10-CM

## 2024-07-18 DIAGNOSIS — E06.3 HYPOTHYROIDISM DUE TO HASHIMOTO'S THYROIDITIS: ICD-10-CM

## 2024-07-18 DIAGNOSIS — Z83.3 FAMILY HISTORY OF DIABETES MELLITUS (DM): ICD-10-CM

## 2024-07-18 LAB
ALBUMIN SERPL BCP-MCNC: 4.2 G/DL (ref 3.2–4.9)
ALBUMIN/GLOB SERPL: 1.8 G/DL
ALP SERPL-CCNC: 56 U/L (ref 30–99)
ALT SERPL-CCNC: 19 U/L (ref 2–50)
ANION GAP SERPL CALC-SCNC: 12 MMOL/L (ref 7–16)
AST SERPL-CCNC: 19 U/L (ref 12–45)
BILIRUB SERPL-MCNC: 0.5 MG/DL (ref 0.1–1.5)
BUN SERPL-MCNC: 14 MG/DL (ref 8–22)
CALCIUM ALBUM COR SERPL-MCNC: 9 MG/DL (ref 8.5–10.5)
CALCIUM SERPL-MCNC: 9.2 MG/DL (ref 8.5–10.5)
CHLORIDE SERPL-SCNC: 106 MMOL/L (ref 96–112)
CO2 SERPL-SCNC: 22 MMOL/L (ref 20–33)
CREAT SERPL-MCNC: 0.77 MG/DL (ref 0.5–1.4)
EST. AVERAGE GLUCOSE BLD GHB EST-MCNC: 103 MG/DL
GFR SERPLBLD CREATININE-BSD FMLA CKD-EPI: 101 ML/MIN/1.73 M 2
GLOBULIN SER CALC-MCNC: 2.4 G/DL (ref 1.9–3.5)
GLUCOSE SERPL-MCNC: 92 MG/DL (ref 65–99)
HBA1C MFR BLD: 5.2 % (ref 4–5.6)
POTASSIUM SERPL-SCNC: 4.5 MMOL/L (ref 3.6–5.5)
PROT SERPL-MCNC: 6.6 G/DL (ref 6–8.2)
SODIUM SERPL-SCNC: 140 MMOL/L (ref 135–145)
TSH SERPL-ACNC: 0.06 UIU/ML (ref 0.35–5.5)

## 2024-07-18 PROCEDURE — 36415 COLL VENOUS BLD VENIPUNCTURE: CPT

## 2024-07-18 PROCEDURE — 83036 HEMOGLOBIN GLYCOSYLATED A1C: CPT

## 2024-07-18 PROCEDURE — 80053 COMPREHEN METABOLIC PANEL: CPT

## 2024-07-18 PROCEDURE — 84443 ASSAY THYROID STIM HORMONE: CPT

## 2024-07-19 DIAGNOSIS — E03.8 HYPOTHYROIDISM DUE TO HASHIMOTO'S THYROIDITIS: ICD-10-CM

## 2024-07-19 DIAGNOSIS — E06.3 HYPOTHYROIDISM DUE TO HASHIMOTO'S THYROIDITIS: ICD-10-CM

## 2024-07-25 DIAGNOSIS — Z31.9 ENCOUNTER FOR INFERTILITY: ICD-10-CM

## 2024-08-16 PROCEDURE — RXMED WILLOW AMBULATORY MEDICATION CHARGE: Performed by: NURSE PRACTITIONER

## 2024-08-17 ENCOUNTER — PHARMACY VISIT (OUTPATIENT)
Dept: PHARMACY | Facility: MEDICAL CENTER | Age: 39
End: 2024-08-17
Payer: COMMERCIAL

## 2024-08-30 PROCEDURE — RXMED WILLOW AMBULATORY MEDICATION CHARGE: Performed by: OBSTETRICS & GYNECOLOGY

## 2024-09-10 ENCOUNTER — PHARMACY VISIT (OUTPATIENT)
Dept: PHARMACY | Facility: MEDICAL CENTER | Age: 39
End: 2024-09-10
Payer: COMMERCIAL

## 2024-09-10 PROCEDURE — RXOTC WILLOW AMBULATORY OTC CHARGE: Performed by: PHARMACIST

## 2024-09-21 ENCOUNTER — HOSPITAL ENCOUNTER (OUTPATIENT)
Dept: LAB | Facility: MEDICAL CENTER | Age: 39
End: 2024-09-21
Attending: OBSTETRICS & GYNECOLOGY
Payer: COMMERCIAL

## 2024-09-21 LAB
25(OH)D3 SERPL-MCNC: 29 NG/ML (ref 30–100)
ABO GROUP BLD: NORMAL
BASOPHILS # BLD AUTO: 0.3 % (ref 0–1.8)
BASOPHILS # BLD: 0.02 K/UL (ref 0–0.12)
BLD GP AB SCN SERPL QL: NORMAL
EOSINOPHIL # BLD AUTO: 0.03 K/UL (ref 0–0.51)
EOSINOPHIL NFR BLD: 0.4 % (ref 0–6.9)
ERYTHROCYTE [DISTWIDTH] IN BLOOD BY AUTOMATED COUNT: 46.7 FL (ref 35.9–50)
HBV SURFACE AG SER QL: ABNORMAL
HCT VFR BLD AUTO: 42 % (ref 37–47)
HGB BLD-MCNC: 13.7 G/DL (ref 12–16)
HIV 1+2 AB+HIV1 P24 AG SERPL QL IA: NORMAL
IMM GRANULOCYTES # BLD AUTO: 0.02 K/UL (ref 0–0.11)
IMM GRANULOCYTES NFR BLD AUTO: 0.3 % (ref 0–0.9)
LYMPHOCYTES # BLD AUTO: 2.45 K/UL (ref 1–4.8)
LYMPHOCYTES NFR BLD: 32.5 % (ref 22–41)
MCH RBC QN AUTO: 27.1 PG (ref 27–33)
MCHC RBC AUTO-ENTMCNC: 32.6 G/DL (ref 32.2–35.5)
MCV RBC AUTO: 83 FL (ref 81.4–97.8)
MONOCYTES # BLD AUTO: 0.32 K/UL (ref 0–0.85)
MONOCYTES NFR BLD AUTO: 4.2 % (ref 0–13.4)
NEUTROPHILS # BLD AUTO: 4.7 K/UL (ref 1.82–7.42)
NEUTROPHILS NFR BLD: 62.3 % (ref 44–72)
NRBC # BLD AUTO: 0 K/UL
NRBC BLD-RTO: 0 /100 WBC (ref 0–0.2)
PLATELET # BLD AUTO: 155 K/UL (ref 164–446)
PMV BLD AUTO: 12.2 FL (ref 9–12.9)
RBC # BLD AUTO: 5.06 M/UL (ref 4.2–5.4)
RH BLD: NORMAL
RUBV AB SER QL: 182 IU/ML
T PALLIDUM AB SER QL IA: ABNORMAL
TSH SERPL DL<=0.005 MIU/L-ACNC: 2.13 UIU/ML (ref 0.38–5.33)
WBC # BLD AUTO: 7.5 K/UL (ref 4.8–10.8)

## 2024-09-21 PROCEDURE — 87389 HIV-1 AG W/HIV-1&-2 AB AG IA: CPT

## 2024-09-21 PROCEDURE — 85025 COMPLETE CBC W/AUTO DIFF WBC: CPT

## 2024-09-21 PROCEDURE — 82306 VITAMIN D 25 HYDROXY: CPT

## 2024-09-21 PROCEDURE — 86901 BLOOD TYPING SEROLOGIC RH(D): CPT

## 2024-09-21 PROCEDURE — 86780 TREPONEMA PALLIDUM: CPT

## 2024-09-21 PROCEDURE — 36415 COLL VENOUS BLD VENIPUNCTURE: CPT

## 2024-09-21 PROCEDURE — 86762 RUBELLA ANTIBODY: CPT

## 2024-09-21 PROCEDURE — 86900 BLOOD TYPING SEROLOGIC ABO: CPT

## 2024-09-21 PROCEDURE — 87086 URINE CULTURE/COLONY COUNT: CPT

## 2024-09-21 PROCEDURE — 87340 HEPATITIS B SURFACE AG IA: CPT

## 2024-09-21 PROCEDURE — 86592 SYPHILIS TEST NON-TREP QUAL: CPT

## 2024-09-21 PROCEDURE — 84443 ASSAY THYROID STIM HORMONE: CPT

## 2024-09-21 PROCEDURE — 86787 VARICELLA-ZOSTER ANTIBODY: CPT | Mod: 91

## 2024-09-21 PROCEDURE — 86850 RBC ANTIBODY SCREEN: CPT

## 2024-09-23 ENCOUNTER — OFFICE VISIT (OUTPATIENT)
Dept: MATERNAL FETAL MEDICINE | Facility: MEDICAL CENTER | Age: 39
End: 2024-09-23
Payer: COMMERCIAL

## 2024-09-23 VITALS
SYSTOLIC BLOOD PRESSURE: 118 MMHG | BODY MASS INDEX: 35.38 KG/M2 | HEART RATE: 66 BPM | WEIGHT: 225.9 LBS | DIASTOLIC BLOOD PRESSURE: 70 MMHG

## 2024-09-23 DIAGNOSIS — O09.521 SUPERVISION OF ELDERLY MULTIGRAVIDA (>=35 YEARS OLD AT TIME OF DELIVERY), FIRST TRIMESTER: ICD-10-CM

## 2024-09-23 DIAGNOSIS — Z86.2: ICD-10-CM

## 2024-09-23 DIAGNOSIS — Z3A.12 12 WEEKS GESTATION OF PREGNANCY: ICD-10-CM

## 2024-09-23 DIAGNOSIS — R76.8 SS-A ANTIBODY POSITIVE: ICD-10-CM

## 2024-09-23 DIAGNOSIS — O09.521 AMA (ADVANCED MATERNAL AGE) MULTIGRAVIDA 35+, FIRST TRIMESTER: ICD-10-CM

## 2024-09-23 DIAGNOSIS — J45.909 ASTHMA AFFECTING PREGNANCY IN FIRST TRIMESTER: ICD-10-CM

## 2024-09-23 DIAGNOSIS — E06.3 HASHIMOTO'S THYROIDITIS: ICD-10-CM

## 2024-09-23 DIAGNOSIS — O99.511 ASTHMA AFFECTING PREGNANCY IN FIRST TRIMESTER: ICD-10-CM

## 2024-09-23 LAB
BACTERIA UR CULT: NORMAL
SIGNIFICANT IND 70042: NORMAL
SITE SITE: NORMAL
SOURCE SOURCE: NORMAL
VZV IGG SER IA-ACNC: 1053 IV
VZV IGM SER IA-ACNC: 0.63 ISR

## 2024-09-23 PROCEDURE — 76801 OB US < 14 WKS SINGLE FETUS: CPT | Performed by: OBSTETRICS & GYNECOLOGY

## 2024-09-23 ASSESSMENT — FIBROSIS 4 INDEX: FIB4 SCORE: 1.07

## 2024-09-26 ENCOUNTER — IMMUNIZATION (OUTPATIENT)
Dept: OCCUPATIONAL MEDICINE | Facility: CLINIC | Age: 39
End: 2024-09-26

## 2024-09-26 DIAGNOSIS — Z23 NEED FOR VACCINATION: Primary | ICD-10-CM

## 2024-09-27 NOTE — TELEPHONE ENCOUNTER
HPI:    Patient ID: Philomena Kwon is a 68 year old female.    Patient presents for dermatitis follow-up. Notes, the face is clearing up. But, there is still an area under right arm, that hasn't improved. Ketoconazole still being applied with minimal improvement. No draining or tenderness noted. Hx of allergies to medications noted.         Review of Systems   Constitutional:  Negative for chills and fever.   Musculoskeletal:  Negative for arthralgias and myalgias.   Skin:  Positive for rash. Negative for color change and wound.            Current Outpatient Medications   Medication Sig Dispense Refill    ketoconazole 2 % External Cream Apply to affected area 2 times daily. 30 g 1    mometasone 0.1 % External Ointment Apply 1 Application topically daily. 15 g 0    metFORMIN  MG Oral Tablet 24 Hr Take 1 tablet (500 mg total) by mouth daily. 90 tablet 3    rosuvastatin 10 MG Oral Tab Take 1 tablet (10 mg total) by mouth nightly. 90 tablet 3    triamcinolone 0.5 % External Cream Use bid as directed 30 g 0    Vitamin D3, Cholecalciferol, 50 MCG (2000 UT) Oral Cap Take 1 capsule (2,000 Units total) by mouth daily.  0    Multiple Vitamin Oral Tab Take  by mouth. take 1 tablet by ORAL route  every day with food       Allergies:No Known Allergies   There were no vitals taken for this visit.  There is no height or weight on file to calculate BMI.  PHYSICAL EXAM:   Physical Exam  Constitutional:       General: She is not in acute distress.     Appearance: Normal appearance.   Skin:     General: Skin is warm and dry.      Findings: Rash present.      Comments: Erythematous scaling areas under the right arm. No draining or tenderness noted.    Neurological:      Mental Status: She is alert and oriented to person, place, and time.                ASSESSMENT/PLAN:   1. Dermatitis  -After discussion with patient, advised the following:  -Stop ketoconazole   -Start mometasone   -Educated to apply 2 times per day for 2 weeks.  Lab Results   Component Value Date/Time    CHOLSTRLTOT 203 (H) 04/26/2021 12:09 PM     (H) 04/26/2021 12:09 PM    HDL 67 04/26/2021 12:09 PM    TRIGLYCERIDE 62 04/26/2021 12:09 PM       Lab Results   Component Value Date/Time    SODIUM 139 04/26/2021 12:09 PM    POTASSIUM 4.3 04/26/2021 12:09 PM    CHLORIDE 101 04/26/2021 12:09 PM    CO2 29 04/26/2021 12:09 PM    GLUCOSE 59 (L) 04/26/2021 12:09 PM    BUN 13 04/26/2021 12:09 PM    CREATININE 0.74 04/26/2021 12:09 PM     Lab Results   Component Value Date/Time    ALKPHOSPHAT 43 04/26/2021 12:09 PM    ASTSGOT 27 04/26/2021 12:09 PM    ALTSGPT 27 04/26/2021 12:09 PM    TBILIRUBIN 0.5 04/26/2021 12:09 PM      Component Ref Range & Units 1 yr ago   (4/26/21) 3 yr ago   (9/12/18) 4 yr ago   (2/23/18) 5 yr ago   (3/17/17) 6 yr ago   (4/13/16) 6 yr ago   (9/30/15) 7 yr ago   (3/31/15)   TSH 0.380 - 5.330 uIU/mL 1.260  0.170 Low  CM  0.300 Low  CM  2.620 R  0.120 Low  R  0.140 Low  R  0.040 Low  R   Last TSH on 04/26/21 due for lab.  Will send 1 fill to pharmacy, call patient for lab       -Return in 2 weeks if not improving.   -To call or follow-up with worsening symptoms or concerns.   -Pt was agreeable to plan and will comply with discussion above.         No orders of the defined types were placed in this encounter.      Meds This Visit:  Requested Prescriptions      No prescriptions requested or ordered in this encounter       Imaging & Referrals:  None         ID#[id number]

## 2024-10-02 PROCEDURE — 90656 IIV3 VACC NO PRSV 0.5 ML IM: CPT | Performed by: PREVENTIVE MEDICINE

## 2024-10-18 ENCOUNTER — PHARMACY VISIT (OUTPATIENT)
Dept: PHARMACY | Facility: MEDICAL CENTER | Age: 39
End: 2024-10-18
Payer: COMMERCIAL

## 2024-10-18 ENCOUNTER — PATIENT MESSAGE (OUTPATIENT)
Dept: SLEEP MEDICINE | Facility: MEDICAL CENTER | Age: 39
End: 2024-10-18
Payer: COMMERCIAL

## 2024-10-18 DIAGNOSIS — J45.51 SEVERE PERSISTENT ASTHMA WITH ACUTE EXACERBATION: ICD-10-CM

## 2024-10-18 PROCEDURE — RXMED WILLOW AMBULATORY MEDICATION CHARGE: Performed by: FAMILY MEDICINE

## 2024-10-18 RX ORDER — LEVOTHYROXINE SODIUM 175 UG/1
175 TABLET ORAL
Qty: 30 TABLET | Refills: 1 | Status: SHIPPED | OUTPATIENT
Start: 2024-10-18

## 2024-10-21 DIAGNOSIS — E06.3 HYPOTHYROIDISM DUE TO HASHIMOTO'S THYROIDITIS: Primary | ICD-10-CM

## 2024-10-21 DIAGNOSIS — Z34.82 ENCOUNTER FOR SUPERVISION OF OTHER NORMAL PREGNANCY, SECOND TRIMESTER: ICD-10-CM

## 2024-10-21 PROCEDURE — RXMED WILLOW AMBULATORY MEDICATION CHARGE: Performed by: FAMILY MEDICINE

## 2024-10-21 RX ORDER — LEVOTHYROXINE SODIUM 150 UG/1
150 TABLET ORAL
Qty: 90 TABLET | Refills: 3 | Status: SHIPPED | OUTPATIENT
Start: 2024-10-21

## 2024-10-22 RX ORDER — MONTELUKAST SODIUM 10 MG/1
10 TABLET ORAL DAILY
Qty: 90 TABLET | Refills: 0 | Status: SHIPPED | OUTPATIENT
Start: 2024-10-22

## 2024-10-22 ASSESSMENT — ENCOUNTER SYMPTOMS
DIZZINESS: 0
DIAPHORESIS: 0
NAUSEA: 0
VOMITING: 0
HEADACHES: 0
DIARRHEA: 0
WEAKNESS: 0
MYALGIAS: 0
FALLS: 0
WHEEZING: 0
SINUS PAIN: 0
PALPITATIONS: 0
CHILLS: 0
SPUTUM PRODUCTION: 0
FEVER: 0
HEARTBURN: 0
HEMOPTYSIS: 0

## 2024-10-23 ENCOUNTER — PHARMACY VISIT (OUTPATIENT)
Dept: PHARMACY | Facility: MEDICAL CENTER | Age: 39
End: 2024-10-23
Payer: COMMERCIAL

## 2024-10-23 ENCOUNTER — OFFICE VISIT (OUTPATIENT)
Dept: SLEEP MEDICINE | Facility: MEDICAL CENTER | Age: 39
End: 2024-10-23
Payer: COMMERCIAL

## 2024-10-23 VITALS
WEIGHT: 224 LBS | HEIGHT: 67 IN | HEART RATE: 83 BPM | OXYGEN SATURATION: 99 % | SYSTOLIC BLOOD PRESSURE: 122 MMHG | DIASTOLIC BLOOD PRESSURE: 74 MMHG | BODY MASS INDEX: 35.16 KG/M2

## 2024-10-23 DIAGNOSIS — Z3A.17 17 WEEKS GESTATION OF PREGNANCY: ICD-10-CM

## 2024-10-23 DIAGNOSIS — J45.30 MILD PERSISTENT ASTHMA WITHOUT COMPLICATION: ICD-10-CM

## 2024-10-23 PROCEDURE — 3074F SYST BP LT 130 MM HG: CPT

## 2024-10-23 PROCEDURE — RXMED WILLOW AMBULATORY MEDICATION CHARGE

## 2024-10-23 PROCEDURE — 99212 OFFICE O/P EST SF 10 MIN: CPT

## 2024-10-23 PROCEDURE — 99213 OFFICE O/P EST LOW 20 MIN: CPT

## 2024-10-23 PROCEDURE — 3078F DIAST BP <80 MM HG: CPT

## 2024-10-23 ASSESSMENT — FIBROSIS 4 INDEX: FIB4 SCORE: 1.07

## 2024-10-23 ASSESSMENT — ENCOUNTER SYMPTOMS
COUGH: 1
SHORTNESS OF BREATH: 1

## 2024-10-29 ENCOUNTER — PATIENT MESSAGE (OUTPATIENT)
Dept: SLEEP MEDICINE | Facility: MEDICAL CENTER | Age: 39
End: 2024-10-29
Payer: COMMERCIAL

## 2024-10-29 DIAGNOSIS — J45.30 MILD PERSISTENT ASTHMA WITHOUT COMPLICATION: ICD-10-CM

## 2024-10-30 ENCOUNTER — PATIENT MESSAGE (OUTPATIENT)
Dept: SLEEP MEDICINE | Facility: MEDICAL CENTER | Age: 39
End: 2024-10-30
Payer: COMMERCIAL

## 2024-10-30 DIAGNOSIS — J45.51 SEVERE PERSISTENT ASTHMA WITH ACUTE EXACERBATION: ICD-10-CM

## 2024-10-30 PROCEDURE — RXMED WILLOW AMBULATORY MEDICATION CHARGE

## 2024-10-30 RX ORDER — IPRATROPIUM BROMIDE AND ALBUTEROL SULFATE 2.5; .5 MG/3ML; MG/3ML
3 SOLUTION RESPIRATORY (INHALATION) EVERY 4 HOURS PRN
Qty: 90 ML | Refills: 11 | Status: SHIPPED | OUTPATIENT
Start: 2024-10-30

## 2024-10-30 RX ORDER — ALBUTEROL SULFATE 90 UG/1
2 INHALANT RESPIRATORY (INHALATION) EVERY 6 HOURS PRN
Qty: 8.5 G | Refills: 11 | Status: SHIPPED | OUTPATIENT
Start: 2024-10-30

## 2024-11-06 PROCEDURE — RXMED WILLOW AMBULATORY MEDICATION CHARGE

## 2024-11-06 PROCEDURE — RXMED WILLOW AMBULATORY MEDICATION CHARGE: Performed by: OBSTETRICS & GYNECOLOGY

## 2024-11-10 ENCOUNTER — PHARMACY VISIT (OUTPATIENT)
Dept: PHARMACY | Facility: MEDICAL CENTER | Age: 39
End: 2024-11-10
Payer: COMMERCIAL

## 2024-11-11 ENCOUNTER — ANCILLARY PROCEDURE (OUTPATIENT)
Dept: MATERNAL FETAL MEDICINE | Facility: MEDICAL CENTER | Age: 39
End: 2024-11-11
Payer: COMMERCIAL

## 2024-11-11 VITALS
DIASTOLIC BLOOD PRESSURE: 73 MMHG | BODY MASS INDEX: 36.1 KG/M2 | HEART RATE: 82 BPM | WEIGHT: 230.5 LBS | SYSTOLIC BLOOD PRESSURE: 121 MMHG

## 2024-11-11 DIAGNOSIS — O99.512 ASTHMA AFFECTING PREGNANCY IN SECOND TRIMESTER: ICD-10-CM

## 2024-11-11 DIAGNOSIS — O09.522 MULTIGRAVIDA OF ADVANCED MATERNAL AGE IN SECOND TRIMESTER: ICD-10-CM

## 2024-11-11 DIAGNOSIS — Z3A.19 19 WEEKS GESTATION OF PREGNANCY: ICD-10-CM

## 2024-11-11 DIAGNOSIS — J45.909 ASTHMA AFFECTING PREGNANCY IN SECOND TRIMESTER: ICD-10-CM

## 2024-11-11 DIAGNOSIS — O09.521 SUPERVISION OF ELDERLY MULTIGRAVIDA (>=35 YEARS OLD AT TIME OF DELIVERY), FIRST TRIMESTER: ICD-10-CM

## 2024-11-11 DIAGNOSIS — R76.8 SS-A ANTIBODY POSITIVE: ICD-10-CM

## 2024-11-11 DIAGNOSIS — E06.3 HASHIMOTO THYROIDITIS: ICD-10-CM

## 2024-11-11 PROCEDURE — 76817 TRANSVAGINAL US OBSTETRIC: CPT | Performed by: OBSTETRICS & GYNECOLOGY

## 2024-11-11 PROCEDURE — 76811 OB US DETAILED SNGL FETUS: CPT | Performed by: OBSTETRICS & GYNECOLOGY

## 2024-11-11 ASSESSMENT — FIBROSIS 4 INDEX: FIB4 SCORE: 1.07

## 2024-11-14 PROCEDURE — RXMED WILLOW AMBULATORY MEDICATION CHARGE

## 2024-11-21 ENCOUNTER — PHARMACY VISIT (OUTPATIENT)
Dept: PHARMACY | Facility: MEDICAL CENTER | Age: 39
End: 2024-11-21
Payer: COMMERCIAL

## 2024-11-24 ENCOUNTER — PHARMACY VISIT (OUTPATIENT)
Dept: PHARMACY | Facility: MEDICAL CENTER | Age: 39
End: 2024-11-24
Payer: COMMERCIAL

## 2024-11-24 ENCOUNTER — APPOINTMENT (OUTPATIENT)
Dept: TELEHEALTH | Facility: TELEMEDICINE | Age: 39
End: 2024-11-24
Payer: COMMERCIAL

## 2024-11-24 ENCOUNTER — OFFICE VISIT (OUTPATIENT)
Dept: URGENT CARE | Facility: CLINIC | Age: 39
End: 2024-11-24
Payer: COMMERCIAL

## 2024-11-24 VITALS
DIASTOLIC BLOOD PRESSURE: 84 MMHG | TEMPERATURE: 98.5 F | HEIGHT: 67 IN | BODY MASS INDEX: 36.57 KG/M2 | WEIGHT: 233 LBS | RESPIRATION RATE: 19 BRPM | OXYGEN SATURATION: 98 % | SYSTOLIC BLOOD PRESSURE: 126 MMHG | HEART RATE: 86 BPM

## 2024-11-24 DIAGNOSIS — R05.1 ACUTE COUGH: ICD-10-CM

## 2024-11-24 DIAGNOSIS — J45.901 ASTHMA WITH ACUTE EXACERBATION, UNSPECIFIED ASTHMA SEVERITY, UNSPECIFIED WHETHER PERSISTENT: Primary | ICD-10-CM

## 2024-11-24 PROCEDURE — 3074F SYST BP LT 130 MM HG: CPT

## 2024-11-24 PROCEDURE — 99214 OFFICE O/P EST MOD 30 MIN: CPT

## 2024-11-24 PROCEDURE — 3079F DIAST BP 80-89 MM HG: CPT

## 2024-11-24 PROCEDURE — RXMED WILLOW AMBULATORY MEDICATION CHARGE

## 2024-11-24 RX ORDER — PREDNISONE 20 MG/1
TABLET ORAL
Qty: 30 TABLET | Refills: 0 | Status: SHIPPED | OUTPATIENT
Start: 2024-11-24 | End: 2024-11-24

## 2024-11-24 RX ORDER — PREDNISONE 20 MG/1
TABLET ORAL
Qty: 15 TABLET | Refills: 0 | Status: SHIPPED | OUTPATIENT
Start: 2024-11-24

## 2024-11-24 RX ORDER — AZITHROMYCIN 250 MG/1
TABLET, FILM COATED ORAL
Qty: 6 TABLET | Refills: 0 | Status: SHIPPED | OUTPATIENT
Start: 2024-11-24

## 2024-11-24 ASSESSMENT — FIBROSIS 4 INDEX: FIB4 SCORE: 1.07

## 2024-11-24 NOTE — PROGRESS NOTES
Subjective:   Carolina Medrano is a 39 y.o. female who presents for Cough (Oct 25)          I introduced myself to the patient and informed them that I am a Family Nurse Practitioner.    HPI: Carolina is a 39 year-old female with a history of asthma who is currently 21 weeks pregnant  who comes in today c/o persistent nonproductive cough, chest congestion. Onset was 30 days ago.  Patient states it started as a cold/viral URI, other symptoms have resolved, but the cough has persisted.  She states she is quite concerned as this happened last year during her last pregnancy, she did getting severe bronchitis and exacerbation of asthma and ended up being hospitalized.  She states she was treated with azithromycin and a prednisone taper at that time and is asking for this again today.  Patient describes symptoms as constant, worsening. They describe the cough as nonproductive. Aggravating factors include worse at night, worse when lying flat. Relieving factors include sleeping propped up. Treatments tried at home include  Dayquil, Niquil with minimal effect . They describe their symptoms as moderate.  Denies any known exposure to Covid, Flu, RSV, strep. Denies anyone else is sick at home presently.     Review of Systems   Constitutional:  Negative for chills, fever and malaise/fatigue.   HENT:  Negative for congestion, ear pain and sore throat.    Eyes:  Negative for pain, discharge and redness.   Respiratory:  Positive for cough, sputum production, shortness of breath and wheezing. Negative for hemoptysis and stridor.    Cardiovascular:  Negative for chest pain and palpitations.   Gastrointestinal:  Negative for abdominal pain, diarrhea, nausea and vomiting.   Genitourinary:  Negative for dysuria.   Musculoskeletal:  Negative for myalgias.   Skin:  Negative for rash.   Neurological:  Negative for dizziness and headaches.       Medications: albuterol Aers  Budeson-Glycopyrrol-Formoterol  "Aero  ipratropium-albuterol  levothyroxine Tabs  montelukast Tabs  omeprazole  omeprazole Cpdr  ondansetron Tabs  Tirzepatide-Weight Management Soaj     Allergies: Shellfish allergy and Iodine    Problem List: does not have any pertinent problems on file.    Surgical History:  Past Surgical History:   Procedure Laterality Date    PRIMARY C SECTION N/A 2023    Procedure: PRIMARY  SECTION;  Surgeon: Sarah Kang M.D.;  Location: [facility];  Service: Labor and Delivery    UMBILICAL HERNIA REPAIR  2017    Procedure: UMBILICAL HERNIA REPAIR WITH MESH;  Surgeon: Florencio Tavares M.D.;  Location: SURGERY [facility];  Service:     LIZ BY LAPAROSCOPY  2013    Performed by Florencio Tavares M.D. at SURGERY [facility]    KNEE ARTHROSCOPY      right knee ( times 4)    TONSILLECTOMY AND ADENOIDECTOMY      as a child    UMBILICAL HERNIA REPAIR         Past Social Hx:   reports that she has never smoked. She has never been exposed to tobacco smoke. She has never used smokeless tobacco. She reports that she does not currently use alcohol. She reports that she does not use drugs.     Past Family Hx:   family history includes Alcohol abuse in her father; Alzheimer's Disease in her paternal grandfather; Autoimmune Disease in her paternal grandmother; Cancer in her paternal aunt, paternal grandmother, and paternal uncle; Diabetes in her maternal grandfather and maternal grandmother; Heart Disease in her father; Hypertension in her father; Lupus in her mother; No Known Problems in her brother.     Problem list, medications, and allergies reviewed by myself today in Epic.   I have documented what I find to be significant in regards to past medical, social, family and surgical history  in my HPI or under PMH/PSH/FH review section, otherwise it is noncontributory     Objective:     /84   Pulse 86   Temp 36.9 °C (98.5 °F) (Temporal)   Resp 19   Ht 1.702 m (5' 7\")   Wt " 106 kg (233 lb)   SpO2 98%   BMI 36.49 kg/m²     During this visit, appropriate PPE was worn, and hand hygiene was performed.    Physical Exam  Vitals reviewed.   Constitutional:       General: She is not in acute distress.     Appearance: Normal appearance. She is not ill-appearing or toxic-appearing.   HENT:      Head: Normocephalic and atraumatic.      Right Ear: Tympanic membrane, ear canal and external ear normal. There is no impacted cerumen.      Left Ear: Tympanic membrane, ear canal and external ear normal. There is no impacted cerumen.      Nose: No congestion or rhinorrhea.      Mouth/Throat:      Pharynx: Oropharynx is clear. No oropharyngeal exudate or posterior oropharyngeal erythema.   Eyes:      General: No scleral icterus.        Right eye: No discharge.         Left eye: No discharge.      Conjunctiva/sclera: Conjunctivae normal.      Pupils: Pupils are equal, round, and reactive to light.   Cardiovascular:      Rate and Rhythm: Normal rate and regular rhythm.      Heart sounds: Normal heart sounds. No murmur heard.     No friction rub. No gallop.   Pulmonary:      Effort: Pulmonary effort is normal. No tachypnea or respiratory distress.      Breath sounds: No stridor. Examination of the right-upper field reveals wheezing and rhonchi. Examination of the left-upper field reveals wheezing and rhonchi. Examination of the right-middle field reveals wheezing and rhonchi. Examination of the left-middle field reveals wheezing and rhonchi. Wheezing and rhonchi present. No rales.   Abdominal:      General: There is no distension.   Musculoskeletal:         General: Normal range of motion.      Cervical back: Normal range of motion. No rigidity.      Right lower leg: No edema.      Left lower leg: No edema.   Lymphadenopathy:      Cervical: No cervical adenopathy.   Skin:     General: Skin is warm and dry.      Coloration: Skin is not jaundiced.   Neurological:      General: No focal deficit present.       Mental Status: She is alert and oriented to person, place, and time. Mental status is at baseline.   Psychiatric:         Mood and Affect: Mood normal.         Behavior: Behavior normal.         Thought Content: Thought content normal.         Judgment: Judgment normal.           Per UTD -  Uncontrolled asthma is associated with adverse events in pregnancy (increased risk of  mortality, preeclampsia,  birth, low birth weight infants,  delivery, and the development of gestational diabetes). Poorly controlled asthma or asthma exacerbations may have a greater fetal/maternal risk than what is associated with appropriately used asthma medications. Maternal treatment improves pregnancy outcomes by reducing the risk of some adverse events. Inhaled ??rti???t?r?i?? are recommended for the treatment of asthma during pregnancy; however, systemic ??rti???ter?i?s, including ?r???is??e, should be used to control acute exacerbations. Maternal asthma symptoms should be monitored every 4 to 6 weeks during pregnancy (ERS/TSANZ [Lamas 2020]; CHAYITO ).     ... the indications and dosing of ?zithr?m??in are the same as in nonpregnant patients (IDSA/AAN/ACR [Lantos ]; Lambert 2020; SOGC [Anil 2020]).     Assessment/Plan:     Diagnosis and associated orders:     1. Asthma with acute exacerbation, unspecified asthma severity, unspecified whether persistent  azithromycin (ZITHROMAX) 250 MG Tab    predniSONE (DELTASONE) 20 MG Tab    DISCONTINUED: predniSONE (DELTASONE) 20 MG Tab      2. Acute cough  azithromycin (ZITHROMAX) 250 MG Tab    predniSONE (DELTASONE) 20 MG Tab    DISCONTINUED: predniSONE (DELTASONE) 20 MG Tab         Comments/MDM:     1. Acute cough  - azithromycin (ZITHROMAX) 250 MG Tab; Take 2 tablets by mouth on day one. Take one tablet by mouth the remaining days until gone  Dispense: 6 Tablet; Refill: 0  - predniSONE (DELTASONE) 20 MG Tab; Take 2 Tablets by mouth every morning with breakfast  for 3 days, THEN 1.5 Tablets every morning with breakfast for 3 days, THEN 1 Tablet every morning with breakfast for 3 days, THEN 0.5 Tablets every morning with breakfast for 2 days then stop  Dispense: 15 Tablet; Refill: 0    2. Asthma with acute exacerbation, unspecified asthma severity, unspecified whether persistent (Primary)  Patient's presentation and symptoms as well as physical exam are consistent with bronchitis/asthma with acute exacerbation  discussed with patient Dx,  DDx, management options (risks,benefits, and alternatives to planned treatment), natural progression.   Supportive care measures were discussed.   Questions were encouraged and answered.   Written information was provided and I did go over this with the patient in clinic today.  Instructed patient regarding red flags and to return to urgent care prn if new or worsening sx or if there is no improvement in condition prn.    Advised the patient to follow-up with the primary care physician for recheck, reevaluation, and consideration of further management.  I did instruct patient regarding medications prescribed, purpose, side effects, precautions.  Instructed patient to get a pharmacy consult when picking up any prescribed medications.  Strict ER precautions discussed for any worsening symptoms, fever, chills, nausea or vomiting, lethargy   Patient states they have good understanding and they are agreeable with the plan of care.     - azithromycin (ZITHROMAX) 250 MG Tab; Take 2 tablets by mouth on day one. Take one tablet by mouth the remaining days until gone  Dispense: 6 Tablet; Refill: 0  - predniSONE (DELTASONE) 20 MG Tab; Take 2 Tablets by mouth every morning with breakfast for 3 days, THEN 1.5 Tablets every morning with breakfast for 3 days, THEN 1 Tablet every morning with breakfast for 3 days, THEN 0.5 Tablets every morning with breakfast for 2 days then stop  Dispense: 15 Tablet; Refill: 0    Patient works as an OB nurse, we did have  a conversation, shared decision making, she is fully aware of potential risks of taking these medications during pregnancy.  States she has taken them before during pregnancy without complication, benefits currently outweigh risks.     Pt is clinically stable at today's acute urgent care visit. Vital signs are normal and reassuring.  No acute distress noted. Appropriate for outpatient management at this time.        I personally reviewed prior external notes and test results pertinent to today's visit.  I have independently reviewed and interpreted all diagnostics ordered during this urgent care acute visit.        Please note that this dictation was created using voice recognition software. I have made a reasonable attempt to correct obvious errors, but I expect that there are errors of grammar and possibly content that I did not discover before finalizing the note.    This note was electronically signed by Freedom ASIF, PERLA, GÓMEZ, LONG

## 2024-11-26 ASSESSMENT — ENCOUNTER SYMPTOMS
COUGH: 1
SORE THROAT: 0
CHILLS: 0
ABDOMINAL PAIN: 0
PALPITATIONS: 0
DIZZINESS: 0
NAUSEA: 0
MYALGIAS: 0
HEADACHES: 0
FEVER: 0
DIARRHEA: 0
EYE DISCHARGE: 0
EYE PAIN: 0
STRIDOR: 0
SPUTUM PRODUCTION: 1
VOMITING: 0
EYE REDNESS: 0
SHORTNESS OF BREATH: 1
HEMOPTYSIS: 0
WHEEZING: 1

## 2024-12-05 ENCOUNTER — PHARMACY VISIT (OUTPATIENT)
Dept: PHARMACY | Facility: MEDICAL CENTER | Age: 39
End: 2024-12-05
Payer: COMMERCIAL

## 2024-12-05 PROCEDURE — RXMED WILLOW AMBULATORY MEDICATION CHARGE: Performed by: OBSTETRICS & GYNECOLOGY

## 2024-12-05 RX ORDER — FLUCONAZOLE 150 MG/1
TABLET ORAL
Qty: 1 TABLET | Refills: 0 | OUTPATIENT
Start: 2024-12-05

## 2024-12-09 ENCOUNTER — ANCILLARY PROCEDURE (OUTPATIENT)
Dept: MATERNAL FETAL MEDICINE | Facility: MEDICAL CENTER | Age: 39
End: 2024-12-09
Attending: OBSTETRICS & GYNECOLOGY
Payer: COMMERCIAL

## 2024-12-09 ENCOUNTER — OFFICE VISIT (OUTPATIENT)
Dept: SLEEP MEDICINE | Facility: MEDICAL CENTER | Age: 39
End: 2024-12-09
Attending: STUDENT IN AN ORGANIZED HEALTH CARE EDUCATION/TRAINING PROGRAM
Payer: COMMERCIAL

## 2024-12-09 VITALS
BODY MASS INDEX: 36.12 KG/M2 | SYSTOLIC BLOOD PRESSURE: 135 MMHG | WEIGHT: 230.6 LBS | HEART RATE: 92 BPM | DIASTOLIC BLOOD PRESSURE: 75 MMHG

## 2024-12-09 VITALS
DIASTOLIC BLOOD PRESSURE: 58 MMHG | OXYGEN SATURATION: 99 % | WEIGHT: 230 LBS | BODY MASS INDEX: 36.1 KG/M2 | SYSTOLIC BLOOD PRESSURE: 118 MMHG | HEART RATE: 91 BPM | HEIGHT: 67 IN

## 2024-12-09 DIAGNOSIS — J45.40 MODERATE PERSISTENT ASTHMA WITHOUT COMPLICATION: ICD-10-CM

## 2024-12-09 DIAGNOSIS — Z3A.23 23 WEEKS GESTATION OF PREGNANCY: ICD-10-CM

## 2024-12-09 DIAGNOSIS — R76.8 SS-A ANTIBODY POSITIVE: ICD-10-CM

## 2024-12-09 DIAGNOSIS — O09.521 SUPERVISION OF ELDERLY MULTIGRAVIDA (>=35 YEARS OLD AT TIME OF DELIVERY), FIRST TRIMESTER: ICD-10-CM

## 2024-12-09 DIAGNOSIS — O35.BXX0 ANOMALY OF HEART OF FETUS AFFECTING PREGNANCY, ANTEPARTUM, SINGLE OR UNSPECIFIED FETUS: ICD-10-CM

## 2024-12-09 DIAGNOSIS — E06.3 HASHIMOTO THYROIDITIS: ICD-10-CM

## 2024-12-09 DIAGNOSIS — O09.522 AMA (ADVANCED MATERNAL AGE) MULTIGRAVIDA 35+, SECOND TRIMESTER: ICD-10-CM

## 2024-12-09 DIAGNOSIS — O35.BXX0 FETAL CARDIAC ANOMALY COMPLICATING PREGNANCY, ANTEPARTUM, NOT APPLICABLE OR UNSPECIFIED FETUS: ICD-10-CM

## 2024-12-09 PROCEDURE — 99214 OFFICE O/P EST MOD 30 MIN: CPT | Performed by: STUDENT IN AN ORGANIZED HEALTH CARE EDUCATION/TRAINING PROGRAM

## 2024-12-09 PROCEDURE — RXMED WILLOW AMBULATORY MEDICATION CHARGE: Performed by: STUDENT IN AN ORGANIZED HEALTH CARE EDUCATION/TRAINING PROGRAM

## 2024-12-09 PROCEDURE — 93325 DOPPLER ECHO COLOR FLOW MAPG: CPT | Performed by: OBSTETRICS & GYNECOLOGY

## 2024-12-09 PROCEDURE — 3078F DIAST BP <80 MM HG: CPT | Performed by: STUDENT IN AN ORGANIZED HEALTH CARE EDUCATION/TRAINING PROGRAM

## 2024-12-09 PROCEDURE — 76827 ECHO EXAM OF FETAL HEART: CPT | Performed by: OBSTETRICS & GYNECOLOGY

## 2024-12-09 PROCEDURE — 94761 N-INVAS EAR/PLS OXIMETRY MLT: CPT | Performed by: STUDENT IN AN ORGANIZED HEALTH CARE EDUCATION/TRAINING PROGRAM

## 2024-12-09 PROCEDURE — 3074F SYST BP LT 130 MM HG: CPT | Performed by: STUDENT IN AN ORGANIZED HEALTH CARE EDUCATION/TRAINING PROGRAM

## 2024-12-09 PROCEDURE — 76825 ECHO EXAM OF FETAL HEART: CPT | Performed by: OBSTETRICS & GYNECOLOGY

## 2024-12-09 PROCEDURE — 99213 OFFICE O/P EST LOW 20 MIN: CPT | Performed by: STUDENT IN AN ORGANIZED HEALTH CARE EDUCATION/TRAINING PROGRAM

## 2024-12-09 PROCEDURE — 99212 OFFICE O/P EST SF 10 MIN: CPT | Performed by: OBSTETRICS & GYNECOLOGY

## 2024-12-09 RX ORDER — PREDNISONE 20 MG/1
40 TABLET ORAL DAILY
Qty: 10 TABLET | Refills: 0 | Status: SHIPPED | OUTPATIENT
Start: 2024-12-09

## 2024-12-09 RX ORDER — FLUTICASONE FUROATE, UMECLIDINIUM BROMIDE AND VILANTEROL TRIFENATATE 200; 62.5; 25 UG/1; UG/1; UG/1
1 POWDER RESPIRATORY (INHALATION) DAILY
Qty: 60 EACH | Refills: 2 | Status: SHIPPED | OUTPATIENT
Start: 2024-12-09

## 2024-12-09 RX ORDER — FLUTICASONE FUROATE, UMECLIDINIUM BROMIDE AND VILANTEROL TRIFENATATE 200; 62.5; 25 UG/1; UG/1; UG/1
1 POWDER RESPIRATORY (INHALATION) DAILY
Qty: 60 EACH | Refills: 0 | COMMUNITY
Start: 2024-12-09

## 2024-12-09 ASSESSMENT — FIBROSIS 4 INDEX
FIB4 SCORE: 1.07
FIB4 SCORE: 1.07

## 2024-12-09 NOTE — PROCEDURES
Multi-Ox Readings  Multi Ox #1 Room air   O2 sat % at rest 98   O2 sat % on exertion 97   O2 sat average on exertion     Multi Ox #2     O2 sat % at rest     O2 sat % on exertion     O2 sat average on exertion

## 2024-12-09 NOTE — PROGRESS NOTES
Pulmonary Clinic Note    Chief Complaint:  Severe persistent asthma during pregnancy    HPI:   Carolina Medrano is a very pleasant 39 y.o. year old female never smoker, with a PMHx of asthma, hypothyroidism, IgA deficiency who presents to the Pulmonary Clinic for a regular follow up. Last seen in the office on 10/23/24 with Raina ASIF.     Patient is followed by the pulmonary office for asthma.  PFTs in  showed an FVC of 4.8 L or 102%, FEV1 3.76 L or 112%, FEV1/FVC 90%, RV 84%, %, DLCO 138% predicted, without positive bronchodilator response.  During her previous pregnancy, she had severe asthma complications which required her to be hospitalized for 20 days in May 2023 until delivery. Patient underwent  on 2023.  Patient was also seen by ENT during her hospitalization and was scoped.  It showed thickened secretions on Larynx and hypopharynx with normal vocal cord movement and anatomy and normal tongue base.  It did show posterior glottic changes consistent with reflux.  Patient is currently on Breztri 160, Singulair, DuoNeb, and albuterol inhaler/nebulizer as needed.  IgE 13.  Eosinophils 0.05.  Allergen panel was relatively unremarkable.  Patient is currently 23 weeks pregnant.      Interval events:  24   - She was seen by YEFRI Landry 24 for cough and some SHEPHERD who prescribed 11d prednisone taper (20->15-->10-->5) as well as a Z-pack, her breathing feels back to baseline although she will get slightly SOB when laughing  - During her last pregnancy, she notes that her Asthma began to worsen at 29 weeks gestation, leading to hospitalazation at 34 weeks, where she was oral steroid dependent until delivery at 37 weeks.  - Currently no wheezing or cough; compliant with Breztri 160 2p BID    Past Medical History:   Diagnosis Date    Pain 2017    abd., 1/10    Pregnancy 11/10/2022    7 weeks pregnant  Has appt to establish with OB soon  +N/V, taking  pyridoxine and doxylamine for sx       Thyroid disease        Past Surgical History:   Procedure Laterality Date    PRIMARY C SECTION N/A 2023    Procedure: PRIMARY  SECTION;  Surgeon: Sarah Kang M.D.;  Location: [facility]ERY [facility];  Service: Labor and Delivery    UMBILICAL HERNIA REPAIR  2017    Procedure: UMBILICAL HERNIA REPAIR WITH MESH;  Surgeon: Florencio Tavares M.D.;  Location: SURGERY [facility];  Service:     LIZ BY LAPAROSCOPY  2013    Performed by Florencio Tavares M.D. at SURGERY [facility]    KNEE ARTHROSCOPY      right knee ( times 4)    TONSILLECTOMY AND ADENOIDECTOMY      as a child    UMBILICAL HERNIA REPAIR         Social History     Socioeconomic History    Marital status:      Spouse name: Not on file    Number of children: Not on file    Years of education: Not on file    Highest education level: Bachelor's degree (e.g., BA, AB, BS)   Occupational History    Not on file   Tobacco Use    Smoking status: Never     Passive exposure: Never    Smokeless tobacco: Never   Vaping Use    Vaping status: Never Used   Substance and Sexual Activity    Alcohol use: Not Currently     Comment: 1-2/week    Drug use: No    Sexual activity: Yes     Partners: Male     Birth control/protection: Other-See Comments   Other Topics Concern    Not on file   Social History Narrative    Not on file     Social Drivers of Health     Financial Resource Strain: Low Risk  (2023)    Overall Financial Resource Strain (CARDIA)     Difficulty of Paying Living Expenses: Not hard at all   Food Insecurity: No Food Insecurity (2023)    Hunger Vital Sign     Worried About Running Out of Food in the Last Year: Never true     Ran Out of Food in the Last Year: Never true   Transportation Needs: No Transportation Needs (2023)    PRAPARE - Transportation     Lack of Transportation (Medical): No     Lack of Transportation (Non-Medical): No   Physical Activity:  Insufficiently Active (6/6/2023)    Exercise Vital Sign     Days of Exercise per Week: 2 days     Minutes of Exercise per Session: 60 min   Stress: No Stress Concern Present (6/6/2023)    [place] [employer] - Occupational Stress Questionnaire     Feeling of Stress : Not at all   Social Connections: Unknown (6/6/2023)    Social Connection and Isolation Panel [NHANES]     Frequency of Communication with Friends and Family: More than three times a week     Frequency of Social Gatherings with Friends and Family: Three times a week     Attends Zoroastrianism Services: Not on file     Active Member of Clubs or Organizations: No     Attends Club or Organization Meetings: Never     Marital Status:    Intimate Partner Violence: Not on file   Housing Stability: Unknown (6/6/2023)    Housing Stability Vital Sign     Unable to Pay for Housing in the Last Year: No     Number of Places Lived in the Last Year: Not on file     Unstable Housing in the Last Year: No          Family History   Problem Relation Age of Onset    Heart Disease Father     Hypertension Father     Alcohol abuse Father     Cancer Paternal Aunt         breast    Cancer Paternal Uncle         colon    Diabetes Maternal Grandfather     Cancer Paternal Grandmother         breast    Autoimmune Disease Paternal Grandmother     Lupus Mother     No Known Problems Brother     Diabetes Maternal Grandmother     Alzheimer's Disease Paternal Grandfather        Current Outpatient Medications on File Prior to Visit   Medication Sig Dispense Refill    omeprazole (PRILOSEC) 20 MG delayed-release capsule Take 1 capsule two times a day Orally twice a day 30 days 60 Capsule 3    albuterol 108 (90 Base) MCG/ACT Aero Soln inhalation aerosol Inhale 2 Puffs every 6 hours as needed for Shortness of Breath. 8.5 g 11    ipratropium-albuterol (DUONEB) 0.5-2.5 (3) MG/3ML nebulizer solution Take 3 mL by nebulization every four hours as needed for Shortness of Breath  "(Wheezing). 90 mL 11    montelukast (SINGULAIR) 10 MG Tab Take 1 Tablet by mouth every day. 90 Tablet 0    levothyroxine (SYNTHROID) 150 MCG Tab Take 1 Tablet by mouth every morning on an empty stomach. 90 Tablet 3    levothyroxine (SYNTHROID) 175 MCG Tab Take 1 Tablet by mouth every morning on an empty stomach. 30 Tablet 1    ondansetron (ZOFRAN) 8 MG Tab take 1 tablet as needed Orally Once a day 30 days 30 Tablet 2    omeprazole (PRILOSEC) 10 MG CAPSULE DELAYED RELEASE Take 1 capsule by mouth 2 times a day 60 Capsule 30    fluconazole (DIFLUCAN) 150 MG tablet Take 1 tablet by mouth as a single dose (Patient not taking: Reported on 12/9/2024) 1 Tablet 0     No current facility-administered medications on file prior to visit.       Allergies: Shellfish allergy and Iodine      Vitals:  /58 (BP Location: Right arm, Patient Position: Sitting, BP Cuff Size: Adult)   Pulse 91   Ht 1.702 m (5' 7\")   Wt 104 kg (230 lb)   SpO2 99%     Physical Exam:  Physical Exam  General - alert, oriented x4  HEENT - normocephalic, trachea midline  Cardiovascular - no JVD, RRR, s1, s2, RRR  Pulmonary - not in respiratory distress, CTAB, no wheezes, rhonchi or rales  Abdominal - gravid abdomen  Skin - hands appear normal, no rashes  Extremities - no lower extremity edema bilaterally  Psych - affect and mood appropriate    Laboratory Data:  As per HPI    Assessment/Plan:    1. Moderate persistent asthma without complication  predniSONE (DELTASONE) 20 MG Tab    fluticasone-umeclidinium-vilanterol (TRELEGY ELLIPTA) 200-62.5-25 mcg/act inhaler    fluticasone-umeclidinium-vilanterol (TRELEGY ELLIPTA) 200-62.5-25 mcg/act inhaler    Multiple Oximetry    Multiple Oximetry      2. 23 weeks gestation of pregnancy          Patient is doing okay currently, although was treated with a 10-day prednisone taper recently for mild asthma exacerbation (cough predominant).  She has already been uptitrated from Symbicort 160 to Breztri 160 this " pregnancy and I would like to further uptitrate her inhaled corticosteroid dose by transitioning from Breztri to Trelegy 200 1 puff daily.  Continue montelukast.  I will prescribe her 40 mg of prednisone for 5 days as an emergency precaution so that if she feels like she is entering into an exacerbation she should take the first dose of prednisone and notify us in the pulmonary clinic so that we can see her more quickly in clinic.  Her asthma previously worsened in the third trimester so she is entering a high risk time period in her pregnancy and should be followed closely.  I recommend she be seen at least every 4 weeks.  If she requires additional prednisone this pregnancy, we should consider initiating a biologic.  I would favor dupilumab, despite her TH2-low IgE/eosinophil profile, given dupilumab's proven efficacy in oral steroid dependent asthma regardless of TH2 profile.  Per review of literature, dupilumab has been studied in other nonasthmatic diseases during pregnancy and found to not have an increased risk profile.  As above.  We performed a 6-minute walk test in clinic today and patient did not demonstrate hypoxemia with exertion.    Note: 2 Trelegy 200 samples were provided in clinic today: both were from [id number]    Return in about 4 weeks (around 1/6/2025) for MD for asthma during pregnancy followup.     This note was generated using voice recognition software which has a chance of producing errors of grammar and possibly content.  I have made every reasonable attempt to find and correct any obvious errors, but it should be expected that some may not be found prior to finalization of this note.    Time spent in record review prior to patient arrival, reviewing results, and in face-to-face encounter totaled 35 min, excluding any procedures if performed.  __________  Freedom Pool MD  Pulmonary and Critical Care Medicine  [facility]

## 2024-12-10 PROBLEM — J45.40 MODERATE PERSISTENT ASTHMA WITHOUT COMPLICATION: Status: ACTIVE | Noted: 2023-06-07

## 2024-12-10 PROCEDURE — RXMED WILLOW AMBULATORY MEDICATION CHARGE: Performed by: STUDENT IN AN ORGANIZED HEALTH CARE EDUCATION/TRAINING PROGRAM

## 2024-12-10 PROCEDURE — RXMED WILLOW AMBULATORY MEDICATION CHARGE: Performed by: OBSTETRICS & GYNECOLOGY

## 2024-12-21 ENCOUNTER — PHARMACY VISIT (OUTPATIENT)
Dept: PHARMACY | Facility: MEDICAL CENTER | Age: 39
End: 2024-12-21
Payer: COMMERCIAL

## 2025-01-06 DIAGNOSIS — E06.3 HYPOTHYROIDISM DUE TO HASHIMOTO'S THYROIDITIS: ICD-10-CM

## 2025-01-09 ENCOUNTER — HOSPITAL ENCOUNTER (OUTPATIENT)
Dept: LAB | Facility: MEDICAL CENTER | Age: 40
End: 2025-01-09
Attending: STUDENT IN AN ORGANIZED HEALTH CARE EDUCATION/TRAINING PROGRAM
Payer: COMMERCIAL

## 2025-01-09 ENCOUNTER — HOSPITAL ENCOUNTER (OUTPATIENT)
Dept: LAB | Facility: MEDICAL CENTER | Age: 40
End: 2025-01-09
Attending: INTERNAL MEDICINE
Payer: COMMERCIAL

## 2025-01-09 DIAGNOSIS — E06.3 HYPOTHYROIDISM DUE TO HASHIMOTO'S THYROIDITIS: ICD-10-CM

## 2025-01-09 LAB
BASOPHILS # BLD AUTO: 0.2 % (ref 0–1.8)
BASOPHILS # BLD: 0.02 K/UL (ref 0–0.12)
BLD GP AB SCN SERPL QL: NORMAL
EOSINOPHIL # BLD AUTO: 0.03 K/UL (ref 0–0.51)
EOSINOPHIL NFR BLD: 0.3 % (ref 0–6.9)
ERYTHROCYTE [DISTWIDTH] IN BLOOD BY AUTOMATED COUNT: 46.5 FL (ref 35.9–50)
HCT VFR BLD AUTO: 35.9 % (ref 37–47)
HGB BLD-MCNC: 12 G/DL (ref 12–16)
IMM GRANULOCYTES # BLD AUTO: 0.06 K/UL (ref 0–0.11)
IMM GRANULOCYTES NFR BLD AUTO: 0.6 % (ref 0–0.9)
LYMPHOCYTES # BLD AUTO: 1.51 K/UL (ref 1–4.8)
LYMPHOCYTES NFR BLD: 15.3 % (ref 22–41)
MCH RBC QN AUTO: 29 PG (ref 27–33)
MCHC RBC AUTO-ENTMCNC: 33.4 G/DL (ref 32.2–35.5)
MCV RBC AUTO: 86.7 FL (ref 81.4–97.8)
MONOCYTES # BLD AUTO: 0.28 K/UL (ref 0–0.85)
MONOCYTES NFR BLD AUTO: 2.8 % (ref 0–13.4)
NEUTROPHILS # BLD AUTO: 7.98 K/UL (ref 1.82–7.42)
NEUTROPHILS NFR BLD: 80.8 % (ref 44–72)
NRBC # BLD AUTO: 0 K/UL
NRBC BLD-RTO: 0 /100 WBC (ref 0–0.2)
PLATELET # BLD AUTO: 132 K/UL (ref 164–446)
PMV BLD AUTO: 12.6 FL (ref 9–12.9)
RBC # BLD AUTO: 4.14 M/UL (ref 4.2–5.4)
T PALLIDUM AB SER QL IA: NONREACTIVE
WBC # BLD AUTO: 9.9 K/UL (ref 4.8–10.8)

## 2025-01-09 PROCEDURE — 84443 ASSAY THYROID STIM HORMONE: CPT

## 2025-01-09 PROCEDURE — 36415 COLL VENOUS BLD VENIPUNCTURE: CPT

## 2025-01-09 PROCEDURE — 86850 RBC ANTIBODY SCREEN: CPT

## 2025-01-09 PROCEDURE — 86780 TREPONEMA PALLIDUM: CPT

## 2025-01-09 PROCEDURE — 82950 GLUCOSE TEST: CPT

## 2025-01-09 PROCEDURE — 85025 COMPLETE CBC W/AUTO DIFF WBC: CPT

## 2025-01-10 LAB
GLUCOSE 1H P 50 G GLC PO SERPL-MCNC: 132 MG/DL (ref 70–139)
TSH SERPL-ACNC: 1.34 UIU/ML (ref 0.35–5.5)

## 2025-01-15 DIAGNOSIS — J45.51 SEVERE PERSISTENT ASTHMA WITH ACUTE EXACERBATION: ICD-10-CM

## 2025-01-15 PROCEDURE — RXMED WILLOW AMBULATORY MEDICATION CHARGE: Performed by: STUDENT IN AN ORGANIZED HEALTH CARE EDUCATION/TRAINING PROGRAM

## 2025-01-15 PROCEDURE — RXMED WILLOW AMBULATORY MEDICATION CHARGE: Performed by: FAMILY MEDICINE

## 2025-01-15 PROCEDURE — RXMED WILLOW AMBULATORY MEDICATION CHARGE

## 2025-01-15 PROCEDURE — RXMED WILLOW AMBULATORY MEDICATION CHARGE: Performed by: OBSTETRICS & GYNECOLOGY

## 2025-01-15 RX ORDER — MONTELUKAST SODIUM 10 MG/1
10 TABLET ORAL DAILY
Qty: 90 TABLET | Refills: 0 | Status: SHIPPED | OUTPATIENT
Start: 2025-01-15 | End: 2025-01-28 | Stop reason: SDUPTHER

## 2025-01-15 NOTE — TELEPHONE ENCOUNTER
Caller Name: Carolina Medrano                 Call Back Number: [phone] (home)         Patient approves a detailed voicemail message: N\A    Have we ever prescribed this med? Yes.  If yes, what date? 10/22/24 Carrie     Last OV: 12/9/24 Dobimalu     Next OV: 1/28/25    DX: Severe persistent asthma with acute exacerbation [J45.51]     Medications:  Requested Prescriptions     Pending Prescriptions Disp Refills    montelukast (SINGULAIR) 10 MG Tab 90 Tablet 0     Sig: Take 1 Tablet by mouth every day.

## 2025-01-17 ENCOUNTER — APPOINTMENT (OUTPATIENT)
Dept: MATERNAL FETAL MEDICINE | Facility: MEDICAL CENTER | Age: 40
End: 2025-01-17
Attending: OBSTETRICS & GYNECOLOGY
Payer: COMMERCIAL

## 2025-01-17 ENCOUNTER — APPOINTMENT (OUTPATIENT)
Dept: MEDICAL GROUP | Facility: CLINIC | Age: 40
End: 2025-01-17
Payer: COMMERCIAL

## 2025-01-17 ENCOUNTER — ANCILLARY PROCEDURE (OUTPATIENT)
Dept: MATERNAL FETAL MEDICINE | Facility: MEDICAL CENTER | Age: 40
End: 2025-01-17
Payer: COMMERCIAL

## 2025-01-17 ENCOUNTER — APPOINTMENT (OUTPATIENT)
Dept: OBGYN | Facility: CLINIC | Age: 40
End: 2025-01-17
Payer: COMMERCIAL

## 2025-01-17 VITALS
DIASTOLIC BLOOD PRESSURE: 81 MMHG | BODY MASS INDEX: 36.38 KG/M2 | WEIGHT: 232.3 LBS | SYSTOLIC BLOOD PRESSURE: 126 MMHG | HEART RATE: 95 BPM

## 2025-01-17 DIAGNOSIS — O09.521 SUPERVISION OF ELDERLY MULTIGRAVIDA (>=35 YEARS OLD AT TIME OF DELIVERY), FIRST TRIMESTER: ICD-10-CM

## 2025-01-17 PROCEDURE — 76816 OB US FOLLOW-UP PER FETUS: CPT | Performed by: OBSTETRICS & GYNECOLOGY

## 2025-01-17 ASSESSMENT — FIBROSIS 4 INDEX: FIB4 SCORE: 1.25

## 2025-01-24 ENCOUNTER — PHARMACY VISIT (OUTPATIENT)
Dept: PHARMACY | Facility: MEDICAL CENTER | Age: 40
End: 2025-01-24
Payer: COMMERCIAL

## 2025-01-28 ENCOUNTER — OFFICE VISIT (OUTPATIENT)
Dept: SLEEP MEDICINE | Facility: MEDICAL CENTER | Age: 40
End: 2025-01-28
Attending: INTERNAL MEDICINE
Payer: COMMERCIAL

## 2025-01-28 VITALS
WEIGHT: 234 LBS | SYSTOLIC BLOOD PRESSURE: 100 MMHG | HEART RATE: 101 BPM | BODY MASS INDEX: 36.73 KG/M2 | OXYGEN SATURATION: 97 % | DIASTOLIC BLOOD PRESSURE: 56 MMHG | HEIGHT: 67 IN

## 2025-01-28 DIAGNOSIS — J45.40 MODERATE PERSISTENT ASTHMA WITHOUT COMPLICATION: ICD-10-CM

## 2025-01-28 DIAGNOSIS — Z3A.31 31 WEEKS GESTATION OF PREGNANCY: ICD-10-CM

## 2025-01-28 DIAGNOSIS — R05.8 POST-VIRAL COUGH SYNDROME: ICD-10-CM

## 2025-01-28 PROCEDURE — 99215 OFFICE O/P EST HI 40 MIN: CPT | Performed by: INTERNAL MEDICINE

## 2025-01-28 PROCEDURE — 99211 OFF/OP EST MAY X REQ PHY/QHP: CPT | Performed by: INTERNAL MEDICINE

## 2025-01-28 RX ORDER — FLUTICASONE FUROATE, UMECLIDINIUM BROMIDE AND VILANTEROL TRIFENATATE 200; 62.5; 25 UG/1; UG/1; UG/1
1 POWDER RESPIRATORY (INHALATION) DAILY
Qty: 1 EACH | Refills: 11 | Status: SHIPPED | OUTPATIENT
Start: 2025-01-28 | End: 2025-01-30

## 2025-01-28 RX ORDER — MONTELUKAST SODIUM 10 MG/1
10 TABLET ORAL DAILY
Qty: 90 TABLET | Refills: 4 | Status: SHIPPED | OUTPATIENT
Start: 2025-01-28

## 2025-01-28 RX ORDER — MONTELUKAST SODIUM 10 MG/1
10 TABLET ORAL DAILY
Qty: 90 TABLET | Refills: 4 | Status: SHIPPED | OUTPATIENT
Start: 2025-01-28 | End: 2025-01-28

## 2025-01-28 ASSESSMENT — ENCOUNTER SYMPTOMS
COUGH: 1
HEMOPTYSIS: 0
WHEEZING: 0
SPUTUM PRODUCTION: 0
SHORTNESS OF BREATH: 0

## 2025-01-28 ASSESSMENT — FIBROSIS 4 INDEX: FIB4 SCORE: 1.25

## 2025-01-28 NOTE — PROGRESS NOTES
Pulmonary Clinic Note    Chief Complaint:  Chief Complaint   Patient presents with    Follow-Up     Last seen 24 w/ Dr. Pool  Moderate persistent asthma without complication     HPI:   Carolina Medrano is a very pleasant 39 y.o. female never smoker who returns to the pulmonary clinic for follow-up of moderate persistent asthma    Initial consultation in  when she was hospitalized for asthma exacerbation in her third trimester of her first pregnancy.  Laryngoscopy during hospitalization noted posterior glottic changes suggestive of reflux.  Since that hospitalization she is established in the pulmonary clinic.  She has a past medical history notable for hypothyroidism and IgA deficiency.    Breztri 160, Singulair, DuoNebs  Eosinophil 50  IgE 13    : FVC 4.07 L, 100%; FEV1 3.73 L, 111%; ratio 92%, no BD response. TLC 5.61 L, 103%; DLCO 138%    Interval events since last clinic visit in 2024:  Currently 31 weeks pregnant     At last visit was recovering from an asthma exacerbation for which she was treated with a 10-day course of prednisone.    Breztri uptitrated to Trelegy 200 to maximize ICS dose (not studied in pregnancy).  Continued on Singulair (studied and considered safe in pregnancy).    Got RSV over the holidays from her daughter, patient fortunately did well, however has had a persistent nasal congestion with associated cough that is worse with recumbency    Past Medical History:   Diagnosis Date    Cough     Pain 2017    abd., 1/10    Pregnancy 11/10/2022    7 weeks pregnant  Has appt to establish with OB soon  +N/V, taking pyridoxine and doxylamine for sx       Shortness of breath     Thyroid disease        Past Surgical History:   Procedure Laterality Date    PRIMARY C SECTION N/A 2023    Procedure: PRIMARY  SECTION;  Surgeon: Sarah Kang M.D.;  Location: SURGERY LAB[facility] DELIVERY;  Service: Labor and Delivery    UMBILICAL HERNIA REPAIR  2017    Procedure:  UMBILICAL HERNIA REPAIR WITH MESH;  Surgeon: Florencio Tavares M.D.;  Location: SURGERY [facility];  Service:     LIZ BY LAPAROSCOPY  5/1/2013    Performed by Florencio Tavares M.D. at SURGERY [facility]    KNEE ARTHROSCOPY      right knee ( times 4)    TONSILLECTOMY AND ADENOIDECTOMY      as a child    UMBILICAL HERNIA REPAIR         Social History     Socioeconomic History    Marital status:      Spouse name: Not on file    Number of children: Not on file    Years of education: Not on file    Highest education level: Bachelor's degree (e.g., BA, AB, BS)   Occupational History    Not on file   Tobacco Use    Smoking status: Never     Passive exposure: Never    Smokeless tobacco: Never   Vaping Use    Vaping status: Never Used   Substance and Sexual Activity    Alcohol use: Not Currently     Comment: 1-2/week    Drug use: No    Sexual activity: Yes     Partners: Male     Birth control/protection: Other-See Comments   Other Topics Concern    Not on file   Social History Narrative    Not on file     Social Drivers of Health     Financial Resource Strain: Low Risk  (6/6/2023)    Overall Financial Resource Strain (CARDIA)     Difficulty of Paying Living Expenses: Not hard at all   Food Insecurity: No Food Insecurity (6/6/2023)    Hunger Vital Sign     Worried About Running Out of Food in the Last Year: Never true     Ran Out of Food in the Last Year: Never true   Transportation Needs: No Transportation Needs (6/6/2023)    PRAPARE - Transportation     Lack of Transportation (Medical): No     Lack of Transportation (Non-Medical): No   Physical Activity: Insufficiently Active (6/6/2023)    Exercise Vital Sign     Days of Exercise per Week: 2 days     Minutes of Exercise per Session: 60 min   Stress: No Stress Concern Present (6/6/2023)    [place] of Occupational Health - Occupational Stress Questionnaire     Feeling of Stress : Not at all   Social Connections: Unknown (6/6/2023)    Social  Connection and Isolation Panel [NHANES]     Frequency of Communication with Friends and Family: More than three times a week     Frequency of Social Gatherings with Friends and Family: Three times a week     Attends Mormon Services: Not on file     Active Member of Clubs or Organizations: No     Attends Club or Organization Meetings: Never     Marital Status:    Intimate Partner Violence: Not on file   Housing Stability: Unknown (6/6/2023)    Housing Stability Vital Sign     Unable to Pay for Housing in the Last Year: No     Number of Places Lived in the Last Year: Not on file     Unstable Housing in the Last Year: No          Family History   Problem Relation Age of Onset    Heart Disease Father     Hypertension Father     Alcohol abuse Father     Cancer Paternal Aunt         breast    Cancer Paternal Uncle         colon    Diabetes Maternal Grandfather     Cancer Paternal Grandmother         breast    Autoimmune Disease Paternal Grandmother     Lupus Mother     No Known Problems Brother     Diabetes Maternal Grandmother     Alzheimer's Disease Paternal Grandfather        Current Outpatient Medications on File Prior to Visit   Medication Sig Dispense Refill    omeprazole (PRILOSEC) 20 MG delayed-release capsule Take 1 capsule two times a day Orally for 30 days 60 Capsule 3    predniSONE (DELTASONE) 20 MG Tab Take 2 Tablets by mouth every day. 10 Tablet 0    omeprazole (PRILOSEC) 20 MG delayed-release capsule Take 1 capsule two times a day Orally twice a day 30 days 60 Capsule 3    albuterol 108 (90 Base) MCG/ACT Aero Soln inhalation aerosol Inhale 2 Puffs every 6 hours as needed for Shortness of Breath. 8.5 g 11    ipratropium-albuterol (DUONEB) 0.5-2.5 (3) MG/3ML nebulizer solution Take 3 mL by nebulization every four hours as needed for Shortness of Breath (Wheezing). 90 mL 11    levothyroxine (SYNTHROID) 150 MCG Tab Take 1 Tablet by mouth every morning on an empty stomach. 90 Tablet 3    levothyroxine  "(SYNTHROID) 175 MCG Tab Take 1 Tablet by mouth every morning on an empty stomach. 30 Tablet 1    ondansetron (ZOFRAN) 8 MG Tab take 1 tablet as needed Orally Once a day 30 days 30 Tablet 2    omeprazole (PRILOSEC) 10 MG CAPSULE DELAYED RELEASE Take 1 capsule by mouth 2 times a day 60 Capsule 30    fluconazole (DIFLUCAN) 150 MG tablet Take 1 tablet by mouth as a single dose (Patient not taking: Reported on 1/28/2025) 1 Tablet 0     No current facility-administered medications on file prior to visit.       Allergies: Shellfish allergy and Iodine      ROS:   Review of Systems   HENT:  Positive for congestion.    Respiratory:  Positive for cough. Negative for hemoptysis, sputum production, shortness of breath and wheezing.    All other systems reviewed and are negative.    Vitals:  /56 (BP Location: Right arm, Patient Position: Sitting, BP Cuff Size: Adult)   Pulse (!) 101   Ht 1.702 m (5' 7\")   Wt 106 kg (234 lb)   SpO2 97%     Physical Exam:  Physical Exam  Vitals and nursing note reviewed.   Constitutional:       General: She is not in acute distress.     Appearance: Normal appearance. She is well-developed. She is not ill-appearing or diaphoretic.      Comments: Very pleasant, as always   HENT:      Nose: Congestion present.   Eyes:      General: No scleral icterus.        Right eye: No discharge.         Left eye: No discharge.      Conjunctiva/sclera: Conjunctivae normal.      Pupils: Pupils are equal, round, and reactive to light.   Neck:      Thyroid: No thyromegaly.      Vascular: No JVD.   Cardiovascular:      Rate and Rhythm: Normal rate and regular rhythm.      Heart sounds: Normal heart sounds. No murmur heard.     No gallop.   Pulmonary:      Effort: Pulmonary effort is normal. No respiratory distress.      Breath sounds: Normal breath sounds. No wheezing or rales.   Abdominal:      General: There is no distension.      Palpations: Abdomen is soft.      Tenderness: There is no abdominal " tenderness. There is no guarding.   Musculoskeletal:         General: No tenderness.   Lymphadenopathy:      Cervical: No cervical adenopathy.   Skin:     General: Skin is warm.      Capillary Refill: Capillary refill takes less than 2 seconds.      Findings: No erythema or rash.   Neurological:      Mental Status: She is alert and oriented to person, place, and time.      Cranial Nerves: No cranial nerve deficit.      Sensory: No sensory deficit.      Motor: No abnormal muscle tone.   Psychiatric:         Behavior: Behavior normal.       Laboratory Data:    PFTs as reviewed by me personally show:  See HPI    Imaging as reviewed by me personally show:    See HPI    Assessment/Plan:    1. Post-viral cough syndrome        2. Moderate persistent asthma without complication  fluticasone-umeclidinium-vilanterol (TRELEGY ELLIPTA) 200-62.5-25 mcg/act inhaler    montelukast (SINGULAIR) 10 MG Tab      3. 31 weeks gestation of pregnancy          Due to upper airway cough syndrome/postnasal drip.  Start sinus hygiene regimen with saline rinses/Flonase twice daily.  Continue Trelegy 200, Singulair, refills provided. If she requires additional prednisone this pregnancy, we should consider initiating a biologic.  Despite her TH2-low IgE/eosinophil profile, dupilumab has shown efficacy in oral steroid dependent asthma regardless of TH2 profile and has been studied in other nonasthmatic diseases during pregnancy and found to not have an increased risk profile.     Return in about 1 month (around 2/28/2025) for already scheduled.     This note was generated using voice recognition software which has a chance of producing errors of grammar and possibly content.  I have made every reasonable attempt to find and correct any obvious errors, but it should be expected that some may not be found prior to finalization of this note.    Time spent in record review prior to patient arrival, reviewing results, and in face-to-face encounter  totaled 42 min, excluding any procedures if performed.  __________  Luke Loaiza MD  Pulmonary and Critical Care Medicine  [facility]

## 2025-01-29 ENCOUNTER — TELEPHONE (OUTPATIENT)
Dept: PHARMACY | Facility: MEDICAL CENTER | Age: 40
End: 2025-01-29
Payer: COMMERCIAL

## 2025-01-29 DIAGNOSIS — J45.40 MODERATE PERSISTENT ASTHMA WITHOUT COMPLICATION: ICD-10-CM

## 2025-01-29 NOTE — TELEPHONE ENCOUNTER
Received [employer] request via [employer]  for fluticasone-umeclidinium-vilanterol (TRELEGY ELLIPTA) 200-62.5-25 mcg/act inhaler . (Quantity:60, Day Supply:30)     Insurance: [employer]  Member ID:  [id number]  BIN: [id number]  PCN: [facility]  Group: [id number]     Ran Test claim via [place] & medication Rejects stating prior authorization is required.    Preferred product for insurance is Brezti. Is Breztri and acceptable change? Please advise.

## 2025-01-30 NOTE — TELEPHONE ENCOUNTER
Josefa Jacobson, PhT  You; Luke Loaiza M.D.2 hours ago (7:58 AM)     BP  Thank you!     Luke Loaiza M.D.  You; Josefa Jacobson, PhT3 hours ago (7:04 AM)       Orders Placed This Encounter      Budeson-Glycopyrrol-Formoterol 160-9-4.8 MCG/ACT Aerosol    Done, thanks!

## 2025-02-10 DIAGNOSIS — E06.3 HYPOTHYROIDISM DUE TO HASHIMOTO'S THYROIDITIS: ICD-10-CM

## 2025-02-11 ENCOUNTER — ANCILLARY PROCEDURE (OUTPATIENT)
Dept: MATERNAL FETAL MEDICINE | Facility: MEDICAL CENTER | Age: 40
End: 2025-02-11
Payer: COMMERCIAL

## 2025-02-11 VITALS
BODY MASS INDEX: 36.43 KG/M2 | SYSTOLIC BLOOD PRESSURE: 128 MMHG | HEART RATE: 87 BPM | WEIGHT: 232.6 LBS | DIASTOLIC BLOOD PRESSURE: 75 MMHG

## 2025-02-11 DIAGNOSIS — O35.BXX0 FETAL CARDIAC ANOMALY COMPLICATING PREGNANCY, ANTEPARTUM, NOT APPLICABLE OR UNSPECIFIED FETUS: ICD-10-CM

## 2025-02-11 DIAGNOSIS — Z3A.32 32 WEEKS GESTATION OF PREGNANCY: ICD-10-CM

## 2025-02-11 DIAGNOSIS — O09.523 AMA (ADVANCED MATERNAL AGE) MULTIGRAVIDA 35+, THIRD TRIMESTER: ICD-10-CM

## 2025-02-11 DIAGNOSIS — R76.8 SS-A ANTIBODY POSITIVE: ICD-10-CM

## 2025-02-11 DIAGNOSIS — O09.521 SUPERVISION OF ELDERLY MULTIGRAVIDA (>=35 YEARS OLD AT TIME OF DELIVERY), FIRST TRIMESTER: ICD-10-CM

## 2025-02-11 PROCEDURE — 76816 OB US FOLLOW-UP PER FETUS: CPT | Performed by: OBSTETRICS & GYNECOLOGY

## 2025-02-11 ASSESSMENT — FIBROSIS 4 INDEX: FIB4 SCORE: 1.25

## 2025-02-24 ENCOUNTER — ANCILLARY PROCEDURE (OUTPATIENT)
Dept: MATERNAL FETAL MEDICINE | Facility: MEDICAL CENTER | Age: 40
End: 2025-02-24
Payer: COMMERCIAL

## 2025-02-24 VITALS
BODY MASS INDEX: 36.32 KG/M2 | DIASTOLIC BLOOD PRESSURE: 72 MMHG | SYSTOLIC BLOOD PRESSURE: 118 MMHG | HEART RATE: 94 BPM | WEIGHT: 231.9 LBS

## 2025-02-24 DIAGNOSIS — O09.523 AMA (ADVANCED MATERNAL AGE) MULTIGRAVIDA 35+, THIRD TRIMESTER: ICD-10-CM

## 2025-02-24 DIAGNOSIS — R76.8 SS-A ANTIBODY POSITIVE: ICD-10-CM

## 2025-02-24 DIAGNOSIS — O09.522 AMA (ADVANCED MATERNAL AGE) MULTIGRAVIDA 35+, SECOND TRIMESTER: ICD-10-CM

## 2025-02-24 DIAGNOSIS — Z3A.34 34 WEEKS GESTATION OF PREGNANCY: ICD-10-CM

## 2025-02-24 PROCEDURE — 76815 OB US LIMITED FETUS(S): CPT | Performed by: OBSTETRICS & GYNECOLOGY

## 2025-02-24 ASSESSMENT — FIBROSIS 4 INDEX: FIB4 SCORE: 1.25

## 2025-02-27 ASSESSMENT — ENCOUNTER SYMPTOMS
WHEEZING: 0
SPUTUM PRODUCTION: 0
SHORTNESS OF BREATH: 0
HEMOPTYSIS: 0

## 2025-02-27 NOTE — PROGRESS NOTES
Pulmonary Clinic Note    Chief Complaint:  Chief Complaint   Patient presents with    Follow-Up     Last seen 25 w/ Dr. OXANA Loaiza  Post-viral cough syndrome, Moderate persistent asthma without complication     HPI:   Carolina Medrano is a very pleasant 39 y.o. female never smoker who returns to the pulmonary clinic for follow-up of moderate persistent asthma    : hospitalized for asthma exacerbation in her third trimester of her first pregnancy.  Laryngoscopy during hospitalization noted posterior glottic changes suggestive of reflux.  Since that hospitalization she is established in the pulmonary clinic.      Rx:  Trelegy 200, Singulair, DuoNebs  AEC 50  IgE 13    : FVC 4.07 L, 100%; FEV1 3.73 L, 111%; ratio 92%, no BD response. TLC 5.61 L, 103%; DLCO 138%    PMH: hypothyroidism and IgA deficiency    Interval events since last clinic visit in 2025:  Currently 35 weeks pregnant, baby girl #2,  scheduled 3/27/25    Last visit reporting persistent nasal congestion with associated cough that is worse with recumbency -> advised starting sinus hygiene regimen/flonase which has helped tremendously, complete resolution of symptoms    Compliant with Trelegy, no albuterol use. Trelegy 200 to maximize ICS dose (not studied in pregnancy/breastfeeding).    Continued on Singulair (studied and considered safe in pregnancy, adverse events were not reported in the mothers or  infants (Britney 2017). ).    Past Medical History:   Diagnosis Date    Pain 2017    abd., 1/10    Pregnancy 11/10/2022    7 weeks pregnant  Has appt to establish with OB soon  +N/V, taking pyridoxine and doxylamine for sx       Thyroid disease        Past Surgical History:   Procedure Laterality Date    PRIMARY C SECTION N/A 2023    Procedure: PRIMARY  SECTION;  Surgeon: Sarah Kang M.D.;  Location: SURGERY LAB[facility] DELIVERY;  Service: Labor and Delivery    UMBILICAL HERNIA REPAIR  2017     Procedure: UMBILICAL HERNIA REPAIR WITH MESH;  Surgeon: Florencio Tavares M.D.;  Location: SURGERY [facility];  Service:     LIZ BY LAPAROSCOPY  5/1/2013    Performed by Florencio Tavares M.D. at SURGERY [facility]    KNEE ARTHROSCOPY      right knee ( times 4)    TONSILLECTOMY AND ADENOIDECTOMY      as a child    UMBILICAL HERNIA REPAIR         Social History     Socioeconomic History    Marital status:      Spouse name: Not on file    Number of children: Not on file    Years of education: Not on file    Highest education level: Bachelor's degree (e.g., BA, AB, BS)   Occupational History    Not on file   Tobacco Use    Smoking status: Never     Passive exposure: Never    Smokeless tobacco: Never   Vaping Use    Vaping status: Never Used   Substance and Sexual Activity    Alcohol use: Not Currently     Comment: 1-2/week    Drug use: No    Sexual activity: Yes     Partners: Male     Birth control/protection: Other-See Comments   Other Topics Concern    Not on file   Social History Narrative    Not on file     Social Drivers of Health     Financial Resource Strain: Low Risk  (6/6/2023)    Overall Financial Resource Strain (CARDIA)     Difficulty of Paying Living Expenses: Not hard at all   Food Insecurity: No Food Insecurity (6/6/2023)    Hunger Vital Sign     Worried About Running Out of Food in the Last Year: Never true     Ran Out of Food in the Last Year: Never true   Transportation Needs: No Transportation Needs (6/6/2023)    PRAPARE - Transportation     Lack of Transportation (Medical): No     Lack of Transportation (Non-Medical): No   Physical Activity: Insufficiently Active (6/6/2023)    Exercise Vital Sign     Days of Exercise per Week: 2 days     Minutes of Exercise per Session: 60 min   Stress: No Stress Concern Present (6/6/2023)    [employer] - Occupational Stress Questionnaire     Feeling of Stress : Not at all   Social Connections: Unknown  (6/6/2023)    Social Connection and Isolation Panel [NHANES]     Frequency of Communication with Friends and Family: More than three times a week     Frequency of Social Gatherings with Friends and Family: Three times a week     Attends Taoism Services: Not on file     Active Member of Clubs or Organizations: No     Attends Club or Organization Meetings: Never     Marital Status:    Intimate Partner Violence: Not on file   Housing Stability: Unknown (6/6/2023)    Housing Stability Vital Sign     Unable to Pay for Housing in the Last Year: No     Number of Places Lived in the Last Year: Not on file     Unstable Housing in the Last Year: No          Family History   Problem Relation Age of Onset    Heart Disease Father     Hypertension Father     Alcohol abuse Father     Cancer Paternal Aunt         breast    Cancer Paternal Uncle         colon    Diabetes Maternal Grandfather     Cancer Paternal Grandmother         breast    Autoimmune Disease Paternal Grandmother     Lupus Mother     No Known Problems Brother     Diabetes Maternal Grandmother     Alzheimer's Disease Paternal Grandfather        Current Outpatient Medications on File Prior to Visit   Medication Sig Dispense Refill    Budeson-Glycopyrrol-Formoterol 160-9-4.8 MCG/ACT Aerosol Inhale 2 Inhalations 2 times a day. Rinse mouth after use 10.7 g 5    montelukast (SINGULAIR) 10 MG Tab Take 1 Tablet by mouth every day. 90 Tablet 4    omeprazole (PRILOSEC) 20 MG delayed-release capsule Take 1 capsule two times a day Orally for 30 days 60 Capsule 3    predniSONE (DELTASONE) 20 MG Tab Take 2 Tablets by mouth every day. 10 Tablet 0    fluconazole (DIFLUCAN) 150 MG tablet Take 1 tablet by mouth as a single dose 1 Tablet 0    omeprazole (PRILOSEC) 20 MG delayed-release capsule Take 1 capsule two times a day Orally twice a day 30 days 60 Capsule 3    albuterol 108 (90 Base) MCG/ACT Aero Soln inhalation aerosol Inhale 2 Puffs every 6 hours as needed for  "Shortness of Breath. 8.5 g 11    ipratropium-albuterol (DUONEB) 0.5-2.5 (3) MG/3ML nebulizer solution Take 3 mL by nebulization every four hours as needed for Shortness of Breath (Wheezing). 90 mL 11    levothyroxine (SYNTHROID) 150 MCG Tab Take 1 Tablet by mouth every morning on an empty stomach. 90 Tablet 3    levothyroxine (SYNTHROID) 175 MCG Tab Take 1 Tablet by mouth every morning on an empty stomach. 30 Tablet 1    ondansetron (ZOFRAN) 8 MG Tab take 1 tablet as needed Orally Once a day 30 days 30 Tablet 2    omeprazole (PRILOSEC) 10 MG CAPSULE DELAYED RELEASE Take 1 capsule by mouth 2 times a day 60 Capsule 30     No current facility-administered medications on file prior to visit.       Allergies: Shellfish allergy and Iodine      ROS:   Review of Systems   HENT:  Negative for congestion.    Respiratory:  Negative for cough, hemoptysis, sputum production, shortness of breath and wheezing.    All other systems reviewed and are negative.    Vitals:  /66 (BP Location: Left arm, Patient Position: Sitting, BP Cuff Size: Adult)   Pulse 100   Ht 1.702 m (5' 7\")   Wt 106 kg (233 lb)   SpO2 98%     Physical Exam:  Physical Exam  Vitals and nursing note reviewed.   Constitutional:       General: She is not in acute distress.     Appearance: Normal appearance. She is well-developed. She is not ill-appearing or diaphoretic.      Comments: Very pleasant, as always   HENT:      Nose: No congestion.   Eyes:      General: No scleral icterus.        Right eye: No discharge.         Left eye: No discharge.      Conjunctiva/sclera: Conjunctivae normal.      Pupils: Pupils are equal, round, and reactive to light.   Neck:      Thyroid: No thyromegaly.      Vascular: No JVD.   Cardiovascular:      Rate and Rhythm: Normal rate and regular rhythm.      Heart sounds: Normal heart sounds. No murmur heard.     No gallop.   Pulmonary:      Effort: Pulmonary effort is normal. No respiratory distress.      Breath sounds: Normal " breath sounds. No wheezing or rales.   Abdominal:      General: There is no distension.      Palpations: Abdomen is soft.      Tenderness: There is no abdominal tenderness. There is no guarding.      Comments: Gravid   Musculoskeletal:         General: No tenderness.   Lymphadenopathy:      Cervical: No cervical adenopathy.   Skin:     General: Skin is warm.      Capillary Refill: Capillary refill takes less than 2 seconds.      Findings: No erythema or rash.   Neurological:      Mental Status: She is alert and oriented to person, place, and time.      Cranial Nerves: No cranial nerve deficit.      Sensory: No sensory deficit.      Motor: No abnormal muscle tone.   Psychiatric:         Behavior: Behavior normal.       Laboratory Data:    PFTs as reviewed by me personally show:  See HPI    Imaging as reviewed by me personally show:    See HPI    Assessment/Plan:    1. Post-viral cough syndrome        2. Moderate persistent asthma without complication        3. 35 weeks gestation of pregnancy          Due to upper airway cough syndrome/postnasal drip following URI.  Resolved with sinus hygiene regimen, continue.  Continue Trelegy 200, Singulair, refills provided. If she requires additional prednisone this pregnancy, we should consider initiating a biologic.  Despite her TH2-low IgE/eosinophil profile, dupilumab has shown efficacy in oral steroid dependent asthma regardless of TH2 profile and has been studied in other nonasthmatic diseases during pregnancy and found to not have an increased risk profile. Discussed FDA black box warning re: singulair, plans to continue med as tolerating well.   scheduled 3/27/2025    Return for as scheduled w/ Dr Fleming 3/13.     This note was generated using voice recognition software which has a chance of producing errors of grammar and possibly content.  I have made every reasonable attempt to find and correct any obvious errors, but it should be expected that some may  not be found prior to finalization of this note.    Time spent in record review prior to patient arrival, reviewing results, and in face-to-face encounter totaled 32 min, excluding any procedures if performed.  __________  Luke Loaiza MD  Pulmonary and Critical Care Medicine  [facility]

## 2025-02-28 ENCOUNTER — OFFICE VISIT (OUTPATIENT)
Dept: SLEEP MEDICINE | Facility: MEDICAL CENTER | Age: 40
End: 2025-02-28
Attending: INTERNAL MEDICINE
Payer: COMMERCIAL

## 2025-02-28 VITALS
BODY MASS INDEX: 36.57 KG/M2 | HEIGHT: 67 IN | HEART RATE: 100 BPM | OXYGEN SATURATION: 98 % | SYSTOLIC BLOOD PRESSURE: 114 MMHG | WEIGHT: 233 LBS | DIASTOLIC BLOOD PRESSURE: 66 MMHG

## 2025-02-28 DIAGNOSIS — J45.40 MODERATE PERSISTENT ASTHMA WITHOUT COMPLICATION: ICD-10-CM

## 2025-02-28 DIAGNOSIS — Z3A.35 35 WEEKS GESTATION OF PREGNANCY: ICD-10-CM

## 2025-02-28 DIAGNOSIS — R05.8 POST-VIRAL COUGH SYNDROME: ICD-10-CM

## 2025-02-28 PROCEDURE — 99212 OFFICE O/P EST SF 10 MIN: CPT | Performed by: INTERNAL MEDICINE

## 2025-02-28 ASSESSMENT — FIBROSIS 4 INDEX: FIB4 SCORE: 1.25

## 2025-02-28 ASSESSMENT — ENCOUNTER SYMPTOMS: COUGH: 0

## 2025-03-03 ENCOUNTER — APPOINTMENT (OUTPATIENT)
Dept: ADMISSIONS | Facility: MEDICAL CENTER | Age: 40
End: 2025-03-03
Attending: OBSTETRICS & GYNECOLOGY
Payer: COMMERCIAL

## 2025-03-04 ENCOUNTER — TELEPHONE (OUTPATIENT)
Dept: OBGYN | Facility: MEDICAL CENTER | Age: 40
End: 2025-03-04
Payer: COMMERCIAL

## 2025-03-04 PROCEDURE — RXMED WILLOW AMBULATORY MEDICATION CHARGE: Performed by: STUDENT IN AN ORGANIZED HEALTH CARE EDUCATION/TRAINING PROGRAM

## 2025-03-04 RX ORDER — PANTOPRAZOLE SODIUM 40 MG/1
40 TABLET, DELAYED RELEASE ORAL DAILY
Qty: 30 TABLET | Refills: 1 | Status: ON HOLD | OUTPATIENT
Start: 2025-03-04 | End: 2025-03-28

## 2025-03-05 NOTE — TELEPHONE ENCOUNTER
Patient called reporting refractory acid reflux. Requesting new prescription. Rx to pharmacy.    Diana Herrera MD MPH

## 2025-03-07 ENCOUNTER — PHARMACY VISIT (OUTPATIENT)
Dept: PHARMACY | Facility: MEDICAL CENTER | Age: 40
End: 2025-03-07
Payer: COMMERCIAL

## 2025-03-10 ENCOUNTER — PRE-ADMISSION TESTING (OUTPATIENT)
Dept: ADMISSIONS | Facility: MEDICAL CENTER | Age: 40
End: 2025-03-10
Attending: OBSTETRICS & GYNECOLOGY
Payer: COMMERCIAL

## 2025-03-10 PROCEDURE — RXMED WILLOW AMBULATORY MEDICATION CHARGE

## 2025-03-10 RX ORDER — ASPIRIN 81 MG/1
81 TABLET, CHEWABLE ORAL DAILY
Status: ON HOLD | COMMUNITY
End: 2025-03-28

## 2025-03-10 RX ORDER — FLUTICASONE FUROATE, UMECLIDINIUM BROMIDE AND VILANTEROL TRIFENATATE 200; 62.5; 25 UG/1; UG/1; UG/1
1 POWDER RESPIRATORY (INHALATION) DAILY
COMMUNITY
End: 2025-03-13 | Stop reason: SDUPTHER

## 2025-03-12 ENCOUNTER — ANCILLARY PROCEDURE (OUTPATIENT)
Dept: MATERNAL FETAL MEDICINE | Facility: MEDICAL CENTER | Age: 40
End: 2025-03-12
Attending: OBSTETRICS & GYNECOLOGY
Payer: COMMERCIAL

## 2025-03-12 VITALS
HEART RATE: 88 BPM | WEIGHT: 235.8 LBS | DIASTOLIC BLOOD PRESSURE: 82 MMHG | SYSTOLIC BLOOD PRESSURE: 129 MMHG | BODY MASS INDEX: 36.93 KG/M2

## 2025-03-12 DIAGNOSIS — O09.522 AMA (ADVANCED MATERNAL AGE) MULTIGRAVIDA 35+, SECOND TRIMESTER: ICD-10-CM

## 2025-03-12 DIAGNOSIS — Z3A.36 36 WEEKS GESTATION OF PREGNANCY: ICD-10-CM

## 2025-03-12 DIAGNOSIS — R76.8 SS-A ANTIBODY POSITIVE: ICD-10-CM

## 2025-03-12 DIAGNOSIS — O09.523 AMA (ADVANCED MATERNAL AGE) MULTIGRAVIDA 35+, THIRD TRIMESTER: ICD-10-CM

## 2025-03-12 DIAGNOSIS — E06.3 HASHIMOTO THYROIDITIS: ICD-10-CM

## 2025-03-12 PROCEDURE — 76816 OB US FOLLOW-UP PER FETUS: CPT | Performed by: OBSTETRICS & GYNECOLOGY

## 2025-03-12 PROCEDURE — RXMED WILLOW AMBULATORY MEDICATION CHARGE: Performed by: OBSTETRICS & GYNECOLOGY

## 2025-03-12 ASSESSMENT — FIBROSIS 4 INDEX: FIB4 SCORE: 1.25

## 2025-03-13 ENCOUNTER — PHARMACY VISIT (OUTPATIENT)
Dept: PHARMACY | Facility: MEDICAL CENTER | Age: 40
End: 2025-03-13
Payer: COMMERCIAL

## 2025-03-13 ENCOUNTER — OFFICE VISIT (OUTPATIENT)
Dept: SLEEP MEDICINE | Facility: MEDICAL CENTER | Age: 40
End: 2025-03-13
Attending: INTERNAL MEDICINE
Payer: COMMERCIAL

## 2025-03-13 VITALS
DIASTOLIC BLOOD PRESSURE: 62 MMHG | SYSTOLIC BLOOD PRESSURE: 114 MMHG | HEIGHT: 67 IN | BODY MASS INDEX: 36.88 KG/M2 | WEIGHT: 235 LBS | OXYGEN SATURATION: 99 % | HEART RATE: 90 BPM

## 2025-03-13 DIAGNOSIS — J45.50 SEVERE PERSISTENT ASTHMA WITHOUT COMPLICATION: ICD-10-CM

## 2025-03-13 PROBLEM — J45.909 REACTIVE AIRWAY DISEASE WITHOUT COMPLICATION: Status: RESOLVED | Noted: 2023-04-20 | Resolved: 2025-03-13

## 2025-03-13 PROBLEM — R05.9 COUGH IN ADULT PATIENT: Status: RESOLVED | Noted: 2023-04-20 | Resolved: 2025-03-13

## 2025-03-13 PROBLEM — J45.40 MODERATE PERSISTENT ASTHMA WITHOUT COMPLICATION: Status: RESOLVED | Noted: 2023-06-07 | Resolved: 2025-03-13

## 2025-03-13 PROBLEM — J45.30 MILD PERSISTENT ASTHMA WITHOUT COMPLICATION: Status: RESOLVED | Noted: 2023-06-20 | Resolved: 2025-03-13

## 2025-03-13 PROCEDURE — 3078F DIAST BP <80 MM HG: CPT | Performed by: INTERNAL MEDICINE

## 2025-03-13 PROCEDURE — 99213 OFFICE O/P EST LOW 20 MIN: CPT | Performed by: INTERNAL MEDICINE

## 2025-03-13 PROCEDURE — 3074F SYST BP LT 130 MM HG: CPT | Performed by: INTERNAL MEDICINE

## 2025-03-13 PROCEDURE — 99214 OFFICE O/P EST MOD 30 MIN: CPT | Performed by: INTERNAL MEDICINE

## 2025-03-13 PROCEDURE — RXMED WILLOW AMBULATORY MEDICATION CHARGE: Performed by: INTERNAL MEDICINE

## 2025-03-13 RX ORDER — FLUTICASONE FUROATE, UMECLIDINIUM BROMIDE AND VILANTEROL TRIFENATATE 200; 62.5; 25 UG/1; UG/1; UG/1
1 POWDER RESPIRATORY (INHALATION) DAILY
Qty: 60 EACH | Refills: 11 | Status: SHIPPED | OUTPATIENT
Start: 2025-03-13 | End: 2025-04-17

## 2025-03-13 RX ORDER — FLUTICASONE FUROATE, UMECLIDINIUM BROMIDE AND VILANTEROL TRIFENATATE 200; 62.5; 25 UG/1; UG/1; UG/1
1 POWDER RESPIRATORY (INHALATION) DAILY
Qty: 1 EACH | Refills: 11 | COMMUNITY
Start: 2025-03-13

## 2025-03-13 ASSESSMENT — ENCOUNTER SYMPTOMS
NAUSEA: 0
HEMOPTYSIS: 0
VOMITING: 0
SPUTUM PRODUCTION: 0
SHORTNESS OF BREATH: 0
WHEEZING: 0
COUGH: 0

## 2025-03-13 ASSESSMENT — FIBROSIS 4 INDEX: FIB4 SCORE: 1.25

## 2025-03-13 NOTE — ASSESSMENT & PLAN NOTE
Patient doing much better during this pregnancy on Singulair and Trelegy    Continue with Trelegy 200/62.5/25 mcg per actuation daily  Continue with DuoNebs as needed  Continue with albuterol as needed  Continue with Singulair  At this time, patient is stable and we will not initiate biologic therapy  Scheduled for her  on 3/27/2025

## 2025-03-13 NOTE — PROGRESS NOTES
Pulmonary Clinic Note    Chief Complaint:  Chief Complaint   Patient presents with    Follow-Up     Last seen 25 w/ Dr. OXANA Loaiza  Dx: Post-viral cough syndrome, Moderate persistent asthma without complication     HPI:   Carolina Medrano is a very pleasant female never smoker who returns to the pulmonary clinic for follow-up of severe persistent asthma  She has a past medical history notable for hypothyroidism and IgA deficiency.    Eosinophil 50  IgE 2023: FVC 4.07 L, 100%; FEV1 3.73 L, 111%; ratio 92%, no BD response. TLC 5.61 L, 103%; DLCO 138%    3/13/2025  Patient returns for follow-up.  She is 38 weeks pregnant and doing very well from an asthma perspective.  She does need a refill of her Trelegy, and this has significantly improved her symptoms compared to Breztri.  She continues on Singulair and with treatment for GERD.   is planned for 3/27/2025      Past Medical History:   Diagnosis Date    Asthma     Pain 2017    abd., 1/10    Pregnancy 11/10/2022    7 weeks pregnant  Has appt to establish with OB soon  +N/V, taking pyridoxine and doxylamine for sx       Thyroid disease        Current Outpatient Medications on File Prior to Visit   Medication Sig Dispense Refill    fluticasone-umeclidinium-vilanterol (TRELEGY ELLIPTA) 200-62.5-25 mcg/act inhaler Inhale 1 Puff every day.      aspirin (ASA) 81 MG Chew Tab chewable tablet Chew 81 mg every day.      Prenatal MV-Min-Fe Fum-FA-DHA (PRENATAL 1 PO) Take 1 Tablet by mouth every day.      fluconazole (DIFLUCAN) 150 MG tablet 1 tablet Orally once for 1 day 1 Tablet 0    pantoprazole (PROTONIX) 40 MG Tablet Delayed Response Take 1 Tablet by mouth every day. 30 Tablet 1    Budeson-Glycopyrrol-Formoterol 160-9-4.8 MCG/ACT Aerosol Inhale 2 Inhalations 2 times a day. Rinse mouth after use (Patient not taking: Reported on 3/10/2025) 10.7 g 5    montelukast (SINGULAIR) 10 MG Tab Take 1 Tablet by mouth every day. (Patient not taking: Reported on  3/10/2025) 90 Tablet 4    omeprazole (PRILOSEC) 20 MG delayed-release capsule Take 1 capsule two times a day Orally for 30 days (Patient not taking: Reported on 3/10/2025) 60 Capsule 3    predniSONE (DELTASONE) 20 MG Tab Take 2 Tablets by mouth every day. (Patient not taking: Reported on 3/10/2025) 10 Tablet 0    fluconazole (DIFLUCAN) 150 MG tablet Take 1 tablet by mouth as a single dose (Patient not taking: Reported on 3/10/2025) 1 Tablet 0    omeprazole (PRILOSEC) 20 MG delayed-release capsule Take 1 capsule two times a day Orally twice a day 30 days (Patient taking differently: Take 20 mg by mouth every day.) 60 Capsule 3    albuterol 108 (90 Base) MCG/ACT Aero Soln inhalation aerosol Inhale 2 Puffs every 6 hours as needed for Shortness of Breath. 8.5 g 11    ipratropium-albuterol (DUONEB) 0.5-2.5 (3) MG/3ML nebulizer solution Take 3 mL by nebulization every four hours as needed for Shortness of Breath (Wheezing). (Patient not taking: Reported on 3/10/2025) 90 mL 11    levothyroxine (SYNTHROID) 150 MCG Tab Take 1 Tablet by mouth every morning on an empty stomach. 90 Tablet 3    levothyroxine (SYNTHROID) 175 MCG Tab Take 1 Tablet by mouth every morning on an empty stomach. (Patient taking differently: Take 175 mcg by mouth every morning on an empty stomach. Takes 2 days out of week) 30 Tablet 1    ondansetron (ZOFRAN) 8 MG Tab take 1 tablet as needed Orally Once a day 30 days (Patient taking differently: Take 8 mg by mouth every 8 hours as needed.) 30 Tablet 2    omeprazole (PRILOSEC) 10 MG CAPSULE DELAYED RELEASE Take 1 capsule by mouth 2 times a day 60 Capsule 30     No current facility-administered medications on file prior to visit.       Allergies: Shellfish allergy and Iodine    Review of Systems   HENT:  Negative for congestion.    Respiratory:  Negative for cough, hemoptysis, sputum production, shortness of breath and wheezing.    Gastrointestinal:  Negative for nausea and vomiting.   All other systems  "reviewed and are negative.    Vitals:  /62 (BP Location: Right arm, Patient Position: Sitting, BP Cuff Size: Adult)   Pulse 90   Ht 1.702 m (5' 7\")   Wt 107 kg (235 lb)   SpO2 99%     Physical Exam  Vitals and nursing note reviewed.   Constitutional:       General: She is not in acute distress.     Appearance: Normal appearance. She is well-developed. She is not ill-appearing or diaphoretic.      Comments: Very pleasant, as always   Eyes:      General: No scleral icterus.        Right eye: No discharge.         Left eye: No discharge.      Conjunctiva/sclera: Conjunctivae normal.   Neck:      Thyroid: No thyromegaly.      Vascular: No JVD.   Cardiovascular:      Rate and Rhythm: Normal rate and regular rhythm.      Heart sounds: Normal heart sounds. No murmur heard.     No gallop.   Pulmonary:      Effort: Pulmonary effort is normal. No respiratory distress.      Breath sounds: Normal breath sounds. No wheezing or rales.   Abdominal:      General: There is no distension.      Palpations: Abdomen is soft.      Tenderness: There is no abdominal tenderness. There is no guarding.   Musculoskeletal:         General: No tenderness.   Skin:     General: Skin is warm.      Capillary Refill: Capillary refill takes less than 2 seconds.      Findings: No erythema or rash.   Neurological:      Mental Status: She is alert and oriented to person, place, and time.      Cranial Nerves: No cranial nerve deficit.      Sensory: No sensory deficit.      Motor: No abnormal muscle tone.   Psychiatric:         Behavior: Behavior normal.       Laboratory Data:    PFTs as reviewed by me personally show:  See HPI    Imaging as reviewed by me personally show:    See HPI    Assessment/Plan:    Problem List Items Addressed This Visit       Severe persistent asthma without complication    Patient doing much better during this pregnancy on Singulair and Trelegy    Continue with Trelegy 200/62.5/25 mcg per actuation daily  Continue with " DuoNebs as needed  Continue with albuterol as needed  Continue with Singulair  At this time, patient is stable and we will not initiate biologic therapy  Scheduled for her  on 3/27/2025         Relevant Medications    fluticasone-umeclidinium-vilanterol (TRELEGY ELLIPTA) 200-62.5-25 mcg/act inhaler    fluticasone-umeclidinium-vilanterol (TRELEGY ELLIPTA) 200-62.5-25 mcg/act inhaler     Return in about 4 weeks (around 4/10/2025).     Time spent in record review prior to patient arrival, reviewing results, and in face-to-face encounter totaled 40 min, excluding any procedures if performed.    __________  This note was generated using voice recognition software which has a chance of producing errors of grammar and content.  I have made every reasonable attempt to find and correct any errors, but it should be expected that some may not be found prior to finalization of this note.    Adriana Fleming, DO   Pulmonary and Critical Care

## 2025-03-17 ENCOUNTER — HOSPITAL ENCOUNTER (OUTPATIENT)
Facility: MEDICAL CENTER | Age: 40
End: 2025-03-17
Attending: OBSTETRICS & GYNECOLOGY
Payer: COMMERCIAL

## 2025-03-17 LAB — TSH SERPL DL<=0.005 MIU/L-ACNC: 1.95 UIU/ML (ref 0.38–5.33)

## 2025-03-17 PROCEDURE — RXMED WILLOW AMBULATORY MEDICATION CHARGE: Performed by: OBSTETRICS & GYNECOLOGY

## 2025-03-17 PROCEDURE — 84443 ASSAY THYROID STIM HORMONE: CPT

## 2025-03-18 ENCOUNTER — PHARMACY VISIT (OUTPATIENT)
Dept: PHARMACY | Facility: MEDICAL CENTER | Age: 40
End: 2025-03-18
Payer: COMMERCIAL

## 2025-03-27 ENCOUNTER — ANESTHESIA (OUTPATIENT)
Dept: OBGYN | Facility: MEDICAL CENTER | Age: 40
End: 2025-03-27
Payer: COMMERCIAL

## 2025-03-27 ENCOUNTER — HOSPITAL ENCOUNTER (INPATIENT)
Facility: MEDICAL CENTER | Age: 40
LOS: 1 days | End: 2025-03-28
Attending: OBSTETRICS & GYNECOLOGY | Admitting: OBSTETRICS & GYNECOLOGY
Payer: COMMERCIAL

## 2025-03-27 ENCOUNTER — ANESTHESIA EVENT (OUTPATIENT)
Dept: OBGYN | Facility: MEDICAL CENTER | Age: 40
End: 2025-03-27
Payer: COMMERCIAL

## 2025-03-27 DIAGNOSIS — G89.18 ACUTE POST-OPERATIVE PAIN: ICD-10-CM

## 2025-03-27 LAB
BASOPHILS # BLD AUTO: 0.2 % (ref 0–1.8)
BASOPHILS # BLD: 0.02 K/UL (ref 0–0.12)
EOSINOPHIL # BLD AUTO: 0.04 K/UL (ref 0–0.51)
EOSINOPHIL NFR BLD: 0.4 % (ref 0–6.9)
ERYTHROCYTE [DISTWIDTH] IN BLOOD BY AUTOMATED COUNT: 44.2 FL (ref 35.9–50)
ERYTHROCYTE [DISTWIDTH] IN BLOOD BY AUTOMATED COUNT: 46.3 FL (ref 35.9–50)
HCT VFR BLD AUTO: 35.3 % (ref 37–47)
HCT VFR BLD AUTO: 36.7 % (ref 37–47)
HGB BLD-MCNC: 11.7 G/DL (ref 12–16)
HGB BLD-MCNC: 11.7 G/DL (ref 12–16)
HOLDING TUBE BB 8507: NORMAL
IMM GRANULOCYTES # BLD AUTO: 0.05 K/UL (ref 0–0.11)
IMM GRANULOCYTES NFR BLD AUTO: 0.5 % (ref 0–0.9)
IMMUNE ROSETTING TEST 8505FMH: NORMAL
LYMPHOCYTES # BLD AUTO: 2.13 K/UL (ref 1–4.8)
LYMPHOCYTES NFR BLD: 22.6 % (ref 22–41)
MCH RBC QN AUTO: 27.7 PG (ref 27–33)
MCH RBC QN AUTO: 28 PG (ref 27–33)
MCHC RBC AUTO-ENTMCNC: 31.9 G/DL (ref 32.2–35.5)
MCHC RBC AUTO-ENTMCNC: 33.1 G/DL (ref 32.2–35.5)
MCV RBC AUTO: 83.5 FL (ref 81.4–97.8)
MCV RBC AUTO: 87.8 FL (ref 81.4–97.8)
MONOCYTES # BLD AUTO: 0.42 K/UL (ref 0–0.85)
MONOCYTES NFR BLD AUTO: 4.4 % (ref 0–13.4)
NEUTROPHILS # BLD AUTO: 6.78 K/UL (ref 1.82–7.42)
NEUTROPHILS NFR BLD: 71.9 % (ref 44–72)
NRBC # BLD AUTO: 0 K/UL
NRBC BLD-RTO: 0 /100 WBC (ref 0–0.2)
NUMBER OF RH DOSES IND 8505RD: 1
PLATELET # BLD AUTO: 128 K/UL (ref 164–446)
PLATELET # BLD AUTO: 130 K/UL (ref 164–446)
PMV BLD AUTO: 11.8 FL (ref 9–12.9)
PMV BLD AUTO: 12.8 FL (ref 9–12.9)
RBC # BLD AUTO: 4.18 M/UL (ref 4.2–5.4)
RBC # BLD AUTO: 4.23 M/UL (ref 4.2–5.4)
T PALLIDUM AB SER QL IA: NORMAL
WBC # BLD AUTO: 15.3 K/UL (ref 4.8–10.8)
WBC # BLD AUTO: 9.4 K/UL (ref 4.8–10.8)

## 2025-03-27 PROCEDURE — 160029 HCHG SURGERY MINUTES - 1ST 30 MINS LEVEL 4: Performed by: OBSTETRICS & GYNECOLOGY

## 2025-03-27 PROCEDURE — 770002 HCHG ROOM/CARE - OB PRIVATE (112)

## 2025-03-27 PROCEDURE — 700111 HCHG RX REV CODE 636 W/ 250 OVERRIDE (IP): Performed by: ANESTHESIOLOGY

## 2025-03-27 PROCEDURE — 160035 HCHG PACU - 1ST 60 MINS PHASE I: Performed by: OBSTETRICS & GYNECOLOGY

## 2025-03-27 PROCEDURE — 700102 HCHG RX REV CODE 250 W/ 637 OVERRIDE(OP): Performed by: ANESTHESIOLOGY

## 2025-03-27 PROCEDURE — 160009 HCHG ANES TIME/MIN: Performed by: OBSTETRICS & GYNECOLOGY

## 2025-03-27 PROCEDURE — A9270 NON-COVERED ITEM OR SERVICE: HCPCS | Performed by: ANESTHESIOLOGY

## 2025-03-27 PROCEDURE — 160015 HCHG STAT PREOP MINUTES: Performed by: OBSTETRICS & GYNECOLOGY

## 2025-03-27 PROCEDURE — 85027 COMPLETE CBC AUTOMATED: CPT

## 2025-03-27 PROCEDURE — 700111 HCHG RX REV CODE 636 W/ 250 OVERRIDE (IP): Performed by: OBSTETRICS & GYNECOLOGY

## 2025-03-27 PROCEDURE — 36415 COLL VENOUS BLD VENIPUNCTURE: CPT

## 2025-03-27 PROCEDURE — 160048 HCHG OR STATISTICAL LEVEL 1-5: Performed by: OBSTETRICS & GYNECOLOGY

## 2025-03-27 PROCEDURE — 3E0234Z INTRODUCTION OF SERUM, TOXOID AND VACCINE INTO MUSCLE, PERCUTANEOUS APPROACH: ICD-10-PCS | Performed by: OBSTETRICS & GYNECOLOGY

## 2025-03-27 PROCEDURE — 160002 HCHG RECOVERY MINUTES (STAT): Performed by: OBSTETRICS & GYNECOLOGY

## 2025-03-27 PROCEDURE — 160036 HCHG PACU - EA ADDL 30 MINS PHASE I: Performed by: OBSTETRICS & GYNECOLOGY

## 2025-03-27 PROCEDURE — 85025 COMPLETE CBC W/AUTO DIFF WBC: CPT

## 2025-03-27 PROCEDURE — 86780 TREPONEMA PALLIDUM: CPT

## 2025-03-27 PROCEDURE — 160041 HCHG SURGERY MINUTES - EA ADDL 1 MIN LEVEL 4: Performed by: OBSTETRICS & GYNECOLOGY

## 2025-03-27 PROCEDURE — 700105 HCHG RX REV CODE 258: Performed by: ANESTHESIOLOGY

## 2025-03-27 PROCEDURE — C1755 CATHETER, INTRASPINAL: HCPCS | Performed by: OBSTETRICS & GYNECOLOGY

## 2025-03-27 PROCEDURE — 85461 HEMOGLOBIN FETAL: CPT

## 2025-03-27 RX ORDER — ONDANSETRON 2 MG/ML
INJECTION INTRAMUSCULAR; INTRAVENOUS PRN
Status: DISCONTINUED | OUTPATIENT
Start: 2025-03-27 | End: 2025-03-27 | Stop reason: SURG

## 2025-03-27 RX ORDER — SODIUM CHLORIDE, SODIUM LACTATE, POTASSIUM CHLORIDE, AND CALCIUM CHLORIDE .6; .31; .03; .02 G/100ML; G/100ML; G/100ML; G/100ML
1000 INJECTION, SOLUTION INTRAVENOUS ONCE
Status: COMPLETED | OUTPATIENT
Start: 2025-03-27 | End: 2025-03-27

## 2025-03-27 RX ORDER — OXYCODONE HYDROCHLORIDE 5 MG/1
5 TABLET ORAL EVERY 4 HOURS PRN
Status: DISCONTINUED | OUTPATIENT
Start: 2025-03-28 | End: 2025-03-28 | Stop reason: HOSPADM

## 2025-03-27 RX ORDER — DIPHENHYDRAMINE HYDROCHLORIDE 50 MG/ML
25 INJECTION, SOLUTION INTRAMUSCULAR; INTRAVENOUS EVERY 6 HOURS PRN
Status: DISCONTINUED | OUTPATIENT
Start: 2025-03-28 | End: 2025-03-28 | Stop reason: HOSPADM

## 2025-03-27 RX ORDER — DIPHENHYDRAMINE HYDROCHLORIDE 50 MG/ML
25 INJECTION, SOLUTION INTRAMUSCULAR; INTRAVENOUS EVERY 6 HOURS PRN
Status: ACTIVE | OUTPATIENT
Start: 2025-03-27 | End: 2025-03-28

## 2025-03-27 RX ORDER — HALOPERIDOL 5 MG/ML
1 INJECTION INTRAMUSCULAR
Status: DISCONTINUED | OUTPATIENT
Start: 2025-03-27 | End: 2025-03-27 | Stop reason: HOSPADM

## 2025-03-27 RX ORDER — CITRIC ACID/SODIUM CITRATE 334-500MG
30 SOLUTION, ORAL ORAL ONCE
Status: COMPLETED | OUTPATIENT
Start: 2025-03-27 | End: 2025-03-27

## 2025-03-27 RX ORDER — ACETAMINOPHEN 500 MG
1000 TABLET ORAL EVERY 6 HOURS
Status: COMPLETED | OUTPATIENT
Start: 2025-03-27 | End: 2025-03-28

## 2025-03-27 RX ORDER — HYDROMORPHONE HYDROCHLORIDE 1 MG/ML
0.2 INJECTION, SOLUTION INTRAMUSCULAR; INTRAVENOUS; SUBCUTANEOUS
Status: DISCONTINUED | OUTPATIENT
Start: 2025-03-27 | End: 2025-03-27 | Stop reason: HOSPADM

## 2025-03-27 RX ORDER — ACETAMINOPHEN 500 MG
1000 TABLET ORAL EVERY 6 HOURS PRN
Status: DISCONTINUED | OUTPATIENT
Start: 2025-03-31 | End: 2025-03-28 | Stop reason: HOSPADM

## 2025-03-27 RX ORDER — DIPHENHYDRAMINE HYDROCHLORIDE 50 MG/ML
12.5 INJECTION, SOLUTION INTRAMUSCULAR; INTRAVENOUS EVERY 6 HOURS PRN
Status: ACTIVE | OUTPATIENT
Start: 2025-03-27 | End: 2025-03-28

## 2025-03-27 RX ORDER — ALBUTEROL SULFATE 5 MG/ML
2.5 SOLUTION RESPIRATORY (INHALATION)
Status: DISCONTINUED | OUTPATIENT
Start: 2025-03-27 | End: 2025-03-27 | Stop reason: HOSPADM

## 2025-03-27 RX ORDER — CALCIUM CARBONATE 500 MG/1
1000 TABLET, CHEWABLE ORAL EVERY 6 HOURS PRN
Status: DISCONTINUED | OUTPATIENT
Start: 2025-03-27 | End: 2025-03-28 | Stop reason: HOSPADM

## 2025-03-27 RX ORDER — IBUPROFEN 800 MG/1
800 TABLET, FILM COATED ORAL EVERY 8 HOURS
Status: DISCONTINUED | OUTPATIENT
Start: 2025-03-28 | End: 2025-03-28 | Stop reason: HOSPADM

## 2025-03-27 RX ORDER — DEXAMETHASONE SODIUM PHOSPHATE 4 MG/ML
INJECTION, SOLUTION INTRA-ARTICULAR; INTRALESIONAL; INTRAMUSCULAR; INTRAVENOUS; SOFT TISSUE PRN
Status: DISCONTINUED | OUTPATIENT
Start: 2025-03-27 | End: 2025-03-27 | Stop reason: SURG

## 2025-03-27 RX ORDER — HYDROMORPHONE HYDROCHLORIDE 1 MG/ML
0.2 INJECTION, SOLUTION INTRAMUSCULAR; INTRAVENOUS; SUBCUTANEOUS
Status: ACTIVE | OUTPATIENT
Start: 2025-03-27 | End: 2025-03-28

## 2025-03-27 RX ORDER — SIMETHICONE 125 MG
125 TABLET,CHEWABLE ORAL 4 TIMES DAILY PRN
Status: DISCONTINUED | OUTPATIENT
Start: 2025-03-27 | End: 2025-03-28 | Stop reason: HOSPADM

## 2025-03-27 RX ORDER — ONDANSETRON 2 MG/ML
4 INJECTION INTRAMUSCULAR; INTRAVENOUS
Status: DISCONTINUED | OUTPATIENT
Start: 2025-03-27 | End: 2025-03-27 | Stop reason: HOSPADM

## 2025-03-27 RX ORDER — METOCLOPRAMIDE HYDROCHLORIDE 5 MG/ML
10 INJECTION INTRAMUSCULAR; INTRAVENOUS ONCE
Status: COMPLETED | OUTPATIENT
Start: 2025-03-27 | End: 2025-03-27

## 2025-03-27 RX ORDER — OXYTOCIN 10 [USP'U]/ML
10 INJECTION, SOLUTION INTRAMUSCULAR; INTRAVENOUS
Status: DISCONTINUED | OUTPATIENT
Start: 2025-03-27 | End: 2025-03-28 | Stop reason: HOSPADM

## 2025-03-27 RX ORDER — IBUPROFEN 800 MG/1
800 TABLET, FILM COATED ORAL EVERY 8 HOURS PRN
Status: DISCONTINUED | OUTPATIENT
Start: 2025-03-31 | End: 2025-03-28 | Stop reason: HOSPADM

## 2025-03-27 RX ORDER — BISACODYL 10 MG
10 SUPPOSITORY, RECTAL RECTAL PRN
Status: DISCONTINUED | OUTPATIENT
Start: 2025-03-27 | End: 2025-03-28 | Stop reason: HOSPADM

## 2025-03-27 RX ORDER — KETOROLAC TROMETHAMINE 15 MG/ML
INJECTION, SOLUTION INTRAMUSCULAR; INTRAVENOUS PRN
Status: DISCONTINUED | OUTPATIENT
Start: 2025-03-27 | End: 2025-03-27 | Stop reason: SURG

## 2025-03-27 RX ORDER — CEFAZOLIN SODIUM 1 G/3ML
2 INJECTION, POWDER, FOR SOLUTION INTRAMUSCULAR; INTRAVENOUS ONCE
Status: COMPLETED | OUTPATIENT
Start: 2025-03-27 | End: 2025-03-27

## 2025-03-27 RX ORDER — OXYCODONE HCL 5 MG/5 ML
5 SOLUTION, ORAL ORAL
Status: DISCONTINUED | OUTPATIENT
Start: 2025-03-27 | End: 2025-03-27 | Stop reason: HOSPADM

## 2025-03-27 RX ORDER — OMEPRAZOLE 20 MG/1
20 CAPSULE, DELAYED RELEASE ORAL DAILY
Status: DISCONTINUED | OUTPATIENT
Start: 2025-03-28 | End: 2025-03-28 | Stop reason: HOSPADM

## 2025-03-27 RX ORDER — OXYCODONE HCL 5 MG/5 ML
10 SOLUTION, ORAL ORAL
Status: DISCONTINUED | OUTPATIENT
Start: 2025-03-27 | End: 2025-03-27 | Stop reason: HOSPADM

## 2025-03-27 RX ORDER — LEVOTHYROXINE SODIUM 150 UG/1
150 TABLET ORAL
Status: DISCONTINUED | OUTPATIENT
Start: 2025-03-29 | End: 2025-03-28 | Stop reason: HOSPADM

## 2025-03-27 RX ORDER — ONDANSETRON 2 MG/ML
4 INJECTION INTRAMUSCULAR; INTRAVENOUS EVERY 6 HOURS PRN
Status: ACTIVE | OUTPATIENT
Start: 2025-03-27 | End: 2025-03-28

## 2025-03-27 RX ORDER — BUPIVACAINE HYDROCHLORIDE 7.5 MG/ML
INJECTION, SOLUTION INTRASPINAL PRN
Status: DISCONTINUED | OUTPATIENT
Start: 2025-03-27 | End: 2025-03-27 | Stop reason: SURG

## 2025-03-27 RX ORDER — DIPHENHYDRAMINE HYDROCHLORIDE 50 MG/ML
12.5 INJECTION, SOLUTION INTRAMUSCULAR; INTRAVENOUS
Status: DISCONTINUED | OUTPATIENT
Start: 2025-03-27 | End: 2025-03-27 | Stop reason: HOSPADM

## 2025-03-27 RX ORDER — ACETAMINOPHEN 500 MG
1000 TABLET ORAL EVERY 6 HOURS
Status: DISCONTINUED | OUTPATIENT
Start: 2025-03-28 | End: 2025-03-28 | Stop reason: HOSPADM

## 2025-03-27 RX ORDER — OXYTOCIN 10 [USP'U]/ML
INJECTION, SOLUTION INTRAMUSCULAR; INTRAVENOUS PRN
Status: DISCONTINUED | OUTPATIENT
Start: 2025-03-27 | End: 2025-03-27 | Stop reason: SURG

## 2025-03-27 RX ORDER — HYDROMORPHONE HYDROCHLORIDE 1 MG/ML
0.4 INJECTION, SOLUTION INTRAMUSCULAR; INTRAVENOUS; SUBCUTANEOUS
Status: DISCONTINUED | OUTPATIENT
Start: 2025-03-27 | End: 2025-03-27 | Stop reason: HOSPADM

## 2025-03-27 RX ORDER — HYDROMORPHONE HYDROCHLORIDE 1 MG/ML
0.4 INJECTION, SOLUTION INTRAMUSCULAR; INTRAVENOUS; SUBCUTANEOUS
Status: ACTIVE | OUTPATIENT
Start: 2025-03-27 | End: 2025-03-28

## 2025-03-27 RX ORDER — MONTELUKAST SODIUM 10 MG/1
10 TABLET ORAL DAILY
Status: DISCONTINUED | OUTPATIENT
Start: 2025-03-27 | End: 2025-03-27

## 2025-03-27 RX ORDER — SODIUM CHLORIDE, SODIUM LACTATE, POTASSIUM CHLORIDE, CALCIUM CHLORIDE 600; 310; 30; 20 MG/100ML; MG/100ML; MG/100ML; MG/100ML
INJECTION, SOLUTION INTRAVENOUS
Status: DISCONTINUED | OUTPATIENT
Start: 2025-03-27 | End: 2025-03-27 | Stop reason: SURG

## 2025-03-27 RX ORDER — OXYCODONE HYDROCHLORIDE 5 MG/1
5 TABLET ORAL EVERY 4 HOURS PRN
Status: DISPENSED | OUTPATIENT
Start: 2025-03-27 | End: 2025-03-28

## 2025-03-27 RX ORDER — SODIUM CHLORIDE, SODIUM LACTATE, POTASSIUM CHLORIDE, CALCIUM CHLORIDE 600; 310; 30; 20 MG/100ML; MG/100ML; MG/100ML; MG/100ML
2000 INJECTION, SOLUTION INTRAVENOUS PRN
Status: DISCONTINUED | OUTPATIENT
Start: 2025-03-27 | End: 2025-03-28 | Stop reason: HOSPADM

## 2025-03-27 RX ORDER — ONDANSETRON 2 MG/ML
4 INJECTION INTRAMUSCULAR; INTRAVENOUS EVERY 6 HOURS PRN
Status: DISCONTINUED | OUTPATIENT
Start: 2025-03-28 | End: 2025-03-28 | Stop reason: HOSPADM

## 2025-03-27 RX ORDER — LEVOTHYROXINE SODIUM 175 UG/1
175 TABLET ORAL
Status: DISCONTINUED | OUTPATIENT
Start: 2025-03-28 | End: 2025-03-28 | Stop reason: HOSPADM

## 2025-03-27 RX ORDER — HYDROMORPHONE HYDROCHLORIDE 1 MG/ML
0.5 INJECTION, SOLUTION INTRAMUSCULAR; INTRAVENOUS; SUBCUTANEOUS
Status: DISCONTINUED | OUTPATIENT
Start: 2025-03-27 | End: 2025-03-27 | Stop reason: HOSPADM

## 2025-03-27 RX ORDER — MONTELUKAST SODIUM 10 MG/1
10 TABLET ORAL DAILY
Status: DISCONTINUED | OUTPATIENT
Start: 2025-03-28 | End: 2025-03-28 | Stop reason: HOSPADM

## 2025-03-27 RX ORDER — ALBUTEROL SULFATE 90 UG/1
2 INHALANT RESPIRATORY (INHALATION) EVERY 6 HOURS PRN
Status: DISCONTINUED | OUTPATIENT
Start: 2025-03-27 | End: 2025-03-28 | Stop reason: HOSPADM

## 2025-03-27 RX ORDER — MORPHINE SULFATE 0.5 MG/ML
INJECTION, SOLUTION EPIDURAL; INTRATHECAL; INTRAVENOUS PRN
Status: DISCONTINUED | OUTPATIENT
Start: 2025-03-27 | End: 2025-03-27 | Stop reason: SURG

## 2025-03-27 RX ORDER — OXYCODONE HYDROCHLORIDE 10 MG/1
10 TABLET ORAL EVERY 4 HOURS PRN
Status: ACTIVE | OUTPATIENT
Start: 2025-03-27 | End: 2025-03-28

## 2025-03-27 RX ORDER — VITAMIN A ACETATE, BETA CAROTENE, ASCORBIC ACID, CHOLECALCIFEROL, .ALPHA.-TOCOPHEROL ACETATE, DL-, THIAMINE MONONITRATE, RIBOFLAVIN, NIACINAMIDE, PYRIDOXINE HYDROCHLORIDE, FOLIC ACID, CYANOCOBALAMIN, CALCIUM CARBONATE, FERROUS FUMARATE, ZINC OXIDE, CUPRIC OXIDE 3080; 12; 120; 400; 1; 1.84; 3; 20; 22; 920; 25; 200; 27; 10; 2 [IU]/1; UG/1; MG/1; [IU]/1; MG/1; MG/1; MG/1; MG/1; MG/1; [IU]/1; MG/1; MG/1; MG/1; MG/1; MG/1
1 TABLET, FILM COATED ORAL
Status: DISCONTINUED | OUTPATIENT
Start: 2025-03-27 | End: 2025-03-28 | Stop reason: HOSPADM

## 2025-03-27 RX ORDER — SODIUM CHLORIDE, SODIUM LACTATE, POTASSIUM CHLORIDE, CALCIUM CHLORIDE 600; 310; 30; 20 MG/100ML; MG/100ML; MG/100ML; MG/100ML
INJECTION, SOLUTION INTRAVENOUS CONTINUOUS
Status: DISCONTINUED | OUTPATIENT
Start: 2025-03-27 | End: 2025-03-28 | Stop reason: HOSPADM

## 2025-03-27 RX ORDER — DOCUSATE SODIUM 100 MG/1
100 CAPSULE, LIQUID FILLED ORAL 2 TIMES DAILY PRN
Status: DISCONTINUED | OUTPATIENT
Start: 2025-03-27 | End: 2025-03-28 | Stop reason: HOSPADM

## 2025-03-27 RX ORDER — HYDRALAZINE HYDROCHLORIDE 20 MG/ML
5 INJECTION INTRAMUSCULAR; INTRAVENOUS
Status: DISCONTINUED | OUTPATIENT
Start: 2025-03-27 | End: 2025-03-27 | Stop reason: HOSPADM

## 2025-03-27 RX ORDER — DIPHENHYDRAMINE HCL 25 MG
25 TABLET ORAL EVERY 6 HOURS PRN
Status: DISCONTINUED | OUTPATIENT
Start: 2025-03-28 | End: 2025-03-28 | Stop reason: HOSPADM

## 2025-03-27 RX ORDER — ONDANSETRON 4 MG/1
4 TABLET, ORALLY DISINTEGRATING ORAL EVERY 6 HOURS PRN
Status: DISCONTINUED | OUTPATIENT
Start: 2025-03-28 | End: 2025-03-28 | Stop reason: HOSPADM

## 2025-03-27 RX ORDER — EPHEDRINE SULFATE 50 MG/ML
10 INJECTION, SOLUTION INTRAVENOUS
Status: ACTIVE | OUTPATIENT
Start: 2025-03-27 | End: 2025-03-28

## 2025-03-27 RX ORDER — OXYCODONE HYDROCHLORIDE 10 MG/1
10 TABLET ORAL EVERY 4 HOURS PRN
Status: DISCONTINUED | OUTPATIENT
Start: 2025-03-28 | End: 2025-03-28 | Stop reason: HOSPADM

## 2025-03-27 RX ORDER — KETOROLAC TROMETHAMINE 15 MG/ML
15 INJECTION, SOLUTION INTRAMUSCULAR; INTRAVENOUS EVERY 6 HOURS
Status: COMPLETED | OUTPATIENT
Start: 2025-03-27 | End: 2025-03-28

## 2025-03-27 RX ADMIN — FAMOTIDINE 20 MG: 10 INJECTION, SOLUTION INTRAVENOUS at 07:03

## 2025-03-27 RX ADMIN — CEFAZOLIN 2 G: 1 INJECTION, POWDER, FOR SOLUTION INTRAMUSCULAR; INTRAVENOUS at 07:29

## 2025-03-27 RX ADMIN — SODIUM CITRATE AND CITRIC ACID MONOHYDRATE 30 ML: 2004; 3000 SOLUTION ORAL at 07:03

## 2025-03-27 RX ADMIN — PHENYLEPHRINE HYDROCHLORIDE 10 MCG/MIN: 10 INJECTION INTRAVENOUS at 07:33

## 2025-03-27 RX ADMIN — SODIUM CHLORIDE, POTASSIUM CHLORIDE, SODIUM LACTATE AND CALCIUM CHLORIDE: 600; 310; 30; 20 INJECTION, SOLUTION INTRAVENOUS at 07:29

## 2025-03-27 RX ADMIN — ACETAMINOPHEN 1000 MG: 500 TABLET ORAL at 23:31

## 2025-03-27 RX ADMIN — OXYCODONE 5 MG: 5 TABLET ORAL at 20:53

## 2025-03-27 RX ADMIN — KETOROLAC TROMETHAMINE 15 MG: 15 INJECTION, SOLUTION INTRAMUSCULAR; INTRAVENOUS at 23:31

## 2025-03-27 RX ADMIN — METOCLOPRAMIDE HYDROCHLORIDE 10 MG: 5 INJECTION INTRAMUSCULAR; INTRAVENOUS at 07:03

## 2025-03-27 RX ADMIN — FENTANYL CITRATE 15 MCG: 50 INJECTION, SOLUTION INTRAMUSCULAR; INTRAVENOUS at 07:36

## 2025-03-27 RX ADMIN — OXYTOCIN 500 ML: 10 INJECTION, SOLUTION INTRAMUSCULAR; INTRAVENOUS at 08:16

## 2025-03-27 RX ADMIN — ONDANSETRON 4 MG: 2 INJECTION INTRAMUSCULAR; INTRAVENOUS at 07:59

## 2025-03-27 RX ADMIN — KETOROLAC TROMETHAMINE 15 MG: 15 INJECTION, SOLUTION INTRAMUSCULAR; INTRAVENOUS at 17:17

## 2025-03-27 RX ADMIN — KETOROLAC TROMETHAMINE 15 MG: 15 INJECTION, SOLUTION INTRAMUSCULAR; INTRAVENOUS at 08:11

## 2025-03-27 RX ADMIN — ACETAMINOPHEN 1000 MG: 500 TABLET ORAL at 17:16

## 2025-03-27 RX ADMIN — HUMAN RHO(D) IMMUNE GLOBULIN 300 MCG: 1500 SOLUTION INTRAMUSCULAR; INTRAVENOUS at 16:56

## 2025-03-27 RX ADMIN — OXYTOCIN 20 UNITS: 10 INJECTION, SOLUTION INTRAMUSCULAR; INTRAVENOUS at 07:56

## 2025-03-27 RX ADMIN — DEXAMETHASONE SODIUM PHOSPHATE 8 MG: 4 INJECTION INTRA-ARTICULAR; INTRALESIONAL; INTRAMUSCULAR; INTRAVENOUS; SOFT TISSUE at 07:59

## 2025-03-27 RX ADMIN — SODIUM CHLORIDE, POTASSIUM CHLORIDE, SODIUM LACTATE AND CALCIUM CHLORIDE 1000 ML: 600; 310; 30; 20 INJECTION, SOLUTION INTRAVENOUS at 07:04

## 2025-03-27 RX ADMIN — ACETAMINOPHEN 1000 MG: 500 TABLET ORAL at 11:33

## 2025-03-27 RX ADMIN — MORPHINE SULFATE 0.15 MG: 0.5 INJECTION, SOLUTION EPIDURAL; INTRATHECAL; INTRAVENOUS at 07:36

## 2025-03-27 RX ADMIN — BUPIVACAINE HYDROCHLORIDE IN DEXTROSE 1.7 ML: 7.5 INJECTION, SOLUTION SUBARACHNOID at 07:36

## 2025-03-27 SDOH — ECONOMIC STABILITY: TRANSPORTATION INSECURITY
IN THE PAST 12 MONTHS, HAS THE LACK OF TRANSPORTATION KEPT YOU FROM MEDICAL APPOINTMENTS OR FROM GETTING MEDICATIONS?: NO

## 2025-03-27 SDOH — ECONOMIC STABILITY: TRANSPORTATION INSECURITY
IN THE PAST 12 MONTHS, HAS LACK OF RELIABLE TRANSPORTATION KEPT YOU FROM MEDICAL APPOINTMENTS, MEETINGS, WORK OR FROM GETTING THINGS NEEDED FOR DAILY LIVING?: NO

## 2025-03-27 ASSESSMENT — PATIENT HEALTH QUESTIONNAIRE - PHQ9
SUM OF ALL RESPONSES TO PHQ9 QUESTIONS 1 AND 2: 0
2. FEELING DOWN, DEPRESSED, IRRITABLE, OR HOPELESS: NOT AT ALL
1. LITTLE INTEREST OR PLEASURE IN DOING THINGS: NOT AT ALL
SUM OF ALL RESPONSES TO PHQ9 QUESTIONS 1 AND 2: 0
2. FEELING DOWN, DEPRESSED, IRRITABLE, OR HOPELESS: NOT AT ALL
1. LITTLE INTEREST OR PLEASURE IN DOING THINGS: NOT AT ALL

## 2025-03-27 ASSESSMENT — LIFESTYLE VARIABLES
DOES PATIENT WANT TO STOP DRINKING: NO
ALCOHOL_USE: NO

## 2025-03-27 ASSESSMENT — FIBROSIS 4 INDEX: FIB4 SCORE: 1.25

## 2025-03-27 ASSESSMENT — PAIN DESCRIPTION - PAIN TYPE
TYPE: SURGICAL PAIN
TYPE: ACUTE PAIN;SURGICAL PAIN
TYPE: SURGICAL PAIN
TYPE: ACUTE PAIN;SURGICAL PAIN
TYPE: SURGICAL PAIN
TYPE: ACUTE PAIN;SURGICAL PAIN
TYPE: SURGICAL PAIN

## 2025-03-27 ASSESSMENT — SOCIAL DETERMINANTS OF HEALTH (SDOH)
WITHIN THE LAST YEAR, HAVE YOU BEEN KICKED, HIT, SLAPPED, OR OTHERWISE PHYSICALLY HURT BY YOUR PARTNER OR EX-PARTNER?: NO
WITHIN THE PAST 12 MONTHS, YOU WORRIED THAT YOUR FOOD WOULD RUN OUT BEFORE YOU GOT THE MONEY TO BUY MORE: NEVER TRUE
WITHIN THE LAST YEAR, HAVE TO BEEN RAPED OR FORCED TO HAVE ANY KIND OF SEXUAL ACTIVITY BY YOUR PARTNER OR EX-PARTNER?: NO
WITHIN THE LAST YEAR, HAVE YOU BEEN HUMILIATED OR EMOTIONALLY ABUSED IN OTHER WAYS BY YOUR PARTNER OR EX-PARTNER?: NO
IN THE PAST 12 MONTHS, HAS THE ELECTRIC, GAS, OIL, OR WATER COMPANY THREATENED TO SHUT OFF SERVICE IN YOUR HOME?: NO
WITHIN THE PAST 12 MONTHS, THE FOOD YOU BOUGHT JUST DIDN'T LAST AND YOU DIDN'T HAVE MONEY TO GET MORE: NEVER TRUE
WITHIN THE LAST YEAR, HAVE YOU BEEN AFRAID OF YOUR PARTNER OR EX-PARTNER?: NO

## 2025-03-27 ASSESSMENT — PAIN SCALES - GENERAL: PAIN_LEVEL: 0

## 2025-03-27 NOTE — ANESTHESIA PREPROCEDURE EVALUATION
Case: [id number] Date/Time: 25 0715    Procedure: REPEAT  SECTION    Pre-op diagnosis:       HX OF  SECTION      39 WEEKS    Location: [facility] OR 01 / SURGERY LABOR [facility] DELIVERY    Surgeons: Sarah Kang M.D.           reyes pregnancy at 39 weeks gestation. Repeat  today.    Relevant Problems   PULMONARY   (positive) Severe persistent asthma without complication      ENDO   (positive) Hypothyroidism due to Hashimoto's thyroiditis      OB   (positive) S/P  section       Physical Exam    Airway   Mallampati: II  TM distance: >3 FB  Neck ROM: full       Cardiovascular - normal exam  Rhythm: regular  Rate: normal  (-) murmur     Dental - normal exam           Pulmonary - normal exam  Breath sounds clear to auscultation     Abdominal    Neurological - normal exam                   Anesthesia Plan    ASA 2       Plan - spinal   Neuraxial block will be primary anesthetic                Postoperative Plan: Postoperative administration of opioids is intended.    Pertinent diagnostic labs and testing reviewed    Informed Consent:    Anesthetic plan and risks discussed with patient.

## 2025-03-27 NOTE — PROGRESS NOTES
Late Entry    0548-TOCO and US on.  VSS.  Pt presents for her scheduled repeat  section.  No obstetrical complaints.  0729-In OR2.  0755-Delivery of viable female, 8/9 apgars.  0826-In PACU.  1014-Anticipate transfer to PP unit in the 1000 hour after uncomplicated surgery and recovery

## 2025-03-27 NOTE — CARE PLAN
The patient is Stable - Low risk of patient condition declining or worsening    Shift Goals  Clinical Goals: pain management, VSS, lochia scant, fundus firm  Patient Goals: pain management, breast feeding  Family Goals: infant bonding, support      Problem: Psychosocial - Postpartum  Goal: Patient will verbalize and demonstrate effective bonding and parenting behavior  Outcome: Progressing  Note: MOB has shown adequate bonding with infant, provided skin to skin, and proper cares of infant.      Problem: Knowledge Deficit - Postpartum  Goal: Patient will verbalize and demonstrate understanding of self and infant care  Outcome: Progressing  Note: MOB demonstrates and verbalizes understanding on how to care for . Asks appropriate questions and calls for help when necessary. Education has been provided to patient.

## 2025-03-27 NOTE — ANESTHESIA TIME REPORT
Anesthesia Start and Stop Event Times       Date Time Event    3/27/2025 0701 Ready for Procedure     0729 Anesthesia Start     0831 Anesthesia Stop          Responsible Staff  03/27/25      Name Role Begin End    Camilo Martin M.D. Anesth 0729 0831          Overtime Reason:  no overtime (within assigned shift)    Comments:

## 2025-03-27 NOTE — OP REPORT
Operative procedure note    Procedure performed  1. Repeat          Preoperative diagnosis  1. IUP term  2. Previous CS    Postoperative diagnosis  Same    Surgeon: Sarah Kang M.D.  Assistant: Savannah Dubois MD    Anesthesiologist: Mario  Anesthesia: spinal    Estimated blood loss: 500 cc cc    female presentation vertex APGAR 8/9  Uterus Normal, Tubes Normal, ovaries Normal       Procedure in detail  After informed consent had been obtained the patient was seen to the operating room where anesthesia was found to be adequate.  The present was prepped and draped in the usual sterile fashion, a timeout was performed.  Pfannenstiel skin incision was made and carried down to the underlying layer of fascia with the knife.  The fascia was then nicked in the midline elevated and the fascial incision extended laterally on both sides with Tubbs scissors.  The rectus muscles were then dissected off the posterior aspect of the fascia both superiorly and inferiorly.  They were  in the midline the peritoneum was identified and entered sharply with Metzenbaum scissors.  The peritoneal incision was then extended superiorly and inferiorly with good visualization of bowel and bladder.  The Noble O retractor was placed into the incision and checked to make sure there was no bowel entrapment.  The vesicular uterine peritoneum was identified and a bladder flap created.  The uterus was incised in a low transverse fashion and extended bluntly.  The infant was delivered through the incision without complication, after 30 seconds of delayed cord clamping the cord was clamped x2 and cut.  The infant was then handed to awaiting nursing staff.  The placenta then delivered spontaneously.  The uterus was cleared of all clots and debris with a moist lap sponge.  The uterine incision closed with a #1 chromic in a running locked fashion.   Hemostasis was found to be adequate at the hysterotomy.  The abdomen was irrigated  cleared of all clots and debris with a moist lap sponge.  The Noble O retractor was removed.  The peritoneum was identified and loosely closed with 0 Vicryl.  The muscles were reapproximated with the same Vicryl stitch.  The fascia was then closed with 0 Vicryl in a running fashion.  Subcutaneous layers were checked for hemostasis which was noted to be adequate reapproximated with a 2-0 plain.  The skin was closed in a subcuticular fashion with a 4-0 Vicryl.  Sponge lap needle counts were all correct x2 and the patient is taken to the recovery room in good condition.

## 2025-03-27 NOTE — H&P
History and Physical      Carolina Medrano is a 39 y.o. female  at 39w0d who presents for repeat CS    Subjective:   positive  For CTXS.   negative Feels pain   negative for LOF  negative for vaginal bleeding.   positive for fetal movement    ROS: Pertinent items are noted in HPI.    Past Medical History:   Diagnosis Date    Asthma     Pain 2017    abd., 1/10    Pregnancy 11/10/2022    7 weeks pregnant  Has appt to establish with OB soon  +N/V, taking pyridoxine and doxylamine for sx       Thyroid disease      Past Surgical History:   Procedure Laterality Date    PRIMARY C SECTION N/A 2023    Procedure: PRIMARY  SECTION;  Surgeon: Sarah Kang M.D.;  Location: SURGERY LABOR AND DELIVERY;  Service: Labor and Delivery    UMBILICAL HERNIA REPAIR  2017    Procedure: UMBILICAL HERNIA REPAIR WITH MESH;  Surgeon: Florencio Tavares M.D.;  Location: SURGERY [facility];  Service:     LIZ BY LAPAROSCOPY  2013    Performed by Florencio Tavares M.D. at SURGERY [facility] ORS    KNEE ARTHROSCOPY      right knee ( times 4)    TONSILLECTOMY AND ADENOIDECTOMY      as a child    UMBILICAL HERNIA REPAIR       Family History   Problem Relation Age of Onset    Heart Disease Father     Hypertension Father     Alcohol abuse Father     Cancer Paternal Aunt         breast    Cancer Paternal Uncle         colon    Diabetes Maternal Grandfather     Cancer Paternal Grandmother         breast    Autoimmune Disease Paternal Grandmother     Lupus Mother     No Known Problems Brother     Diabetes Maternal Grandmother     Alzheimer's Disease Paternal Grandfather      OB History    Para Term  AB Living   2 1 1 0 0 1   SAB IAB Ectopic Molar Multiple Live Births   0 0 0  0 1      # Outcome Date GA Lbr Abhay/2nd Weight Sex Type Anes PTL Lv   2 Current            1 Term 23 37w0d  3.62 kg (7 lb 15.7 oz) F CS-LTranv Spinal N LINDA     Social History     Tobacco Use    Smoking  "status: Never     Passive exposure: Never    Smokeless tobacco: Never   Vaping Use    Vaping status: Never Used   Substance Use Topics    Alcohol use: Not Currently     Comment: 1-2/week    Drug use: No     Allergies: Shellfish allergy and Iodine    Current Facility-Administered Medications:     ceFAZolin (Ancef) injection 2 g, 2 g, Intravenous, Once, Sarah Kang M.D.    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    Hypothyroidism due to Hashimoto's thyroiditis 2018    Travel advice encounter 2016    IgA deficiency (HCC) 2016    Healthcare maintenance 2013       Objective:      /80   Pulse 97   Resp 20   Ht 1.702 m (5' 7\")   Wt 104 kg (230 lb)     General:   no acute distress   Skin:   normal   HEENT:  Neck supple with midline trachea   Lungs:   CTA bilateral   Heart:   no hepatosplenomegaly, S1, S2 normal,\"no JVD   Abdomen:   gravid, NT   EFW:  7 lbs   Pelvis:  adequate with gynecoid pelvis   FHT:  150 BPM   Uterine Size: S=D   Presentations: Cephalic   Cervix:     Dilation: deferred    Effacement:     Station:      Consistency:     Position:      Lab Review  Recent Results (from the past 35 weeks)   TSH WITH REFLEX TO FT4    Collection Time: 24 10:28 AM   Result Value Ref Range    TSH 2.130 0.380 - 5.330 uIU/mL   PRENATAL PANEL 3-HIV-CULTURE    Collection Time: 24 10:28 AM   Result Value Ref Range    WBC 7.5 4.8 - 10.8 K/uL    RBC 5.06 4.20 - 5.40 M/uL    Hemoglobin 13.7 12.0 - " 16.0 g/dL    Hematocrit 42.0 37.0 - 47.0 %    MCV 83.0 81.4 - 97.8 fL    MCH 27.1 27.0 - 33.0 pg    MCHC 32.6 32.2 - 35.5 g/dL    RDW 46.7 35.9 - 50.0 fL    Platelet Count 155 (L) 164 - 446 K/uL    MPV 12.2 9.0 - 12.9 fL    Neutrophils-Polys 62.30 44.00 - 72.00 %    Lymphocytes 32.50 22.00 - 41.00 %    Monocytes 4.20 0.00 - 13.40 %    Eosinophils 0.40 0.00 - 6.90 %    Basophils 0.30 0.00 - 1.80 %    Immature Granulocytes 0.30 0.00 - 0.90 %    Nucleated RBC 0.00 0.00 - 0.20 /100 WBC    Neutrophils (Absolute) 4.70 1.82 - 7.42 K/uL    Lymphs (Absolute) 2.45 1.00 - 4.80 K/uL    Monos (Absolute) 0.32 0.00 - 0.85 K/uL    Eos (Absolute) 0.03 0.00 - 0.51 K/uL    Baso (Absolute) 0.02 0.00 - 0.12 K/uL    Immature Granulocytes (abs) 0.02 0.00 - 0.11 K/uL    NRBC (Absolute) 0.00 K/uL    Rubella IgG Antibody 182.00 IU/mL    Hepatitis B Surface Antigen Non-Reactive Non-Reactive    Syphilis, Treponemal Qual Non-Reactive Non-Reactive   VARICELLA ZOSTER ANTIBODIES (IGG + IGM)    Collection Time: 09/21/24 10:28 AM   Result Value Ref Range    Varicella Zoster IgG Ab 1053.0 IV    Varicella Zoster IgM Ab 0.63 <=0.90 ISR   VITAMIN D,25 HYDROXY (DEFICIENCY)    Collection Time: 09/21/24 10:28 AM   Result Value Ref Range    25-Hydroxy   Vitamin D 25 29 (L) 30 - 100 ng/mL   HIV AG/AB COMBO ASSAY SCREENING    Collection Time: 09/21/24 10:28 AM   Result Value Ref Range    HIV Ag/Ab Combo Assay Non-Reactive Non Reactive   URINE CULTURE(NEW)    Collection Time: 09/21/24 10:28 AM    Specimen: Urine   Result Value Ref Range    Significant Indicator NEG     Source UR     Site -     Culture Result Usual urogenital bladimir >100,000 cfu/mL    OP Prenatal Panel-Blood Bank    Collection Time: 09/21/24 10:28 AM   Result Value Ref Range    ABO Grouping Only A     Rh Grouping Only NEG     Antibody Screen Scrn NEG    TSH    Collection Time: 01/09/25 12:30 PM   Result Value Ref Range    TSH 1.340 0.350 - 5.500 uIU/mL   CBC WITH DIFFERENTIAL    Collection Time:  01/09/25 12:30 PM   Result Value Ref Range    WBC 9.9 4.8 - 10.8 K/uL    RBC 4.14 (L) 4.20 - 5.40 M/uL    Hemoglobin 12.0 12.0 - 16.0 g/dL    Hematocrit 35.9 (L) 37.0 - 47.0 %    MCV 86.7 81.4 - 97.8 fL    MCH 29.0 27.0 - 33.0 pg    MCHC 33.4 32.2 - 35.5 g/dL    RDW 46.5 35.9 - 50.0 fL    Platelet Count 132 (L) 164 - 446 K/uL    MPV 12.6 9.0 - 12.9 fL    Neutrophils-Polys 80.80 (H) 44.00 - 72.00 %    Lymphocytes 15.30 (L) 22.00 - 41.00 %    Monocytes 2.80 0.00 - 13.40 %    Eosinophils 0.30 0.00 - 6.90 %    Basophils 0.20 0.00 - 1.80 %    Immature Granulocytes 0.60 0.00 - 0.90 %    Nucleated RBC 0.00 0.00 - 0.20 /100 WBC    Neutrophils (Absolute) 7.98 (H) 1.82 - 7.42 K/uL    Lymphs (Absolute) 1.51 1.00 - 4.80 K/uL    Monos (Absolute) 0.28 0.00 - 0.85 K/uL    Eos (Absolute) 0.03 0.00 - 0.51 K/uL    Baso (Absolute) 0.02 0.00 - 0.12 K/uL    Immature Granulocytes (abs) 0.06 0.00 - 0.11 K/uL    NRBC (Absolute) 0.00 K/uL   T.PALLIDUM AB KAREN (SCREENING)    Collection Time: 01/09/25 12:30 PM   Result Value Ref Range    Syphilis, Treponemal Qual Nonreactive Non-Reactive   GLUCOSE 1HR GESTATIONAL    Collection Time: 01/09/25 12:30 PM   Result Value Ref Range    Glucose, Post Dose 132 70 - 139 mg/dL   ANTIBODY SCREEN    Collection Time: 01/09/25 12:30 PM   Result Value Ref Range    Antibody Screen Scrn NEG    TSH WITH REFLEX TO FT4    Collection Time: 03/17/25  4:13 PM   Result Value Ref Range    TSH 1.950 0.380 - 5.330 uIU/mL   Hold Blood Bank Specimen (Not Tested)    Collection Time: 03/27/25  6:05 AM   Result Value Ref Range    Holding Tube - Bb DONE    CBC with Differential    Collection Time: 03/27/25  6:05 AM   Result Value Ref Range    WBC 9.4 4.8 - 10.8 K/uL    RBC 4.23 4.20 - 5.40 M/uL    Hemoglobin 11.7 (L) 12.0 - 16.0 g/dL    Hematocrit 35.3 (L) 37.0 - 47.0 %    MCV 83.5 81.4 - 97.8 fL    MCH 27.7 27.0 - 33.0 pg    MCHC 33.1 32.2 - 35.5 g/dL    RDW 44.2 35.9 - 50.0 fL    Platelet Count 130 (L) 164 - 446 K/uL    MPV  11.8 9.0 - 12.9 fL    Neutrophils-Polys 71.90 44.00 - 72.00 %    Lymphocytes 22.60 22.00 - 41.00 %    Monocytes 4.40 0.00 - 13.40 %    Eosinophils 0.40 0.00 - 6.90 %    Basophils 0.20 0.00 - 1.80 %    Immature Granulocytes 0.50 0.00 - 0.90 %    Nucleated RBC 0.00 0.00 - 0.20 /100 WBC    Neutrophils (Absolute) 6.78 1.82 - 7.42 K/uL    Lymphs (Absolute) 2.13 1.00 - 4.80 K/uL    Monos (Absolute) 0.42 0.00 - 0.85 K/uL    Eos (Absolute) 0.04 0.00 - 0.51 K/uL    Baso (Absolute) 0.02 0.00 - 0.12 K/uL    Immature Granulocytes (abs) 0.05 0.00 - 0.11 K/uL    NRBC (Absolute) 0.00 K/uL   T.PALLIDUM AB KAREN (Syphilis)    Collection Time: 25  6:05 AM   Result Value Ref Range    Syphilis, Treponemal Qual Non-Reactive Non-Reactive        Assessment:   Carolina Medrano at 39w0d  Labor status: Not in labor.  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      Plan:     Admit to L&D  GBS negative  Discussed with the patient indications for  delivery. The patient voiced understanding of indications for  section at this time.    Discussed with the patient the risks of  delivery. The risks include infection, bleeding, scarring, damage to other organs in the area of operation. Specifically organs that can be damaged are bowel, bladder, ureters. I also discussed with the patient the risk of  wound infection and wound breakdown. We discussed that these risks are greater in people that have diabetes or obesity. I also discussed the risk of emergency blood transfusion during procedure as well as emergency hysterectomy during procedure.      Patient had the opportunity to ask questions regarding procedures. All questions answered to the patient's satisfaction.  Proceed with  delivery

## 2025-03-27 NOTE — PROGRESS NOTES
1445 received report from Carole RN care assumed at this time. PT education on updated POC PT verbalized understanding. Pt up and ambulated to the restroom with a steady gait No assistance required, Pericare provided. 40mL/HR urine output noted.

## 2025-03-27 NOTE — ANESTHESIA POSTPROCEDURE EVALUATION
Patient: Carolina Medrano    Procedure Summary       Date: 25 Room / Location: [facility] OR 01 / SURGERY LABOR AND DELIVERY    Anesthesia Start: 729 Anesthesia Stop: 831    Procedure: REPEAT  SECTION (Abdomen) Diagnosis:        delivery delivered      (same, obinna)    Surgeons: Sarah Kang M.D. Responsible Provider: Camilo Martin M.D.    Anesthesia Type: spinal ASA Status: 2            Final Anesthesia Type: spinal  Last vitals  BP   Blood Pressure: 109/53    Temp   36.3 °C (97.4 °F)    Pulse   69   Resp   19    SpO2   96 %      Anesthesia Post Evaluation    Patient location during evaluation: PACU  Patient participation: complete - patient participated  Level of consciousness: awake and alert  Pain score: 0    Airway patency: patent  Anesthetic complications: no  Cardiovascular status: hemodynamically stable  Respiratory status: acceptable  Hydration status: euvolemic    PONV: none          No notable events documented.

## 2025-03-27 NOTE — PROGRESS NOTES
1045 - Report received from KRIS Hager. [facility] running Pitocin at 125 cc/hr, dominguez draining to gravity. Whiteboards updated, POC discussed. VSS. Lochia scant/ fundus firm. Pt reports adequate pain control. Bands verified matching baby. Call light within reach. Patient encouraged to call with any needs and or concerns.   7022 - Report given to KRIS Priest. Care transferred.

## 2025-03-27 NOTE — ANESTHESIA PROCEDURE NOTES
Spinal Block    Date/Time: 3/27/2025 7:29 AM    Performed by: Camilo Martin M.D.  Authorized by: Camilo Martin M.D.    Patient Location:  OR  Start Time:  3/27/2025 7:29 AM  End Time:  3/27/2025 7:36 AM  Reason for Block: primary anesthetic    patient identified, IV checked, site marked, risks and benefits discussed, surgical consent, monitors and equipment checked, pre-op evaluation and timeout performed    Patient Position:  Sitting  Prep: ChloraPrep, patient draped and sterile technique    Monitoring:  Blood pressure, continuous pulse oximetry and heart rate  Approach:  Midline  Location:  L3-4  Injection Technique:  Single-shot  Skin infiltration:  Lidocaine  Strength:  1%  Dose:  3ml  Needle Type:  Pencan  Needle Gauge:  25 G  CSF flowing pre/post injection:  Yes  Sensory Level:  T4   Success on 2nd pass at same level  No heme  Normal resistance on injection  CSF flowing pre and post injection  No evidence of complications

## 2025-03-27 NOTE — PROGRESS NOTES
"62-40\" abdominal binder sent to tube station 32.    Contact traction for any questions or concerns.   "

## 2025-03-28 ENCOUNTER — PHARMACY VISIT (OUTPATIENT)
Dept: PHARMACY | Facility: MEDICAL CENTER | Age: 40
End: 2025-03-28
Payer: COMMERCIAL

## 2025-03-28 VITALS
WEIGHT: 230 LBS | TEMPERATURE: 98.2 F | OXYGEN SATURATION: 98 % | SYSTOLIC BLOOD PRESSURE: 128 MMHG | BODY MASS INDEX: 36.1 KG/M2 | DIASTOLIC BLOOD PRESSURE: 72 MMHG | HEART RATE: 68 BPM | HEIGHT: 67 IN | RESPIRATION RATE: 15 BRPM

## 2025-03-28 PROBLEM — R05.8 POST-VIRAL COUGH SYNDROME: Status: RESOLVED | Noted: 2025-02-28 | Resolved: 2025-03-28

## 2025-03-28 PROBLEM — Z34.90 PREGNANCY: Status: RESOLVED | Noted: 2022-11-10 | Resolved: 2025-03-28

## 2025-03-28 PROCEDURE — A9270 NON-COVERED ITEM OR SERVICE: HCPCS | Performed by: ANESTHESIOLOGY

## 2025-03-28 PROCEDURE — A9270 NON-COVERED ITEM OR SERVICE: HCPCS | Performed by: OBSTETRICS & GYNECOLOGY

## 2025-03-28 PROCEDURE — 700102 HCHG RX REV CODE 250 W/ 637 OVERRIDE(OP): Performed by: OBSTETRICS & GYNECOLOGY

## 2025-03-28 PROCEDURE — RXMED WILLOW AMBULATORY MEDICATION CHARGE: Performed by: OBSTETRICS & GYNECOLOGY

## 2025-03-28 PROCEDURE — 700102 HCHG RX REV CODE 250 W/ 637 OVERRIDE(OP): Performed by: ANESTHESIOLOGY

## 2025-03-28 PROCEDURE — 700111 HCHG RX REV CODE 636 W/ 250 OVERRIDE (IP): Performed by: ANESTHESIOLOGY

## 2025-03-28 RX ORDER — OXYCODONE HYDROCHLORIDE 5 MG/1
5 TABLET ORAL EVERY 4 HOURS PRN
Qty: 20 TABLET | Refills: 0 | Status: SHIPPED | OUTPATIENT
Start: 2025-03-28 | End: 2025-04-04

## 2025-03-28 RX ORDER — PSEUDOEPHEDRINE HCL 30 MG
100 TABLET ORAL 2 TIMES DAILY PRN
Qty: 60 CAPSULE | Refills: 1 | Status: SHIPPED | OUTPATIENT
Start: 2025-03-28

## 2025-03-28 RX ORDER — IBUPROFEN 800 MG/1
800 TABLET, FILM COATED ORAL EVERY 8 HOURS
Qty: 30 TABLET | Refills: 1 | Status: SHIPPED | OUTPATIENT
Start: 2025-03-28

## 2025-03-28 RX ADMIN — ACETAMINOPHEN 1000 MG: 500 TABLET ORAL at 11:30

## 2025-03-28 RX ADMIN — KETOROLAC TROMETHAMINE 15 MG: 15 INJECTION, SOLUTION INTRAMUSCULAR; INTRAVENOUS at 05:34

## 2025-03-28 RX ADMIN — ACETAMINOPHEN 1000 MG: 500 TABLET ORAL at 05:33

## 2025-03-28 RX ADMIN — LEVOTHYROXINE SODIUM 175 MCG: 0.17 TABLET ORAL at 05:33

## 2025-03-28 RX ADMIN — OMEPRAZOLE 20 MG: 20 CAPSULE, DELAYED RELEASE ORAL at 05:32

## 2025-03-28 RX ADMIN — FLUTICASONE FUROATE, UMECLIDINIUM BROMIDE AND VILANTEROL TRIFENATATE 1 PUFF: 200; 62.5; 25 POWDER RESPIRATORY (INHALATION) at 05:34

## 2025-03-28 RX ADMIN — DOCUSATE SODIUM 100 MG: 100 CAPSULE, LIQUID FILLED ORAL at 11:44

## 2025-03-28 RX ADMIN — KETOROLAC TROMETHAMINE 15 MG: 15 INJECTION, SOLUTION INTRAMUSCULAR; INTRAVENOUS at 11:30

## 2025-03-28 RX ADMIN — MONTELUKAST 10 MG: 10 TABLET, FILM COATED ORAL at 05:32

## 2025-03-28 RX ADMIN — OXYCODONE 5 MG: 5 TABLET ORAL at 02:05

## 2025-03-28 RX ADMIN — OXYCODONE HYDROCHLORIDE 10 MG: 10 TABLET ORAL at 11:44

## 2025-03-28 ASSESSMENT — EDINBURGH POSTNATAL DEPRESSION SCALE (EPDS)
I HAVE LOOKED FORWARD WITH ENJOYMENT TO THINGS: AS MUCH AS I EVER DID
I HAVE BEEN SO UNHAPPY THAT I HAVE HAD DIFFICULTY SLEEPING: NOT AT ALL
THE THOUGHT OF HARMING MYSELF HAS OCCURRED TO ME: NEVER
I HAVE BEEN SO UNHAPPY THAT I HAVE BEEN CRYING: NO, NEVER
I HAVE BEEN ABLE TO LAUGH AND SEE THE FUNNY SIDE OF THINGS: AS MUCH AS I ALWAYS COULD
THINGS HAVE BEEN GETTING ON TOP OF ME: NO, I HAVE BEEN COPING AS WELL AS EVER
I HAVE BLAMED MYSELF UNNECESSARILY WHEN THINGS WENT WRONG: NOT VERY OFTEN
I HAVE BEEN ANXIOUS OR WORRIED FOR NO GOOD REASON: NO, NOT AT ALL
I HAVE FELT SCARED OR PANICKY FOR NO GOOD REASON: NO, NOT AT ALL
I HAVE FELT SAD OR MISERABLE: NO, NOT AT ALL

## 2025-03-28 ASSESSMENT — PAIN DESCRIPTION - PAIN TYPE
TYPE: SURGICAL PAIN

## 2025-03-28 NOTE — CARE PLAN
The patient is Stable - Low risk of patient condition declining or worsening    Shift Goals  Clinical Goals: Pain managment, VSS  Patient Goals: Rest  Family Goals: Support    Progress made toward(s) clinical / shift goals:  Pain well controlled with PRN and scheduled medications administered. Pt is able to ambulate and care for self and infant without difficulty. Lochia light with no clots expressed. Pt performing pad changes independently with all self care. No s/s of infection present. Call light within reach, pt educated on POC.     Patient is not progressing towards the following goals:

## 2025-03-28 NOTE — DISCHARGE SUMMARY
Discharge Summary:      Carolina Medrano    Admit Date:   3/27/2025  Discharge Date:  3/28/2025     Admitting diagnosis:  Indication for care in labor or delivery [O75.9]  Discharge Diagnosis: Status post  for repeat.  Pregnancy Complications: none  Tubal Ligation:  no        History:  Past Medical History:   Diagnosis Date    Asthma     Pain 2017    abd., 1/10    Pregnancy 11/10/2022    7 weeks pregnant  Has appt to establish with OB soon  +N/V, taking pyridoxine and doxylamine for sx       Thyroid disease      OB History    Para Term  AB Living   2 2 2 0 0 2   SAB IAB Ectopic Molar Multiple Live Births   0 0 0  0 2      # Outcome Date GA Lbr Abhay/2nd Weight Sex Type Anes PTL Lv   2 Term 25 39w0d  3.45 kg (7 lb 9.7 oz) F CS-LTranv Spinal N LINDA   1 Term 23 37w0d  3.62 kg (7 lb 15.7 oz) F CS-LTranv Spinal N LINDA        Shellfish allergy and Iodine  Patient Active Problem List    Diagnosis Date Noted    Indication for care in labor or delivery 2025    Severe persistent asthma without complication 2025    BMI 35.0-35.9,adult 2024    Encounter for infertility 2024    S/P  section 2023    Acute post-operative pain 2023    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 , now para 2, was admitted with the above mentioned diagnosis, underwent A REPEAT C/SECTION ON 3/28/25. Patient postpartum course was unremarkable, with progressive advancement in diet , ambulation and toleration of oral analgesia. Patient without complaints today and desires discharge.      Vitals:    25 2200 25 2300 25 0200 25 0600   BP: 121/68  130/77 128/72   Pulse: 78 72 76 68   Resp: 16 17 16 15   Temp: 36.4 °C (97.5 °F)  36.2 °C (97.1 °F)  36.8 °C (98.2 °F)   TempSrc: Temporal  Temporal Temporal   SpO2: 98% 97% 98% 98%   Weight:       Height:           Current Facility-Administered Medications   Medication Dose    oxytocin (Pitocin) infusion bolus (for post delivery)  20 Units    Followed by    oxytocin (Pitocin) infusion (for post delivery)  125 mL/hr    oxytocin (Pitocin) injection 10 Units  10 Units    fluticasone-umeclidinium-vilanterol (Trelegy Ellipta) 200-62.5-25 mcg/act inhaler 1 Puff  1 Puff    albuterol inhaler 2 Puff  2 Puff    [START ON 3/29/2025] levothyroxine (Synthroid) tablet 150 mcg  150 mcg    levothyroxine (Synthroid) tablet 175 mcg  175 mcg    omeprazole (PriLOSEC) capsule 20 mg  20 mg    lactated ringers infusion  2,000 mL    ibuprofen (Motrin) tablet 800 mg  800 mg    Followed by    [START ON 3/31/2025] ibuprofen (Motrin) tablet 800 mg  800 mg    acetaminophen (Tylenol) tablet 1,000 mg  1,000 mg    Followed by    [START ON 3/31/2025] acetaminophen (Tylenol) tablet 1,000 mg  1,000 mg    oxyCODONE immediate-release (Roxicodone) tablet 5 mg  5 mg    oxyCODONE immediate release (Roxicodone) tablet 10 mg  10 mg    ondansetron (Zofran) syringe/vial injection 4 mg  4 mg    Or    ondansetron (Zofran ODT) dispertab 4 mg  4 mg    diphenhydrAMINE (Benadryl) tablet/capsule 25 mg  25 mg    Or    diphenhydrAMINE (Benadryl) injection 25 mg  25 mg    docusate sodium (Colace) capsule 100 mg  100 mg    tetanus-dipth-acell pertussis (Tdap) inj 0.5 mL  0.5 mL    bisacodyl (Dulcolax) suppository 10 mg  10 mg    magnesium hydroxide (Milk Of Magnesia) suspension 30 mL  30 mL    prenatal plus vitamin (Stuartnatal 1+1) 27-1 MG tablet 1 Tablet  1 Tablet    simethicone (Mylicon) chewable tablet 125 mg  125 mg    calcium carbonate (Tums) chewable tab 1,000 mg  1,000 mg    lactated ringers infusion      ketorolac (Toradol) 15 MG/ML injection 15 mg  15 mg    oxyCODONE immediate-release (Roxicodone) tablet 5 mg  5 mg    oxyCODONE immediate release (Roxicodone)  tablet 10 mg  10 mg    HYDROmorphone (Dilaudid) injection 0.2 mg  0.2 mg    HYDROmorphone (Dilaudid) injection 0.4 mg  0.4 mg    ePHEDrine injection 10 mg  10 mg    ondansetron (Zofran) syringe/vial injection 4 mg  4 mg    diphenhydrAMINE (Benadryl) injection 12.5 mg  12.5 mg    diphenhydrAMINE (Benadryl) injection 12.5 mg  12.5 mg    Or    diphenhydrAMINE (Benadryl) injection 25 mg  25 mg    Or    naloxone HCl (Narcan) 20 mg in  mL infusion  0.4 mg/hr    montelukast (Singulair) tablet 10 mg  10 mg       Exam:  See today's progress note for exam     Labs:  Recent Labs     03/27/25  0605 03/27/25  1522   WBC 9.4 15.3*   RBC 4.23 4.18*   HEMOGLOBIN 11.7* 11.7*   HEMATOCRIT 35.3* 36.7*   MCV 83.5 87.8   MCH 27.7 28.0   MCHC 33.1 31.9*   RDW 44.2 46.3   PLATELETCT 130* 128*   MPV 11.8 12.8        Activity:   Discharge to home  Pelvic Rest x 6 weeks    Assessment:postop day 1 s/p repeat c/section  Follow up: .Jorge Luis 1 week for incision check.      Discharge Meds:   Current Outpatient Medications   Medication Sig Dispense Refill    docusate sodium 100 MG Cap Take 100 mg by mouth 2 times a day as needed for Constipation. 60 Capsule 1    ibuprofen (MOTRIN) 800 MG Tab Take 1 Tablet by mouth every 8 hours. 30 Tablet 1    oxyCODONE immediate-release (ROXICODONE) 5 MG Tab Take 1 Tablet by mouth every four hours as needed for Severe Pain for up to 7 days. 20 Tablet 0       Savannah Dubois M.D.

## 2025-03-28 NOTE — PROGRESS NOTES
Progress Note    Carolinaantonio Childers Mitchell   Post-op day 1  Chief Admitting Dx: Indication for care in labor or delivery [O75.9]  Delivery Type:  for repeat      Subjective:  Ambulating: voiding, tolerating diet, normal lochia. Pain controlled. Breastfeeding/latching well. Desires to go home today if possible    Vitals:    25 2100 25 2200 25 2300 25 0200   BP:  121/68  130/77   Pulse: 76 78 72 76   Resp: 18 16 17 16   Temp:  36.4 °C (97.5 °F)  36.2 °C (97.1 °F)   TempSrc:  Temporal  Temporal   SpO2: 98% 98% 97% 98%   Weight:       Height:           Exam:  Gen: no acute distress  Abdomen:soft nt/nd firm fundus  Inc: c/d/i with postop dressing  Ext: no calf pain rui LE    Labs:   Recent Results (from the past 24 hours)   Hold Blood Bank Specimen (Not Tested)    Collection Time: 25  6:05 AM   Result Value Ref Range    Holding Tube - Bb DONE    CBC with Differential    Collection Time: 25  6:05 AM   Result Value Ref Range    WBC 9.4 4.8 - 10.8 K/uL    RBC 4.23 4.20 - 5.40 M/uL    Hemoglobin 11.7 (L) 12.0 - 16.0 g/dL    Hematocrit 35.3 (L) 37.0 - 47.0 %    MCV 83.5 81.4 - 97.8 fL    MCH 27.7 27.0 - 33.0 pg    MCHC 33.1 32.2 - 35.5 g/dL    RDW 44.2 35.9 - 50.0 fL    Platelet Count 130 (L) 164 - 446 K/uL    MPV 11.8 9.0 - 12.9 fL    Neutrophils-Polys 71.90 44.00 - 72.00 %    Lymphocytes 22.60 22.00 - 41.00 %    Monocytes 4.40 0.00 - 13.40 %    Eosinophils 0.40 0.00 - 6.90 %    Basophils 0.20 0.00 - 1.80 %    Immature Granulocytes 0.50 0.00 - 0.90 %    Nucleated RBC 0.00 0.00 - 0.20 /100 WBC    Neutrophils (Absolute) 6.78 1.82 - 7.42 K/uL    Lymphs (Absolute) 2.13 1.00 - 4.80 K/uL    Monos (Absolute) 0.42 0.00 - 0.85 K/uL    Eos (Absolute) 0.04 0.00 - 0.51 K/uL    Baso (Absolute) 0.02 0.00 - 0.12 K/uL    Immature Granulocytes (abs) 0.05 0.00 - 0.11 K/uL    NRBC (Absolute) 0.00 K/uL   T.PALLIDUM AB KAREN (Syphilis)    Collection Time: 25  6:05 AM   Result Value Ref Range     Syphilis, Treponemal Qual Non-Reactive Non-Reactive   CBC without differential- Once in 8 hours post delivery    Collection Time: 03/27/25  3:22 PM   Result Value Ref Range    WBC 15.3 (H) 4.8 - 10.8 K/uL    RBC 4.18 (L) 4.20 - 5.40 M/uL    Hemoglobin 11.7 (L) 12.0 - 16.0 g/dL    Hematocrit 36.7 (L) 37.0 - 47.0 %    MCV 87.8 81.4 - 97.8 fL    MCH 28.0 27.0 - 33.0 pg    MCHC 31.9 (L) 32.2 - 35.5 g/dL    RDW 46.3 35.9 - 50.0 fL    Platelet Count 128 (L) 164 - 446 K/uL    MPV 12.8 9.0 - 12.9 fL   MOM RHHDN/RHOGAM    Collection Time: 03/27/25  3:22 PM   Result Value Ref Range    Immune Rosetting Test NEG     Number Of Rh Doses Indicated 1        Assessment:  POD 1 s/p repeat c/s  thrombocytopenia    Plan:  Routine postop care  Ambulate  Regular diet  Plan dc home later today if baby able to go home.     Savannah Dubois M.D.

## 2025-03-28 NOTE — LACTATION NOTE
This note was copied from a baby's chart.  40y/o  who delivered a 39 0/7 gestation infant via repeat scheduled c/s. Infant latched soon after delivery and has been latching without difficulty, breastfeeding well. Review lactation education as in assessment. Plan is to discharge to home breastfeeding. She has the Rx for HTH breastpump and is aware of where to . Denies further needs or questions.     Breastfeeding plan:    Feed baby with feeding cues and at least a minimum of 8x/24 hours.  Expect cluster feeding as this is normal during early days of life and growth spurts.  It is not recommended to let baby sleep longer than 4 hours between feedings and if sleepy, place skin to skin to promote feeding interest and milk production.  Baby's usually feed more frequently and longer when skin to skin with mother. It is not recommended to use pacifiers or supplementing with formula until breast feeding and milk production is well established or at least 4 weeks.    Make sure infant is getting enough by ensuring frequent feedings as well as looking for transitioning stools from dark meconium to bright yellow/green seedy loose stools by day 5.     Follow up with PEDS PCP as scheduled for weight checks and to make sure feeding is progressing appropriately.    Call for breastfeeding for 1:1 lactation consultation through  [facility] ([facility]) as needed and attend the BF [facility] without need for appointment.

## 2025-03-28 NOTE — PROGRESS NOTES
Report received from RN. Assessment completed, VSS. Discussed POC, feeding frequency, latch techniques, safe sleeping habits and to call for next feeding for latch assessment. Pt verbalized understanding

## 2025-03-28 NOTE — DISCHARGE INSTRUCTIONS

## 2025-03-28 NOTE — PROGRESS NOTES
-- Discharged instructions provided to patient and verbalized understanding. No further questions at this time.     1503-- bands verified Infant placed in car seat by mom Evaluated baby in car seat and is ready for discharge. Mom and baby escorted by staff to vehicle

## 2025-04-07 ENCOUNTER — OFFICE VISIT (OUTPATIENT)
Dept: SLEEP MEDICINE | Facility: MEDICAL CENTER | Age: 40
End: 2025-04-07
Attending: OBSTETRICS & GYNECOLOGY
Payer: COMMERCIAL

## 2025-04-07 VITALS
WEIGHT: 214 LBS | SYSTOLIC BLOOD PRESSURE: 126 MMHG | HEIGHT: 67 IN | BODY MASS INDEX: 33.59 KG/M2 | HEART RATE: 75 BPM | DIASTOLIC BLOOD PRESSURE: 82 MMHG | OXYGEN SATURATION: 100 %

## 2025-04-07 DIAGNOSIS — J45.40 MODERATE PERSISTENT ASTHMA WITHOUT COMPLICATION: ICD-10-CM

## 2025-04-07 DIAGNOSIS — J45.50 SEVERE PERSISTENT ASTHMA WITHOUT COMPLICATION: ICD-10-CM

## 2025-04-07 PROCEDURE — 3074F SYST BP LT 130 MM HG: CPT | Performed by: INTERNAL MEDICINE

## 2025-04-07 PROCEDURE — 99213 OFFICE O/P EST LOW 20 MIN: CPT | Performed by: OBSTETRICS & GYNECOLOGY

## 2025-04-07 PROCEDURE — 3079F DIAST BP 80-89 MM HG: CPT | Performed by: INTERNAL MEDICINE

## 2025-04-07 PROCEDURE — 99214 OFFICE O/P EST MOD 30 MIN: CPT | Performed by: INTERNAL MEDICINE

## 2025-04-07 RX ORDER — MONTELUKAST SODIUM 10 MG/1
10 TABLET ORAL DAILY
Qty: 90 TABLET | Refills: 4 | Status: SHIPPED | OUTPATIENT
Start: 2025-04-07

## 2025-04-07 ASSESSMENT — ENCOUNTER SYMPTOMS
COUGH: 0
WHEEZING: 0
SPUTUM PRODUCTION: 0
NAUSEA: 0
HEMOPTYSIS: 0
VOMITING: 0
SHORTNESS OF BREATH: 0

## 2025-04-07 ASSESSMENT — FIBROSIS 4 INDEX: FIB4 SCORE: 1.29

## 2025-04-07 NOTE — ASSESSMENT & PLAN NOTE
Patient doing well post partum   On Singulair and Trelegy    Continue with Trelegy 200/62.5/25 mcg per actuation daily  Continue with DuoNebs as needed  Continue with albuterol as needed  Continue with Singulair  At this time, patient is stable and we will not initiate biologic therapy

## 2025-04-07 NOTE — PROGRESS NOTES
Pulmonary Clinic Note    Chief Complaint:  Chief Complaint   Patient presents with    Follow-Up     Last seen 3/13/25 w/ Dr. Fleming for Severe persistent asthma without complication       HPI:   Carolina Medrano is a very pleasant female never smoker who returns to the pulmonary clinic for follow-up of severe persistent asthma  She has a past medical history notable for hypothyroidism and IgA deficiency.    Eosinophil 50  IgE 2023: FVC 4.07 L, 100%; FEV1 3.73 L, 111%; ratio 92%, no BD response. TLC 5.61 L, 103%; DLCO 138%    3/13/2025  Patient returns for follow-up.  She is 38 weeks pregnant and doing very well from an asthma perspective.  She does need a refill of her Trelegy, and this has significantly improved her symptoms compared to Breztri.  She continues on Singulair and with treatment for GERD.   is planned for 3/27/2025    2025  Patient returns for follow-up visit.  She had her  on 3/27/2025 without any issues.  She reports that she has been doing well postpartum.  She and the baby are both doing well.  She does not have any shortness of breath or cough.  She would like to stay on the Trelegy and Singulair at this time, which I think is reasonable.      Past Medical History:   Diagnosis Date    Asthma     Pain 2017    abd., 1/10    Pregnancy 11/10/2022    7 weeks pregnant  Has appt to establish with OB soon  +N/V, taking pyridoxine and doxylamine for sx       Thyroid disease        Current Outpatient Medications on File Prior to Visit   Medication Sig Dispense Refill    docusate sodium 100 MG Cap Take 100 mg by mouth 2 times a day as needed for Constipation. 60 Capsule 1    ibuprofen (MOTRIN) 800 MG Tab Take 1 Tablet by mouth every 8 hours. 30 Tablet 1    fluticasone-umeclidinium-vilanterol (TRELEGY ELLIPTA) 200-62.5-25 mcg/act inhaler Inhale 1 Puff every day for 30 days. 60 Each 11    fluticasone-umeclidinium-vilanterol (TRELEGY ELLIPTA) 200-62.5-25 mcg/act inhaler  "Inhale 1 Puff every day. 1 Each 11    Prenatal MV-Min-Fe Fum-FA-DHA (PRENATAL 1 PO) Take 1 Tablet by mouth every day.      montelukast (SINGULAIR) 10 MG Tab Take 1 Tablet by mouth every day. 90 Tablet 4    omeprazole (PRILOSEC) 20 MG delayed-release capsule Take 1 capsule two times a day Orally twice a day 30 days (Patient taking differently: Take 20 mg by mouth every day.) 60 Capsule 3    albuterol 108 (90 Base) MCG/ACT Aero Soln inhalation aerosol Inhale 2 Puffs every 6 hours as needed for Shortness of Breath. 8.5 g 11    ipratropium-albuterol (DUONEB) 0.5-2.5 (3) MG/3ML nebulizer solution Take 3 mL by nebulization every four hours as needed for Shortness of Breath (Wheezing). 90 mL 11    levothyroxine (SYNTHROID) 150 MCG Tab Take 1 Tablet by mouth every morning on an empty stomach. 90 Tablet 3    levothyroxine (SYNTHROID) 175 MCG Tab Take 1 Tablet by mouth every morning on an empty stomach. (Patient taking differently: Take 175 mcg by mouth every morning on an empty stomach. Takes 2 days out of week) 30 Tablet 1     No current facility-administered medications on file prior to visit.       Allergies: Shellfish allergy and Iodine    Review of Systems   HENT:  Negative for congestion.    Respiratory:  Negative for cough, hemoptysis, sputum production, shortness of breath and wheezing.    Gastrointestinal:  Negative for nausea and vomiting.   All other systems reviewed and are negative.    Vitals:  /82 (BP Location: Left arm, Patient Position: Sitting, BP Cuff Size: Adult)   Pulse 75   Ht 1.702 m (5' 7\")   Wt 97.1 kg (214 lb)   SpO2 100%     Physical Exam  Vitals and nursing note reviewed.   Constitutional:       General: She is not in acute distress.     Appearance: Normal appearance. She is well-developed. She is not ill-appearing or diaphoretic.      Comments: Very pleasant, as always   Eyes:      General: No scleral icterus.        Right eye: No discharge.         Left eye: No discharge.      " Conjunctiva/sclera: Conjunctivae normal.   Cardiovascular:      Rate and Rhythm: Normal rate and regular rhythm.      Heart sounds: Normal heart sounds. No murmur heard.     No gallop.   Pulmonary:      Effort: Pulmonary effort is normal. No respiratory distress.      Breath sounds: Normal breath sounds. No wheezing or rales.   Skin:     General: Skin is warm.   Neurological:      General: No focal deficit present.      Mental Status: She is alert and oriented to person, place, and time. Mental status is at baseline.      Motor: No abnormal muscle tone.   Psychiatric:         Mood and Affect: Mood normal.         Behavior: Behavior normal.       Laboratory Data:    PFTs as reviewed by me personally show:      Imaging as reviewed by me personally show:        Assessment/Plan:    Problem List Items Addressed This Visit       Severe persistent asthma without complication    Patient doing well post partum   On Singulair and Trelegy    Continue with Trelegy 200/62.5/25 mcg per actuation daily  Continue with DuoNebs as needed  Continue with albuterol as needed  Continue with Singulair  At this time, patient is stable and we will not initiate biologic therapy         Relevant Medications    montelukast (SINGULAIR) 10 MG Tab     Other Visit Diagnoses         Moderate persistent asthma without complication        Relevant Medications    montelukast (SINGULAIR) 10 MG Tab            Return in about 6 months (around 10/7/2025).     Time spent in record review prior to patient arrival, reviewing results, and in face-to-face encounter totaled 30 min, excluding any procedures if performed.    __________  This note was generated using voice recognition software which has a chance of producing errors of grammar and content.  I have made every reasonable attempt to find and correct any errors, but it should be expected that some may not be found prior to finalization of this note.    Adriana Fleming, DO   Pulmonary and Critical  Care

## 2025-04-30 ENCOUNTER — PHARMACY VISIT (OUTPATIENT)
Dept: PHARMACY | Facility: MEDICAL CENTER | Age: 40
End: 2025-04-30
Payer: COMMERCIAL

## 2025-04-30 PROCEDURE — RXMED WILLOW AMBULATORY MEDICATION CHARGE: Performed by: OBSTETRICS & GYNECOLOGY

## 2025-04-30 RX ORDER — CEPHALEXIN 500 MG/1
CAPSULE ORAL
Qty: 28 CAPSULE | Refills: 0 | OUTPATIENT
Start: 2025-04-30

## 2025-04-30 RX ORDER — OXYCODONE HYDROCHLORIDE 5 MG/1
TABLET ORAL
Qty: 12 TABLET | Refills: 0 | OUTPATIENT
Start: 2025-04-30

## 2025-04-30 RX ORDER — IBUPROFEN 800 MG/1
TABLET, FILM COATED ORAL
Qty: 30 TABLET | Refills: 0 | OUTPATIENT
Start: 2025-04-30

## 2025-05-16 ENCOUNTER — PHARMACY VISIT (OUTPATIENT)
Dept: PHARMACY | Facility: MEDICAL CENTER | Age: 40
End: 2025-05-16
Payer: COMMERCIAL

## 2025-05-16 PROCEDURE — RXMED WILLOW AMBULATORY MEDICATION CHARGE: Performed by: OBSTETRICS & GYNECOLOGY

## 2025-05-16 PROCEDURE — RXMED WILLOW AMBULATORY MEDICATION CHARGE: Performed by: INTERNAL MEDICINE

## 2025-05-29 ENCOUNTER — APPOINTMENT (OUTPATIENT)
Dept: LAB | Facility: MEDICAL CENTER | Age: 40
End: 2025-05-29
Payer: COMMERCIAL

## 2025-05-29 ENCOUNTER — PHARMACY VISIT (OUTPATIENT)
Dept: PHARMACY | Facility: MEDICAL CENTER | Age: 40
End: 2025-05-29
Payer: COMMERCIAL

## 2025-05-29 PROCEDURE — RXMED WILLOW AMBULATORY MEDICATION CHARGE: Performed by: OBSTETRICS & GYNECOLOGY

## 2025-05-29 RX ORDER — MEDROXYPROGESTERONE ACETATE 10 MG
10 TABLET ORAL DAILY
Qty: 10 TABLET | Refills: 0 | OUTPATIENT
Start: 2025-05-29

## 2025-05-30 ENCOUNTER — HOSPITAL ENCOUNTER (OUTPATIENT)
Dept: LAB | Facility: MEDICAL CENTER | Age: 40
End: 2025-05-30
Attending: STUDENT IN AN ORGANIZED HEALTH CARE EDUCATION/TRAINING PROGRAM
Payer: COMMERCIAL

## 2025-05-30 LAB
B-HCG SERPL-ACNC: <1 MIU/ML (ref 0–5)
ERYTHROCYTE [DISTWIDTH] IN BLOOD BY AUTOMATED COUNT: 43.1 FL (ref 35.9–50)
HCT VFR BLD AUTO: 40.9 % (ref 37–47)
HGB BLD-MCNC: 13 G/DL (ref 12–16)
MCH RBC QN AUTO: 26.5 PG (ref 27–33)
MCHC RBC AUTO-ENTMCNC: 31.8 G/DL (ref 32.2–35.5)
MCV RBC AUTO: 83.5 FL (ref 81.4–97.8)
PLATELET # BLD AUTO: 194 K/UL (ref 164–446)
PMV BLD AUTO: 11.8 FL (ref 9–12.9)
RBC # BLD AUTO: 4.9 M/UL (ref 4.2–5.4)
TSH SERPL-ACNC: 1.34 UIU/ML (ref 0.38–5.33)
WBC # BLD AUTO: 6.2 K/UL (ref 4.8–10.8)

## 2025-05-30 PROCEDURE — 36415 COLL VENOUS BLD VENIPUNCTURE: CPT

## 2025-05-30 PROCEDURE — 85027 COMPLETE CBC AUTOMATED: CPT

## 2025-05-30 PROCEDURE — 84702 CHORIONIC GONADOTROPIN TEST: CPT

## 2025-05-30 PROCEDURE — 84443 ASSAY THYROID STIM HORMONE: CPT

## 2025-06-09 ENCOUNTER — APPOINTMENT (OUTPATIENT)
Dept: ADMISSIONS | Facility: MEDICAL CENTER | Age: 40
End: 2025-06-09
Attending: OBSTETRICS & GYNECOLOGY
Payer: COMMERCIAL

## 2025-06-11 ENCOUNTER — PRE-ADMISSION TESTING (OUTPATIENT)
Dept: ADMISSIONS | Facility: MEDICAL CENTER | Age: 40
End: 2025-06-11
Attending: OBSTETRICS & GYNECOLOGY
Payer: COMMERCIAL

## 2025-06-11 NOTE — H&P
DATE OF ADMISSION:  2025     PREOPERATIVE DIAGNOSIS:  Suspected retained products of conception.     PLANNED PROCEDURE: D and C hysteroscopy with potential MyoSure removal of   retained products of conception.     HISTORY AND PHYSICAL: This is a 40-year-old G2, P2-0-0-2, who is status post a    section on .  She has been seen for her postoperative and   postpartum evaluations, but she has continued to have irregular bleeding off   and on, on a daily basis since delivery.  She underwent ultrasonography on   , which revealed a 1.3 x 1 cm echogenic mass in the right fundal   endocervical canal.  There was no blood flow noted to this mass, but retained   products of conception could not be ruled out.  The remainder of her   ultrasound was normal.  I discussed with the patient the risks of these   findings and we agreed with the plan of care to undergo hysteroscopic   evaluation of the uterus with potential removal of retained products of   conception.  Currently, the patient states that she is having bleeding on a   daily basis.  Over the last week, it seems to have decreased, but she still   has some small amounts of bleeding.  She denies any pelvic pain, fever,   nausea, vomiting, chest pain, shortness of breath.     PAST MEDICAL HISTORY:  Asthma, Hashimoto's thyroiditis.     PAST SURGICAL HISTORY:  Cholecystectomy in 2013,  x2.     MEDICATIONS:  Inhaled allergy medications, Synthroid 175 mcg twice weekly and   150 mcg 5 times per week.     ALLERGIES:  IODINE, CLINDAMYCIN, AND PENICILLIN.     PHYSICAL EXAMINATION:  GENERAL:  She is a well-developed, well-nourished female.  VITAL SIGNS: Her blood pressure is 120/76.  Her weight was 214 pounds.  HEART:  Regular rate and rhythm.  LUNGS:  Clear.  ABDOMEN:  Nontender, nondistended.  EXTREMITIES:  Negative clubbing, cyanosis, or edema.  Normal external female   genitalia.  PELVIC:  Cervix is normal.  Uterus small, nontender, and normal.   Adnexa   bilaterally nontender.     PERTINENT PREOPERATIVE EVALUATION:  The patient had an ultrasound performed on    that showed the uterus to be 7 x 5 x 6 cm in size with normal ovaries   and a 13 x 10 mm echogenic mass in the right fundus of the endometrial canal,   potentially retained products of conception.     ASSESSMENT AND PLAN:  A 40-year-old , who is status post  on    with suspected retained products of conception given her irregular   bleeding and ultrasound findings.  I discussed with the patient continued   expected management versus surgical intervention, we agreed upon surgical   evaluation via hysteroscopy as well as potential removal of the endometrial   retained products using the MyoSure.  I discussed with the patient risks,   benefits, and alternatives.  We specifically discussed infection, bleeding,   scar, damage to bowel, bladder, ureters, emergency blood transfusion,   emergency hysterectomy.  The patient voiced understanding of all the risks as   described and all her questions were answered to her satisfaction.        ______________________________  MD MEGAN Weeks/KAY    DD:  2025 12:19  DT:  2025 12:46    Job#:  [id number]

## 2025-06-11 NOTE — PREADMIT AVS NOTE
Current Medications   Medication Instructions    ibuprofen (MOTRIN) 800 MG Tab Stop 5 days before surgery    montelukast (SINGULAIR) 10 MG Tab Continue taking as prescribed.         fluticasone-umeclidinium-vilanterol (TRELEGY ELLIPTA) 200-62.5-25 MCG/ACT inhaler Continue taking as prescribed.         Prenatal MV-Min-Fe Fum-FA-DHA (PRENATAL 1 PO) Stop 7 days before surgery    omeprazole (PRILOSEC) 20 MG delayed-release capsule Continue taking as prescribed.    albuterol 108 (90 Base) MCG/ACT Aero Soln inhalation aerosol As needed medication, may take if needed, including morning of procedure     ipratropium-albuterol (DUONEB) 0.5-2.5 (3) MG/3ML nebulizer solution As needed medication, may take if needed, including morning of procedure     levothyroxine (SYNTHROID) 150 MCG Tab Continue taking as prescribed.    levothyroxine (SYNTHROID) 175 MCG Tab Continue taking as prescribed.

## 2025-06-11 NOTE — H&P
DATE OF ADMISSION:  2025     PREOPERATIVE DIAGNOSIS:  Suspected retained products of conception.     PLANNED PROCEDURE: D and C hysteroscopy with potential MyoSure removal of   retained products of conception.     HISTORY AND PHYSICAL: This is a 40-year-old G2, P2-0-0-2, who is status post a    section on .  She has been seen for her postoperative and   postpartum evaluations, but she has continued to have irregular bleeding off   and on, on a daily basis since delivery.  She underwent ultrasonography on   , which revealed a 1.3 x 1 cm echogenic mass in the right fundal   endocervical canal.  There was no blood flow noted to this mass, but retained   products of conception could not be ruled out.  The remainder of her   ultrasound was normal.  I discussed with the patient the risks of these   findings and we agreed with the plan of care to undergo hysteroscopic   evaluation of the uterus with potential removal of retained products of   conception.  Currently, the patient states that she is having bleeding on a   daily basis.  Over the last week, it seems to have decreased, but she still   has some small amounts of bleeding.  She denies any pelvic pain, fever,   nausea, vomiting, chest pain, shortness of breath.     PAST MEDICAL HISTORY:  Asthma, Hashimoto's thyroiditis.     PAST SURGICAL HISTORY:  Cholecystectomy in 2013,  x2.     MEDICATIONS:  Inhaled allergy medications, Synthroid 175 mcg twice weekly and   150 mcg 5 times per week.     ALLERGIES:  IODINE, CLINDAMYCIN, AND PENICILLIN.     PHYSICAL EXAMINATION:  GENERAL:  She is a well-developed, well-nourished female.  VITAL SIGNS: Her blood pressure is 120/76.  Her weight was 214 pounds.  HEART:  Regular rate and rhythm.  LUNGS:  Clear.  ABDOMEN:  Nontender, nondistended.  EXTREMITIES:  Negative clubbing, cyanosis, or edema.  Normal external female   genitalia.  PELVIC:  Cervix is normal.  Uterus small, nontender, and normal.   Adnexa   bilaterally nontender.     PERTINENT PREOPERATIVE EVALUATION:  The patient had an ultrasound performed on    that showed the uterus to be 7 x 5 x 6 cm in size with normal ovaries   and a 13 x 10 mm echogenic mass in the right fundus of the endometrial canal,   potentially retained products of conception.     ASSESSMENT AND PLAN:  A 40-year-old , who is status post  on    with suspected retained products of conception given her irregular   bleeding and ultrasound findings.  I discussed with the patient continued   expected management versus surgical intervention, we agreed upon surgical   evaluation via hysteroscopy as well as potential removal of the endometrial   retained products using the MyoSure.  I discussed with the patient risks,   benefits, and alternatives.  We specifically discussed infection, bleeding,   scar, damage to bowel, bladder, ureters, emergency blood transfusion,   emergency hysterectomy.  The patient voiced understanding of all the risks as   described and all her questions were answered to her satisfaction.        ______________________________  MD MEGAN Weeks/KAY    DD:  2025 12:19  DT:  2025 12:46    Job#:  [id number]

## 2025-06-12 ENCOUNTER — ANESTHESIA (OUTPATIENT)
Dept: SURGERY | Facility: MEDICAL CENTER | Age: 40
End: 2025-06-12
Payer: COMMERCIAL

## 2025-06-12 ENCOUNTER — ANESTHESIA EVENT (OUTPATIENT)
Dept: SURGERY | Facility: MEDICAL CENTER | Age: 40
End: 2025-06-12
Payer: COMMERCIAL

## 2025-06-12 ENCOUNTER — HOSPITAL ENCOUNTER (OUTPATIENT)
Facility: MEDICAL CENTER | Age: 40
End: 2025-06-12
Attending: OBSTETRICS & GYNECOLOGY | Admitting: OBSTETRICS & GYNECOLOGY
Payer: COMMERCIAL

## 2025-06-12 VITALS
DIASTOLIC BLOOD PRESSURE: 74 MMHG | HEIGHT: 67 IN | SYSTOLIC BLOOD PRESSURE: 120 MMHG | WEIGHT: 216.49 LBS | TEMPERATURE: 97 F | RESPIRATION RATE: 16 BRPM | OXYGEN SATURATION: 96 % | HEART RATE: 74 BPM | BODY MASS INDEX: 33.98 KG/M2

## 2025-06-12 LAB
ERYTHROCYTE [DISTWIDTH] IN BLOOD BY AUTOMATED COUNT: 43.6 FL (ref 35.9–50)
HCG UR QL: NEGATIVE
HCT VFR BLD AUTO: 39.9 % (ref 37–47)
HGB BLD-MCNC: 12.9 G/DL (ref 12–16)
MCH RBC QN AUTO: 26.5 PG (ref 27–33)
MCHC RBC AUTO-ENTMCNC: 32.3 G/DL (ref 32.2–35.5)
MCV RBC AUTO: 81.9 FL (ref 81.4–97.8)
PATHOLOGY CONSULT NOTE: NORMAL
PLATELET # BLD AUTO: 176 K/UL (ref 164–446)
PMV BLD AUTO: 10.7 FL (ref 9–12.9)
RBC # BLD AUTO: 4.87 M/UL (ref 4.2–5.4)
WBC # BLD AUTO: 6.6 K/UL (ref 4.8–10.8)

## 2025-06-12 PROCEDURE — 160046 HCHG PACU - 1ST 60 MINS PHASE II: Performed by: OBSTETRICS & GYNECOLOGY

## 2025-06-12 PROCEDURE — 700111 HCHG RX REV CODE 636 W/ 250 OVERRIDE (IP): Mod: JZ | Performed by: ANESTHESIOLOGY

## 2025-06-12 PROCEDURE — 700105 HCHG RX REV CODE 258: Performed by: OBSTETRICS & GYNECOLOGY

## 2025-06-12 PROCEDURE — 160025 RECOVERY II MINUTES (STATS): Performed by: OBSTETRICS & GYNECOLOGY

## 2025-06-12 PROCEDURE — 160048 HCHG OR STATISTICAL LEVEL 1-5: Performed by: OBSTETRICS & GYNECOLOGY

## 2025-06-12 PROCEDURE — 160041 HCHG SURGERY MINUTES - EA ADDL 1 MIN LEVEL 4: Performed by: OBSTETRICS & GYNECOLOGY

## 2025-06-12 PROCEDURE — 160015 HCHG STAT PREOP MINUTES: Performed by: OBSTETRICS & GYNECOLOGY

## 2025-06-12 PROCEDURE — 81025 URINE PREGNANCY TEST: CPT

## 2025-06-12 PROCEDURE — 700101 HCHG RX REV CODE 250: Performed by: ANESTHESIOLOGY

## 2025-06-12 PROCEDURE — 160029 HCHG SURGERY MINUTES - 1ST 30 MINS LEVEL 4: Performed by: OBSTETRICS & GYNECOLOGY

## 2025-06-12 PROCEDURE — 160191 HCHG ANESTHESIA STANDARD: Performed by: OBSTETRICS & GYNECOLOGY

## 2025-06-12 PROCEDURE — 88305 TISSUE EXAM BY PATHOLOGIST: CPT | Performed by: PATHOLOGY

## 2025-06-12 PROCEDURE — 85027 COMPLETE CBC AUTOMATED: CPT

## 2025-06-12 PROCEDURE — 160002 HCHG RECOVERY MINUTES (STAT): Performed by: OBSTETRICS & GYNECOLOGY

## 2025-06-12 PROCEDURE — 36415 COLL VENOUS BLD VENIPUNCTURE: CPT

## 2025-06-12 PROCEDURE — 700102 HCHG RX REV CODE 250 W/ 637 OVERRIDE(OP): Performed by: ANESTHESIOLOGY

## 2025-06-12 PROCEDURE — 88305 TISSUE EXAM BY PATHOLOGIST: CPT | Mod: 26 | Performed by: PATHOLOGY

## 2025-06-12 PROCEDURE — 700111 HCHG RX REV CODE 636 W/ 250 OVERRIDE (IP): Mod: JZ

## 2025-06-12 PROCEDURE — A9270 NON-COVERED ITEM OR SERVICE: HCPCS | Performed by: ANESTHESIOLOGY

## 2025-06-12 PROCEDURE — 160193 HCHG PACU STANDARD - 1ST 60 MINS: Performed by: OBSTETRICS & GYNECOLOGY

## 2025-06-12 RX ORDER — OXYCODONE HCL 5 MG/5 ML
5 SOLUTION, ORAL ORAL
Status: DISCONTINUED | OUTPATIENT
Start: 2025-06-12 | End: 2025-06-12 | Stop reason: HOSPADM

## 2025-06-12 RX ORDER — LABETALOL HYDROCHLORIDE 5 MG/ML
5 INJECTION, SOLUTION INTRAVENOUS
Status: DISCONTINUED | OUTPATIENT
Start: 2025-06-12 | End: 2025-06-12 | Stop reason: HOSPADM

## 2025-06-12 RX ORDER — HYDROMORPHONE HYDROCHLORIDE 1 MG/ML
0.4 INJECTION, SOLUTION INTRAMUSCULAR; INTRAVENOUS; SUBCUTANEOUS
Status: DISCONTINUED | OUTPATIENT
Start: 2025-06-12 | End: 2025-06-12 | Stop reason: HOSPADM

## 2025-06-12 RX ORDER — METHYLERGONOVINE MALEATE 0.2 MG/ML
INJECTION INTRAVENOUS
Status: DISCONTINUED
Start: 2025-06-12 | End: 2025-06-12 | Stop reason: HOSPADM

## 2025-06-12 RX ORDER — HALOPERIDOL 5 MG/ML
1 INJECTION INTRAMUSCULAR
Status: DISCONTINUED | OUTPATIENT
Start: 2025-06-12 | End: 2025-06-12 | Stop reason: HOSPADM

## 2025-06-12 RX ORDER — HYDRALAZINE HYDROCHLORIDE 20 MG/ML
5 INJECTION INTRAMUSCULAR; INTRAVENOUS
Status: DISCONTINUED | OUTPATIENT
Start: 2025-06-12 | End: 2025-06-12 | Stop reason: HOSPADM

## 2025-06-12 RX ORDER — HYDROMORPHONE HYDROCHLORIDE 1 MG/ML
0.2 INJECTION, SOLUTION INTRAMUSCULAR; INTRAVENOUS; SUBCUTANEOUS
Status: DISCONTINUED | OUTPATIENT
Start: 2025-06-12 | End: 2025-06-12 | Stop reason: HOSPADM

## 2025-06-12 RX ORDER — SODIUM CHLORIDE, SODIUM LACTATE, POTASSIUM CHLORIDE, CALCIUM CHLORIDE 600; 310; 30; 20 MG/100ML; MG/100ML; MG/100ML; MG/100ML
INJECTION, SOLUTION INTRAVENOUS CONTINUOUS
Status: DISCONTINUED | OUTPATIENT
Start: 2025-06-12 | End: 2025-06-12 | Stop reason: HOSPADM

## 2025-06-12 RX ORDER — DIPHENHYDRAMINE HYDROCHLORIDE 50 MG/ML
12.5 INJECTION, SOLUTION INTRAMUSCULAR; INTRAVENOUS
Status: DISCONTINUED | OUTPATIENT
Start: 2025-06-12 | End: 2025-06-12 | Stop reason: HOSPADM

## 2025-06-12 RX ORDER — ONDANSETRON 2 MG/ML
4 INJECTION INTRAMUSCULAR; INTRAVENOUS
Status: COMPLETED | OUTPATIENT
Start: 2025-06-12 | End: 2025-06-12

## 2025-06-12 RX ORDER — HYDROMORPHONE HYDROCHLORIDE 1 MG/ML
0.1 INJECTION, SOLUTION INTRAMUSCULAR; INTRAVENOUS; SUBCUTANEOUS
Status: DISCONTINUED | OUTPATIENT
Start: 2025-06-12 | End: 2025-06-12 | Stop reason: HOSPADM

## 2025-06-12 RX ORDER — DEXAMETHASONE SODIUM PHOSPHATE 4 MG/ML
INJECTION, SOLUTION INTRA-ARTICULAR; INTRALESIONAL; INTRAMUSCULAR; INTRAVENOUS; SOFT TISSUE PRN
Status: DISCONTINUED | OUTPATIENT
Start: 2025-06-12 | End: 2025-06-12 | Stop reason: SURG

## 2025-06-12 RX ORDER — CEFAZOLIN SODIUM 1 G/3ML
INJECTION, POWDER, FOR SOLUTION INTRAMUSCULAR; INTRAVENOUS PRN
Status: DISCONTINUED | OUTPATIENT
Start: 2025-06-12 | End: 2025-06-12 | Stop reason: SURG

## 2025-06-12 RX ORDER — ALBUTEROL SULFATE 5 MG/ML
2.5 SOLUTION RESPIRATORY (INHALATION)
Status: DISCONTINUED | OUTPATIENT
Start: 2025-06-12 | End: 2025-06-12 | Stop reason: HOSPADM

## 2025-06-12 RX ORDER — ACETAMINOPHEN 325 MG/1
TABLET ORAL PRN
Status: DISCONTINUED | OUTPATIENT
Start: 2025-06-12 | End: 2025-06-12 | Stop reason: SURG

## 2025-06-12 RX ORDER — ONDANSETRON 2 MG/ML
INJECTION INTRAMUSCULAR; INTRAVENOUS
Status: COMPLETED
Start: 2025-06-12 | End: 2025-06-12

## 2025-06-12 RX ORDER — LIDOCAINE HYDROCHLORIDE 20 MG/ML
INJECTION, SOLUTION EPIDURAL; INFILTRATION; INTRACAUDAL; PERINEURAL PRN
Status: DISCONTINUED | OUTPATIENT
Start: 2025-06-12 | End: 2025-06-12 | Stop reason: SURG

## 2025-06-12 RX ORDER — OXYTOCIN 10 [USP'U]/ML
INJECTION, SOLUTION INTRAMUSCULAR; INTRAVENOUS
Status: DISCONTINUED
Start: 2025-06-12 | End: 2025-06-12 | Stop reason: HOSPADM

## 2025-06-12 RX ORDER — MIDAZOLAM HYDROCHLORIDE 1 MG/ML
1 INJECTION INTRAMUSCULAR; INTRAVENOUS
Status: DISCONTINUED | OUTPATIENT
Start: 2025-06-12 | End: 2025-06-12 | Stop reason: HOSPADM

## 2025-06-12 RX ORDER — PROMETHAZINE HYDROCHLORIDE 12.5 MG/1
12.5 SUPPOSITORY RECTAL EVERY 4 HOURS PRN
Status: DISCONTINUED | OUTPATIENT
Start: 2025-06-12 | End: 2025-06-12 | Stop reason: HOSPADM

## 2025-06-12 RX ORDER — KETOROLAC TROMETHAMINE 15 MG/ML
INJECTION, SOLUTION INTRAMUSCULAR; INTRAVENOUS PRN
Status: DISCONTINUED | OUTPATIENT
Start: 2025-06-12 | End: 2025-06-12 | Stop reason: SURG

## 2025-06-12 RX ORDER — MISOPROSTOL 200 UG/1
TABLET ORAL
Status: DISCONTINUED
Start: 2025-06-12 | End: 2025-06-12 | Stop reason: HOSPADM

## 2025-06-12 RX ORDER — OXYCODONE HCL 5 MG/5 ML
10 SOLUTION, ORAL ORAL
Status: DISCONTINUED | OUTPATIENT
Start: 2025-06-12 | End: 2025-06-12 | Stop reason: HOSPADM

## 2025-06-12 RX ORDER — SIMETHICONE 125 MG
125 TABLET,CHEWABLE ORAL 3 TIMES DAILY PRN
Status: DISCONTINUED | OUTPATIENT
Start: 2025-06-12 | End: 2025-06-12 | Stop reason: HOSPADM

## 2025-06-12 RX ORDER — EPHEDRINE SULFATE 50 MG/ML
5 INJECTION, SOLUTION INTRAVENOUS
Status: DISCONTINUED | OUTPATIENT
Start: 2025-06-12 | End: 2025-06-12 | Stop reason: HOSPADM

## 2025-06-12 RX ORDER — MIDAZOLAM HYDROCHLORIDE 1 MG/ML
INJECTION INTRAMUSCULAR; INTRAVENOUS PRN
Status: DISCONTINUED | OUTPATIENT
Start: 2025-06-12 | End: 2025-06-12 | Stop reason: SURG

## 2025-06-12 RX ORDER — ACETAMINOPHEN 500 MG
1000 TABLET ORAL ONCE
Status: DISCONTINUED | OUTPATIENT
Start: 2025-06-12 | End: 2025-06-12 | Stop reason: HOSPADM

## 2025-06-12 RX ORDER — SCOPOLAMINE 1 MG/3D
1 PATCH, EXTENDED RELEASE TRANSDERMAL
Status: DISCONTINUED | OUTPATIENT
Start: 2025-06-12 | End: 2025-06-12 | Stop reason: HOSPADM

## 2025-06-12 RX ORDER — ONDANSETRON 2 MG/ML
INJECTION INTRAMUSCULAR; INTRAVENOUS PRN
Status: DISCONTINUED | OUTPATIENT
Start: 2025-06-12 | End: 2025-06-12 | Stop reason: SURG

## 2025-06-12 RX ADMIN — KETOROLAC TROMETHAMINE 15 MG: 15 INJECTION, SOLUTION INTRAMUSCULAR; INTRAVENOUS at 12:10

## 2025-06-12 RX ADMIN — SODIUM CHLORIDE, POTASSIUM CHLORIDE, SODIUM LACTATE AND CALCIUM CHLORIDE: 600; 310; 30; 20 INJECTION, SOLUTION INTRAVENOUS at 11:43

## 2025-06-12 RX ADMIN — MIDAZOLAM HYDROCHLORIDE 2 MG: 1 INJECTION, SOLUTION INTRAMUSCULAR; INTRAVENOUS at 11:43

## 2025-06-12 RX ADMIN — ONDANSETRON 4 MG: 2 INJECTION INTRAMUSCULAR; INTRAVENOUS at 12:24

## 2025-06-12 RX ADMIN — PROPOFOL 200 MG: 10 INJECTION, EMULSION INTRAVENOUS at 11:46

## 2025-06-12 RX ADMIN — FENTANYL CITRATE 50 MCG: 50 INJECTION, SOLUTION INTRAMUSCULAR; INTRAVENOUS at 11:56

## 2025-06-12 RX ADMIN — DEXAMETHASONE SODIUM PHOSPHATE 8 MG: 4 INJECTION INTRA-ARTICULAR; INTRALESIONAL; INTRAMUSCULAR; INTRAVENOUS; SOFT TISSUE at 11:50

## 2025-06-12 RX ADMIN — LIDOCAINE HYDROCHLORIDE 100 MG: 20 INJECTION, SOLUTION EPIDURAL; INFILTRATION; INTRACAUDAL; PERINEURAL at 11:46

## 2025-06-12 RX ADMIN — CEFAZOLIN 2 G: 1 INJECTION, POWDER, FOR SOLUTION INTRAMUSCULAR; INTRAVENOUS at 11:43

## 2025-06-12 RX ADMIN — ACETAMINOPHEN 1000 MG: 325 TABLET ORAL at 11:07

## 2025-06-12 RX ADMIN — ONDANSETRON 4 MG: 2 INJECTION INTRAMUSCULAR; INTRAVENOUS at 12:10

## 2025-06-12 ASSESSMENT — PAIN DESCRIPTION - PAIN TYPE
TYPE: SURGICAL PAIN

## 2025-06-12 ASSESSMENT — FIBROSIS 4 INDEX: FIB4 SCORE: 0.88

## 2025-06-12 ASSESSMENT — PAIN SCALES - GENERAL: PAIN_LEVEL: 2

## 2025-06-12 NOTE — ANESTHESIA PREPROCEDURE EVALUATION
Case: [id number] Date/Time: 25 1115    Procedure: HYSTEROSCOPY WITH DILATION AND CURETTAGE    Pre-op diagnosis: RETAINED PRODUCTS OF CONCEPTION, POSTPARTUM    Location: [facility] ROOM 27 / SURGERY SAME DAY [facility]    Surgeons: Sarah Kang M.D.            Relevant Problems   PULMONARY   (positive) Severe persistent asthma without complication      ENDO   (positive) Hypothyroidism due to Hashimoto's thyroiditis      OB   (positive) S/P  section       Physical Exam    Airway   Mallampati: I  TM distance: >3 FB  Neck ROM: full       Cardiovascular - normal exam   Dental    Pulmonary Breath sounds clear to auscultation     Abdominal    Neurological - normal exam                   Anesthesia Plan    ASA 2       Plan - general       Airway plan will be LMA          Induction: intravenous    Postoperative Plan: Postoperative administration of opioids is intended.    Pertinent diagnostic labs and testing reviewed    Informed Consent:    Anesthetic plan and risks discussed with patient.

## 2025-06-12 NOTE — OR NURSING
1235- report from Jade RN, care assumed    1300- pt expresses readiness for discharge. Disconnected from monitors. Pumping    1325- Discharge instructions discussed with pt and pt's . All questions answered. Verbalized understanding.    1320- pt ambulated to bathroom. Able to void without difficulty. Dressed with assistance of     1330- PIV removed with tip intact. Pt escorted out of department in wheelchair with all belongings. Discharged home to responsible adult.

## 2025-06-12 NOTE — OP REPORT
Operative note    Preoperative diagnosis: Suspected retained products of conception  Postoperative diagnosis: Same    Procedure performed: Dilation and curettage with hysteroscopy and removal of suspected products of conception    Surgeon: Dr. Kang  Assistant: None    Anesthesia: Dr. Valadez  Anesthesia: General Endotracheal    Specimens for pathology: Intrauterine contents    Estimated blood loss: None    Intraoperative findings: Normal-appearing intrauterine cavity with a small amount of what appeared to be retained products of conception in the right cornu    Procedure in detail:  After informed consent was obtained the patient was sent to the operating room where general anesthesia was found to be adequate.  She was prepped and draped in dorsal lithotomy position.  A weighted speculum was inserted into the vagina the anterior lip of the cervix was grasped with a single-tooth tenaculum  The cervix was then sequentially dilated to approximately 7 mm at which point a hysteroscope was placed into the uterus.  The uterus was distended with normal saline, the cornu were bilaterally identified confirming intrauterine placement  At that point in the right cornu and the fundus of the uterus there was noted to be a small amount of brownish tissue that would be consistent with retained products of conception.  Using the MyoSure device this was removed without difficulty.  Once the tissue had been removed the instruments were removed from the uterus and vagina.  The sponge lap needle counts were all correct x 2 and the patient will be taken to the recovery room in good condition

## 2025-06-12 NOTE — ANESTHESIA TIME REPORT
Anesthesia Start and Stop Event Times       Date Time Event    6/12/2025 1142 Ready for Procedure     1143 Anesthesia Start     1221 Anesthesia Stop          Responsible Staff  06/12/25      Name Role Begin End    Lashonda Valadez M.D. Anesth 1143 1221          Overtime Reason:  no overtime (within assigned shift)    Comments:

## 2025-06-12 NOTE — OR NURSING
1218: Pt arrived from OR to PACU 23. Connected to monitor. Report received from anesthesia & RN. VSS. Oxygen at 6L via mask with an oral airway in place that was removed upon arrival to PACU. Breaths calm, even, and unlabored.  No signs of pain.     1224: pt medicated per MAR for nausea    1234: pt tolerating PO intake    1235: handoff to April RN

## 2025-06-12 NOTE — ANESTHESIA PROCEDURE NOTES
Airway    Date/Time: 6/12/2025 11:47 AM    Performed by: Lashonda Valadez M.D.  Authorized by: Lashonda Valadez M.D.    Location:  OR  Urgency:  Elective  Indications for Airway Management:  Anesthesia      Spontaneous Ventilation: absent    Sedation Level:  Deep  Preoxygenated: Yes    Mask Difficulty Assessment:  0 - not attempted  Final Airway Type:  Supraglottic airway  Final Supraglottic Airway:  Standard LMA    SGA Size:  3  Number of Attempts at Approach:  1

## 2025-06-12 NOTE — ANESTHESIA POSTPROCEDURE EVALUATION
Patient: Carolina Medrano    Procedure Summary       Date: 06/12/25 Room / Location: [facility] ROOM 27 / SURGERY SAME DAY [facility]    Anesthesia Start: 1143 Anesthesia Stop: 1221    Procedure: HYSTEROSCOPY WITH DILATION AND CURETTAGE (Uterus) Diagnosis: (RETAINED PRODUCTS OF CONCEPTION, POSTPARTUM)    Surgeons: Sarah Kang M.D. Responsible Provider: Lashonda Valadez M.D.    Anesthesia Type: general ASA Status: 2            Final Anesthesia Type: general  Last vitals  BP   Blood Pressure: 119/75    Temp   36.1 °C (97 °F)    Pulse   69   Resp   (!) 24    SpO2   95 %      Anesthesia Post Evaluation    Patient location during evaluation: PACU  Patient participation: complete - patient participated  Level of consciousness: awake and alert  Pain score: 2    Airway patency: patent  Anesthetic complications: no  Cardiovascular status: hemodynamically stable  Respiratory status: acceptable  Hydration status: euvolemic    PONV: none          No notable events documented.     Nurse Pain Score: 2 (NPRS)

## 2025-06-26 PROCEDURE — RXMED WILLOW AMBULATORY MEDICATION CHARGE: Performed by: INTERNAL MEDICINE

## 2025-06-26 PROCEDURE — RXMED WILLOW AMBULATORY MEDICATION CHARGE: Performed by: FAMILY MEDICINE

## 2025-06-26 RX ORDER — LEVOTHYROXINE SODIUM 175 UG/1
175 TABLET ORAL
Qty: 90 TABLET | Refills: 0 | Status: SHIPPED | OUTPATIENT
Start: 2025-06-26

## 2025-06-29 ENCOUNTER — PHARMACY VISIT (OUTPATIENT)
Dept: PHARMACY | Facility: MEDICAL CENTER | Age: 40
End: 2025-06-29
Payer: COMMERCIAL

## 2025-07-03 ENCOUNTER — RESEARCH ENCOUNTER (OUTPATIENT)
Dept: SLEEP MEDICINE | Facility: MEDICAL CENTER | Age: 40
End: 2025-07-03
Payer: COMMERCIAL

## 2025-08-05 ENCOUNTER — PHARMACY VISIT (OUTPATIENT)
Dept: PHARMACY | Facility: MEDICAL CENTER | Age: 40
End: 2025-08-05
Payer: COMMERCIAL

## 2025-08-05 PROCEDURE — RXMED WILLOW AMBULATORY MEDICATION CHARGE: Performed by: INTERNAL MEDICINE

## 2025-08-05 RX ORDER — LEVOTHYROXINE SODIUM 175 UG/1
175 TABLET ORAL
Qty: 30 TABLET | Refills: 0 | Status: SHIPPED | OUTPATIENT
Start: 2025-08-05

## 2025-08-09 ENCOUNTER — PHARMACY VISIT (OUTPATIENT)
Dept: PHARMACY | Facility: MEDICAL CENTER | Age: 40
End: 2025-08-09
Payer: COMMERCIAL

## 2025-08-09 PROCEDURE — RXOTC WILLOW AMBULATORY OTC CHARGE

## 2025-09-05 ENCOUNTER — APPOINTMENT (OUTPATIENT)
Dept: MEDICAL GROUP | Facility: CLINIC | Age: 40
End: 2025-09-05
Payer: COMMERCIAL

## (undated) DEVICE — SPECIMEN PLASTIC CONVERTOR - (10/CA)

## (undated) DEVICE — MASK AIRWAY SIZE 3 UNIQUE SILICON (10/BX)

## (undated) DEVICE — STAPLER SKIN DISP - (6/BX 10BX/CA) VISISTAT

## (undated) DEVICE — Device

## (undated) DEVICE — SET TUBING AQUILEX OUT-FLOW MYOSURE (10EA/BX)

## (undated) DEVICE — HEAD HOLDER JUNIOR/ADULT

## (undated) DEVICE — SUTURE 3-0 VICRYL PLUS SH - 8X 18 INCH (12/BX)

## (undated) DEVICE — SLEEVE VASO DVT COMPRESSION CALF MED - (10PR/CA)

## (undated) DEVICE — TUBING CLEARLINK DUO-VENT - C-FLO (48EA/CA)

## (undated) DEVICE — ELECTRODE DUAL RETURN W/ CORD - (50/PK)

## (undated) DEVICE — SODIUM CHL IRRIGATION 0.9% 1000ML (12EA/CA)

## (undated) DEVICE — SPONGE GAUZESTER. 2X2 4-PL - (2/PK 50PK/BX 30BX/CS)

## (undated) DEVICE — KIT  I.V. START (100EA/CA)

## (undated) DEVICE — GLOVE BIOGEL SZ 6 PF LATEX - (50EA/BX 4BX/CA)

## (undated) DEVICE — SUTURE 0 36IN PDS + VIO CT-1 (36PK/BX)

## (undated) DEVICE — SUTUREABS02-0 CT1 27IN - (36EA/BX)

## (undated) DEVICE — SUTURE ABSORBABLE MONOFILAMENT VIOLET MONOCRYL CT-1 L36 IN (36EA/BX)

## (undated) DEVICE — PACK MINOR BASIN - (2EA/CA)

## (undated) DEVICE — SUTURE 0 GUT-PLAIN (36PK/BX)

## (undated) DEVICE — SUTURE 0 VICRYL PLUS CT-1 - 36 INCH (36/BX)

## (undated) DEVICE — MAT PATIENT POSITIONING PREVALON (10EA/CA)

## (undated) DEVICE — PAD SANITARY 11IN MAXI IND WRAPPED (12EA/PK 24PK/CA)

## (undated) DEVICE — STERI STRIP COMPOUND BENZOIN - TINCTURE 0.6ML WITH APPLICATOR (40EA/BX)

## (undated) DEVICE — GOWN WARMING STANDARD FLEX - (30/CA)

## (undated) DEVICE — DRAPE LAPAROTOMY T SHEET - (12EA/CA)

## (undated) DEVICE — MASK, LARYNGEAL AIRWAY #4

## (undated) DEVICE — SUTURE GENERAL

## (undated) DEVICE — CANISTER SUCTION 3000ML MECHANICAL FILTER AUTO SHUTOFF MEDI-VAC NONSTERILE LF DISP (40EA/CA)

## (undated) DEVICE — PACK C-SECTION (2EA/CA)

## (undated) DEVICE — WATER IRRIGATION STERILE 1000ML (12EA/CA)

## (undated) DEVICE — SUTURE 1 CHROMIC GUTCT-1 27 (36PK/BX)"

## (undated) DEVICE — SPONGE SURGICAL PVP IODINE L8 IN (20EA/CA)

## (undated) DEVICE — SENSOR SPO2 NEO LNCS ADHESIVE (20/BX) SEE USER NOTES

## (undated) DEVICE — CATHETER IV NON-SAFETY 18 GA X 1 1/4 (50/BX 4BX/CA)

## (undated) DEVICE — CLOSURE SKIN STRIP 1/2 X 4 IN - (STERI STRIP) (50/BX 4BX/CA)

## (undated) DEVICE — ELECTRODE 850 FOAM ADHESIVE - HYDROGEL RADIOTRNSPRNT (50/PK)

## (undated) DEVICE — CANISTER SUCTION 3000ML MECHANICAL FILTER AUTO SHUTOFF MEDI-VAC NONSTERILE LF DISP  (40EA/CA)

## (undated) DEVICE — SET LEADWIRE 5 LEAD BEDSIDE DISPOSABLE ECG (1SET OF 5/EA)

## (undated) DEVICE — SUCTION INSTRUMENT YANKAUER BULBOUS TIP W/O VENT (50EA/CA)

## (undated) DEVICE — SET EXTENSION WITH 2 PORTS (48EA/CA) ***PART #2C8610 IS A SUBSTITUTE*****

## (undated) DEVICE — PENCIL ELECTSURG 10FT HLSTR - WITH BLADE (50EA/CA)

## (undated) DEVICE — BLADE SURGICAL #15 - (50/BX 3BX/CA)

## (undated) DEVICE — TRAY SPINAL ANESTHESIA NON-SAFETY (20/CA)

## (undated) DEVICE — SUTURE 0 ETHIBOND MO6 C/R - (12/BX) 8-18 INCH ETHICON

## (undated) DEVICE — LACTATED RINGERS INJ 1000 ML - (14EA/CA 60CA/PF)

## (undated) DEVICE — NEPTUNE 4 PORT MANIFOLD - (20/PK)

## (undated) DEVICE — PROTECTOR ULNA NERVE - (36PR/CA)

## (undated) DEVICE — TRAY SPINAL ANESTHESIA NON-SAFETY (10/CA)

## (undated) DEVICE — SET TUBING AQUILEX IN-FLOW MYOSURE (10EA/BX)

## (undated) DEVICE — GLOVE BIOGEL SZ 7.5 SURGICAL PF LTX - (50PR/BX 4BX/CA)

## (undated) DEVICE — MASK OXYGEN VNYL ADLT MED CONC WITH 7 FOOT TUBING - (50EA/CA)

## (undated) DEVICE — SUTURE 4-0 VICRYL PLUS FS-2 - 27 INCH (36/BX)

## (undated) DEVICE — RETRACTOR O C SECTION LRY - (5/BX)

## (undated) DEVICE — BLANKET STERILE CHICKIE FOR L&D (100/CA)

## (undated) DEVICE — CANISTER SUCTION RIGID RED 1500CC (40EA/CA)

## (undated) DEVICE — TUBE CONNECTING SUCTION - CLEAR PLASTIC STERILE 72 IN (50EA/CA)

## (undated) DEVICE — BLANKET UNDERBODY FULL ACCES - (5/CA)

## (undated) DEVICE — SOLUTION PLASMA-LYTE PH 7.4 INJ 1000ML (14EA/CA)

## (undated) DEVICE — KIT ROOM DECONTAMINATION

## (undated) DEVICE — DEVICE REMOVAL MYOSURE LITE TISSUE (3EA/BX)

## (undated) DEVICE — SODIUM CHL. IRRIGATION 0.9% 3000ML (4EA/CA 65CA/PF)

## (undated) DEVICE — KIT ANESTHESIA W/CIRCUIT & 3/LT BAG W/FILTER (20EA/CA)

## (undated) DEVICE — SYRINGE 10 ML CONTROL LL (25EA/BX 4BX/CA)

## (undated) DEVICE — MASK ANESTHESIA ADULT  - (100/CA)

## (undated) DEVICE — TRAY FOLEY CATHETER STATLOCK 16FR SURESTEP (10EA/CA)

## (undated) DEVICE — CANNULA O2 COMFORT SOFT EAR ADULT 7 FT TUBING (50/CA)

## (undated) DEVICE — SENSOR OXIMETER ADULT SPO2 RD SET (20EA/BX)

## (undated) DEVICE — ADHESIVE DERMABOND HVD MINI (12EA/BX)

## (undated) DEVICE — CHLORAPREP 26 ML APPLICATOR - ORANGE TINT(25/CA)

## (undated) DEVICE — SUTURE 0 VICRYL PLUS CT-2 - 27 INCH (36/BX)

## (undated) DEVICE — DRESSING POST OP BORDER 4 X 10 (5EA/BX)

## (undated) DEVICE — GLOVE BIOGEL PI ORTHO SZ 7.5 PF LF (40PR/BX)

## (undated) DEVICE — KIT SKIN NOSE AND MOUTH PRE-OP (20/CA)

## (undated) DEVICE — SLEEVE, VASO, THIGH, MED

## (undated) DEVICE — GLOVE BIOGEL INDICATOR SZ 6 SURGICAL PF LTX -(50/BX)

## (undated) DEVICE — NEEDLE NON SAFETY 25 GA X 1 1/2 IN HYPO (100EA/BX)

## (undated) DEVICE — SUTURE 4-0 MONOCRYL PLUS PS-1 - 27 INCH (36/BX)

## (undated) DEVICE — DRESSING TRANSPARENT FILM TEGADERM 4 X 4.75" (50EA/BX)"

## (undated) DEVICE — TOWEL STOP TIMEOUT SAFETY FLAG (40EA/CA)